# Patient Record
Sex: MALE | Race: BLACK OR AFRICAN AMERICAN | NOT HISPANIC OR LATINO | Employment: OTHER | URBAN - METROPOLITAN AREA
[De-identification: names, ages, dates, MRNs, and addresses within clinical notes are randomized per-mention and may not be internally consistent; named-entity substitution may affect disease eponyms.]

---

## 2023-03-14 LAB
ALBUMIN (G/DL) IN SER/PLAS: NORMAL
ANION GAP IN SER/PLAS: NORMAL
CALCIUM (MG/DL) IN SER/PLAS: NORMAL
CARBON DIOXIDE, TOTAL (MMOL/L) IN SER/PLAS: NORMAL
CHLORIDE (MMOL/L) IN SER/PLAS: NORMAL
CREATININE (MG/DL) IN SER/PLAS: NORMAL
ERYTHROCYTE DISTRIBUTION WIDTH (RATIO) BY AUTOMATED COUNT: NORMAL
ERYTHROCYTE MEAN CORPUSCULAR HEMOGLOBIN CONCENTRATION (G/DL) BY AUTOMATED: NORMAL
ERYTHROCYTE MEAN CORPUSCULAR VOLUME (FL) BY AUTOMATED COUNT: NORMAL
ERYTHROCYTES (10*6/UL) IN BLOOD BY AUTOMATED COUNT: NORMAL
GFR FEMALE: NORMAL
GFR MALE: NORMAL
GLUCOSE (MG/DL) IN SER/PLAS: NORMAL
HEMATOCRIT (%) IN BLOOD BY AUTOMATED COUNT: NORMAL
HEMOGLOBIN (G/DL) IN BLOOD: NORMAL
LEUKOCYTES (10*3/UL) IN BLOOD BY AUTOMATED COUNT: NORMAL
NRBC (PER 100 WBCS) BY AUTOMATED COUNT: NORMAL
PHOSPHATE (MG/DL) IN SER/PLAS: NORMAL
PLATELETS (10*3/UL) IN BLOOD AUTOMATED COUNT: NORMAL
POTASSIUM (MMOL/L) IN SER/PLAS: NORMAL
SODIUM (MMOL/L) IN SER/PLAS: NORMAL
TACROLIMUS (NG/ML) IN BLOOD: NORMAL
UREA NITROGEN (MG/DL) IN SER/PLAS: NORMAL

## 2023-03-15 LAB
ALBUMIN (G/DL) IN SER/PLAS: 4.2 G/DL (ref 3.4–5)
ANION GAP IN SER/PLAS: 16 MMOL/L (ref 10–20)
CALCIUM (MG/DL) IN SER/PLAS: 9.9 MG/DL (ref 8.6–10.3)
CARBON DIOXIDE, TOTAL (MMOL/L) IN SER/PLAS: 28 MMOL/L (ref 21–32)
CHLORIDE (MMOL/L) IN SER/PLAS: 98 MMOL/L (ref 98–107)
CREATININE (MG/DL) IN SER/PLAS: 1.81 MG/DL (ref 0.5–1.3)
ERYTHROCYTE DISTRIBUTION WIDTH (RATIO) BY AUTOMATED COUNT: 13.8 % (ref 11.5–14.5)
ERYTHROCYTE MEAN CORPUSCULAR HEMOGLOBIN CONCENTRATION (G/DL) BY AUTOMATED: 30.7 G/DL (ref 32–36)
ERYTHROCYTE MEAN CORPUSCULAR VOLUME (FL) BY AUTOMATED COUNT: 81 FL (ref 80–100)
ERYTHROCYTES (10*6/UL) IN BLOOD BY AUTOMATED COUNT: 5.46 X10E12/L (ref 4.5–5.9)
GFR MALE: 39 ML/MIN/1.73M2
GLUCOSE (MG/DL) IN SER/PLAS: 110 MG/DL (ref 74–99)
HEMATOCRIT (%) IN BLOOD BY AUTOMATED COUNT: 44.3 % (ref 41–52)
HEMOGLOBIN (G/DL) IN BLOOD: 13.6 G/DL (ref 13.5–17.5)
LEUKOCYTES (10*3/UL) IN BLOOD BY AUTOMATED COUNT: 9.7 X10E9/L (ref 4.4–11.3)
PHOSPHATE (MG/DL) IN SER/PLAS: 2.9 MG/DL (ref 2.5–4.9)
PLATELETS (10*3/UL) IN BLOOD AUTOMATED COUNT: 338 X10E9/L (ref 150–450)
POTASSIUM (MMOL/L) IN SER/PLAS: 3.5 MMOL/L (ref 3.5–5.3)
SODIUM (MMOL/L) IN SER/PLAS: 138 MMOL/L (ref 136–145)
TACROLIMUS (NG/ML) IN BLOOD: 4.4 NG/ML (ref 2–15)
UREA NITROGEN (MG/DL) IN SER/PLAS: 19 MG/DL (ref 6–23)

## 2023-03-20 LAB
RBC, URINE: 7 /HPF (ref 0–5)
WBC, URINE: 25 /HPF (ref 0–5)

## 2023-03-23 LAB — URINE CULTURE: ABNORMAL

## 2023-04-10 LAB
ALBUMIN (G/DL) IN SER/PLAS: 4.1 G/DL (ref 3.4–5)
ANION GAP IN SER/PLAS: 16 MMOL/L (ref 10–20)
CALCIUM (MG/DL) IN SER/PLAS: 9.5 MG/DL (ref 8.6–10.3)
CARBON DIOXIDE, TOTAL (MMOL/L) IN SER/PLAS: 28 MMOL/L (ref 21–32)
CHLORIDE (MMOL/L) IN SER/PLAS: 100 MMOL/L (ref 98–107)
CREATININE (MG/DL) IN SER/PLAS: 1.78 MG/DL (ref 0.5–1.3)
ERYTHROCYTE DISTRIBUTION WIDTH (RATIO) BY AUTOMATED COUNT: 14 % (ref 11.5–14.5)
ERYTHROCYTE MEAN CORPUSCULAR HEMOGLOBIN CONCENTRATION (G/DL) BY AUTOMATED: 30.2 G/DL (ref 32–36)
ERYTHROCYTE MEAN CORPUSCULAR VOLUME (FL) BY AUTOMATED COUNT: 81 FL (ref 80–100)
ERYTHROCYTES (10*6/UL) IN BLOOD BY AUTOMATED COUNT: 5.44 X10E12/L (ref 4.5–5.9)
GFR MALE: 39 ML/MIN/1.73M2
GLUCOSE (MG/DL) IN SER/PLAS: 90 MG/DL (ref 74–99)
HEMATOCRIT (%) IN BLOOD BY AUTOMATED COUNT: 44.3 % (ref 41–52)
HEMOGLOBIN (G/DL) IN BLOOD: 13.4 G/DL (ref 13.5–17.5)
LEUKOCYTES (10*3/UL) IN BLOOD BY AUTOMATED COUNT: 8.8 X10E9/L (ref 4.4–11.3)
PHOSPHATE (MG/DL) IN SER/PLAS: 3.7 MG/DL (ref 2.5–4.9)
PLATELETS (10*3/UL) IN BLOOD AUTOMATED COUNT: 364 X10E9/L (ref 150–450)
POTASSIUM (MMOL/L) IN SER/PLAS: 3.6 MMOL/L (ref 3.5–5.3)
SODIUM (MMOL/L) IN SER/PLAS: 140 MMOL/L (ref 136–145)
TACROLIMUS (NG/ML) IN BLOOD: 3.9 NG/ML (ref 2–15)
UREA NITROGEN (MG/DL) IN SER/PLAS: 17 MG/DL (ref 6–23)

## 2023-05-05 LAB
ALBUMIN (G/DL) IN SER/PLAS: 4.1 G/DL (ref 3.4–5)
ANION GAP IN SER/PLAS: 17 MMOL/L (ref 10–20)
CALCIUM (MG/DL) IN SER/PLAS: 9.6 MG/DL (ref 8.6–10.3)
CARBON DIOXIDE, TOTAL (MMOL/L) IN SER/PLAS: 25 MMOL/L (ref 21–32)
CHLORIDE (MMOL/L) IN SER/PLAS: 102 MMOL/L (ref 98–107)
CREATININE (MG/DL) IN SER/PLAS: 1.65 MG/DL (ref 0.5–1.3)
ERYTHROCYTE DISTRIBUTION WIDTH (RATIO) BY AUTOMATED COUNT: 13.9 % (ref 11.5–14.5)
ERYTHROCYTE MEAN CORPUSCULAR HEMOGLOBIN CONCENTRATION (G/DL) BY AUTOMATED: 30.1 G/DL (ref 32–36)
ERYTHROCYTE MEAN CORPUSCULAR VOLUME (FL) BY AUTOMATED COUNT: 80 FL (ref 80–100)
ERYTHROCYTES (10*6/UL) IN BLOOD BY AUTOMATED COUNT: 5.19 X10E12/L (ref 4.5–5.9)
GFR MALE: 43 ML/MIN/1.73M2
GLUCOSE (MG/DL) IN SER/PLAS: 105 MG/DL (ref 74–99)
HEMATOCRIT (%) IN BLOOD BY AUTOMATED COUNT: 41.5 % (ref 41–52)
HEMOGLOBIN (G/DL) IN BLOOD: 12.5 G/DL (ref 13.5–17.5)
LEUKOCYTES (10*3/UL) IN BLOOD BY AUTOMATED COUNT: 9.6 X10E9/L (ref 4.4–11.3)
PHOSPHATE (MG/DL) IN SER/PLAS: 3.4 MG/DL (ref 2.5–4.9)
PLATELETS (10*3/UL) IN BLOOD AUTOMATED COUNT: 340 X10E9/L (ref 150–450)
POTASSIUM (MMOL/L) IN SER/PLAS: 3.7 MMOL/L (ref 3.5–5.3)
SODIUM (MMOL/L) IN SER/PLAS: 140 MMOL/L (ref 136–145)
TACROLIMUS (NG/ML) IN BLOOD: <2 NG/ML (ref 2–15)
UREA NITROGEN (MG/DL) IN SER/PLAS: 18 MG/DL (ref 6–23)

## 2023-05-11 LAB — TACROLIMUS (NG/ML) IN BLOOD: 11.8 NG/ML (ref 2–15)

## 2023-05-18 LAB
ALBUMIN (G/DL) IN SER/PLAS: 4.2 G/DL (ref 3.4–5)
ANION GAP IN SER/PLAS: 15 MMOL/L (ref 10–20)
CALCIUM (MG/DL) IN SER/PLAS: 9.4 MG/DL (ref 8.6–10.3)
CARBON DIOXIDE, TOTAL (MMOL/L) IN SER/PLAS: 26 MMOL/L (ref 21–32)
CHLORIDE (MMOL/L) IN SER/PLAS: 102 MMOL/L (ref 98–107)
CREATININE (MG/DL) IN SER/PLAS: 1.67 MG/DL (ref 0.5–1.3)
ERYTHROCYTE DISTRIBUTION WIDTH (RATIO) BY AUTOMATED COUNT: 14.1 % (ref 11.5–14.5)
ERYTHROCYTE MEAN CORPUSCULAR HEMOGLOBIN CONCENTRATION (G/DL) BY AUTOMATED: 29.7 G/DL (ref 32–36)
ERYTHROCYTE MEAN CORPUSCULAR VOLUME (FL) BY AUTOMATED COUNT: 81 FL (ref 80–100)
ERYTHROCYTES (10*6/UL) IN BLOOD BY AUTOMATED COUNT: 5.19 X10E12/L (ref 4.5–5.9)
GFR MALE: 43 ML/MIN/1.73M2
GLUCOSE (MG/DL) IN SER/PLAS: 105 MG/DL (ref 74–99)
HEMATOCRIT (%) IN BLOOD BY AUTOMATED COUNT: 41.8 % (ref 41–52)
HEMOGLOBIN (G/DL) IN BLOOD: 12.4 G/DL (ref 13.5–17.5)
LEUKOCYTES (10*3/UL) IN BLOOD BY AUTOMATED COUNT: 8.6 X10E9/L (ref 4.4–11.3)
PHOSPHATE (MG/DL) IN SER/PLAS: 4.1 MG/DL (ref 2.5–4.9)
PLATELETS (10*3/UL) IN BLOOD AUTOMATED COUNT: 313 X10E9/L (ref 150–450)
POTASSIUM (MMOL/L) IN SER/PLAS: 3.6 MMOL/L (ref 3.5–5.3)
SODIUM (MMOL/L) IN SER/PLAS: 139 MMOL/L (ref 136–145)
TACROLIMUS (NG/ML) IN BLOOD: 6.8 NG/ML (ref 2–15)
UREA NITROGEN (MG/DL) IN SER/PLAS: 18 MG/DL (ref 6–23)

## 2023-06-05 LAB
ALBUMIN (G/DL) IN SER/PLAS: 4.2 G/DL (ref 3.4–5)
ANION GAP IN SER/PLAS: 14 MMOL/L (ref 10–20)
CALCIUM (MG/DL) IN SER/PLAS: 9.9 MG/DL (ref 8.6–10.3)
CARBON DIOXIDE, TOTAL (MMOL/L) IN SER/PLAS: 26 MMOL/L (ref 21–32)
CHLORIDE (MMOL/L) IN SER/PLAS: 102 MMOL/L (ref 98–107)
CHOLESTEROL (MG/DL) IN SER/PLAS: 165 MG/DL (ref 0–199)
CHOLESTEROL IN HDL (MG/DL) IN SER/PLAS: 45.6 MG/DL
CHOLESTEROL/HDL RATIO: 3.6
CREATININE (MG/DL) IN SER/PLAS: 1.6 MG/DL (ref 0.5–1.3)
ESTIMATED AVERAGE GLUCOSE FOR HBA1C: 131 MG/DL
GFR MALE: 45 ML/MIN/1.73M2
GLUCOSE (MG/DL) IN SER/PLAS: 107 MG/DL (ref 74–99)
HEMOGLOBIN A1C/HEMOGLOBIN TOTAL IN BLOOD: 6.2 %
LDL: 96 MG/DL (ref 0–99)
PHOSPHATE (MG/DL) IN SER/PLAS: 2.8 MG/DL (ref 2.5–4.9)
POTASSIUM (MMOL/L) IN SER/PLAS: 3.4 MMOL/L (ref 3.5–5.3)
SODIUM (MMOL/L) IN SER/PLAS: 139 MMOL/L (ref 136–145)
TACROLIMUS (NG/ML) IN BLOOD: 5.4 NG/ML (ref 2–15)
TRIGLYCERIDE (MG/DL) IN SER/PLAS: 118 MG/DL (ref 0–149)
UREA NITROGEN (MG/DL) IN SER/PLAS: 15 MG/DL (ref 6–23)
VLDL: 24 MG/DL (ref 0–40)

## 2023-06-28 LAB
ALBUMIN (G/DL) IN SER/PLAS: NORMAL
ANION GAP IN SER/PLAS: NORMAL
CALCIUM (MG/DL) IN SER/PLAS: NORMAL
CARBON DIOXIDE, TOTAL (MMOL/L) IN SER/PLAS: NORMAL
CHLORIDE (MMOL/L) IN SER/PLAS: NORMAL
CREATININE (MG/DL) IN SER/PLAS: NORMAL
CREATININE (MG/DL) IN URINE: 131 MG/DL (ref 20–370)
ERYTHROCYTE DISTRIBUTION WIDTH (RATIO) BY AUTOMATED COUNT: NORMAL
ERYTHROCYTE MEAN CORPUSCULAR HEMOGLOBIN CONCENTRATION (G/DL) BY AUTOMATED: NORMAL
ERYTHROCYTE MEAN CORPUSCULAR VOLUME (FL) BY AUTOMATED COUNT: NORMAL
ERYTHROCYTES (10*6/UL) IN BLOOD BY AUTOMATED COUNT: NORMAL
GFR FEMALE: NORMAL
GFR MALE: NORMAL
GLUCOSE (MG/DL) IN SER/PLAS: NORMAL
HEMATOCRIT (%) IN BLOOD BY AUTOMATED COUNT: NORMAL
HEMOGLOBIN (G/DL) IN BLOOD: NORMAL
LEUKOCYTES (10*3/UL) IN BLOOD BY AUTOMATED COUNT: NORMAL
NRBC (PER 100 WBCS) BY AUTOMATED COUNT: NORMAL
PHOSPHATE (MG/DL) IN SER/PLAS: NORMAL
PLATELETS (10*3/UL) IN BLOOD AUTOMATED COUNT: NORMAL
POTASSIUM (MMOL/L) IN SER/PLAS: NORMAL
PROTEIN (MG/DL) IN URINE: 267 MG/DL (ref 5–25)
PROTEIN/CREATININE (MG/MG) IN URINE: 2.04 MG/MG CREAT (ref 0–0.17)
SODIUM (MMOL/L) IN SER/PLAS: NORMAL
TACROLIMUS (NG/ML) IN BLOOD: NORMAL
UREA NITROGEN (MG/DL) IN SER/PLAS: NORMAL

## 2023-07-06 LAB
ALBUMIN (G/DL) IN SER/PLAS: 4 G/DL (ref 3.4–5)
ANION GAP IN SER/PLAS: 14 MMOL/L (ref 10–20)
CALCIUM (MG/DL) IN SER/PLAS: 9.5 MG/DL (ref 8.6–10.3)
CARBON DIOXIDE, TOTAL (MMOL/L) IN SER/PLAS: 25 MMOL/L (ref 21–32)
CHLORIDE (MMOL/L) IN SER/PLAS: 104 MMOL/L (ref 98–107)
CREATININE (MG/DL) IN SER/PLAS: 1.66 MG/DL (ref 0.5–1.3)
ERYTHROCYTE DISTRIBUTION WIDTH (RATIO) BY AUTOMATED COUNT: 14.6 % (ref 11.5–14.5)
ERYTHROCYTE MEAN CORPUSCULAR HEMOGLOBIN CONCENTRATION (G/DL) BY AUTOMATED: 30.7 G/DL (ref 32–36)
ERYTHROCYTE MEAN CORPUSCULAR VOLUME (FL) BY AUTOMATED COUNT: 79 FL (ref 80–100)
ERYTHROCYTES (10*6/UL) IN BLOOD BY AUTOMATED COUNT: 5.02 X10E12/L (ref 4.5–5.9)
GFR MALE: 43 ML/MIN/1.73M2
GLUCOSE (MG/DL) IN SER/PLAS: 111 MG/DL (ref 74–99)
HEMATOCRIT (%) IN BLOOD BY AUTOMATED COUNT: 39.7 % (ref 41–52)
HEMOGLOBIN (G/DL) IN BLOOD: 12.2 G/DL (ref 13.5–17.5)
LEUKOCYTES (10*3/UL) IN BLOOD BY AUTOMATED COUNT: 7.9 X10E9/L (ref 4.4–11.3)
PHOSPHATE (MG/DL) IN SER/PLAS: 3.3 MG/DL (ref 2.5–4.9)
PLATELETS (10*3/UL) IN BLOOD AUTOMATED COUNT: 313 X10E9/L (ref 150–450)
POTASSIUM (MMOL/L) IN SER/PLAS: 4.1 MMOL/L (ref 3.5–5.3)
SODIUM (MMOL/L) IN SER/PLAS: 139 MMOL/L (ref 136–145)
TACROLIMUS (NG/ML) IN BLOOD: 6.8 NG/ML (ref 2–15)
UREA NITROGEN (MG/DL) IN SER/PLAS: 14 MG/DL (ref 6–23)

## 2023-09-27 LAB
ALBUMIN (G/DL) IN SER/PLAS: 4.4 G/DL (ref 3.4–5)
ANION GAP IN SER/PLAS: 15 MMOL/L (ref 10–20)
APPEARANCE, URINE: CLEAR
BILIRUBIN, URINE: NEGATIVE
BLOOD, URINE: NEGATIVE
CALCIUM (MG/DL) IN SER/PLAS: 9.6 MG/DL (ref 8.6–10.3)
CARBON DIOXIDE, TOTAL (MMOL/L) IN SER/PLAS: 25 MMOL/L (ref 21–32)
CHLORIDE (MMOL/L) IN SER/PLAS: 103 MMOL/L (ref 98–107)
COLOR, URINE: YELLOW
CREATININE (MG/DL) IN SER/PLAS: 1.84 MG/DL (ref 0.5–1.3)
CREATININE (MG/DL) IN URINE: 112 MG/DL (ref 20–370)
ERYTHROCYTE DISTRIBUTION WIDTH (RATIO) BY AUTOMATED COUNT: 16.7 % (ref 11.5–14.5)
ERYTHROCYTE MEAN CORPUSCULAR HEMOGLOBIN CONCENTRATION (G/DL) BY AUTOMATED: 28.8 G/DL (ref 32–36)
ERYTHROCYTE MEAN CORPUSCULAR VOLUME (FL) BY AUTOMATED COUNT: 86 FL (ref 80–100)
ERYTHROCYTES (10*6/UL) IN BLOOD BY AUTOMATED COUNT: 4.05 X10E12/L (ref 4.5–5.9)
GFR MALE: 38 ML/MIN/1.73M2
GLUCOSE (MG/DL) IN SER/PLAS: 114 MG/DL (ref 74–99)
GLUCOSE, URINE: NEGATIVE MG/DL
HEMATOCRIT (%) IN BLOOD BY AUTOMATED COUNT: 34.7 % (ref 41–52)
HEMOGLOBIN (G/DL) IN BLOOD: 10 G/DL (ref 13.5–17.5)
HLA CLASS I SP ANTIBODY IDENTIFICATION, HIGH DEFINITION: NORMAL
HLA CLASS II SP ANTIBODY IDENTIFICATION, HIGH DEFINITION: NORMAL
KETONES, URINE: NEGATIVE MG/DL
LEUKOCYTE ESTERASE, URINE: NEGATIVE
LEUKOCYTES (10*3/UL) IN BLOOD BY AUTOMATED COUNT: 7.6 X10E9/L (ref 4.4–11.3)
MAGNESIUM (MG/DL) IN SER/PLAS: 1.71 MG/DL (ref 1.6–2.4)
NITRITE, URINE: NEGATIVE
PH, URINE: 6 (ref 5–8)
PHOSPHATE (MG/DL) IN SER/PLAS: 3.2 MG/DL (ref 2.5–4.9)
PLATELETS (10*3/UL) IN BLOOD AUTOMATED COUNT: 298 X10E9/L (ref 150–450)
POTASSIUM (MMOL/L) IN SER/PLAS: 3.6 MMOL/L (ref 3.5–5.3)
PROTEIN (MG/DL) IN URINE: 55 MG/DL (ref 5–25)
PROTEIN, URINE: ABNORMAL MG/DL
PROTEIN/CREATININE (MG/MG) IN URINE: 0.49 MG/MG CREAT (ref 0–0.17)
RBC, URINE: <1 /HPF (ref 0–5)
SODIUM (MMOL/L) IN SER/PLAS: 139 MMOL/L (ref 136–145)
SPECIFIC GRAVITY, URINE: 1.01 (ref 1–1.03)
TACROLIMUS (NG/ML) IN BLOOD: 4.7 NG/ML (ref 2–15)
UREA NITROGEN (MG/DL) IN SER/PLAS: 24 MG/DL (ref 6–23)
UROBILINOGEN, URINE: <2 MG/DL (ref 0–1.9)
WBC, URINE: 1 /HPF (ref 0–5)

## 2023-09-28 LAB — URINE CULTURE: NORMAL

## 2023-10-02 ENCOUNTER — HOSPITAL ENCOUNTER (OUTPATIENT)
Dept: CARDIOLOGY | Facility: CLINIC | Age: 73
Discharge: HOME | End: 2023-10-02
Payer: MEDICARE

## 2023-10-02 DIAGNOSIS — I48.91 A-FIB (MULTI): ICD-10-CM

## 2023-10-02 DIAGNOSIS — I48.92 ATRIAL FLUTTER (MULTI): ICD-10-CM

## 2023-10-03 ENCOUNTER — DOCUMENTATION (OUTPATIENT)
Dept: CARDIOLOGY | Facility: CLINIC | Age: 73
End: 2023-10-03
Payer: MEDICARE

## 2023-10-03 NOTE — PROGRESS NOTES
Cardiac Rehab orders received, placed for signature by Dr. Bhupinder MD [facility] and faxed to cardiac rehab with confirmation received.

## 2023-10-09 ENCOUNTER — HOSPITAL ENCOUNTER (OUTPATIENT)
Dept: CARDIOLOGY | Facility: CLINIC | Age: 73
Discharge: HOME | End: 2023-10-09
Payer: MEDICARE

## 2023-10-09 DIAGNOSIS — I48.91 A-FIB (MULTI): ICD-10-CM

## 2023-10-09 DIAGNOSIS — I48.92 ATRIAL FLUTTER (MULTI): ICD-10-CM

## 2023-10-13 ENCOUNTER — HOSPITAL ENCOUNTER (OUTPATIENT)
Dept: CARDIOLOGY | Facility: CLINIC | Age: 73
Discharge: HOME | End: 2023-10-13
Payer: MEDICARE

## 2023-10-13 DIAGNOSIS — I48.92 ATRIAL FLUTTER (MULTI): ICD-10-CM

## 2023-10-13 DIAGNOSIS — I48.91 A-FIB (MULTI): ICD-10-CM

## 2023-10-23 ENCOUNTER — HOSPITAL ENCOUNTER (OUTPATIENT)
Dept: CARDIOLOGY | Facility: CLINIC | Age: 73
Discharge: HOME | End: 2023-10-23
Payer: MEDICARE

## 2023-10-23 DIAGNOSIS — I48.91 A-FIB (MULTI): ICD-10-CM

## 2023-10-23 DIAGNOSIS — I48.92 ATRIAL FLUTTER (MULTI): ICD-10-CM

## 2023-10-30 ENCOUNTER — TELEPHONE (OUTPATIENT)
Dept: TRANSPLANT | Facility: HOSPITAL | Age: 73
End: 2023-10-30
Payer: MEDICARE

## 2023-10-30 ENCOUNTER — FOLLOW-UP (OUTPATIENT)
Dept: TRANSPLANT | Facility: HOSPITAL | Age: 73
End: 2023-10-30
Payer: MEDICARE

## 2023-10-30 VITALS
WEIGHT: 180 LBS | TEMPERATURE: 97.9 F | HEART RATE: 99 BPM | DIASTOLIC BLOOD PRESSURE: 75 MMHG | SYSTOLIC BLOOD PRESSURE: 155 MMHG | BODY MASS INDEX: 26.58 KG/M2 | OXYGEN SATURATION: 94 %

## 2023-10-30 DIAGNOSIS — N39.0 URINARY TRACT INFECTION WITHOUT HEMATURIA, SITE UNSPECIFIED: ICD-10-CM

## 2023-10-30 LAB
APPEARANCE UR: ABNORMAL
BILIRUB UR STRIP.AUTO-MCNC: NEGATIVE MG/DL
COLOR UR: YELLOW
GLUCOSE UR STRIP.AUTO-MCNC: NEGATIVE MG/DL
KETONES UR STRIP.AUTO-MCNC: NEGATIVE MG/DL
LEUKOCYTE ESTERASE UR QL STRIP.AUTO: ABNORMAL
MUCOUS THREADS #/AREA URNS AUTO: ABNORMAL /LPF
NITRITE UR QL STRIP.AUTO: NEGATIVE
PH UR STRIP.AUTO: 9 [PH]
PROT UR STRIP.AUTO-MCNC: ABNORMAL MG/DL
RBC # UR STRIP.AUTO: NEGATIVE /UL
RBC #/AREA URNS AUTO: ABNORMAL /HPF
SP GR UR STRIP.AUTO: 1.01
TRI-PHOS CRY #/AREA UR COMP ASSIST: ABNORMAL /HPF
UROBILINOGEN UR STRIP.AUTO-MCNC: <2 MG/DL
WBC #/AREA URNS AUTO: ABNORMAL /HPF

## 2023-10-30 PROCEDURE — 81001 URINALYSIS AUTO W/SCOPE: CPT

## 2023-10-30 PROCEDURE — 87086 URINE CULTURE/COLONY COUNT: CPT

## 2023-10-30 RX ORDER — CHOLECALCIFEROL (VITAMIN D3) 50 MCG
50 TABLET ORAL DAILY
COMMUNITY
End: 2024-01-16 | Stop reason: ALTCHOICE

## 2023-10-30 RX ORDER — MYCOPHENOLATE MOFETIL 250 MG/1
750 CAPSULE ORAL 2 TIMES DAILY
COMMUNITY
Start: 2020-05-06 | End: 2023-10-30 | Stop reason: SDUPTHER

## 2023-10-30 RX ORDER — PRAVASTATIN SODIUM 40 MG/1
40 TABLET ORAL NIGHTLY
COMMUNITY
End: 2024-04-10

## 2023-10-30 RX ORDER — AMLODIPINE BESYLATE 10 MG/1
10 TABLET ORAL DAILY
COMMUNITY

## 2023-10-30 RX ORDER — LOSARTAN POTASSIUM 25 MG/1
25 TABLET ORAL DAILY
COMMUNITY
Start: 2023-09-10

## 2023-10-30 RX ORDER — FLUTICASONE PROPIONATE 50 MCG
2 SPRAY, SUSPENSION (ML) NASAL DAILY PRN
COMMUNITY
Start: 2022-09-19

## 2023-10-30 RX ORDER — NITROGLYCERIN 0.4 MG/1
0.4 TABLET SUBLINGUAL EVERY 5 MIN PRN
COMMUNITY
Start: 2019-05-14

## 2023-10-30 RX ORDER — FAMOTIDINE 20 MG/1
20 TABLET, FILM COATED ORAL DAILY
COMMUNITY
End: 2024-03-06 | Stop reason: SDUPTHER

## 2023-10-30 RX ORDER — MULTIVITAMIN
TABLET ORAL
COMMUNITY
Start: 2009-08-31

## 2023-10-30 RX ORDER — TORSEMIDE 20 MG/1
1 TABLET ORAL DAILY PRN
COMMUNITY
Start: 2022-08-17 | End: 2023-11-02

## 2023-10-30 RX ORDER — LEVOTHYROXINE SODIUM 150 UG/1
150 TABLET ORAL DAILY
COMMUNITY
Start: 2023-09-03

## 2023-10-30 RX ORDER — PREDNISONE 5 MG/1
5 TABLET ORAL DAILY
COMMUNITY
Start: 2020-05-06 | End: 2023-10-30 | Stop reason: SDUPTHER

## 2023-10-30 RX ORDER — TAMSULOSIN HYDROCHLORIDE 0.4 MG/1
1 CAPSULE ORAL NIGHTLY
COMMUNITY
Start: 2020-05-08 | End: 2023-12-27 | Stop reason: SDUPTHER

## 2023-10-30 RX ORDER — METOPROLOL TARTRATE 50 MG/1
50 TABLET ORAL 2 TIMES DAILY
COMMUNITY
Start: 2023-06-22 | End: 2023-12-18

## 2023-10-30 RX ORDER — TACROLIMUS 0.5 MG/1
0.5 CAPSULE ORAL EVERY 12 HOURS
COMMUNITY
Start: 2023-09-24 | End: 2024-04-30 | Stop reason: ALTCHOICE

## 2023-10-30 RX ORDER — TACROLIMUS 1 MG/1
1 CAPSULE ORAL EVERY 12 HOURS
COMMUNITY
Start: 2020-05-06 | End: 2023-10-30 | Stop reason: SDUPTHER

## 2023-10-30 RX ORDER — ASPIRIN 81 MG/1
81 TABLET ORAL DAILY
COMMUNITY
Start: 2020-11-23

## 2023-10-30 ASSESSMENT — PAIN SCALES - GENERAL: PAINLEVEL: 0-NO PAIN

## 2023-10-30 NOTE — PROGRESS NOTES
Subjective   Dilip Haynes is a 73 y.o. male who underwent kidney transplant on 5/5/2020 (Kidney). Here today for follow-up.  Has dysuria. Has a loop recorder per his cardiologist to evaluate his shortness of breath.    Objective   The home BP readings have been in the 140's to 150's range.  Physical Exam  Constitutional:       Appearance: Normal appearance.   HENT:      Head: Normocephalic.      Mouth/Throat:      Mouth: Mucous membranes are moist.   Cardiovascular:      Rate and Rhythm: Normal rate. Rhythm irregular.      Heart sounds: Murmur heard.   Pulmonary:      Effort: Pulmonary effort is normal.      Breath sounds: Normal breath sounds.   Abdominal:      General: Abdomen is flat.      Palpations: Abdomen is soft.   Musculoskeletal:         General: Normal range of motion.      Cervical back: Normal range of motion.   Skin:     General: Skin is warm and dry.   Neurological:      General: No focal deficit present.      Mental Status: He is alert.   Psychiatric:         Mood and Affect: Mood normal.       Lab Results   Component Value Date    CREATININE 1.84 (H) 09/27/2023    K 3.6 09/27/2023    GLUCOSE 114 (H) 09/27/2023    HCT 34.7 (L) 09/27/2023    WBC 7.6 09/27/2023     09/27/2023    CALCIUM 9.6 09/27/2023     Assessment/Plan   Stable renal allograft function. Therapeutic Tacrolimus levels. No signification proteinuria. BP uncontrolled. Patient wants to monitor for now. Will order UA with culture. Labs every 3 months and return to clinic in 6 monhts.

## 2023-10-30 NOTE — TELEPHONE ENCOUNTER
Discussed ua with Dr. Ramirez and Dr. Dewitt started pt on Cipro 500mg BID for 7 days. Called pt he is aware

## 2023-10-30 NOTE — PATIENT INSTRUCTIONS
Labs every 3 months  Return to see us in 6 months  Testing urine today to rule out a urinary tract infection, sample provided

## 2023-10-31 ENCOUNTER — SPECIALTY PHARMACY (OUTPATIENT)
Dept: PHARMACY | Facility: CLINIC | Age: 73
End: 2023-10-31

## 2023-10-31 ENCOUNTER — PHARMACY VISIT (OUTPATIENT)
Dept: PHARMACY | Facility: CLINIC | Age: 73
End: 2023-10-31
Payer: COMMERCIAL

## 2023-10-31 DIAGNOSIS — Z94.0 KIDNEY REPLACED BY TRANSPLANT (HHS-HCC): Primary | ICD-10-CM

## 2023-10-31 PROCEDURE — RXMED WILLOW AMBULATORY MEDICATION CHARGE

## 2023-11-01 ENCOUNTER — HOSPITAL ENCOUNTER (OUTPATIENT)
Dept: RADIOLOGY | Facility: HOSPITAL | Age: 73
Discharge: HOME | End: 2023-11-01
Payer: MEDICARE

## 2023-11-01 VITALS — BODY MASS INDEX: 26.66 KG/M2 | HEIGHT: 69 IN | WEIGHT: 180 LBS

## 2023-11-01 DIAGNOSIS — A49.9 URINARY TRACT BACTERIAL INFECTIONS: Primary | ICD-10-CM

## 2023-11-01 DIAGNOSIS — N39.0 URINARY TRACT BACTERIAL INFECTIONS: Primary | ICD-10-CM

## 2023-11-01 DIAGNOSIS — I50.30 UNSPECIFIED DIASTOLIC (CONGESTIVE) HEART FAILURE (MULTI): ICD-10-CM

## 2023-11-01 DIAGNOSIS — Z94.0 KIDNEY REPLACED BY TRANSPLANT (HHS-HCC): ICD-10-CM

## 2023-11-01 PROCEDURE — 75561 CARDIAC MRI FOR MORPH W/DYE: CPT

## 2023-11-01 PROCEDURE — 75565 CARD MRI VELOC FLOW MAPPING: CPT | Performed by: INTERNAL MEDICINE

## 2023-11-01 PROCEDURE — A9575 INJ GADOTERATE MEGLUMI 0.1ML: HCPCS | Performed by: STUDENT IN AN ORGANIZED HEALTH CARE EDUCATION/TRAINING PROGRAM

## 2023-11-01 PROCEDURE — 2550000001 HC RX 255 CONTRASTS: Performed by: STUDENT IN AN ORGANIZED HEALTH CARE EDUCATION/TRAINING PROGRAM

## 2023-11-01 PROCEDURE — 75561 CARDIAC MRI FOR MORPH W/DYE: CPT | Performed by: INTERNAL MEDICINE

## 2023-11-01 RX ORDER — CIPROFLOXACIN 500 MG/1
500 TABLET ORAL 2 TIMES DAILY
Qty: 14 TABLET | Refills: 0 | Status: SHIPPED | OUTPATIENT
Start: 2023-11-01 | End: 2023-11-08

## 2023-11-01 RX ORDER — GADOTERATE MEGLUMINE 376.9 MG/ML
32 INJECTION INTRAVENOUS
Status: COMPLETED | OUTPATIENT
Start: 2023-11-01 | End: 2023-11-01

## 2023-11-01 RX ADMIN — GADOTERATE MEGLUMINE 32 ML: 376.9 INJECTION INTRAVENOUS at 10:24

## 2023-11-06 RX ORDER — CIPROFLOXACIN 500 MG/1
500 TABLET ORAL 2 TIMES DAILY
Qty: 14 TABLET | Refills: 0 | Status: SHIPPED | OUTPATIENT
Start: 2023-11-06 | End: 2023-11-13

## 2023-11-08 ENCOUNTER — HOSPITAL ENCOUNTER (OUTPATIENT)
Facility: HOSPITAL | Age: 73
Setting detail: OUTPATIENT SURGERY
Discharge: HOME | End: 2023-11-08
Attending: INTERNAL MEDICINE | Admitting: INTERNAL MEDICINE
Payer: MEDICARE

## 2023-11-08 ENCOUNTER — APPOINTMENT (OUTPATIENT)
Dept: CARDIOLOGY | Facility: HOSPITAL | Age: 73
End: 2023-11-08
Payer: MEDICARE

## 2023-11-08 DIAGNOSIS — I48.91 ATRIAL FIBRILLATION (MULTI): ICD-10-CM

## 2023-11-08 DIAGNOSIS — I48.0 PAROXYSMAL ATRIAL FIBRILLATION (MULTI): ICD-10-CM

## 2023-11-08 DIAGNOSIS — I48.19 ATRIAL FIBRILLATION, PERSISTENT (MULTI): ICD-10-CM

## 2023-11-08 DIAGNOSIS — Z95.9 PRESENCE OF CARDIAC AND VASCULAR IMPLANT AND GRAFT: Primary | ICD-10-CM

## 2023-11-08 PROCEDURE — 2780000003 HC OR 278 NO HCPCS

## 2023-11-08 PROCEDURE — 2500000004 HC RX 250 GENERAL PHARMACY W/ HCPCS (ALT 636 FOR OP/ED): Performed by: INTERNAL MEDICINE

## 2023-11-08 PROCEDURE — 33286 RMVL SUBQ CAR RHYTHM MNTR: CPT | Performed by: INTERNAL MEDICINE

## 2023-11-08 PROCEDURE — 33285 INSJ SUBQ CAR RHYTHM MNTR: CPT

## 2023-11-08 PROCEDURE — 33285 INSJ SUBQ CAR RHYTHM MNTR: CPT | Performed by: INTERNAL MEDICINE

## 2023-11-08 PROCEDURE — C1764 EVENT RECORDER, CARDIAC: HCPCS

## 2023-11-08 RX ORDER — BUPIVACAINE HYDROCHLORIDE 5 MG/ML
INJECTION, SOLUTION EPIDURAL; INTRACAUDAL AS NEEDED
Status: DISCONTINUED | OUTPATIENT
Start: 2023-11-08 | End: 2023-11-08 | Stop reason: HOSPADM

## 2023-11-08 RX ADMIN — BUPIVACAINE HYDROCHLORIDE 20 ML: 5 INJECTION, SOLUTION EPIDURAL; INTRACAUDAL at 08:41

## 2023-11-08 RX ADMIN — BUPIVACAINE HYDROCHLORIDE 20 ML: 5 INJECTION, SOLUTION EPIDURAL; INTRACAUDAL at 08:26

## 2023-11-08 NOTE — POST-PROCEDURE NOTE
Physician Transition of Care Summary  Invasive Cardiovascular Lab    Procedure Date: 11/8/2023  Attending: Panel 1:     * Charbel Lopez - Primary  Panel 2:     * Charbel Lopez - Primary  Resident/Fellow/Other Assistant: Surgeon(s) and Role:    Indications:   Pre-op Diagnosis     * Paroxysmal atrial fibrillation (CMS/HCC) [I48.0]     * Atrial fibrillation (CMS/HCC) [I48.91]    Post-procedure diagnosis:   * No Diagnosis Codes entered *    Procedure(s):   Successful loop recorder explant  Successful loop recorder re-implantation with LINQ II ([id number])      Procedure Findings:   The indications, risks, benefits, alternatives, and details of the procedure were explained to the patient who expressed understanding of the risks including but are not limited to: pain, bleeding, and infection for which the risk is less than 1%. More serious risks, including cardiac perforation and tamponade, vascular trauma, and/or death, for which the overall risk ranges 0.1-1%. After all questions were answered, the patient provided informed and written consent.    The patient was draped in sterile fashion. Local anesthesia was achieved with 1% lidocaine, and a small incision was made above the head of the device. Blunt dissection was performed until the device was visualized, and then the device was removed. The edges of the incision were approximated, and Dermabond was applied. Hemostasis was achieved. The new implantable loop recorder was introduced under the skin. The edges of the incision were approximated with suture x1 and Dermabond with an island dressing was applied. Hemostasis was achieved.    Description of the Procedure:   Successful loop recorder explant  Successful loop recorder re-implantation  The patient tolerated the procedure well with no immediate complications.    Complications:   None    Stents/Implants:   Cardiovascular Implants       Stent    Stent, Ureteral 6.5fr X 12cm Case [id number] - Implanted         Model/Cat number: [id number] : [id number]      As of 9/1/2023       Status: Unknown                              Anticoagulation/Antiplatelet Plan:   No changes    Estimated Blood Loss:   0 mL    Anesthesia: Moderate Sedation Anesthesia Staff: No anesthesia staff entered.    Any Specimen(s) Removed:   No specimens collected during this procedure.    Disposition:   Discharge home today      Electronically signed by: Hugo Flores MD, 11/8/2023 9:35 AM

## 2023-11-08 NOTE — PROGRESS NOTES
Pharmacy Medication History Review    Dilip Haynes is a 73 y.o. male admitted for No Principal Problem: There is no principal problem currently on the Problem List. Please update the Problem List and refresh.. Pharmacy reviewed the patient's [id number] medications and allergies for accuracy.    The list below reflects the updated PTA list. Please review each medication in order reconciliation for additional clarification and justification.  Medications Prior to Admission   Medication Sig Dispense Refill Last Dose    amLODIPine (Norvasc) 10 mg tablet Take 1 tablet (10 mg) by mouth once daily.   11/7/2023    apixaban (Eliquis) 2.5 mg tablet Take 1 tablet (2.5 mg) by mouth 2 times a day.   11/7/2023    aspirin 81 mg EC tablet Take 1 tablet (81 mg) by mouth once daily.       cholecalciferol (Vitamin D3) 50 MCG (2000 UT) tablet Take 1 tablet (50 mcg) by mouth once daily.   11/7/2023    ciprofloxacin (Cipro) 500 mg tablet Take 1 tablet (500 mg) by mouth 2 times a day for 7 days. 14 tablet 0     ciprofloxacin (Cipro) 500 mg tablet Take 1 tablet (500 mg) by mouth 2 times a day for 7 days. 14 tablet 0 11/7/2023    famotidine (Pepcid) 20 mg tablet Take 1 tablet (20 mg) by mouth once daily. as directed   11/7/2023    fluticasone (Flonase) 50 mcg/actuation nasal spray 2 sprays by Does not apply route once daily as needed for allergies.   11/6/2023    levothyroxine (Synthroid, Levoxyl) 150 mcg tablet Take 1 tablet (150 mcg) by mouth once daily.   11/8/2023    losartan (Cozaar) 25 mg tablet Take 1 tablet (25 mg) by mouth once daily.   11/7/2023    metoprolol tartrate (Lopressor) 50 mg tablet Take 1 tablet by mouth twice a day.   11/7/2023    multivitamin tablet Take by mouth.   11/7/2023    mycophenolate (Cellcept) 250 mg capsule TAKE THREE (3) CAPSULES BY MOUTH 2 TIMES DAILY. 540 capsule 3 11/7/2023    nitroglycerin (Nitrostat) 0.4 mg SL tablet Place 1 tablet (0.4 mg) under the tongue every 5 minutes if needed for  chest pain.       pravastatin (Pravachol) 40 mg tablet Take 1 tablet (40 mg) by mouth once daily at bedtime.   11/7/2023    predniSONE (Deltasone) 5 mg tablet TAKE ONE (1) TABLET BY MOUTH ONCE DAILY. 60 tablet 11 11/7/2023    tacrolimus (Prograf) 0.5 mg capsule Take 1 capsule (0.5 mg) by mouth every 12 hours. Take with 1 mg capsule for a total daily dose of 1.5 mg BID       tacrolimus (Prograf) 1 mg capsule TAKE ONE (1) CAPSULE BY MOUTH EVERY 12 HOURS. 120 capsule 11     tamsulosin (Flomax) 0.4 mg 24 hr capsule Take 1 capsule (0.4 mg) by mouth once daily at bedtime.   11/7/2023    torsemide (Demadex) 20 mg tablet TAKE 1 TABLET BY MOUTH DAILY AS NEEDED FOR SWELLING 90 tablet 2         The list below reflectives the updated allergy list. Please review each documented allergy for additional clarification and justification.  Allergies  Reviewed by Niyah Newman PharmD on 11/8/2023        Severity Reactions Comments    Lovastatin Not Specified Myalgia Leg cramps Muscle cramps    Simvastatin Not Specified Myalgia Muscle cramps    Adhesive Low Rash     Iodine Low Rash     Naproxen Low Rash           Sources used: Pharmacy dispense history, OARRs, patient interview (good historian- knew most drug names, dose and frequencies), and reviewed most recent note from transplant 10/30    Below are additional concerns with the patient's PTA list.  -note that pt states he takes torsemide daily and adds additional tablet if pt is experiencing edema (could not find documentation to take this way)  -per pt, finished up cipro doses yesterday after being diagnosed with a UTI    Niyah Newman PharmD  Transitions of Care Pharmacist  Medication reconciliation complete  Please reach out via [employer] for questions, or if no response call [employer] or [employer]   [facility]

## 2023-11-08 NOTE — H&P
History Of Present Illness  Dilip Haynes is a 73 y.o. male presenting for his planned ILR explant and re-implant    Dilip Haynes is a 73 y/o male presenting for follow-up for AF/AFL      PMH includes HTN, ESRD (2/2 HTN) s/p kidney transplant (5/5/20), CAD s/p PCI's, HFpEF (f/w [place]), carotid stenosis s/p R CEA, PAD s/p LFA stent, renal artery stenosis s/p renal stents (2014), ESRD s/p DDKT (5/5/20), Raynauds s/p finger amputations, intra-op cardiac arrest (5/2020) and AF.      Treatment of his AF includes metoprolol, DCCV & ILR implant (6/2020).   Symptoms of his AF includes irregular heartbeat and SOB     Now s/p Afib RFA with Dr. Lopez 11/1/2022  ILR readings 10/3-11/11/2022- 11% Afib burden, only 1 episode since his ablation on 11/10 for 16 minutes, no elevated rates.  ECG 12/2/2022 NSR HR 66 bpm ST and T wave abnormality  Patient unfortunately is having trouble transmitting his loop recorder from home and is awaiting a device to fix the problem  Last Transmission 12/9/2022 showed his last Afib episode was 12/2 and it lasted for 12 minutes  ECG 2/3/2023- NSR HR 80 bpm, ST/T wave abnormality  Remote transmission 8/4/2023 Since last transmission, 0 pt activated symptoms, 0 marisol/ 1 pauses (duration EGM shows undersensing and noise with zoom amplitude SR 83 then to SB 50 bpm ), 0 AT/2 AF ( duration 6-10 min; EGMs show probable SR with ectopy; AT/AF burden 4.45%- pt remains on elqiuis and metoprolol per AEMR ) , and 0 tachy detections within parameter zones.   ECG 8/4/2023 NSR HR 72 bpm     TODAY Patient is doing well and has no complaints. He denies any palpitations, dizziness, syncope, chest pain, and SOB. He is taking his Torsemide daily, even though it is labeled for PRN. He is currently in cardiac rehab and doing well. His Eliquis and Metoprolol were adjusted and he denies any further bleeding issues on the lower dose of ELiquis ..     Past Medical History  Past Medical History:   Diagnosis Date     Encounter for other preprocedural examination 05/10/2020    Pre-transplant evaluation for kidney transplant    Old myocardial infarction 01/19/2022    H/O non-ST elevation myocardial infarction (NSTEMI)    Personal history of other diseases of the circulatory system 12/22/2015    History of stenosis of renal artery    Personal history of other diseases of the circulatory system 04/13/2021    History of carotid artery stenosis    Personal history of other diseases of the circulatory system 01/19/2022    History of essential hypertension    Personal history of other diseases of the circulatory system 07/23/2016    History of claudication    Personal history of other endocrine, nutritional and metabolic disease 12/22/2015    History of thyroid disease    Raynaud's syndrome without gangrene 12/22/2015    Raynauds disease       Surgical History  Past Surgical History:   Procedure Laterality Date    CAROTID ENDARTERECTOMY  12/11/2021    Carotid Thromboendarterectomy    KNEE SURGERY  09/12/2019    Knee Surgery    OTHER SURGICAL HISTORY  01/19/2022    Kidney transplantation    OTHER SURGICAL HISTORY  09/22/2020    Transcath Intravascular Stent Placement Percutaneous Femoral    OTHER SURGICAL HISTORY  01/19/2022    Peritoneal Dialysis Catheter    TOTAL THYROIDECTOMY  12/11/2021    Thyroid Surgery Total Thyroidectomy        Social History  He has no history on file for tobacco use, alcohol use, and drug use.    Family History  No family history on file.     Allergies  Lovastatin, Simvastatin, Adhesive, Iodine, and Naproxen    Review of Systems   All other systems reviewed and are negative.       Physical Exam  Vitals reviewed.   Constitutional:       Appearance: Normal appearance.   HENT:      Head: Normocephalic and atraumatic.      Nose: Nose normal.      Mouth/Throat:      Mouth: Mucous membranes are moist.   Eyes:      Pupils: Pupils are equal, round, and reactive to light.   Cardiovascular:      Rate and Rhythm: Normal  rate and regular rhythm.      Pulses: Normal pulses.      Heart sounds: Normal heart sounds.   Pulmonary:      Effort: Pulmonary effort is normal.      Breath sounds: Normal breath sounds.   Abdominal:      General: Abdomen is flat.   Musculoskeletal:         General: Normal range of motion.      Cervical back: Normal range of motion and neck supple.   Skin:     General: Skin is warm.   Neurological:      Mental Status: He is alert.          Last Recorded Vitals  There were no vitals taken for this visit.    Relevant Results         Assessment/Plan   Active Problems:  There are no active Hospital Problems.    We will perform ILR explant and re-implant today.       I spent 20 minutes in the professional and overall care of this patient.      Hugo Flores MD

## 2023-11-20 ENCOUNTER — HOSPITAL ENCOUNTER (OUTPATIENT)
Dept: CARDIOLOGY | Facility: CLINIC | Age: 73
Discharge: HOME | End: 2023-11-20
Payer: MEDICARE

## 2023-11-20 ENCOUNTER — LAB (OUTPATIENT)
Dept: LAB | Facility: LAB | Age: 73
End: 2023-11-20
Payer: MEDICARE

## 2023-11-20 DIAGNOSIS — I48.92 ATRIAL FLUTTER (MULTI): ICD-10-CM

## 2023-11-20 DIAGNOSIS — I48.91 A-FIB (MULTI): ICD-10-CM

## 2023-11-20 DIAGNOSIS — Z94.0 KIDNEY REPLACED BY TRANSPLANT (HHS-HCC): ICD-10-CM

## 2023-11-20 LAB
ALBUMIN SERPL BCP-MCNC: 4.3 G/DL (ref 3.4–5)
ANION GAP SERPL CALC-SCNC: 14 MMOL/L (ref 10–20)
BUN SERPL-MCNC: 19 MG/DL (ref 6–23)
CALCIUM SERPL-MCNC: 9.7 MG/DL (ref 8.6–10.3)
CHLORIDE SERPL-SCNC: 100 MMOL/L (ref 98–107)
CO2 SERPL-SCNC: 26 MMOL/L (ref 21–32)
CREAT SERPL-MCNC: 1.64 MG/DL (ref 0.5–1.3)
ERYTHROCYTE [DISTWIDTH] IN BLOOD BY AUTOMATED COUNT: 14.2 % (ref 11.5–14.5)
GFR SERPL CREATININE-BSD FRML MDRD: 43 ML/MIN/1.73M*2
GLUCOSE SERPL-MCNC: 100 MG/DL (ref 74–99)
HCT VFR BLD AUTO: 41.2 % (ref 41–52)
HGB BLD-MCNC: 12.4 G/DL (ref 13.5–17.5)
MCH RBC QN AUTO: 23.9 PG (ref 26–34)
MCHC RBC AUTO-ENTMCNC: 30.1 G/DL (ref 32–36)
MCV RBC AUTO: 79 FL (ref 80–100)
NRBC BLD-RTO: 0 /100 WBCS (ref 0–0)
PHOSPHATE SERPL-MCNC: 3 MG/DL (ref 2.5–4.9)
PLATELET # BLD AUTO: 356 X10*3/UL (ref 150–450)
POTASSIUM SERPL-SCNC: 3.5 MMOL/L (ref 3.5–5.3)
RBC # BLD AUTO: 5.19 X10*6/UL (ref 4.5–5.9)
SODIUM SERPL-SCNC: 136 MMOL/L (ref 136–145)
WBC # BLD AUTO: 9.6 X10*3/UL (ref 4.4–11.3)

## 2023-11-20 PROCEDURE — 80197 ASSAY OF TACROLIMUS: CPT

## 2023-11-20 PROCEDURE — 87799 DETECT AGENT NOS DNA QUANT: CPT

## 2023-11-20 PROCEDURE — 85027 COMPLETE CBC AUTOMATED: CPT

## 2023-11-20 PROCEDURE — 36415 COLL VENOUS BLD VENIPUNCTURE: CPT

## 2023-11-20 PROCEDURE — 80069 RENAL FUNCTION PANEL: CPT

## 2023-11-20 PROCEDURE — 82652 VIT D 1 25-DIHYDROXY: CPT

## 2023-11-20 PROCEDURE — 83970 ASSAY OF PARATHORMONE: CPT

## 2023-11-21 ENCOUNTER — LAB (OUTPATIENT)
Dept: LAB | Facility: LAB | Age: 73
End: 2023-11-21
Payer: MEDICARE

## 2023-11-21 DIAGNOSIS — Z94.0 KIDNEY REPLACED BY TRANSPLANT (HHS-HCC): ICD-10-CM

## 2023-11-21 LAB
BKV DNA SERPL NAA+PROBE-LOG#: NORMAL {LOG_COPIES}/ML
CREAT UR-MCNC: 128 MG/DL (ref 20–370)
LABORATORY COMMENT REPORT: NOT DETECTED
PROT UR-ACNC: 119 MG/DL (ref 5–25)
PROT/CREAT UR: 0.93 MG/MG CREAT (ref 0–0.17)
PTH-INTACT SERPL-MCNC: 161.2 PG/ML (ref 18.5–88)
TACROLIMUS BLD-MCNC: 5.9 NG/ML

## 2023-11-21 PROCEDURE — 84156 ASSAY OF PROTEIN URINE: CPT

## 2023-11-21 PROCEDURE — 82570 ASSAY OF URINE CREATININE: CPT

## 2023-11-22 LAB
1,25(OH)2D3 SERPL-MCNC: 89.8 PG/ML (ref 19.9–79.3)
ALLOSURE SCORE - KIDNEY: 0.1 %
CAREDX_ORDER_ID: NORMAL
CENTER_ORDER_ID: NORMAL
CLIENT SPECIMEN ID - ALLOSURE: NORMAL
DONOR RELATION - ALLOSURE: NORMAL
NOTES - ALLOSURE: NORMAL
RELATIVE CHANGE VALUE - KIDNEY: NORMAL %
TEST COMMENTS - ALLOSURE: NORMAL
TIME POST TX - ALLOSURE: NORMAL
TRANSPLANTED ORGAN - ALLOSURE: NORMAL
TX DATE - ALLOSURE/ALLOMAP: NORMAL
WP_ORDER_ID: NORMAL

## 2023-12-01 ENCOUNTER — HOSPITAL ENCOUNTER (OUTPATIENT)
Dept: CARDIOLOGY | Facility: CLINIC | Age: 73
Discharge: HOME | End: 2023-12-01
Payer: MEDICARE

## 2023-12-01 DIAGNOSIS — I48.92 ATRIAL FLUTTER (MULTI): ICD-10-CM

## 2023-12-01 DIAGNOSIS — I48.91 A-FIB (MULTI): ICD-10-CM

## 2023-12-05 RX ORDER — METOPROLOL TARTRATE 100 MG/1
TABLET ORAL
Qty: 180 TABLET | Refills: 1 | Status: SHIPPED | OUTPATIENT
Start: 2023-12-05 | End: 2023-12-18 | Stop reason: WASHOUT

## 2023-12-17 DIAGNOSIS — I10 HYPERTENSION, UNSPECIFIED TYPE: Primary | ICD-10-CM

## 2023-12-18 ENCOUNTER — HOSPITAL ENCOUNTER (OUTPATIENT)
Dept: CARDIOLOGY | Facility: CLINIC | Age: 73
Discharge: HOME | End: 2023-12-18
Payer: MEDICARE

## 2023-12-18 ENCOUNTER — SPECIALTY PHARMACY (OUTPATIENT)
Dept: PHARMACY | Facility: CLINIC | Age: 73
End: 2023-12-18

## 2023-12-18 DIAGNOSIS — I48.91 A-FIB (MULTI): ICD-10-CM

## 2023-12-18 DIAGNOSIS — I48.92 ATRIAL FLUTTER (MULTI): ICD-10-CM

## 2023-12-18 RX ORDER — TACROLIMUS 1 MG/1
1 CAPSULE ORAL EVERY 12 HOURS
Qty: 120 CAPSULE | Refills: 11 | Status: CANCELLED | OUTPATIENT
Start: 2023-12-18 | End: 2024-12-16

## 2023-12-18 RX ORDER — METOPROLOL TARTRATE 50 MG/1
50 TABLET ORAL 2 TIMES DAILY
Qty: 180 TABLET | Refills: 3 | Status: SHIPPED | OUTPATIENT
Start: 2023-12-18

## 2023-12-27 DIAGNOSIS — N40.0 BENIGN PROSTATIC HYPERPLASIA, UNSPECIFIED WHETHER LOWER URINARY TRACT SYMPTOMS PRESENT: Primary | ICD-10-CM

## 2023-12-27 RX ORDER — TAMSULOSIN HYDROCHLORIDE 0.4 MG/1
0.4 CAPSULE ORAL NIGHTLY
Qty: 30 CAPSULE | Refills: 11 | Status: SHIPPED | OUTPATIENT
Start: 2023-12-27 | End: 2024-02-07 | Stop reason: SDUPTHER

## 2023-12-31 DIAGNOSIS — Z94.0 KIDNEY REPLACED BY TRANSPLANT (HHS-HCC): Primary | ICD-10-CM

## 2024-01-08 ENCOUNTER — TELEPHONE (OUTPATIENT)
Dept: TRANSPLANT | Facility: HOSPITAL | Age: 74
End: 2024-01-08
Payer: MEDICARE

## 2024-01-09 ENCOUNTER — LAB (OUTPATIENT)
Dept: LAB | Facility: LAB | Age: 74
End: 2024-01-09
Payer: MEDICARE

## 2024-01-09 DIAGNOSIS — Z94.0 KIDNEY REPLACED BY TRANSPLANT (HHS-HCC): ICD-10-CM

## 2024-01-09 LAB
ALBUMIN SERPL BCP-MCNC: 4.3 G/DL (ref 3.4–5)
ANION GAP SERPL CALC-SCNC: 14 MMOL/L (ref 10–20)
BUN SERPL-MCNC: 21 MG/DL (ref 6–23)
CALCIUM SERPL-MCNC: 9.3 MG/DL (ref 8.6–10.3)
CHLORIDE SERPL-SCNC: 103 MMOL/L (ref 98–107)
CO2 SERPL-SCNC: 25 MMOL/L (ref 21–32)
CREAT SERPL-MCNC: 1.65 MG/DL (ref 0.5–1.3)
EGFRCR SERPLBLD CKD-EPI 2021: 43 ML/MIN/1.73M*2
ERYTHROCYTE [DISTWIDTH] IN BLOOD BY AUTOMATED COUNT: 15.5 % (ref 11.5–14.5)
GLUCOSE SERPL-MCNC: 140 MG/DL (ref 74–99)
HCT VFR BLD AUTO: 35.4 % (ref 41–52)
HGB BLD-MCNC: 10.6 G/DL (ref 13.5–17.5)
MCH RBC QN AUTO: 23.6 PG (ref 26–34)
MCHC RBC AUTO-ENTMCNC: 29.9 G/DL (ref 32–36)
MCV RBC AUTO: 79 FL (ref 80–100)
NRBC BLD-RTO: 0 /100 WBCS (ref 0–0)
PHOSPHATE SERPL-MCNC: 3.1 MG/DL (ref 2.5–4.9)
PLATELET # BLD AUTO: 307 X10*3/UL (ref 150–450)
POTASSIUM SERPL-SCNC: 4 MMOL/L (ref 3.5–5.3)
PTH-INTACT SERPL-MCNC: 207.1 PG/ML (ref 18.5–88)
RBC # BLD AUTO: 4.49 X10*6/UL (ref 4.5–5.9)
SODIUM SERPL-SCNC: 138 MMOL/L (ref 136–145)
TACROLIMUS BLD-MCNC: 9.8 NG/ML
WBC # BLD AUTO: 8.8 X10*3/UL (ref 4.4–11.3)

## 2024-01-09 PROCEDURE — 80197 ASSAY OF TACROLIMUS: CPT

## 2024-01-09 PROCEDURE — 85027 COMPLETE CBC AUTOMATED: CPT

## 2024-01-09 PROCEDURE — 83970 ASSAY OF PARATHORMONE: CPT

## 2024-01-09 PROCEDURE — 87799 DETECT AGENT NOS DNA QUANT: CPT

## 2024-01-09 PROCEDURE — 80069 RENAL FUNCTION PANEL: CPT

## 2024-01-09 PROCEDURE — 36415 COLL VENOUS BLD VENIPUNCTURE: CPT

## 2024-01-09 PROCEDURE — 82652 VIT D 1 25-DIHYDROXY: CPT

## 2024-01-10 ENCOUNTER — TELEPHONE (OUTPATIENT)
Dept: TRANSPLANT | Facility: HOSPITAL | Age: 74
End: 2024-01-10

## 2024-01-10 ENCOUNTER — LAB (OUTPATIENT)
Dept: LAB | Facility: LAB | Age: 74
End: 2024-01-10
Payer: MEDICARE

## 2024-01-10 ENCOUNTER — APPOINTMENT (OUTPATIENT)
Dept: LAB | Facility: LAB | Age: 74
End: 2024-01-10
Payer: MEDICARE

## 2024-01-10 DIAGNOSIS — Z94.0 KIDNEY REPLACED BY TRANSPLANT (HHS-HCC): ICD-10-CM

## 2024-01-10 LAB
BKV DNA SERPL NAA+PROBE-LOG#: NORMAL {LOG_COPIES}/ML
CREAT UR-MCNC: 62.9 MG/DL (ref 20–370)
LABORATORY COMMENT REPORT: NOT DETECTED
PROT UR-ACNC: 18 MG/DL (ref 5–25)
PROT/CREAT UR: 0.29 MG/MG CREAT (ref 0–0.17)

## 2024-01-10 PROCEDURE — 84156 ASSAY OF PROTEIN URINE: CPT

## 2024-01-10 PROCEDURE — 82570 ASSAY OF URINE CREATININE: CPT

## 2024-01-10 RX ORDER — TACROLIMUS 1 MG/1
1 CAPSULE ORAL EVERY 12 HOURS
Qty: 120 CAPSULE | Refills: 11 | Status: CANCELLED | OUTPATIENT
Start: 2023-12-18 | End: 2024-12-16

## 2024-01-10 NOTE — TELEPHONE ENCOUNTER
Reviewed labs with dr. Ramirez no changes not true trough repeat tac next week. Pt is aware and will following 12 hours between.

## 2024-01-11 ENCOUNTER — SPECIALTY PHARMACY (OUTPATIENT)
Dept: PHARMACY | Facility: CLINIC | Age: 74
End: 2024-01-11

## 2024-01-11 ENCOUNTER — PHARMACY VISIT (OUTPATIENT)
Dept: PHARMACY | Facility: CLINIC | Age: 74
End: 2024-01-11
Payer: COMMERCIAL

## 2024-01-11 PROCEDURE — RXMED WILLOW AMBULATORY MEDICATION CHARGE

## 2024-01-11 RX ORDER — TACROLIMUS 1 MG/1
1 CAPSULE ORAL EVERY 12 HOURS
Qty: 120 CAPSULE | Refills: 11 | Status: CANCELLED | OUTPATIENT
Start: 2023-12-18 | End: 2024-12-16

## 2024-01-12 LAB — 1,25(OH)2D3 SERPL-MCNC: 110 PG/ML (ref 19.9–79.3)

## 2024-01-16 ENCOUNTER — TELEPHONE (OUTPATIENT)
Dept: TRANSPLANT | Facility: HOSPITAL | Age: 74
End: 2024-01-16
Payer: MEDICARE

## 2024-01-16 NOTE — TELEPHONE ENCOUNTER
Vit D 110 patient taking daily 2,000 international units .   Per Dr. Boland patient to stop taking at this time  Called and updated patient.

## 2024-01-17 ENCOUNTER — TELEPHONE (OUTPATIENT)
Dept: CARDIOLOGY | Facility: HOSPITAL | Age: 74
End: 2024-01-17
Payer: MEDICARE

## 2024-01-17 ENCOUNTER — LAB (OUTPATIENT)
Dept: LAB | Facility: LAB | Age: 74
End: 2024-01-17
Payer: MEDICARE

## 2024-01-17 DIAGNOSIS — Z94.0 KIDNEY REPLACED BY TRANSPLANT (HHS-HCC): ICD-10-CM

## 2024-01-17 LAB
ALBUMIN SERPL BCP-MCNC: 4.5 G/DL (ref 3.4–5)
ANION GAP SERPL CALC-SCNC: 14 MMOL/L (ref 10–20)
BUN SERPL-MCNC: 24 MG/DL (ref 6–23)
CALCIUM SERPL-MCNC: 9.6 MG/DL (ref 8.6–10.3)
CHLORIDE SERPL-SCNC: 100 MMOL/L (ref 98–107)
CO2 SERPL-SCNC: 27 MMOL/L (ref 21–32)
CREAT SERPL-MCNC: 1.77 MG/DL (ref 0.5–1.3)
EGFRCR SERPLBLD CKD-EPI 2021: 40 ML/MIN/1.73M*2
ERYTHROCYTE [DISTWIDTH] IN BLOOD BY AUTOMATED COUNT: 15.7 % (ref 11.5–14.5)
GLUCOSE SERPL-MCNC: 123 MG/DL (ref 74–99)
HCT VFR BLD AUTO: 36.7 % (ref 41–52)
HGB BLD-MCNC: 10.8 G/DL (ref 13.5–17.5)
MCH RBC QN AUTO: 23.8 PG (ref 26–34)
MCHC RBC AUTO-ENTMCNC: 29.4 G/DL (ref 32–36)
MCV RBC AUTO: 81 FL (ref 80–100)
NRBC BLD-RTO: 0 /100 WBCS (ref 0–0)
PHOSPHATE SERPL-MCNC: 3.1 MG/DL (ref 2.5–4.9)
PLATELET # BLD AUTO: 296 X10*3/UL (ref 150–450)
POTASSIUM SERPL-SCNC: 3.8 MMOL/L (ref 3.5–5.3)
RBC # BLD AUTO: 4.53 X10*6/UL (ref 4.5–5.9)
SODIUM SERPL-SCNC: 137 MMOL/L (ref 136–145)
TACROLIMUS BLD-MCNC: 14.2 NG/ML
WBC # BLD AUTO: 9.6 X10*3/UL (ref 4.4–11.3)

## 2024-01-17 PROCEDURE — 80069 RENAL FUNCTION PANEL: CPT

## 2024-01-17 PROCEDURE — 80197 ASSAY OF TACROLIMUS: CPT

## 2024-01-17 PROCEDURE — 85027 COMPLETE CBC AUTOMATED: CPT

## 2024-01-17 PROCEDURE — 36415 COLL VENOUS BLD VENIPUNCTURE: CPT

## 2024-01-18 ENCOUNTER — TELEPHONE (OUTPATIENT)
Dept: TRANSPLANT | Facility: HOSPITAL | Age: 74
End: 2024-01-18
Payer: MEDICARE

## 2024-01-18 DIAGNOSIS — Z94.0 KIDNEY REPLACED BY TRANSPLANT (HHS-HCC): ICD-10-CM

## 2024-01-18 NOTE — TELEPHONE ENCOUNTER
Called pt, no answer lvm.   After reviewed labs with Dr. Mckinney. FK is 14.2 from 9.2. will start envarsus 0.75mg daily, script was sent to [facility]  For now hold one dose, restart Tac 0.5mg bid repeat labs next week.   
no

## 2024-01-19 ENCOUNTER — SPECIALTY PHARMACY (OUTPATIENT)
Dept: PHARMACY | Facility: CLINIC | Age: 74
End: 2024-01-19

## 2024-01-19 RX ORDER — TACROLIMUS 1 MG/1
1 CAPSULE ORAL EVERY 12 HOURS
Qty: 120 CAPSULE | Refills: 11 | Status: CANCELLED | OUTPATIENT
Start: 2023-12-18 | End: 2024-12-16

## 2024-01-22 ENCOUNTER — HOSPITAL ENCOUNTER (OUTPATIENT)
Dept: CARDIOLOGY | Facility: CLINIC | Age: 74
Discharge: HOME | End: 2024-01-22
Payer: MEDICARE

## 2024-01-22 DIAGNOSIS — I48.91 A-FIB (MULTI): ICD-10-CM

## 2024-01-22 DIAGNOSIS — I48.92 ATRIAL FLUTTER (MULTI): ICD-10-CM

## 2024-01-23 ENCOUNTER — TELEPHONE (OUTPATIENT)
Dept: TRANSPLANT | Facility: HOSPITAL | Age: 74
End: 2024-01-23
Payer: MEDICARE

## 2024-01-24 ENCOUNTER — LAB (OUTPATIENT)
Dept: LAB | Facility: LAB | Age: 74
End: 2024-01-24
Payer: MEDICARE

## 2024-01-24 DIAGNOSIS — Z94.0 KIDNEY REPLACED BY TRANSPLANT (HHS-HCC): ICD-10-CM

## 2024-01-24 LAB
ALBUMIN SERPL BCP-MCNC: 4.3 G/DL (ref 3.4–5)
ANION GAP SERPL CALC-SCNC: 12 MMOL/L (ref 10–20)
BUN SERPL-MCNC: 23 MG/DL (ref 6–23)
CALCIUM SERPL-MCNC: 9.8 MG/DL (ref 8.6–10.3)
CHLORIDE SERPL-SCNC: 100 MMOL/L (ref 98–107)
CO2 SERPL-SCNC: 29 MMOL/L (ref 21–32)
CREAT SERPL-MCNC: 1.89 MG/DL (ref 0.5–1.3)
EGFRCR SERPLBLD CKD-EPI 2021: 37 ML/MIN/1.73M*2
ERYTHROCYTE [DISTWIDTH] IN BLOOD BY AUTOMATED COUNT: 15.5 % (ref 11.5–14.5)
GLUCOSE SERPL-MCNC: 106 MG/DL (ref 74–99)
HCT VFR BLD AUTO: 36.8 % (ref 41–52)
HGB BLD-MCNC: 11 G/DL (ref 13.5–17.5)
MCH RBC QN AUTO: 24.2 PG (ref 26–34)
MCHC RBC AUTO-ENTMCNC: 29.9 G/DL (ref 32–36)
MCV RBC AUTO: 81 FL (ref 80–100)
NRBC BLD-RTO: 0 /100 WBCS (ref 0–0)
PHOSPHATE SERPL-MCNC: 3.3 MG/DL (ref 2.5–4.9)
PLATELET # BLD AUTO: 289 X10*3/UL (ref 150–450)
POTASSIUM SERPL-SCNC: 4 MMOL/L (ref 3.5–5.3)
PTH-INTACT SERPL-MCNC: 240.8 PG/ML (ref 18.5–88)
RBC # BLD AUTO: 4.55 X10*6/UL (ref 4.5–5.9)
SODIUM SERPL-SCNC: 137 MMOL/L (ref 136–145)
WBC # BLD AUTO: 8.8 X10*3/UL (ref 4.4–11.3)

## 2024-01-24 PROCEDURE — 87799 DETECT AGENT NOS DNA QUANT: CPT

## 2024-01-24 PROCEDURE — 85027 COMPLETE CBC AUTOMATED: CPT

## 2024-01-24 PROCEDURE — 80197 ASSAY OF TACROLIMUS: CPT

## 2024-01-24 PROCEDURE — 83970 ASSAY OF PARATHORMONE: CPT

## 2024-01-24 PROCEDURE — 80069 RENAL FUNCTION PANEL: CPT

## 2024-01-24 PROCEDURE — 36415 COLL VENOUS BLD VENIPUNCTURE: CPT

## 2024-01-25 LAB
BKV DNA SERPL NAA+PROBE-LOG#: NORMAL {LOG_COPIES}/ML
LABORATORY COMMENT REPORT: NOT DETECTED
TACROLIMUS BLD-MCNC: 5.4 NG/ML

## 2024-01-26 ENCOUNTER — HOSPITAL ENCOUNTER (OUTPATIENT)
Dept: CARDIOLOGY | Facility: CLINIC | Age: 74
Discharge: HOME | End: 2024-01-26
Payer: MEDICARE

## 2024-01-26 ENCOUNTER — LAB (OUTPATIENT)
Dept: LAB | Facility: LAB | Age: 74
End: 2024-01-26
Payer: MEDICARE

## 2024-01-26 DIAGNOSIS — I48.91 A-FIB (MULTI): ICD-10-CM

## 2024-01-26 DIAGNOSIS — Z94.0 KIDNEY REPLACED BY TRANSPLANT (HHS-HCC): ICD-10-CM

## 2024-01-26 DIAGNOSIS — I48.92 ATRIAL FLUTTER (MULTI): ICD-10-CM

## 2024-01-26 LAB
CREAT UR-MCNC: 64.1 MG/DL (ref 20–370)
PROT UR-ACNC: 102 MG/DL (ref 5–25)
PROT/CREAT UR: 1.59 MG/MG CREAT (ref 0–0.17)

## 2024-01-26 PROCEDURE — 84156 ASSAY OF PROTEIN URINE: CPT

## 2024-01-26 PROCEDURE — 82570 ASSAY OF URINE CREATININE: CPT

## 2024-01-29 ENCOUNTER — HOSPITAL ENCOUNTER (OUTPATIENT)
Dept: CARDIOLOGY | Facility: CLINIC | Age: 74
Discharge: HOME | End: 2024-01-29
Payer: MEDICARE

## 2024-01-29 DIAGNOSIS — I48.92 ATRIAL FLUTTER (MULTI): ICD-10-CM

## 2024-01-29 DIAGNOSIS — I48.91 A-FIB (MULTI): ICD-10-CM

## 2024-01-30 ENCOUNTER — TELEPHONE (OUTPATIENT)
Dept: TRANSPLANT | Facility: HOSPITAL | Age: 74
End: 2024-01-30
Payer: MEDICARE

## 2024-01-31 ENCOUNTER — SPECIALTY PHARMACY (OUTPATIENT)
Dept: PHARMACY | Facility: CLINIC | Age: 74
End: 2024-01-31

## 2024-02-05 ENCOUNTER — HOSPITAL ENCOUNTER (OUTPATIENT)
Dept: CARDIOLOGY | Facility: CLINIC | Age: 74
Discharge: HOME | End: 2024-02-05
Payer: MEDICARE

## 2024-02-05 DIAGNOSIS — I48.91 A-FIB (MULTI): ICD-10-CM

## 2024-02-05 DIAGNOSIS — I48.92 ATRIAL FLUTTER (MULTI): ICD-10-CM

## 2024-02-07 ENCOUNTER — OFFICE VISIT (OUTPATIENT)
Dept: UROLOGY | Facility: CLINIC | Age: 74
End: 2024-02-07
Payer: MEDICARE

## 2024-02-07 ENCOUNTER — SPECIALTY PHARMACY (OUTPATIENT)
Dept: PHARMACY | Facility: CLINIC | Age: 74
End: 2024-02-07

## 2024-02-07 VITALS
BODY MASS INDEX: 26.58 KG/M2 | DIASTOLIC BLOOD PRESSURE: 73 MMHG | HEART RATE: 63 BPM | WEIGHT: 180 LBS | TEMPERATURE: 97.1 F | SYSTOLIC BLOOD PRESSURE: 182 MMHG

## 2024-02-07 DIAGNOSIS — N39.41 URGENCY INCONTINENCE: Primary | ICD-10-CM

## 2024-02-07 DIAGNOSIS — N40.1 BPH WITH OBSTRUCTION/LOWER URINARY TRACT SYMPTOMS: ICD-10-CM

## 2024-02-07 DIAGNOSIS — N30.00 ACUTE CYSTITIS WITHOUT HEMATURIA: ICD-10-CM

## 2024-02-07 DIAGNOSIS — N40.0 BENIGN PROSTATIC HYPERPLASIA, UNSPECIFIED WHETHER LOWER URINARY TRACT SYMPTOMS PRESENT: ICD-10-CM

## 2024-02-07 DIAGNOSIS — N13.8 BPH WITH OBSTRUCTION/LOWER URINARY TRACT SYMPTOMS: ICD-10-CM

## 2024-02-07 LAB
POC APPEARANCE, URINE: CLEAR
POC BILIRUBIN, URINE: NEGATIVE
POC BLOOD, URINE: ABNORMAL
POC COLOR, URINE: YELLOW
POC GLUCOSE, URINE: NEGATIVE MG/DL
POC KETONES, URINE: NEGATIVE MG/DL
POC LEUKOCYTES, URINE: ABNORMAL
POC NITRITE,URINE: NEGATIVE
POC PH, URINE: 6 PH
POC PROTEIN, URINE: ABNORMAL MG/DL
POC SPECIFIC GRAVITY, URINE: 1.02
POC UROBILINOGEN, URINE: 1 EU/DL

## 2024-02-07 PROCEDURE — 1159F MED LIST DOCD IN RCRD: CPT | Performed by: STUDENT IN AN ORGANIZED HEALTH CARE EDUCATION/TRAINING PROGRAM

## 2024-02-07 PROCEDURE — 1160F RVW MEDS BY RX/DR IN RCRD: CPT | Performed by: STUDENT IN AN ORGANIZED HEALTH CARE EDUCATION/TRAINING PROGRAM

## 2024-02-07 PROCEDURE — 87086 URINE CULTURE/COLONY COUNT: CPT

## 2024-02-07 PROCEDURE — 81003 URINALYSIS AUTO W/O SCOPE: CPT | Performed by: STUDENT IN AN ORGANIZED HEALTH CARE EDUCATION/TRAINING PROGRAM

## 2024-02-07 PROCEDURE — 99214 OFFICE O/P EST MOD 30 MIN: CPT | Performed by: STUDENT IN AN ORGANIZED HEALTH CARE EDUCATION/TRAINING PROGRAM

## 2024-02-07 PROCEDURE — 87186 SC STD MICRODIL/AGAR DIL: CPT

## 2024-02-07 PROCEDURE — RXMED WILLOW AMBULATORY MEDICATION CHARGE

## 2024-02-07 PROCEDURE — 1126F AMNT PAIN NOTED NONE PRSNT: CPT | Performed by: STUDENT IN AN ORGANIZED HEALTH CARE EDUCATION/TRAINING PROGRAM

## 2024-02-07 PROCEDURE — 1036F TOBACCO NON-USER: CPT | Performed by: STUDENT IN AN ORGANIZED HEALTH CARE EDUCATION/TRAINING PROGRAM

## 2024-02-07 RX ORDER — TAMSULOSIN HYDROCHLORIDE 0.4 MG/1
0.4 CAPSULE ORAL NIGHTLY
Qty: 90 CAPSULE | Refills: 3 | Status: SHIPPED | OUTPATIENT
Start: 2024-02-07

## 2024-02-07 NOTE — PROGRESS NOTES
Dilip presents for a annual follow up visit.  The patient’s EMR has been reviewed.  Lives in [place], [place].  Accompanied by his wife provides additional history today  Initially referred by Shayy Luciano for UDS     s/p DDKT May 2020  Was on peritoneal dialysis for 5 years prior.  Was primarily anuric during this time  Was unable to void following transplant.  Proceeded with ISC 6-8 times daily.  Now s/p TURP 2/24/22    SUBJECTIVE: HPI   TODAY (02/07/24)  Reports he has been doing well overall.   Continues tamsulosin with good response.   Denies any side-effects and wishes to continue.   Albeit, c/o mild dysuria, concerned he may have UTI.  Typically has 1-2x UTI's per year.   Otherwise denies any gross hematuria, flank pain, fevers or chills.  Notes he underwent colonoscopy about 2 weeks ago.  4x polyps removed.      TO REVIEW: 12/14/2022   Today, the patient reports he is doing well overall. States that his urinary symptoms have improved since being on tamsulosin. Stream remains strong. Reports an isolated incident of enuresis x1 since last visit, otherwise denies any incontinence during the day or night. Denies any recent UTIs or gross hematuria. PVR 0 mL today.     TO REVIEW:  No longer requiring ISC, voiding on his own with a good strong stream  Was having trouble with gross hematuria  Recurred every time he starts his Eliquis   This has finally resolved, no further bleeding  CT urogram confirmed no other cause for bleeding, no clots or other bladder or kidney defects     had UTIs including enterococcus requiring linezolid, saw ID and was recommended prophylactic antibiotic  is not currently taking macrobid recommended  just started doxycycline for current UTI by Shayy   he was not having symptoms, this was prophylactic for UDS today     I personally reviewed and interpreted the patient's urodynamic testing. This demonstrated normal bladder capacity. There was multiple small DOs noted during the filling phase.  Filling pressures/compliance remained normal. Patient had normal sensation of urgency.   Voiding phase was significant for no flow or ability to void at all but a normal bell shaped detrusor pressure curve with significant bladder pressures recorded. Postvoid residual was entire volume. Patient's detrusor pressure remained normal/high during urination. EMG activity was synergic.     Is on tamsulosin  still having problems with constipation  no gross hematuria   is not able to void on his own without self cath  denies current UTI symptoms  good appetite     PSA last checked 2019, was 0.84  CT from 2019 personally reviewed: prostate volume 45g    Past medical, surgical, family and social history in the chart was reviewed and is accurate including any additions to what is in this HPI.    Review of Systems   Constitutional: denies any unintentional weight loss or change in strength.  Integumentary: denies any rashes or pruritus.  Eyes: denies any double vision or eye pain.  Ear/Nose/Mouth/Throat: denies any nosebleeds or gum bleeds.  Cardiovascular: denies any chest pain or syncope.  Respiratory: denies hemoptysis.  Gastrointestinal: denies nausea or vomiting.  Musculoskeletal: denies muscle cramping or weakness.  Neurologic: denies convulsions or seizures.  Hematologic/Lymphatic: denies bleeding tendencies.  Endocrine: denies heat/cold intolerance.  All other systems have been reviewed and are negative unless otherwise noted in the HPI.    OBJECTIVE:  Visit Vitals  BP (!) 182/73   Pulse 63   Temp 36.2 °C (97.1 °F)     Physical Exam   Constitutional: No obvious distress.  Eyes: Non-injected conjunctiva, sclera clear, EOMI.  Ears/Nose/Mouth/Throat: No obvious drainage per ears or nose.  Cardiovascular: Extremities are warm and well perfused. No edema, cyanosis or pallor.  Respiratory: No audible wheezing/stridor; respirations do not appear labored.  Gastrointestinal: Abdomen soft, not distended.  Musculoskeletal: Normal  ROM of extremities.  Skin: No obvious rashes or open sores.  Neurologic: Alert and oriented, CN 2-12 grossly intact.  Psychiatric: Answers questions appropriately with normal affect.  Hematologic/Lymphatic/Immunologic: No obvious bruises or sites of spontaneous bleeding.  Genitourinary: No CVA tenderness, bladder not palpable.     Labs and imaging:  Lab Results   Component Value Date    WBC 8.8 01/24/2024    HGB 11.0 (L) 01/24/2024    HCT 36.8 (L) 01/24/2024     01/24/2024    CHOL 165 06/05/2023    TRIG 118 06/05/2023    HDL 45.6 06/05/2023    ALT 10 08/26/2023    AST 12 08/26/2023     01/24/2024    K 4.0 01/24/2024     01/24/2024    CREATININE 1.89 (H) 01/24/2024    BUN 23 01/24/2024    CO2 29 01/24/2024    TSH 0.01 (L) 09/28/2020    PSA 0.84 07/10/2019    INR 1.2 (H) 08/26/2023    HGBA1C 5.2 09/21/2023     ASSESSMENT:  Problem List Items Addressed This Visit    None  Visit Diagnoses       Urgency incontinence    -  Primary    Relevant Orders    POCT UA Automated manually resulted (Completed)          1. Urinary retention  2. BPH with urinary retention - s/p TURP  3. Gross hematuria - resolved, work-up normal    PLAN:  UA suspicious for UTI.   Offered empiric antibiotic but he declined.   Opted to wait until urine culture results.   Send urine for culture, if positive, start antibiotics.   If negative, RTC in 1 year for reassessment and UA.     In the meantime, continue tamsulosin.   Reports good response, denies side-effects.   Wishes to continue, Rx refill provided.     All questions were answered to the patient’s satisfaction.  Patient agrees with the plan and wishes to proceed.  Follow-up will be scheduled appropriately.     Scribed for Dr. Michael Montesinos by Taras Carvajal.  I, Dr. Michael Montesinos have personally reviewed and agreed with the information entered by the Virtual Scribe. 02/07/24.

## 2024-02-08 ENCOUNTER — PHARMACY VISIT (OUTPATIENT)
Dept: PHARMACY | Facility: CLINIC | Age: 74
End: 2024-02-08
Payer: COMMERCIAL

## 2024-02-08 ENCOUNTER — HOSPITAL ENCOUNTER (OUTPATIENT)
Dept: CARDIOLOGY | Facility: CLINIC | Age: 74
Discharge: HOME | End: 2024-02-08
Payer: MEDICARE

## 2024-02-08 DIAGNOSIS — I48.92 ATRIAL FLUTTER (MULTI): ICD-10-CM

## 2024-02-08 DIAGNOSIS — I48.91 A-FIB (MULTI): ICD-10-CM

## 2024-02-10 LAB — BACTERIA UR CULT: ABNORMAL

## 2024-02-12 ENCOUNTER — HOSPITAL ENCOUNTER (OUTPATIENT)
Dept: CARDIOLOGY | Facility: CLINIC | Age: 74
Discharge: HOME | End: 2024-02-12
Payer: MEDICARE

## 2024-02-12 DIAGNOSIS — I48.92 ATRIAL FLUTTER (MULTI): ICD-10-CM

## 2024-02-12 DIAGNOSIS — I48.91 A-FIB (MULTI): ICD-10-CM

## 2024-02-13 DIAGNOSIS — N39.0 ACUTE UTI: ICD-10-CM

## 2024-02-14 RX ORDER — SULFAMETHOXAZOLE AND TRIMETHOPRIM 800; 160 MG/1; MG/1
1 TABLET ORAL 2 TIMES DAILY
Qty: 14 TABLET | Refills: 0 | Status: SHIPPED | OUTPATIENT
Start: 2024-02-14 | End: 2024-02-21

## 2024-02-15 ENCOUNTER — HOSPITAL ENCOUNTER (OUTPATIENT)
Dept: CARDIOLOGY | Facility: CLINIC | Age: 74
Discharge: HOME | End: 2024-02-15
Payer: MEDICARE

## 2024-02-15 DIAGNOSIS — I48.92 ATRIAL FLUTTER (MULTI): ICD-10-CM

## 2024-02-15 DIAGNOSIS — I48.91 A-FIB (MULTI): ICD-10-CM

## 2024-02-15 PROCEDURE — 93298 REM INTERROG DEV EVAL SCRMS: CPT

## 2024-02-15 PROCEDURE — 93298 REM INTERROG DEV EVAL SCRMS: CPT | Performed by: INTERNAL MEDICINE

## 2024-02-20 ENCOUNTER — HOSPITAL ENCOUNTER (OUTPATIENT)
Dept: CARDIOLOGY | Facility: CLINIC | Age: 74
Discharge: HOME | End: 2024-02-20
Payer: MEDICARE

## 2024-02-20 DIAGNOSIS — I48.92 ATRIAL FLUTTER (MULTI): ICD-10-CM

## 2024-02-20 DIAGNOSIS — I48.91 A-FIB (MULTI): ICD-10-CM

## 2024-02-20 RX ORDER — PREDNISONE 5 MG/1
5 TABLET ORAL DAILY
Qty: 30 TABLET | Refills: 23 | Status: SHIPPED | OUTPATIENT
Start: 2024-02-20 | End: 2024-04-11 | Stop reason: SDUPTHER

## 2024-02-21 ENCOUNTER — HOSPITAL ENCOUNTER (OUTPATIENT)
Dept: CARDIOLOGY | Facility: CLINIC | Age: 74
Discharge: HOME | End: 2024-02-21
Payer: MEDICARE

## 2024-02-21 DIAGNOSIS — I48.91 ATRIAL FIBRILLATION, UNSPECIFIED TYPE (MULTI): ICD-10-CM

## 2024-02-23 ENCOUNTER — HOSPITAL ENCOUNTER (OUTPATIENT)
Dept: CARDIOLOGY | Facility: CLINIC | Age: 74
Discharge: HOME | End: 2024-02-23
Payer: MEDICARE

## 2024-02-23 DIAGNOSIS — I48.91 ATRIAL FIBRILLATION, UNSPECIFIED TYPE (MULTI): ICD-10-CM

## 2024-02-26 ENCOUNTER — HOSPITAL ENCOUNTER (OUTPATIENT)
Dept: CARDIOLOGY | Facility: CLINIC | Age: 74
Discharge: HOME | End: 2024-02-26
Payer: MEDICARE

## 2024-02-26 DIAGNOSIS — I48.91 ATRIAL FIBRILLATION, UNSPECIFIED TYPE (MULTI): ICD-10-CM

## 2024-02-28 DIAGNOSIS — K21.9 GASTRO-ESOPHAGEAL REFLUX DISEASE WITHOUT ESOPHAGITIS: ICD-10-CM

## 2024-03-03 DIAGNOSIS — Z94.0 KIDNEY REPLACED BY TRANSPLANT (HHS-HCC): ICD-10-CM

## 2024-03-04 ENCOUNTER — HOSPITAL ENCOUNTER (OUTPATIENT)
Dept: CARDIOLOGY | Facility: CLINIC | Age: 74
Discharge: HOME | End: 2024-03-04
Payer: MEDICARE

## 2024-03-04 DIAGNOSIS — I48.91 ATRIAL FIBRILLATION, UNSPECIFIED TYPE (MULTI): ICD-10-CM

## 2024-03-04 PROCEDURE — RXMED WILLOW AMBULATORY MEDICATION CHARGE

## 2024-03-05 ENCOUNTER — LAB (OUTPATIENT)
Dept: LAB | Facility: LAB | Age: 74
End: 2024-03-05
Payer: MEDICARE

## 2024-03-05 ENCOUNTER — PHARMACY VISIT (OUTPATIENT)
Dept: PHARMACY | Facility: CLINIC | Age: 74
End: 2024-03-05
Payer: COMMERCIAL

## 2024-03-05 DIAGNOSIS — Z94.0 KIDNEY REPLACED BY TRANSPLANT (HHS-HCC): ICD-10-CM

## 2024-03-05 LAB
ALBUMIN SERPL BCP-MCNC: 4 G/DL (ref 3.4–5)
ANION GAP SERPL CALC-SCNC: 13 MMOL/L (ref 10–20)
BUN SERPL-MCNC: 19 MG/DL (ref 6–23)
CALCIUM SERPL-MCNC: 9.6 MG/DL (ref 8.6–10.6)
CHLORIDE SERPL-SCNC: 101 MMOL/L (ref 98–107)
CO2 SERPL-SCNC: 27 MMOL/L (ref 21–32)
CREAT SERPL-MCNC: 1.56 MG/DL (ref 0.5–1.3)
EGFRCR SERPLBLD CKD-EPI 2021: 46 ML/MIN/1.73M*2
ERYTHROCYTE [DISTWIDTH] IN BLOOD BY AUTOMATED COUNT: 15.3 % (ref 11.5–14.5)
GLUCOSE SERPL-MCNC: 88 MG/DL (ref 74–99)
HCT VFR BLD AUTO: 38.6 % (ref 41–52)
HGB BLD-MCNC: 11.3 G/DL (ref 13.5–17.5)
MCH RBC QN AUTO: 23.1 PG (ref 26–34)
MCHC RBC AUTO-ENTMCNC: 29.3 G/DL (ref 32–36)
MCV RBC AUTO: 79 FL (ref 80–100)
NRBC BLD-RTO: 0 /100 WBCS (ref 0–0)
PHOSPHATE SERPL-MCNC: 3.4 MG/DL (ref 2.5–4.9)
PLATELET # BLD AUTO: 294 X10*3/UL (ref 150–450)
POTASSIUM SERPL-SCNC: 3.7 MMOL/L (ref 3.5–5.3)
RBC # BLD AUTO: 4.9 X10*6/UL (ref 4.5–5.9)
SODIUM SERPL-SCNC: 137 MMOL/L (ref 136–145)
TACROLIMUS BLD-MCNC: 3.9 NG/ML
WBC # BLD AUTO: 8 X10*3/UL (ref 4.4–11.3)

## 2024-03-05 PROCEDURE — 80197 ASSAY OF TACROLIMUS: CPT

## 2024-03-05 PROCEDURE — 80069 RENAL FUNCTION PANEL: CPT

## 2024-03-05 PROCEDURE — 36415 COLL VENOUS BLD VENIPUNCTURE: CPT

## 2024-03-05 PROCEDURE — 85027 COMPLETE CBC AUTOMATED: CPT

## 2024-03-06 RX ORDER — FAMOTIDINE 20 MG/1
20 TABLET, FILM COATED ORAL DAILY
Qty: 90 TABLET | Refills: 3 | Status: SHIPPED | OUTPATIENT
Start: 2024-03-06

## 2024-03-06 NOTE — TELEPHONE ENCOUNTER
Discussed with Dr. Demarco increased Envarsus 0.75mg 2 tablets daily called pt he is aware, new script was sent to [facility]

## 2024-03-11 ENCOUNTER — HOSPITAL ENCOUNTER (OUTPATIENT)
Dept: CARDIOLOGY | Facility: CLINIC | Age: 74
Discharge: HOME | End: 2024-03-11
Payer: MEDICARE

## 2024-03-11 DIAGNOSIS — I48.91 ATRIAL FIBRILLATION, UNSPECIFIED TYPE (MULTI): ICD-10-CM

## 2024-03-18 ENCOUNTER — HOSPITAL ENCOUNTER (OUTPATIENT)
Dept: CARDIOLOGY | Facility: CLINIC | Age: 74
Discharge: HOME | End: 2024-03-18
Payer: MEDICARE

## 2024-03-18 DIAGNOSIS — I48.91 ATRIAL FIBRILLATION, UNSPECIFIED TYPE (MULTI): ICD-10-CM

## 2024-03-20 ENCOUNTER — SPECIALTY PHARMACY (OUTPATIENT)
Dept: PHARMACY | Facility: CLINIC | Age: 74
End: 2024-03-20

## 2024-03-20 ENCOUNTER — TELEPHONE (OUTPATIENT)
Dept: TRANSPLANT | Facility: HOSPITAL | Age: 74
End: 2024-03-20
Payer: MEDICARE

## 2024-03-20 DIAGNOSIS — I48.91 UNSPECIFIED ATRIAL FIBRILLATION (MULTI): ICD-10-CM

## 2024-03-21 RX ORDER — APIXABAN 2.5 MG/1
2.5 TABLET, FILM COATED ORAL 2 TIMES DAILY
Qty: 60 TABLET | Refills: 10 | Status: SHIPPED | OUTPATIENT
Start: 2024-03-21

## 2024-03-21 NOTE — TELEPHONE ENCOUNTER
On call - Patient called in and stated his Envarsus script to his local [facility] needs a PA.  Found the PA in EPIC completed questions.  Patient has enough Envarsus until the beginning of next week.  Patient denies any other needs.  Will update primary coordinator.

## 2024-03-22 ENCOUNTER — HOSPITAL ENCOUNTER (OUTPATIENT)
Dept: CARDIOLOGY | Facility: CLINIC | Age: 74
Discharge: HOME | End: 2024-03-22
Payer: MEDICARE

## 2024-03-22 DIAGNOSIS — I48.91 ATRIAL FIBRILLATION, UNSPECIFIED TYPE (MULTI): ICD-10-CM

## 2024-03-25 ENCOUNTER — HOSPITAL ENCOUNTER (OUTPATIENT)
Dept: CARDIOLOGY | Facility: CLINIC | Age: 74
Discharge: HOME | End: 2024-03-25
Payer: MEDICARE

## 2024-03-25 DIAGNOSIS — I48.91 ATRIAL FIBRILLATION, UNSPECIFIED TYPE (MULTI): ICD-10-CM

## 2024-03-29 ENCOUNTER — HOSPITAL ENCOUNTER (OUTPATIENT)
Dept: CARDIOLOGY | Facility: CLINIC | Age: 74
Discharge: HOME | End: 2024-03-29
Payer: MEDICARE

## 2024-03-29 DIAGNOSIS — I48.91 ATRIAL FIBRILLATION, UNSPECIFIED TYPE (MULTI): ICD-10-CM

## 2024-04-01 ENCOUNTER — HOSPITAL ENCOUNTER (OUTPATIENT)
Dept: CARDIOLOGY | Facility: CLINIC | Age: 74
Discharge: HOME | End: 2024-04-01
Payer: MEDICARE

## 2024-04-01 DIAGNOSIS — I48.91 ATRIAL FIBRILLATION, UNSPECIFIED TYPE (MULTI): ICD-10-CM

## 2024-04-04 PROCEDURE — RXMED WILLOW AMBULATORY MEDICATION CHARGE

## 2024-04-05 ENCOUNTER — PHARMACY VISIT (OUTPATIENT)
Dept: PHARMACY | Facility: CLINIC | Age: 74
End: 2024-04-05
Payer: COMMERCIAL

## 2024-04-06 DIAGNOSIS — I25.10 ATHEROSCLEROTIC HEART DISEASE OF NATIVE CORONARY ARTERY WITHOUT ANGINA PECTORIS: ICD-10-CM

## 2024-04-07 DIAGNOSIS — Z48.298 AFTERCARE FOLLOWING ORGAN TRANSPLANT: Primary | ICD-10-CM

## 2024-04-08 ENCOUNTER — HOSPITAL ENCOUNTER (OUTPATIENT)
Dept: CARDIOLOGY | Facility: CLINIC | Age: 74
Discharge: HOME | End: 2024-04-08
Payer: MEDICARE

## 2024-04-08 DIAGNOSIS — I48.91 UNSPECIFIED ATRIAL FIBRILLATION (MULTI): ICD-10-CM

## 2024-04-08 DIAGNOSIS — I48.92 UNSPECIFIED ATRIAL FLUTTER (MULTI): ICD-10-CM

## 2024-04-10 RX ORDER — PRAVASTATIN SODIUM 40 MG/1
40 TABLET ORAL NIGHTLY
Qty: 90 TABLET | Refills: 3 | Status: SHIPPED | OUTPATIENT
Start: 2024-04-10

## 2024-04-10 RX ORDER — MYCOPHENOLATE MOFETIL 250 MG/1
750 CAPSULE ORAL DAILY
Qty: 180 CAPSULE | Refills: 11 | Status: SHIPPED | OUTPATIENT
Start: 2024-04-10

## 2024-04-11 ENCOUNTER — HOSPITAL ENCOUNTER (OUTPATIENT)
Dept: CARDIOLOGY | Facility: CLINIC | Age: 74
Discharge: HOME | End: 2024-04-11
Payer: MEDICARE

## 2024-04-11 DIAGNOSIS — I48.92 UNSPECIFIED ATRIAL FLUTTER (MULTI): ICD-10-CM

## 2024-04-11 DIAGNOSIS — I48.91 UNSPECIFIED ATRIAL FIBRILLATION (MULTI): ICD-10-CM

## 2024-04-11 RX ORDER — PREDNISONE 5 MG/1
5 TABLET ORAL DAILY
Qty: 30 TABLET | Refills: 23 | Status: SHIPPED | OUTPATIENT
Start: 2024-04-11

## 2024-04-15 ENCOUNTER — HOSPITAL ENCOUNTER (OUTPATIENT)
Dept: CARDIOLOGY | Facility: CLINIC | Age: 74
Discharge: HOME | End: 2024-04-15
Payer: MEDICARE

## 2024-04-15 DIAGNOSIS — I48.91 UNSPECIFIED ATRIAL FIBRILLATION (MULTI): ICD-10-CM

## 2024-04-15 DIAGNOSIS — I48.92 UNSPECIFIED ATRIAL FLUTTER (MULTI): ICD-10-CM

## 2024-04-19 ENCOUNTER — HOSPITAL ENCOUNTER (OUTPATIENT)
Dept: CARDIOLOGY | Facility: CLINIC | Age: 74
Discharge: HOME | End: 2024-04-19
Payer: MEDICARE

## 2024-04-19 DIAGNOSIS — I48.91 UNSPECIFIED ATRIAL FIBRILLATION (MULTI): ICD-10-CM

## 2024-04-19 DIAGNOSIS — I48.92 UNSPECIFIED ATRIAL FLUTTER (MULTI): ICD-10-CM

## 2024-04-22 ENCOUNTER — HOSPITAL ENCOUNTER (OUTPATIENT)
Dept: CARDIOLOGY | Facility: CLINIC | Age: 74
Discharge: HOME | End: 2024-04-22
Payer: MEDICARE

## 2024-04-22 DIAGNOSIS — I48.91 UNSPECIFIED ATRIAL FIBRILLATION (MULTI): ICD-10-CM

## 2024-04-22 DIAGNOSIS — I48.92 UNSPECIFIED ATRIAL FLUTTER (MULTI): ICD-10-CM

## 2024-04-24 ENCOUNTER — HOSPITAL ENCOUNTER (OUTPATIENT)
Dept: CARDIOLOGY | Facility: CLINIC | Age: 74
Discharge: HOME | End: 2024-04-24
Payer: MEDICARE

## 2024-04-24 DIAGNOSIS — I48.91 UNSPECIFIED ATRIAL FIBRILLATION (MULTI): ICD-10-CM

## 2024-04-24 DIAGNOSIS — I48.92 UNSPECIFIED ATRIAL FLUTTER (MULTI): ICD-10-CM

## 2024-04-26 ENCOUNTER — TELEPHONE (OUTPATIENT)
Dept: CARDIOLOGY | Facility: CLINIC | Age: 74
End: 2024-04-26
Payer: MEDICARE

## 2024-04-30 ENCOUNTER — SPECIALTY PHARMACY (OUTPATIENT)
Dept: PHARMACY | Facility: CLINIC | Age: 74
End: 2024-04-30

## 2024-04-30 ENCOUNTER — OFFICE VISIT (OUTPATIENT)
Dept: TRANSPLANT | Facility: HOSPITAL | Age: 74
End: 2024-04-30
Payer: MEDICARE

## 2024-04-30 VITALS
SYSTOLIC BLOOD PRESSURE: 156 MMHG | TEMPERATURE: 97.5 F | OXYGEN SATURATION: 92 % | HEIGHT: 71 IN | DIASTOLIC BLOOD PRESSURE: 64 MMHG | HEART RATE: 73 BPM | WEIGHT: 178 LBS | BODY MASS INDEX: 24.92 KG/M2

## 2024-04-30 DIAGNOSIS — Z92.25 PERSONAL HISTORY OF IMMUNOSUPRESSION THERAPY: ICD-10-CM

## 2024-04-30 DIAGNOSIS — Z76.82 KIDNEY TRANSPLANT CANDIDATE: ICD-10-CM

## 2024-04-30 DIAGNOSIS — K21.9 GASTRO-ESOPHAGEAL REFLUX DISEASE WITHOUT ESOPHAGITIS: ICD-10-CM

## 2024-04-30 DIAGNOSIS — Z94.0 KIDNEY REPLACED BY TRANSPLANT (HHS-HCC): Primary | ICD-10-CM

## 2024-04-30 PROCEDURE — 1159F MED LIST DOCD IN RCRD: CPT | Performed by: HOSPITALIST

## 2024-04-30 PROCEDURE — 99214 OFFICE O/P EST MOD 30 MIN: CPT

## 2024-04-30 PROCEDURE — 1160F RVW MEDS BY RX/DR IN RCRD: CPT | Performed by: HOSPITALIST

## 2024-04-30 PROCEDURE — RXMED WILLOW AMBULATORY MEDICATION CHARGE

## 2024-04-30 ASSESSMENT — ENCOUNTER SYMPTOMS
BACK PAIN: 0
DIARRHEA: 0
SHORTNESS OF BREATH: 0
NERVOUS/ANXIOUS: 0
NAUSEA: 0
HEMATURIA: 0
HEADACHES: 0
ABDOMINAL DISTENTION: 0
FREQUENCY: 0
CONFUSION: 0
PALPITATIONS: 0
CHEST TIGHTNESS: 0
COUGH: 0
VOMITING: 0

## 2024-04-30 NOTE — PROGRESS NOTES
"Dilip Haynes  73 y.o.   male with a history significant for end stage renal disease secondary to hypertension s/p increased risk and HCV+  donor kidney transplant on 2020. He was seroconverted and treated. Patient's post transplant course was complicated by urinary retention and enlarged prostate.  -History of PCI in the right coronary artery and A-fib s/p cardioversion on Eliquis. Follows cardiology closely patient also underwent ablation -2022.   -Hospital admission in 2023 with CHF exacerbation. Patient had 100% stenosis in the mid circumflex and PDA RCA lesion 100% stenosis recommend medical management. Got cardiac rehab and recent echo showing 60% EF. Patient also had a cardiac MRI done in 2023 which showed cardiac remodeling in the inferior wall but with preserved EF around 60 to 65%.    Interim history: Denied any more bleeding have been taking tacrolimus plan to 0.7 5 mg as a patient was given only 15-day supply by the pharmacy will discuss with the pharmacy about giving 2 tablets of 0.75 mg to total of 1.5 mg.        Review of Systems   Respiratory:  Negative for cough, chest tightness and shortness of breath.    Cardiovascular:  Negative for chest pain, palpitations and leg swelling.   Gastrointestinal:  Negative for abdominal distention, diarrhea, nausea and vomiting.   Genitourinary:  Negative for frequency, hematuria and urgency.   Musculoskeletal:  Negative for back pain.   Neurological:  Negative for headaches.   Psychiatric/Behavioral:  Negative for confusion. The patient is not nervous/anxious.         Objective:  Visit Vitals  /64   Pulse 73   Temp 36.4 °C (97.5 °F)   Ht 1.803 m (5' 11\")   Wt 80.7 kg (178 lb)   SpO2 92%   BMI 24.83 kg/m²   Smoking Status Never   BSA 2.01 m²      Physical Exam  HENT:      Head: Normocephalic and atraumatic.      Nose: Nose normal.      Mouth/Throat:      Mouth: Mucous membranes are moist.   Eyes:      Extraocular Movements: " Extraocular movements intact.      Pupils: Pupils are equal, round, and reactive to light.   Cardiovascular:      Rate and Rhythm: Normal rate.      Pulses: Normal pulses.      Heart sounds: Normal heart sounds.   Pulmonary:      Effort: Pulmonary effort is normal.   Abdominal:      Palpations: Abdomen is soft.      Tenderness: There is no abdominal tenderness. There is no guarding.   Musculoskeletal:         General: Normal range of motion.      Cervical back: Normal range of motion.   Skin:     General: Skin is warm.   Neurological:      General: No focal deficit present.      Mental Status: Mental status is at baseline.   Psychiatric:         Mood and Affect: Mood normal.            Current Outpatient Medications:     amLODIPine (Norvasc) 10 mg tablet, Take 1 tablet (10 mg) by mouth once daily., Disp: , Rfl:     aspirin 81 mg EC tablet, Take 1 tablet (81 mg) by mouth once daily., Disp: , Rfl:     Eliquis 2.5 mg tablet, TAKE 1 TABLET TWICE A DAY, Disp: 60 tablet, Rfl: 10    famotidine (Pepcid) 20 mg tablet, TAKE 1 TABLET BY MOUTH EVERY DAY AS DIRECTED, Disp: 90 tablet, Rfl: 3    fluticasone (Flonase) 50 mcg/actuation nasal spray, 2 sprays by Does not apply route once daily as needed for allergies., Disp: , Rfl:     levothyroxine (Synthroid, Levoxyl) 150 mcg tablet, Take 1 tablet (150 mcg) by mouth once daily., Disp: , Rfl:     losartan (Cozaar) 25 mg tablet, Take 1 tablet (25 mg) by mouth once daily., Disp: , Rfl:     metoprolol tartrate (Lopressor) 50 mg tablet, TAKE 1 TABLET TWICE A DAY, Disp: 180 tablet, Rfl: 3    multivitamin tablet, Take by mouth., Disp: , Rfl:     mycophenolate (Cellcept) 250 mg capsule, TAKE 3 CAPSULES BY MOUTH TWICE A DAY, Disp: 180 capsule, Rfl: 11    nitroglycerin (Nitrostat) 0.4 mg SL tablet, Place 1 tablet (0.4 mg) under the tongue every 5 minutes if needed for chest pain., Disp: , Rfl:     pravastatin (Pravachol) 40 mg tablet, TAKE 1 TABLET BY MOUTH EVERYDAY AT BEDTIME, Disp: 90  tablet, Rfl: 3    predniSONE (Deltasone) 5 mg tablet, TAKE 1 TABLET BY MOUTH EVERY DAY, Disp: 30 tablet, Rfl: 23    tacrolimus ER (Envarsus XR) 0.75 mg tablet ER, Take 2 tablets (1.5 mg) by mouth once daily., Disp: 60 tablet, Rfl: 11    tamsulosin (Flomax) 0.4 mg 24 hr capsule, Take 1 capsule (0.4 mg) by mouth once daily at bedtime., Disp: 90 capsule, Rfl: 3    torsemide (Demadex) 20 mg tablet, TAKE 1 TABLET BY MOUTH DAILY AS NEEDED FOR SWELLING, Disp: 90 tablet, Rfl: 2     [unfilled]     No images are attached to the encounter.     Assessment and Plan :Dilip Haynes 73 y.o.   male with a history significant for end stage renal disease secondary to hypertension s/p increased risk and HCV+  donor kidney transplant on 2020. He was seroconverted and treated. Patient's post transplant course was complicated by urinary retention and enlarged prostate.  -History of PCI in the right coronary artery and A-fib s/p cardioversion on [facility]. Follows cardiology closely patient also underwent ablation -2022.   -Hospital admission in 2023 with CHF exacerbation. Patient had 100% stenosis in the mid circumflex and PDA RCA lesion 100% stenosis recommend medical management. Got cardiac rehab and recent echo showing 60% EF. Patient also had a cardiac MRI done in 2023 which showed cardiac remodeling in the inferior wall but with preserved EF around 60 to 65%.    Interim history: Denied any more bleeding have been taking tacrolimus plan to 0.7 5 mg as a patient was given only 15-day supply by the pharmacy will discuss with the pharmacy about giving 2 tablets of 0.75 mg to total of 1.5 mg.  Getting MRI of the heart as per his cardiologist.    Allograft function: Last labs are from March seems to be stable at 1.6-1.8 creatinine currently around 1.56, stable electrolytes, mild uptrend in the UPC noticed increase losartan.-Infectious workup from  CMV, EBV, BK negative    Immunosuppression:  Increase tacrolimus to 1.5 mg daily extended release, continue with the mycophenolate 3 capsules twice daily and prednisone 1 tablet daily.  Aim tacrolimus levels of 5-8    Hemodynamics: Pressures mildly elevated increase losartan to 50 daily    No anemia or leukopenia noticed    Bone mineral disease: Calcium and phosphorus levels are optimal continue with the current management we will check PTH and vitamin D with the next labs    General health care: Recommended age-appropriate screening, COVID dermatology visits, DEXA scan 2 to 3 years.    Labs in 2 weeks follow-up in 6 months      Ashley Gerard MD    Notes created by Cici -Please excuse the Typos .

## 2024-05-02 ENCOUNTER — PHARMACY VISIT (OUTPATIENT)
Dept: PHARMACY | Facility: CLINIC | Age: 74
End: 2024-05-02
Payer: COMMERCIAL

## 2024-05-02 ENCOUNTER — HOSPITAL ENCOUNTER (OUTPATIENT)
Dept: CARDIOLOGY | Facility: CLINIC | Age: 74
Discharge: HOME | End: 2024-05-02
Payer: MEDICARE

## 2024-05-02 DIAGNOSIS — I48.91 UNSPECIFIED ATRIAL FIBRILLATION (MULTI): ICD-10-CM

## 2024-05-02 DIAGNOSIS — I48.92 UNSPECIFIED ATRIAL FLUTTER (MULTI): ICD-10-CM

## 2024-05-06 ENCOUNTER — HOSPITAL ENCOUNTER (OUTPATIENT)
Dept: CARDIOLOGY | Facility: CLINIC | Age: 74
Discharge: HOME | End: 2024-05-06
Payer: MEDICARE

## 2024-05-06 DIAGNOSIS — I48.91 UNSPECIFIED ATRIAL FIBRILLATION (MULTI): ICD-10-CM

## 2024-05-06 DIAGNOSIS — I48.92 UNSPECIFIED ATRIAL FLUTTER (MULTI): ICD-10-CM

## 2024-05-06 PROCEDURE — 93298 REM INTERROG DEV EVAL SCRMS: CPT

## 2024-05-13 ENCOUNTER — HOSPITAL ENCOUNTER (OUTPATIENT)
Dept: CARDIOLOGY | Facility: CLINIC | Age: 74
Discharge: HOME | End: 2024-05-13
Payer: MEDICARE

## 2024-05-13 ENCOUNTER — LAB (OUTPATIENT)
Dept: LAB | Facility: LAB | Age: 74
End: 2024-05-13
Payer: MEDICARE

## 2024-05-13 DIAGNOSIS — I48.91 UNSPECIFIED ATRIAL FIBRILLATION (MULTI): ICD-10-CM

## 2024-05-13 DIAGNOSIS — Z94.0 KIDNEY REPLACED BY TRANSPLANT (HHS-HCC): ICD-10-CM

## 2024-05-13 DIAGNOSIS — I48.92 UNSPECIFIED ATRIAL FLUTTER (MULTI): ICD-10-CM

## 2024-05-13 LAB
ALBUMIN SERPL BCP-MCNC: 4.3 G/DL (ref 3.4–5)
ANION GAP SERPL CALC-SCNC: 14 MMOL/L (ref 10–20)
BUN SERPL-MCNC: 15 MG/DL (ref 6–23)
CALCIUM SERPL-MCNC: 9.8 MG/DL (ref 8.6–10.6)
CHLORIDE SERPL-SCNC: 105 MMOL/L (ref 98–107)
CO2 SERPL-SCNC: 23 MMOL/L (ref 21–32)
CREAT SERPL-MCNC: 1.59 MG/DL (ref 0.5–1.3)
EGFRCR SERPLBLD CKD-EPI 2021: 45 ML/MIN/1.73M*2
ERYTHROCYTE [DISTWIDTH] IN BLOOD BY AUTOMATED COUNT: 15.4 % (ref 11.5–14.5)
GLUCOSE SERPL-MCNC: 103 MG/DL (ref 74–99)
HCT VFR BLD AUTO: 39 % (ref 41–52)
HGB BLD-MCNC: 11.9 G/DL (ref 13.5–17.5)
MCH RBC QN AUTO: 24.1 PG (ref 26–34)
MCHC RBC AUTO-ENTMCNC: 30.5 G/DL (ref 32–36)
MCV RBC AUTO: 79 FL (ref 80–100)
NRBC BLD-RTO: 0 /100 WBCS (ref 0–0)
PHOSPHATE SERPL-MCNC: 3.1 MG/DL (ref 2.5–4.9)
PLATELET # BLD AUTO: 309 X10*3/UL (ref 150–450)
POTASSIUM SERPL-SCNC: 4.3 MMOL/L (ref 3.5–5.3)
PTH-INTACT SERPL-MCNC: 143.1 PG/ML (ref 18.5–88)
RBC # BLD AUTO: 4.93 X10*6/UL (ref 4.5–5.9)
SODIUM SERPL-SCNC: 138 MMOL/L (ref 136–145)
TACROLIMUS BLD-MCNC: 7.8 NG/ML
WBC # BLD AUTO: 9.3 X10*3/UL (ref 4.4–11.3)

## 2024-05-13 PROCEDURE — 82570 ASSAY OF URINE CREATININE: CPT

## 2024-05-13 PROCEDURE — 85027 COMPLETE CBC AUTOMATED: CPT

## 2024-05-13 PROCEDURE — 84156 ASSAY OF PROTEIN URINE: CPT

## 2024-05-13 PROCEDURE — 82652 VIT D 1 25-DIHYDROXY: CPT

## 2024-05-13 PROCEDURE — 83970 ASSAY OF PARATHORMONE: CPT

## 2024-05-13 PROCEDURE — 80069 RENAL FUNCTION PANEL: CPT

## 2024-05-13 PROCEDURE — 80197 ASSAY OF TACROLIMUS: CPT

## 2024-05-13 PROCEDURE — 36415 COLL VENOUS BLD VENIPUNCTURE: CPT

## 2024-05-13 PROCEDURE — 87799 DETECT AGENT NOS DNA QUANT: CPT

## 2024-05-14 LAB
BKV DNA SERPL NAA+PROBE-LOG#: NORMAL {LOG_COPIES}/ML
CREAT UR-MCNC: 144.6 MG/DL (ref 20–370)
CREAT UR-MCNC: 145 MG/DL (ref 20–370)
LABORATORY COMMENT REPORT: NOT DETECTED
PROT UR-ACNC: 142 MG/DL (ref 5–25)
PROT/CREAT UR: 0.98 MG/MG CREAT (ref 0–0.17)

## 2024-05-15 DIAGNOSIS — Z94.0 KIDNEY REPLACED BY TRANSPLANT (HHS-HCC): ICD-10-CM

## 2024-05-15 LAB — 1,25(OH)2D3 SERPL-MCNC: 104 PG/ML (ref 19.9–79.3)

## 2024-05-20 ENCOUNTER — HOSPITAL ENCOUNTER (OUTPATIENT)
Dept: CARDIOLOGY | Facility: CLINIC | Age: 74
Discharge: HOME | End: 2024-05-20
Payer: MEDICARE

## 2024-05-20 DIAGNOSIS — I48.92 UNSPECIFIED ATRIAL FLUTTER (MULTI): ICD-10-CM

## 2024-05-20 DIAGNOSIS — I48.91 UNSPECIFIED ATRIAL FIBRILLATION (MULTI): ICD-10-CM

## 2024-05-28 ENCOUNTER — SPECIALTY PHARMACY (OUTPATIENT)
Dept: PHARMACY | Facility: CLINIC | Age: 74
End: 2024-05-28

## 2024-05-28 PROCEDURE — RXMED WILLOW AMBULATORY MEDICATION CHARGE

## 2024-05-29 ENCOUNTER — PHARMACY VISIT (OUTPATIENT)
Dept: PHARMACY | Facility: CLINIC | Age: 74
End: 2024-05-29
Payer: COMMERCIAL

## 2024-05-30 DIAGNOSIS — E55.9 VITAMIN D DEFICIENCY: ICD-10-CM

## 2024-06-03 ENCOUNTER — HOSPITAL ENCOUNTER (OUTPATIENT)
Dept: CARDIOLOGY | Facility: CLINIC | Age: 74
Discharge: HOME | End: 2024-06-03
Payer: MEDICARE

## 2024-06-03 DIAGNOSIS — I48.91 UNSPECIFIED ATRIAL FIBRILLATION (MULTI): ICD-10-CM

## 2024-06-03 DIAGNOSIS — I48.92 UNSPECIFIED ATRIAL FLUTTER (MULTI): ICD-10-CM

## 2024-06-10 ENCOUNTER — LAB (OUTPATIENT)
Dept: LAB | Facility: LAB | Age: 74
End: 2024-06-10
Payer: MEDICARE

## 2024-06-10 ENCOUNTER — HOSPITAL ENCOUNTER (OUTPATIENT)
Dept: CARDIOLOGY | Facility: CLINIC | Age: 74
Discharge: HOME | End: 2024-06-10
Payer: MEDICARE

## 2024-06-10 DIAGNOSIS — I48.91 UNSPECIFIED ATRIAL FIBRILLATION (MULTI): ICD-10-CM

## 2024-06-10 DIAGNOSIS — Z94.0 KIDNEY REPLACED BY TRANSPLANT (HHS-HCC): ICD-10-CM

## 2024-06-10 DIAGNOSIS — E55.9 VITAMIN D DEFICIENCY: ICD-10-CM

## 2024-06-10 DIAGNOSIS — I48.92 UNSPECIFIED ATRIAL FLUTTER (MULTI): ICD-10-CM

## 2024-06-10 LAB
25(OH)D3 SERPL-MCNC: 43 NG/ML (ref 30–100)
ALBUMIN SERPL BCP-MCNC: 4.3 G/DL (ref 3.4–5)
ANION GAP SERPL CALC-SCNC: 17 MMOL/L (ref 10–20)
BUN SERPL-MCNC: 18 MG/DL (ref 6–23)
CALCIUM SERPL-MCNC: 9.5 MG/DL (ref 8.6–10.6)
CHLORIDE SERPL-SCNC: 103 MMOL/L (ref 98–107)
CO2 SERPL-SCNC: 25 MMOL/L (ref 21–32)
CREAT SERPL-MCNC: 1.55 MG/DL (ref 0.5–1.3)
CREAT UR-MCNC: 63.6 MG/DL (ref 20–370)
EGFRCR SERPLBLD CKD-EPI 2021: 46 ML/MIN/1.73M*2
ERYTHROCYTE [DISTWIDTH] IN BLOOD BY AUTOMATED COUNT: 15.3 % (ref 11.5–14.5)
GLUCOSE SERPL-MCNC: 107 MG/DL (ref 74–99)
HCT VFR BLD AUTO: 40 % (ref 41–52)
HGB BLD-MCNC: 12 G/DL (ref 13.5–17.5)
MCH RBC QN AUTO: 24 PG (ref 26–34)
MCHC RBC AUTO-ENTMCNC: 30 G/DL (ref 32–36)
MCV RBC AUTO: 80 FL (ref 80–100)
NRBC BLD-RTO: 0 /100 WBCS (ref 0–0)
PHOSPHATE SERPL-MCNC: 3.6 MG/DL (ref 2.5–4.9)
PLATELET # BLD AUTO: 303 X10*3/UL (ref 150–450)
POTASSIUM SERPL-SCNC: 4.1 MMOL/L (ref 3.5–5.3)
PROT UR-ACNC: 39 MG/DL (ref 5–25)
PROT/CREAT UR: 0.61 MG/MG CREAT (ref 0–0.17)
PTH-INTACT SERPL-MCNC: 244.5 PG/ML (ref 18.5–88)
RBC # BLD AUTO: 5 X10*6/UL (ref 4.5–5.9)
SODIUM SERPL-SCNC: 141 MMOL/L (ref 136–145)
TACROLIMUS BLD-MCNC: 5.5 NG/ML
WBC # BLD AUTO: 8.4 X10*3/UL (ref 4.4–11.3)

## 2024-06-10 PROCEDURE — 84156 ASSAY OF PROTEIN URINE: CPT

## 2024-06-10 PROCEDURE — 82570 ASSAY OF URINE CREATININE: CPT

## 2024-06-10 PROCEDURE — 82306 VITAMIN D 25 HYDROXY: CPT

## 2024-06-10 PROCEDURE — 93298 REM INTERROG DEV EVAL SCRMS: CPT

## 2024-06-10 PROCEDURE — 83970 ASSAY OF PARATHORMONE: CPT

## 2024-06-10 PROCEDURE — 80069 RENAL FUNCTION PANEL: CPT

## 2024-06-10 PROCEDURE — 80197 ASSAY OF TACROLIMUS: CPT

## 2024-06-10 PROCEDURE — 85027 COMPLETE CBC AUTOMATED: CPT

## 2024-06-10 PROCEDURE — 36415 COLL VENOUS BLD VENIPUNCTURE: CPT

## 2024-06-10 PROCEDURE — 93298 REM INTERROG DEV EVAL SCRMS: CPT | Performed by: INTERNAL MEDICINE

## 2024-06-11 ENCOUNTER — LAB (OUTPATIENT)
Dept: LAB | Facility: LAB | Age: 74
End: 2024-06-11
Payer: MEDICARE

## 2024-06-11 ENCOUNTER — OFFICE VISIT (OUTPATIENT)
Dept: ENDOCRINOLOGY | Facility: CLINIC | Age: 74
End: 2024-06-11
Payer: MEDICARE

## 2024-06-11 VITALS
OXYGEN SATURATION: 86 % | HEART RATE: 76 BPM | WEIGHT: 177.2 LBS | BODY MASS INDEX: 24.71 KG/M2 | DIASTOLIC BLOOD PRESSURE: 67 MMHG | TEMPERATURE: 98.6 F | SYSTOLIC BLOOD PRESSURE: 176 MMHG

## 2024-06-11 DIAGNOSIS — T38.0X5A STEROID-INDUCED HYPERGLYCEMIA: ICD-10-CM

## 2024-06-11 DIAGNOSIS — R73.03 PREDIABETES: Primary | ICD-10-CM

## 2024-06-11 DIAGNOSIS — R73.9 STEROID-INDUCED HYPERGLYCEMIA: ICD-10-CM

## 2024-06-11 DIAGNOSIS — R73.03 PREDIABETES: ICD-10-CM

## 2024-06-11 LAB
EST. AVERAGE GLUCOSE BLD GHB EST-MCNC: 131 MG/DL
HBA1C MFR BLD: 6.2 %

## 2024-06-11 PROCEDURE — 83036 HEMOGLOBIN GLYCOSYLATED A1C: CPT

## 2024-06-11 PROCEDURE — 1159F MED LIST DOCD IN RCRD: CPT | Performed by: STUDENT IN AN ORGANIZED HEALTH CARE EDUCATION/TRAINING PROGRAM

## 2024-06-11 PROCEDURE — 99213 OFFICE O/P EST LOW 20 MIN: CPT | Performed by: STUDENT IN AN ORGANIZED HEALTH CARE EDUCATION/TRAINING PROGRAM

## 2024-06-11 PROCEDURE — 1036F TOBACCO NON-USER: CPT | Performed by: STUDENT IN AN ORGANIZED HEALTH CARE EDUCATION/TRAINING PROGRAM

## 2024-06-11 PROCEDURE — 36415 COLL VENOUS BLD VENIPUNCTURE: CPT

## 2024-06-11 NOTE — PATIENT INSTRUCTIONS
Blood sugar goal:   Fasting <140  2 hours after you eat <160     A1c to stay in prediabetes range <6.4%    If A1c is going up I would like to start you on jardiance or farxiga    If this repeat A1c is good, then I will see you next year     Cathy Marin MD  Divison of Endocrinology   [facility]   Phone: [phone]    option 4, then option 1  Fax: [phone]

## 2024-06-11 NOTE — PROGRESS NOTES
72 M: CAD, HFpEF, PAD, aflutter/Afib, CKD s/p renal transplant in 5/2020 on chornic prednisone 5mg and tacrolimus, HLD, HTN      Diabetes follow up     did not require insulin during immediate post op transplant. currently on prednisone 5mg and tacrolimus.      Diabetes History  DM diagnosed 2023  Complications Micro and Macro-peripheral neuropathy, CKD s/p transplant   Last A1c 6.9%in Jan 2023 -->6.2%--> 5.2% in 9/2023    Regimen -none      Not checking sugars at home      Diet:  Trying to cut back on sweets      Exercise: going outdoors more      Comorbidities and Screening  Eye Exam -need  Foot exam -done today, also sees podiatry    pravastatin 40mg   Cr and albuminuria -proteinuria, GFR 30s-40s   BP   ACE/ARB has proteinuria, none      follows urology-for BPH and urinary retention s/p TURP      thyroidectomy for goiter -managed by PCP   Final pathology benign      PMH: as above   Famxh: DM-mother, mother and daughter hypothyroidism   Social: former smoker quit 10 years ago,         12 Point ROS reviewed and is negative except for pertinent findings noted on HPI     Physical Exam  Constitutional:       Appearance: Normal appearance.   Cardiovascular:      Rate and Rhythm: Normal rate and regular rhythm.      Pulses:           Dorsalis pedis pulses are 2+ on the right side and 2+ on the left side.        Posterior tibial pulses are 2+ on the right side and 2+ on the left side.   Abdominal:      Palpations: Abdomen is soft.   Musculoskeletal:         General: Swelling present.      Comments: Swelling L>R   Feet:      Comments: Dry soles, with fungal infection on the nails   Skin:     General: Skin is warm.   Neurological:      Mental Status: He is alert.         labs and imaging reviewed, pertinent findings listed on HPI and Impression    Problem List Items Addressed This Visit       Prediabetes - Primary    Relevant Orders    Hemoglobin A1C    Steroid-induced hyperglycemia     Steroid induced hyperglycemia    On  longterm prednisone 5mg daily   Added on A1c to most frequent labs.  If starting to trend up we discussed starting an SGLT2 inhibitor since he also has a slightly lower GFR and some proteinuria  Advised to check Bgs at home 2-3x/week      If staying in the prediabetes can continue yearly ff up vs 6 months

## 2024-06-25 ENCOUNTER — SPECIALTY PHARMACY (OUTPATIENT)
Dept: PHARMACY | Facility: CLINIC | Age: 74
End: 2024-06-25

## 2024-06-25 PROCEDURE — RXMED WILLOW AMBULATORY MEDICATION CHARGE

## 2024-06-26 ENCOUNTER — PHARMACY VISIT (OUTPATIENT)
Dept: PHARMACY | Facility: CLINIC | Age: 74
End: 2024-06-26
Payer: COMMERCIAL

## 2024-07-05 ENCOUNTER — OFFICE VISIT (OUTPATIENT)
Dept: CARDIOLOGY | Facility: HOSPITAL | Age: 74
End: 2024-07-05
Payer: MEDICARE

## 2024-07-05 ENCOUNTER — LAB (OUTPATIENT)
Dept: LAB | Facility: LAB | Age: 74
End: 2024-07-05
Payer: MEDICARE

## 2024-07-05 VITALS
HEART RATE: 54 BPM | BODY MASS INDEX: 24.36 KG/M2 | SYSTOLIC BLOOD PRESSURE: 151 MMHG | DIASTOLIC BLOOD PRESSURE: 64 MMHG | WEIGHT: 174 LBS | OXYGEN SATURATION: 90 % | HEIGHT: 71 IN

## 2024-07-05 DIAGNOSIS — Z94.0 KIDNEY REPLACED BY TRANSPLANT (HHS-HCC): ICD-10-CM

## 2024-07-05 DIAGNOSIS — I50.32 CHRONIC HEART FAILURE WITH PRESERVED EJECTION FRACTION (MULTI): ICD-10-CM

## 2024-07-05 DIAGNOSIS — I73.9 PAD (PERIPHERAL ARTERY DISEASE) (CMS-HCC): ICD-10-CM

## 2024-07-05 DIAGNOSIS — R82.90 URINE ABNORMALITY: ICD-10-CM

## 2024-07-05 DIAGNOSIS — M79.89 LEFT LEG SWELLING: ICD-10-CM

## 2024-07-05 DIAGNOSIS — I25.10 ATHEROSCLEROSIS OF NATIVE CORONARY ARTERY OF NATIVE HEART, UNSPECIFIED WHETHER ANGINA PRESENT: Primary | ICD-10-CM

## 2024-07-05 DIAGNOSIS — I70.213 ATHEROSCLEROSIS OF NATIVE ARTERIES OF EXTREMITIES WITH INTERMITTENT CLAUDICATION, BILATERAL LEGS (CMS-HCC): ICD-10-CM

## 2024-07-05 PROCEDURE — 80197 ASSAY OF TACROLIMUS: CPT

## 2024-07-05 PROCEDURE — 99215 OFFICE O/P EST HI 40 MIN: CPT | Performed by: STUDENT IN AN ORGANIZED HEALTH CARE EDUCATION/TRAINING PROGRAM

## 2024-07-05 PROCEDURE — 83880 ASSAY OF NATRIURETIC PEPTIDE: CPT

## 2024-07-05 PROCEDURE — 1159F MED LIST DOCD IN RCRD: CPT | Performed by: STUDENT IN AN ORGANIZED HEALTH CARE EDUCATION/TRAINING PROGRAM

## 2024-07-05 PROCEDURE — 85027 COMPLETE CBC AUTOMATED: CPT

## 2024-07-05 PROCEDURE — 81001 URINALYSIS AUTO W/SCOPE: CPT

## 2024-07-05 PROCEDURE — 1036F TOBACCO NON-USER: CPT | Performed by: STUDENT IN AN ORGANIZED HEALTH CARE EDUCATION/TRAINING PROGRAM

## 2024-07-05 PROCEDURE — 80069 RENAL FUNCTION PANEL: CPT

## 2024-07-05 PROCEDURE — G2211 COMPLEX E/M VISIT ADD ON: HCPCS | Performed by: STUDENT IN AN ORGANIZED HEALTH CARE EDUCATION/TRAINING PROGRAM

## 2024-07-05 PROCEDURE — 36415 COLL VENOUS BLD VENIPUNCTURE: CPT

## 2024-07-05 PROCEDURE — 93005 ELECTROCARDIOGRAM TRACING: CPT | Performed by: STUDENT IN AN ORGANIZED HEALTH CARE EDUCATION/TRAINING PROGRAM

## 2024-07-05 RX ORDER — TORSEMIDE 20 MG/1
20 TABLET ORAL DAILY
Qty: 90 TABLET | Refills: 3 | Status: SHIPPED | OUTPATIENT
Start: 2024-07-05

## 2024-07-05 ASSESSMENT — ENCOUNTER SYMPTOMS
DIZZINESS: 0
HEADACHES: 0
DYSPNEA ON EXERTION: 1
ORTHOPNEA: 0
SHORTNESS OF BREATH: 0
FALLS: 0
FLANK PAIN: 0
PND: 0
NAUSEA: 0
VOMITING: 0
PALPITATIONS: 0
CHANGE IN BOWEL HABIT: 0

## 2024-07-05 NOTE — PATIENT INSTRUCTIONS
Thank you for coming in today. If you have any questions or concerns, you may call the Heart Failure Office at [phone] option [phone], or [phone].  You may also contact our heart failure nursing team via email on [email or web address].    For quicker results set-up your  [employer] account to receive results and other correspondence directly to your phone.    Please bring all your pills/medications to your Cardiology appointments.    **  -We will keep an eye out for the results of your cardiac MRI and update if any changes are needed    -  Please discuss your abdominal discomfort with your kidney transplant team    - Please make the following medication changes:  1. START taking Torsemide 20 mg once a day, every day    - Please have the following tests done:  1.Blood and urine tests today ( RFP, BNP, urinalysis and urine culture) we will let Dr Ramirez  know to look ut for your urine results    2. Blood tests just before your next visit (BNP)    3. DVT Ultrasound of the left lower limb. CALL [phone] to schedule    4. PEDRO PABLO of both lower limbs with and without exercise. CALL [phone] to schedule    You will be referred to the following teams, CALL  [phone] to schedule your appointments with:  1. Cardiac rehabilitation ( CAD)    - Please make an appointment to be seen in 11/2024

## 2024-07-05 NOTE — PROGRESS NOTES
"Accompanied by: wife    Chief Complaint  Cardiovascular review for exertional dyspnea      History of Present Illness  73 year old man with history of vascular disease with obstructive CAD s/p PCI mRCA in-stent restenosis in 5/25/2016, renal artery stenosis, femoral artery stenosis and carotid stenosis all s/p intervention, HFrimprovedEF, atrial fibrillation s/p DCCV 6/2020 and is maintained on DOAC. 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  TTE 12/10/2021 demonstrates normal left ventricular systolic function 55% with mild to moderate RV systolic impairment. 12/13/2021 stress evaluation c/f inducible ischaemia. He had a positive stress test 12/2021 and is now status post left heart catheterization 12/28/2021 which demonstrated known midLCx  with collateral flow, and otherwise mild CAD. He will be maintained on aspirin and pravastatin for CAD. He was hospitalized 2/2023 with sudden onset dyspnea. COVID and influenza were negative. On left heart catheterization 2/2023 hisLCx CTA was again visualized andrPDA 100% occlusion was also noted. Recommendation was made by the cardiology team for medical management. TTE 2/2023 LVEF 60 -65% with normal biventricular systolic function, mild mitral regurgitation. At his last visit he continues to struggle with exertional dyspnea and has now completed cardiac MRI 4/2023 which showed areas of infarction and in the region of the LCx there was \"partial viability\". He does have normal biventricular systolic function with LVEF 63%. There was no evidence of i LV or RV thrombus. Normal valvular function.  He completed cardiac rehabilitation early 2024 and notes that his exertional dyspnea was improved while he was doing the his sessions.  Today he again has had exertional dyspnea for the past 1 month or so.  He notes that he is not getting short of breath when he walks up his driveway.  He is also struggling with exertional leg pain, and occasional nonexertional leg cramps.  His exertional " dyspnea resolves with rest.  He denies orthopnea, PND, chest pain.  He does have chronic leg swelling (right only).  When he takes his diuretic (prescribed prn) his leg swelling resolves.    Review of Systems   Cardiovascular:  Positive for dyspnea on exertion and leg swelling. Negative for chest pain, orthopnea, palpitations and paroxysmal nocturnal dyspnea.   Respiratory:  Negative for shortness of breath.    Musculoskeletal:  Negative for falls.   Gastrointestinal:  Negative for change in bowel habit, nausea and vomiting.   Genitourinary:  Negative for bladder incontinence and flank pain.   Neurological:  Negative for dizziness and headaches.     He denies overt bleeding.    The electronic medical record has been reviewed by me for salient history. All cardiovascular imaging and testing available in the electronic medical record, and [employer] has been reviewed.      Medication Documentation Review Audit       Reviewed by Kacie Alonso RN (Registered Nurse) on 07/05/24 at 1015      Medication Order Taking? Sig Documenting Provider Last Dose Status   amLODIPine (Norvasc) 10 mg tablet [id number] Yes Take 1 tablet (10 mg) by mouth once daily. Historical MD Andrew Taking Active   aspirin 81 mg EC tablet [id number] No Take 1 tablet (81 mg) by mouth once daily. Historical MD Andrew Not Taking Active   Eliquis 2.5 mg tablet [id number] Yes TAKE 1 TABLET TWICE A DAY Krystle Linn MD PhD Taking Active   famotidine (Pepcid) 20 mg tablet [id number] Yes TAKE 1 TABLET BY MOUTH EVERY DAY AS DIRECTED Ash Demarco MD Taking Active   fluticasone (Flonase) 50 mcg/actuation nasal spray [id number] Yes 2 sprays by Does not apply route once daily as needed for allergies. Elieser Morales MD Taking Active   levothyroxine (Synthroid, Levoxyl) 137 mcg tablet [id number] Yes Take 150 mcg by mouth once daily. Elieser Morales MD Taking Differently Active   losartan (Cozaar) 25 mg tablet [id number] Yes Take 1 tablet (25 mg) by  "mouth once daily. Historical Provider, MD Taking Active   metoprolol tartrate (Lopressor) 50 mg tablet [id number] Yes TAKE 1 TABLET TWICE A DAY Krystle Linn MD PhD Taking Active   multivitamin tablet [id number] Yes Take by mouth. Historical Provider, MD Taking Active   mycophenolate (Cellcept) 250 mg capsule [id number] Yes TAKE 3 CAPSULES BY MOUTH TWICE A DAY Krystian Thompson MD Taking Active   nitroglycerin (Nitrostat) 0.4 mg SL tablet [id number] Yes Place 1 tablet (0.4 mg) under the tongue every 5 minutes if needed for chest pain. Historical Provider, MD Taking Active   pravastatin (Pravachol) 40 mg tablet [id number] Yes TAKE 1 TABLET BY MOUTH EVERYDAY AT BEDTIME Krystle Linn MD PhD Taking Active   predniSONE (Deltasone) 5 mg tablet [id number] Yes TAKE 1 TABLET BY MOUTH EVERY DAY Krystian Thompson MD Taking Active   tacrolimus ER (Envarsus XR) 0.75 mg tablet ER [id number] Yes Take 2 tablets (1.5 mg) by mouth once daily. Rajani Ramirez MD Taking Active   tamsulosin (Flomax) 0.4 mg 24 hr capsule [id number] Yes Take 1 capsule (0.4 mg) by mouth once daily at bedtime. Michael Montesinos MD Taking Active   torsemide (Demadex) 20 mg tablet [id number] Yes TAKE 1 TABLET BY MOUTH DAILY AS NEEDED FOR SWELLING Krystle Linn MD PhD Taking Active                   Allergies   Allergen Reactions    Lovastatin Myalgia     Leg cramps    Muscle cramps    Simvastatin Myalgia     Muscle cramps    Adhesive Rash    Iodine Rash    Naproxen Rash     Visit Vitals  /64   Pulse 54   Ht 1.803 m (5' 11\")   Wt 78.9 kg (174 lb)   SpO2 90%   BMI 24.27 kg/m²   Smoking Status Former   BSA 1.99 m²     On examination:    Very pleasant elderly -American man in no apparent CP or painful distress  Immaculately groomed   Neck: No JVD or HJR  CVS: HS 1,2.  No added sounds  Resp: CTA bilaterally. Percussion note resonant  Abdomen: Mildly obese, SNT, BS wnl  Extremities: Left-sided 2+ pedal oedema, none on " right  Skin: warm and dry.  Multiple bruises  CNS: AO x 4, no gross deficits    Lab Results   Component Value Date    WBC 8.4 06/10/2024    HGB 12.0 (L) 06/10/2024    HCT 40.0 (L) 06/10/2024    MCV 80 06/10/2024     06/10/2024       Chemistry    Lab Results   Component Value Date/Time     06/10/2024 1025    K 4.1 06/10/2024 1025     06/10/2024 1025    CO2 25 06/10/2024 1025    BUN 18 06/10/2024 1025    CREATININE 1.55 (H) 06/10/2024 1025    Lab Results   Component Value Date/Time    CALCIUM 9.5 06/10/2024 1025    ALKPHOS 73 2023 2333    AST 12 2023 2333    ALT 10 2023 2333    BILITOT 0.5 2023 2333           Results/Data  MRI Cardiac w/wo contrast for Morph/Funct and Valve Dz 2023 10:25AM Krystle Hayes     Test Name Result Flag Reference   MRI Cardiac w/wo contrast for Morph/Funct and Valve Dz (Report)     FINAL REPORT  Interpreted by: MINGO WARREN   23 16:20                        [facility]                              CMR Report      MRN:          [id number]                  Name:      SUKHDEEP TORREZ                  :         1950                  Scan Date:  2023 09:41:15                    Electronically signed by Mingo Warren  16:20:34     GENERAL INFORMATION   =====================================================================  =====================================     HEIGHT: 68.90 in  (175.00 cm)  WEIGHT: 189.60 lbs  (86.00 kgs)  BSA: 2.02 m\S\2  BASELINE HR: 70 BPM  SCAN LOCATION: [facility]  REFERRING PHYSICIAN: KRYSTLE HAYES  ATTENDING PHYSICIAN: KRYSTLE HAYES  TECHNOLOGIST: Kori Mack  ACCESSION NUMBER: [id number]  CPT CODES: 59477  ICD10 CODES: I50.30, [ , ]I25.9  PATIENT HISTORY: \E\n\E\n72 year old man with history of vascular   disease with obstructive CAD s/p PCI mRCA in-stent restenosis in   2016, renal artery stenosis, femoral artery stenosis and carotid   stenosis all s/p  intervention, HFrecEF, atrial fibrillation s/p DCCV   6/2020 and is maintained on DOAC. 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 12/13/2021 stress   evaluation c/f inducible ischaemia. He had a positive stress test   12/2021 and is now status post left heart catheterization 12/28/2021   which demonstrated known midLCx  with collateral flow, and   otherwise mild CAD. He will be maintained on aspirin and pravastatin   for CAD. He was hospitalized 2/2023 with sudden onset dyspnea. COVID   and influenza were negative. On left heart catheterization 2/2023   hisLCx CTA was again visualized andrPDA 100% occlusion was also   noted. Recommendation was made by the cardiology team for medical   management. TTE 2/2023 LVEF 60 -65% with normal biventricular   systolic function, mild mitral regurgitation. Today he remains with   SOBOE.\E\n#CAD:\E\n? recurrent angina\E\n- cMRI ( viability   assessment, MV assessment)\E\n- will review 2/2023 [facility] with   interventional Cards after cMRI results available \E\n     SUMMARY   =====================================================================  =====================================     Prior Inferior and Inferolateral infarction with partial viability   (LCX territory). Wall motion  abnormalities in this distribution with preserved LVEF.  Moderate non-ischemic scarring in the septum.  LVH with concentric remodeling. Maximum thickness of the septum is   1.9 cm.  Mild RVH.     LEFT VENTRICLE: Quantitative LVEF 63 %. There is moderate LV   hypertrophy. There is LV concentric remodeling. LV cavity size  is normal. LV systolic function is regionally impaired. There is no   LV mass/thrombus.     VIABILITY: LV scar size is 13 %.     RIGHT VENTRICLE: There is mild RVH. RV cavity size is normal. RV   systolic function is normal. There is no RV mass/thrombus.     LV/RV SEPTUM: The  ventricular septum is intact.     LA/RA SEPTUM: The atrial septum is intact.     LEFT ATRIUM: LA is mildly enlarged.     RIGHT ATRIUM: There is no RA mass/thrombus. RA is mildly enlarged.     PERICARDIUM: Pericardium is normal. There is no pericardial effusion.     PLEURAL EFFUSION: There is no pleural effusion.     AORTIC VALVE: Aortic valve is mildly thickened. Aortic valveleaflets   are normal. The aortic valve annulus is normal in  size. There is mild aortic regurgitation.     MITRAL VALVE: Mitral valve leaflets are normal. The mitral valve   annulus is normal in size. There is mild mitral  regurgitation.     TRICUSPID VALVE: Tricuspid valve leaflets are normal. The tricuspid   valve annulus is normal in size.     AORTIC ROOT: The aortic root is normal.        CORE EXAM   =====================================================================  =====================================     MEASUREMENTS   ---------------------------------------------------------------------  -------------------    VOLUMETRIC ANALYSIS     ----------------------------------------------  .------------------------------------------------------.  .   .     . LV  . Reference . RV . Reference .  +------+----------+------+------------+----+-----------+  . EDV . ml    . 120 . (105-187) .  .      .  .   . ml/m\S\2  .  60 . (58-93)  .  .      .  . ESV . ml    .  44 . (25-70)  .  .      .  .   . ml/m\S\2  .  22 . (13-35)  .  .      .  . CO  . L/min  . 5.84 .      .  .      .  .   . L/min/m\S\2 . 2.90 .      .  .      .  . MASS . g    . 235 . (106-183) .  .      .  .   . g/m\S\2   . 117 . (56-89)  .  .      .  . SV  . ml    .  76 . () .  .      .  .   . ml/m\S\2  .  38 . (39-63)  .  .      .  . EF  . %    .  63 . (59-77)  .  .      .  '------+----------+------+------------+----+-----------'         CARDIAC OUTPUT HR: 77 BPM    LV DIMENSIONS     ----------------------------------------------      WALL THICKNESS - ANTEROSEPTAL: 1.9 cm      WALL  THICKNESS - INFEROLATERAL: 1.4 cm      WALL THICKNESS - MAXIMUM: 1.9 cm      LV SUSANA: 4.61 cm      LV ESD: 3.39 cm       LA DIMENSIONS (LV SYSTOLE)     ----------------------------------------------      AREA - 2 CHAMBER: 23.74 cm\S\2      LENGTH - 2 CHAMBER: 6.26 cm      AREA - 4 CHAMBER: 29.24 cm\S\2      LENGTH - 4 CHAMBER: 7.19 cm      VOLUME: 94 ml      VOLUME NORMALIZED: 46.7 ml/m\S\2       RA DIMENSIONS (RV SYSTOLE)     ----------------------------------------------      AREA - 4 CHAMBER: 22 cm\S\2      LENGTH - 4 CHAMBER: 5.2 cm       AORTIC ROOT DIMENSIONS     ----------------------------------------------      ANNULUS: 1.18 cm      SINUS OF VALSALVA: 2.7 cm      SINOTUBULAR JUNCTION: 2.4 cm       EXTRACELLULAR VOLUME MEASUREMENT     ----------------------------------------------      PRE-CONTRAST T1 MYOCARDIUM: 169.66 msec      PRE-CONTRAST T1 LV CAVITY: 178.02 msec      POST-CONTRAST T1 MYOCARDIUM: 134.39 msec      POST-CONTRAST T1 LV CAVITY: 65.5 msec       IRON QUANTIFICATION     ----------------------------------------------      MYOCARDIAL T2*: 33.26 msec      LIVER T2*: 12.89 msec        17 SEGMENT   ---------------------------------------------------------------------  -------------------  .---------------------------------------------------------------------  ----------------------.  . Segments      . Wall Motion  . Hyperenhancement . Stress   Perfusion . Interpretation .  +--------------------+---------------+------------------+-------------  -----+----------------+  . Base Anterior   . Normal/Hyper . None       .           . Normal     .  . Base Anteroseptal . Severe Hypo  . 26-50%      .           . Non-CAD Scar  .  . Base Inferoseptal . Severe Hypo  . 1-25%      .           . Non-CAD Scar  .  . Base Inferior   . Severe Hypo  . 51-75%      .           . Sub-Endo MI  .  . Base Inferolateral . Normal/Hyper . 26-50%      .           . Sub-Endo MI  .  . Base Anterolateral . Normal/Hyper . None        .           . Normal     .  . Mid Anterior    . Normal/Hyper . None       .           . Normal     .  . Mid Anteroseptal  . Normal/Hyper . 1-25%      .           . Non-CAD Scar  .  . Mid Inferoseptal  . Normal/Hyper . 1-25%      .           . Non-CAD Scar  .  . Mid Inferior    . Mild/Mod Hypo . 1-25%      .           . Sub-Endo MI  .  . Mid Inferolateral . Normal/Hyper . 1-25%      .           . Sub-Endo MI  .  . Mid Anterolateral . Normal/Hyper . None       .           . Normal     .  . Apical Anterior  . Normal/Hyper . None       .           . Normal     .  . Apical Septal   . Normal/Hyper . None       .           . Normal     .  . Apical Inferior  . Normal/Hyper . 1-25%      .           . Sub-Endo MI  .  . Apical Lateral   . Normal/Hyper . None       .           . Normal     .  . [place]        . Normal/Hyper . None       .           . Normal     .  +--------------------+---------------+------------------+-------------  -----+----------------+  . RV Segments    . Wall Motion  . Hyperenhancement . Stress   Perfusion . Interpretation .  +--------------------+---------------+------------------+-------------  -----+----------------+  . RV Basal Anterior . Normal/Hyper . None       .           . Normal     .  . RV Basal Inferior . Normal/Hyper . None       .           . Normal     .  . RV Mid       . Normal/Hyper . None       .           . Normal     .  . RV Apical     . Normal/Hyper . None       .           . Normal     .  '--------------------+---------------+------------------+-------------  -----+----------------'       FINDINGS     ----------------------------------------------      LV SCAR SIZE (17 SEGMENT): 13 %        SCAN INFO   =====================================================================  =====================================     GENERAL   ---------------------------------------------------------------------  -------------------    SCANNER     ----------------------------------------------       : [employer]      MODEL: MAGNETOM Aera      PULSE SEQUENCES: SSFP cine, 2D LGE segmented, 2D LGE   single-shot, Black-blood LGE (or Grey-blood),      Pre-contrast T1 mapping, Post-contrast T1 mapping, T2   mapping, First-pass perfusion without stress,      Phase contrast imaging, HASTE morphology        CONTRAST AGENT     ----------------------------------------------      TYPE: [employer]      LOT NUMBER: [id number]       EXPIRATION DATE: 2027-09-01 00:00:00       GD CONCENTRATION: 0.5 M      VOLUME ADMINISTERED: 35 ml      DOSAGE: 0.20 mmol/kg       SEDATION     ----------------------------------------------      SEDATION USED?: No       SETUP     ----------------------------------------------      SCAN TYPE: Clinical      PATIENT TYPE: Outpatient      INCOMPLETE SCAN: No      REASON(S) FOR SCAN: Viability: chronic CAD         Report generated by [employer], a product of [employer]    Electronically signed by: BRIANA  04/06/23 16:20     Impressions    IMPRESSION:    73 year old man with history of vascular disease with obstructive CAD s/p PCI mRCA in-stent restenosis in 5/25/2016, renal artery stenosis, femoral artery stenosis and carotid stenosis all s/p intervention, HFrecEF, atrial fibrillation s/p DCCV 6/2020 and is maintained on DOAC. 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  TTE 12/10/2021 demonstrates normal left ventricular systolic function 55% with mild to moderate RV systolic impairment. 12/13/2021 stress evaluation c/f inducible ischaemia. He had a positive stress test 12/2021 and is now status post left heart catheterization 12/28/2021 which demonstrated known midLCx  with collateral flow, and otherwise mild CAD. He will be maintained on aspirin and pravastatin for CAD. He was hospitalized 2/2023 with sudden onset dyspnea. COVID and influenza were negative. On left heart catheterization 2/2023 hisLCx CTA was again visualized andrPDA 100% occlusion was also noted.  "Recommendation was made by the cardiology team for medical management. TTE 2/2023 LVEF 60 -65% with normal biventricular systolic function, mild mitral regurgitation. At his last visit he continues to struggle with exertional dyspnea and has now completed cardiac MRI 4/2023 which showed areas of infarction and in the region of the LCx there was \"partial viability\". He does have normal biventricular systolic function with LVEF 63%. There was no evidence of i LV or RV thrombus. Normal valvular function.  He was referred to cardiac rehabilitation and doing the sessions was associated with exertional improvement in his dyspnea.  Unfortunately his exertional dyspnea has recurred.  He also complains of unilateral leg swelling (he is fully adherent with DOAC), and exertional leg pain.    ACC/AHA stage B  NYHA FC: 2  Volume status: Euvolemic   Perfusion status: Warm to touch    PLAN    #HFrecoveredEF  -No rx changes   -Plan to start SGLT2 inhibitor, spironolactone at next visit  - cMRI with exercise challenge is pending    #Hypertension  -Continue metoprolol titrate 50 mg twice daily    #CAD:  No symptoms    -C rehab referral placed at last visit, he has upcoming visits  -Continue aspirin, pravast, metoprolol tartrate, CCB  -Given myalgia he was previously transitioned from rosuvastatin to pravastatin and this will be continued.     #Atrial fibrillation:  -Continue DOAC and lower dose apixaban 2.5 mg twice daily. He no longer troubled by overt bleeding    #Exertional leg pain  -PEDRO PABLO with and without exercise bilaterally    #Unilateral leg swelling  He is fully adherent with DOAC, however will also check for DVT    This note was transcribed using the Dragon Dictation system. There may be grammatical, punctuation, or verbiage errors that can occur with voice recognition programs.      Krystle Linn MD PhD    "

## 2024-07-06 LAB
ALBUMIN SERPL BCP-MCNC: 4.3 G/DL (ref 3.4–5)
ANION GAP SERPL CALC-SCNC: 16 MMOL/L (ref 10–20)
BACTERIA #/AREA URNS AUTO: ABNORMAL /HPF
BNP SERPL-MCNC: 1084 PG/ML (ref 0–99)
BUN SERPL-MCNC: 21 MG/DL (ref 6–23)
CALCIUM SERPL-MCNC: 9.7 MG/DL (ref 8.6–10.6)
CHLORIDE SERPL-SCNC: 100 MMOL/L (ref 98–107)
CO2 SERPL-SCNC: 23 MMOL/L (ref 21–32)
CREAT SERPL-MCNC: 1.76 MG/DL (ref 0.5–1.3)
EGFRCR SERPLBLD CKD-EPI 2021: 40 ML/MIN/1.73M*2
ERYTHROCYTE [DISTWIDTH] IN BLOOD BY AUTOMATED COUNT: 14.8 % (ref 11.5–14.5)
GLUCOSE SERPL-MCNC: 180 MG/DL (ref 74–99)
HCT VFR BLD AUTO: 37.7 % (ref 41–52)
HGB BLD-MCNC: 11.3 G/DL (ref 13.5–17.5)
HYALINE CASTS #/AREA URNS AUTO: ABNORMAL /LPF
MCH RBC QN AUTO: 23.3 PG (ref 26–34)
MCHC RBC AUTO-ENTMCNC: 30 G/DL (ref 32–36)
MCV RBC AUTO: 78 FL (ref 80–100)
MUCOUS THREADS #/AREA URNS AUTO: ABNORMAL /LPF
NRBC BLD-RTO: 0 /100 WBCS (ref 0–0)
PHOSPHATE SERPL-MCNC: 3.2 MG/DL (ref 2.5–4.9)
PLATELET # BLD AUTO: 344 X10*3/UL (ref 150–450)
POTASSIUM SERPL-SCNC: 4.3 MMOL/L (ref 3.5–5.3)
RBC # BLD AUTO: 4.84 X10*6/UL (ref 4.5–5.9)
RBC #/AREA URNS AUTO: ABNORMAL /HPF
SODIUM SERPL-SCNC: 135 MMOL/L (ref 136–145)
TACROLIMUS BLD-MCNC: 10.7 NG/ML
WBC # BLD AUTO: 7.9 X10*3/UL (ref 4.4–11.3)
WBC #/AREA URNS AUTO: >50 /HPF
WBC CLUMPS #/AREA URNS AUTO: ABNORMAL /HPF

## 2024-07-08 LAB
ATRIAL RATE: 57 BPM
P AXIS: 76 DEGREES
P OFFSET: 159 MS
P ONSET: 126 MS
PR INTERVAL: 182 MS
Q ONSET: 217 MS
QRS COUNT: 12 BEATS
QRS DURATION: 86 MS
QT INTERVAL: 412 MS
QTC CALCULATION(BAZETT): 441 MS
QTC FREDERICIA: 431 MS
R AXIS: -37 DEGREES
T AXIS: 212 DEGREES
T OFFSET: 423 MS
VENTRICULAR RATE: 69 BPM

## 2024-07-10 ENCOUNTER — HOSPITAL ENCOUNTER (OUTPATIENT)
Dept: RADIOLOGY | Facility: HOSPITAL | Age: 74
Discharge: HOME | End: 2024-07-10
Payer: MEDICARE

## 2024-07-10 ENCOUNTER — HOSPITAL ENCOUNTER (OUTPATIENT)
Dept: CARDIOLOGY | Facility: CLINIC | Age: 74
Discharge: HOME | End: 2024-07-10
Payer: MEDICARE

## 2024-07-10 DIAGNOSIS — I48.91 UNSPECIFIED ATRIAL FIBRILLATION (MULTI): ICD-10-CM

## 2024-07-10 DIAGNOSIS — I48.92 UNSPECIFIED ATRIAL FLUTTER (MULTI): ICD-10-CM

## 2024-07-10 DIAGNOSIS — I50.30 UNSPECIFIED DIASTOLIC (CONGESTIVE) HEART FAILURE (MULTI): ICD-10-CM

## 2024-07-10 PROCEDURE — A9575 INJ GADOTERATE MEGLUMI 0.1ML: HCPCS | Performed by: STUDENT IN AN ORGANIZED HEALTH CARE EDUCATION/TRAINING PROGRAM

## 2024-07-10 PROCEDURE — 75565 CARD MRI VELOC FLOW MAPPING: CPT | Performed by: INTERNAL MEDICINE

## 2024-07-10 PROCEDURE — 2550000001 HC RX 255 CONTRASTS: Performed by: STUDENT IN AN ORGANIZED HEALTH CARE EDUCATION/TRAINING PROGRAM

## 2024-07-10 PROCEDURE — 75563 CARD MRI W/STRESS IMG & DYE: CPT

## 2024-07-10 PROCEDURE — 75563 CARD MRI W/STRESS IMG & DYE: CPT | Performed by: INTERNAL MEDICINE

## 2024-07-10 PROCEDURE — 93298 REM INTERROG DEV EVAL SCRMS: CPT

## 2024-07-10 RX ORDER — GADOTERATE MEGLUMINE 376.9 MG/ML
31 INJECTION INTRAVENOUS
Status: COMPLETED | OUTPATIENT
Start: 2024-07-10 | End: 2024-07-10

## 2024-07-10 NOTE — NURSING NOTE
MRI STRESS        Stress protocol: Regadenoson  Regadenoson Dose: 0.4mg  Aminophylline Dose: 100mg     Resting Vitals:  BP: 182/60  HR: 61 BPM  SPO2: 95% RA  Resting ECG: Sinus, left axis deviation, non specific T wave abnormality, 2mm lateral and inferior lead downsloping ST depression (approved to proceed with regadenoson stress per MD Isac Medrano)     Stress:  0.4mg IV push Regadenoson:  1 min: /73  HR 62 bpm 97% RA Symptoms: patient visibly short of breath  2 min: /72  HR 67 bpm 97% RA Symptoms: Patient stated “that wasn't that bad”     Reversal/Recovery:  100mg IV push Aminophylline:  1 min: /65  HR 61 bpm 96% RA Symptoms: Denies  2 min: /71  HR 70 bpm 94% RA Symptoms: Denies  4 min: /61  HR 73 bpm 91% RA Symptoms: Denies  6 min: /60  HR 74 bpm 92% RA Symptoms: Denies     Patients resting HR of 61 bpm laura to a maximum of 74 bpm.  Resting BP of 182/60 laura to a maximum of 185/65, which occurred immediately after Aminophylline. Patients post stress EKG remained unchanged.  Patient discharged from the [facility] to home.

## 2024-07-11 ENCOUNTER — TELEPHONE (OUTPATIENT)
Dept: TRANSPLANT | Facility: HOSPITAL | Age: 74
End: 2024-07-11
Payer: MEDICARE

## 2024-07-11 DIAGNOSIS — N30.00 ACUTE CYSTITIS WITHOUT HEMATURIA: ICD-10-CM

## 2024-07-11 DIAGNOSIS — Z94.0 KIDNEY REPLACED BY TRANSPLANT (HHS-HCC): ICD-10-CM

## 2024-07-11 RX ORDER — CIPROFLOXACIN 500 MG/1
500 TABLET ORAL 2 TIMES DAILY
Qty: 20 TABLET | Refills: 0 | Status: SHIPPED | OUTPATIENT
Start: 2024-07-11 | End: 2024-07-21

## 2024-07-11 NOTE — TELEPHONE ENCOUNTER
Reviewed with Dr. Ramirez:    Creatinine: (1.76 on 7/5); (1.55); (1.59); (1.56)   Tac: (10.7 on 7/5); (5.5); (7.8); (3.9)   UA: WBC: > 50;     Bacteria: 1+       c/o urinary symptoms to PCP on Friday. PCP would like transplant to prescribe antibiotic.     Plan: Start Cipro 500 mg BID x 10 days.     Spoke with the patient. He states that he took Envarsus before his morning labs were drawn. He will repeat another level tomorrow. States that urine has been cloudy with an odor. Script for Cipro sent to [facility] pending Dr. Ramirez signature.   
37.6

## 2024-07-12 ENCOUNTER — LAB (OUTPATIENT)
Dept: LAB | Facility: LAB | Age: 74
End: 2024-07-12
Payer: MEDICARE

## 2024-07-12 ENCOUNTER — HOSPITAL ENCOUNTER (OUTPATIENT)
Dept: CARDIOLOGY | Facility: CLINIC | Age: 74
Discharge: HOME | End: 2024-07-12
Payer: MEDICARE

## 2024-07-12 DIAGNOSIS — I48.91 UNSPECIFIED ATRIAL FIBRILLATION (MULTI): ICD-10-CM

## 2024-07-12 DIAGNOSIS — I48.92 UNSPECIFIED ATRIAL FLUTTER (MULTI): ICD-10-CM

## 2024-07-12 DIAGNOSIS — Z94.0 KIDNEY REPLACED BY TRANSPLANT (HHS-HCC): ICD-10-CM

## 2024-07-12 LAB — TACROLIMUS BLD-MCNC: 5.9 NG/ML

## 2024-07-12 PROCEDURE — 36415 COLL VENOUS BLD VENIPUNCTURE: CPT

## 2024-07-12 PROCEDURE — 80197 ASSAY OF TACROLIMUS: CPT

## 2024-07-15 ENCOUNTER — HOSPITAL ENCOUNTER (OUTPATIENT)
Dept: CARDIOLOGY | Facility: CLINIC | Age: 74
Discharge: HOME | End: 2024-07-15
Payer: MEDICARE

## 2024-07-15 DIAGNOSIS — I48.92 UNSPECIFIED ATRIAL FLUTTER (MULTI): ICD-10-CM

## 2024-07-15 DIAGNOSIS — I48.91 UNSPECIFIED ATRIAL FIBRILLATION (MULTI): ICD-10-CM

## 2024-07-22 ENCOUNTER — HOSPITAL ENCOUNTER (OUTPATIENT)
Dept: CARDIOLOGY | Facility: CLINIC | Age: 74
Discharge: HOME | End: 2024-07-22
Payer: MEDICARE

## 2024-07-22 DIAGNOSIS — I48.91 UNSPECIFIED ATRIAL FIBRILLATION (MULTI): ICD-10-CM

## 2024-07-22 DIAGNOSIS — I48.92 UNSPECIFIED ATRIAL FLUTTER (MULTI): ICD-10-CM

## 2024-07-24 ENCOUNTER — SPECIALTY PHARMACY (OUTPATIENT)
Dept: PHARMACY | Facility: CLINIC | Age: 74
End: 2024-07-24

## 2024-07-24 PROCEDURE — RXMED WILLOW AMBULATORY MEDICATION CHARGE

## 2024-07-25 ENCOUNTER — HOSPITAL ENCOUNTER (OUTPATIENT)
Dept: CARDIOLOGY | Facility: CLINIC | Age: 74
Discharge: HOME | End: 2024-07-25
Payer: MEDICARE

## 2024-07-25 ENCOUNTER — PHARMACY VISIT (OUTPATIENT)
Dept: PHARMACY | Facility: CLINIC | Age: 74
End: 2024-07-25
Payer: COMMERCIAL

## 2024-07-25 DIAGNOSIS — I48.92 UNSPECIFIED ATRIAL FLUTTER (MULTI): ICD-10-CM

## 2024-07-25 DIAGNOSIS — I48.91 UNSPECIFIED ATRIAL FIBRILLATION (MULTI): ICD-10-CM

## 2024-07-29 ENCOUNTER — HOSPITAL ENCOUNTER (OUTPATIENT)
Dept: CARDIOLOGY | Facility: CLINIC | Age: 74
Discharge: HOME | End: 2024-07-29
Payer: MEDICARE

## 2024-07-29 DIAGNOSIS — I48.92 UNSPECIFIED ATRIAL FLUTTER (MULTI): ICD-10-CM

## 2024-07-29 DIAGNOSIS — I48.91 UNSPECIFIED ATRIAL FIBRILLATION (MULTI): ICD-10-CM

## 2024-07-30 ENCOUNTER — APPOINTMENT (OUTPATIENT)
Dept: VASCULAR MEDICINE | Facility: CLINIC | Age: 74
End: 2024-07-30
Payer: MEDICARE

## 2024-07-30 ENCOUNTER — HOSPITAL ENCOUNTER (OUTPATIENT)
Dept: CARDIOLOGY | Facility: CLINIC | Age: 74
Discharge: HOME | End: 2024-07-30
Payer: MEDICARE

## 2024-07-30 DIAGNOSIS — I48.92 UNSPECIFIED ATRIAL FLUTTER (MULTI): ICD-10-CM

## 2024-07-30 DIAGNOSIS — I48.91 UNSPECIFIED ATRIAL FIBRILLATION (MULTI): ICD-10-CM

## 2024-08-06 ENCOUNTER — HOSPITAL ENCOUNTER (OUTPATIENT)
Dept: VASCULAR MEDICINE | Facility: CLINIC | Age: 74
Discharge: HOME | End: 2024-08-06
Payer: MEDICARE

## 2024-08-06 DIAGNOSIS — I50.32 CHRONIC HEART FAILURE WITH PRESERVED EJECTION FRACTION (MULTI): ICD-10-CM

## 2024-08-06 DIAGNOSIS — I73.89 OTHER SPECIFIED PERIPHERAL VASCULAR DISEASES (CMS-HCC): ICD-10-CM

## 2024-08-06 DIAGNOSIS — I73.9 PAD (PERIPHERAL ARTERY DISEASE) (CMS-HCC): ICD-10-CM

## 2024-08-06 DIAGNOSIS — M79.89 LEFT LEG SWELLING: ICD-10-CM

## 2024-08-06 DIAGNOSIS — I70.213 ATHEROSCLEROSIS OF NATIVE ARTERIES OF EXTREMITIES WITH INTERMITTENT CLAUDICATION, BILATERAL LEGS (CMS-HCC): ICD-10-CM

## 2024-08-06 PROCEDURE — 93922 UPR/L XTREMITY ART 2 LEVELS: CPT

## 2024-08-06 PROCEDURE — 93970 EXTREMITY STUDY: CPT

## 2024-08-06 PROCEDURE — 93922 UPR/L XTREMITY ART 2 LEVELS: CPT | Performed by: SURGERY

## 2024-08-06 PROCEDURE — 93970 EXTREMITY STUDY: CPT | Performed by: SURGERY

## 2024-08-08 PROBLEM — Z95.818 PRESENCE OF CARDIAC DEVICE: Status: ACTIVE | Noted: 2024-08-08

## 2024-08-08 PROBLEM — R06.02 SHORTNESS OF BREATH: Status: ACTIVE | Noted: 2024-08-08

## 2024-08-08 PROBLEM — Z95.5 PRESENCE OF STENT IN CORONARY ARTERY: Status: ACTIVE | Noted: 2024-08-08

## 2024-08-09 ENCOUNTER — OFFICE VISIT (OUTPATIENT)
Dept: CARDIOLOGY | Facility: CLINIC | Age: 74
End: 2024-08-09
Payer: MEDICARE

## 2024-08-09 VITALS
BODY MASS INDEX: 24.01 KG/M2 | DIASTOLIC BLOOD PRESSURE: 76 MMHG | WEIGHT: 171.5 LBS | SYSTOLIC BLOOD PRESSURE: 172 MMHG | OXYGEN SATURATION: 91 % | HEIGHT: 71 IN | HEART RATE: 60 BPM

## 2024-08-09 DIAGNOSIS — I10 ESSENTIAL HYPERTENSION: ICD-10-CM

## 2024-08-09 DIAGNOSIS — I70.213 ATHEROSCLEROSIS OF NATIVE ARTERY OF BOTH LOWER EXTREMITIES WITH INTERMITTENT CLAUDICATION (CMS-HCC): Primary | ICD-10-CM

## 2024-08-09 DIAGNOSIS — I48.0 PAROXYSMAL ATRIAL FIBRILLATION (MULTI): Primary | ICD-10-CM

## 2024-08-09 LAB
ATRIAL RATE: 61 BPM
P AXIS: 71 DEGREES
P OFFSET: 155 MS
P ONSET: 118 MS
PR INTERVAL: 194 MS
Q ONSET: 215 MS
QRS COUNT: 10 BEATS
QRS DURATION: 88 MS
QT INTERVAL: 432 MS
QTC CALCULATION(BAZETT): 434 MS
QTC FREDERICIA: 434 MS
R AXIS: 24 DEGREES
T AXIS: 211 DEGREES
T OFFSET: 431 MS
VENTRICULAR RATE: 61 BPM

## 2024-08-09 PROCEDURE — 1160F RVW MEDS BY RX/DR IN RCRD: CPT | Performed by: NURSE PRACTITIONER

## 2024-08-09 PROCEDURE — 99214 OFFICE O/P EST MOD 30 MIN: CPT | Performed by: NURSE PRACTITIONER

## 2024-08-09 PROCEDURE — 1036F TOBACCO NON-USER: CPT | Performed by: NURSE PRACTITIONER

## 2024-08-09 PROCEDURE — 93005 ELECTROCARDIOGRAM TRACING: CPT | Performed by: NURSE PRACTITIONER

## 2024-08-09 PROCEDURE — 1126F AMNT PAIN NOTED NONE PRSNT: CPT | Performed by: NURSE PRACTITIONER

## 2024-08-09 PROCEDURE — 1159F MED LIST DOCD IN RCRD: CPT | Performed by: NURSE PRACTITIONER

## 2024-08-09 PROCEDURE — 3077F SYST BP >= 140 MM HG: CPT | Performed by: NURSE PRACTITIONER

## 2024-08-09 PROCEDURE — 3078F DIAST BP <80 MM HG: CPT | Performed by: NURSE PRACTITIONER

## 2024-08-09 RX ORDER — LOSARTAN POTASSIUM 25 MG/1
25 TABLET ORAL DAILY
Qty: 90 TABLET | Refills: 1 | Status: SHIPPED | OUTPATIENT
Start: 2024-08-09 | End: 2025-02-05

## 2024-08-09 ASSESSMENT — PAIN SCALES - GENERAL: PAINLEVEL: 0-NO PAIN

## 2024-08-09 ASSESSMENT — ENCOUNTER SYMPTOMS
WEAKNESS: 0
NEAR-SYNCOPE: 0
SHORTNESS OF BREATH: 0
SPUTUM PRODUCTION: 0
LIGHT-HEADEDNESS: 0
SORE THROAT: 0
ABDOMINAL PAIN: 0
COUGH: 0
ORTHOPNEA: 0
SYNCOPE: 0
DIZZINESS: 0
FEVER: 0
BLURRED VISION: 0
HEMOPTYSIS: 0
DIAPHORESIS: 0
NAUSEA: 0
FALLS: 0
DEPRESSION: 0
VOMITING: 0
LOSS OF SENSATION IN FEET: 1
MYALGIAS: 0
IRREGULAR HEARTBEAT: 1
OCCASIONAL FEELINGS OF UNSTEADINESS: 0
DOUBLE VISION: 0
DYSPNEA ON EXERTION: 1
PALPITATIONS: 0
DIARRHEA: 0
HEADACHES: 0
SNORING: 0
PND: 0

## 2024-08-09 ASSESSMENT — COLUMBIA-SUICIDE SEVERITY RATING SCALE - C-SSRS
1. IN THE PAST MONTH, HAVE YOU WISHED YOU WERE DEAD OR WISHED YOU COULD GO TO SLEEP AND NOT WAKE UP?: NO
6. HAVE YOU EVER DONE ANYTHING, STARTED TO DO ANYTHING, OR PREPARED TO DO ANYTHING TO END YOUR LIFE?: NO
2. HAVE YOU ACTUALLY HAD ANY THOUGHTS OF KILLING YOURSELF?: NO

## 2024-08-09 ASSESSMENT — PATIENT HEALTH QUESTIONNAIRE - PHQ9
2. FEELING DOWN, DEPRESSED OR HOPELESS: NOT AT ALL
SUM OF ALL RESPONSES TO PHQ9 QUESTIONS 1 AND 2: 0
1. LITTLE INTEREST OR PLEASURE IN DOING THINGS: NOT AT ALL

## 2024-08-09 NOTE — PROGRESS NOTES
Subjective   Dilip Haynes is a 73 y.o. male.    Chief Complaint:  Follow-up and Atrial Fibrillation    Dilip Haynes is a 72 y/o male presenting for annual follow-up of AF/AFL      PMH includes HTN, ESRD (2/2 HTN) s/p kidney transplant (5/5/20), CAD s/p PCI's, HFpEF (f/w Linn), carotid stenosis s/p R CEA, PAD s/p LFA stent, renal artery stenosis s/p renal stents (2014), ESRD s/p DDKT (5/5/20), Raynauds s/p finger amputations, intra-op cardiac arrest (5/2020) and AF.      Treatment of his AF includes metoprolol, DCCV & ILR implant (6/2020, battery change 11/2023)  Symptoms of his AF includes irregular heartbeat and SOB     Now s/p Afib RFA with Dr. Lopez 11/1/2022  ILR readings 10/3-11/11/2022- 11% Afib burden, only 1 episode since his ablation on 11/10 for 16 minutes, no elevated rates.  ECG 12/2/2022 NSR HR 66 bpm ST and T wave abnormality  Patient unfortunately is having trouble transmitting his loop recorder from home and is awaiting a device to fix the problem  Last Transmission 12/9/2022 showed his last Afib episode was 12/2 and it lasted for 12 minutes  ECG 2/3/2023- NSR HR 80 bpm, ST/T wave abnormality  Remote transmission 8/4/2023 Since last transmission, 0 pt activated symptoms, 0 marisol/ 1 pauses (duration EGM shows undersensing and noise with zoom amplitude SR 83 then to SB 50 bpm ), 0 AT/2 AF ( duration 6-10 min; EGMs show probable SR with ectopy; AT/AF burden 4.45%- pt remains on elqiuis and metoprolol per AEMR ) , and 0 tachy detections within parameter zones.   ECG 8/4/2023 NSR HR 72 bpm    CMRI 7/10/2024: LV hypertrophy with moderate asymmetric hypertrophy of the septum. Mixed ischemic and non-ischemic scar. Transmural infarct of the apical inferior infarct in a distal RCA territory. Overall scar size is 15%. Regadenoson perfusion imaging reveals global ischemia consistent with abnormal myocardial flow reserve that may relate to LVH. Dilated left atrium. Mild to moderate MR.    Remote  "transmissions over the last 6 months have been monitoring PVC and Afib burden.  PVC burden usually remains between 1-3%.  He is still having episodes of Afib, sometimes 0%, sometimes up to 30% depending on when the transmission is sent (sometimes it is only days apart).  He is on Eliquis 2.5 BID  ECG 8/9/2024 NSR with PACs/PVCs, HR 61 bpm    Patient denies any arrhythmia complaints.  He gets SOB after mowing the lawn, but not normal daily activities.  He sometimes notices an irregular heart beat, but nothing that lasts more than a few minutes and nothing that is very fast. He ran out of his Losartan about a month ago.    /76 (BP Location: Left arm, Patient Position: Sitting, BP Cuff Size: Adult)   Pulse 60   Ht 1.803 m (5' 11\")   Wt 77.8 kg (171 lb 8 oz)   SpO2 91%   BMI 23.92 kg/m²   Current Outpatient Medications on File Prior to Visit   Medication Sig Dispense Refill    amLODIPine (Norvasc) 10 mg tablet Take 1 tablet (10 mg) by mouth once daily.      Eliquis 2.5 mg tablet TAKE 1 TABLET TWICE A DAY 60 tablet 10    famotidine (Pepcid) 20 mg tablet TAKE 1 TABLET BY MOUTH EVERY DAY AS DIRECTED 90 tablet 3    fluticasone (Flonase) 50 mcg/actuation nasal spray 2 sprays by Does not apply route once daily as needed for allergies.      levothyroxine (Synthroid, Levoxyl) 137 mcg tablet Take 150 mcg by mouth once daily.      metoprolol tartrate (Lopressor) 50 mg tablet TAKE 1 TABLET TWICE A  tablet 3    multivitamin tablet Take by mouth.      mycophenolate (Cellcept) 250 mg capsule TAKE 3 CAPSULES BY MOUTH TWICE A  capsule 11    nitroglycerin (Nitrostat) 0.4 mg SL tablet Place 1 tablet (0.4 mg) under the tongue every 5 minutes if needed for chest pain.      pravastatin (Pravachol) 40 mg tablet TAKE 1 TABLET BY MOUTH EVERYDAY AT BEDTIME 90 tablet 3    predniSONE (Deltasone) 5 mg tablet TAKE 1 TABLET BY MOUTH EVERY DAY 30 tablet 23    tacrolimus ER (Envarsus XR) 0.75 mg tablet ER Take 2 tablets (1.5 " mg) by mouth once daily. 60 tablet 11    tamsulosin (Flomax) 0.4 mg 24 hr capsule Take 1 capsule (0.4 mg) by mouth once daily at bedtime. 90 capsule 3    torsemide (Demadex) 20 mg tablet Take 1 tablet (20 mg) by mouth once daily. 90 tablet 3    [DISCONTINUED] losartan (Cozaar) 25 mg tablet Take 1 tablet (25 mg) by mouth once daily.      [DISCONTINUED] aspirin 81 mg EC tablet Take 1 tablet (81 mg) by mouth once daily.       No current facility-administered medications on file prior to visit.         Review of Systems   Constitutional: Negative for diaphoresis, fever and malaise/fatigue.   HENT:  Negative for congestion and sore throat.    Eyes:  Negative for blurred vision and double vision.   Cardiovascular:  Positive for dyspnea on exertion and irregular heartbeat. Negative for chest pain, leg swelling, near-syncope, orthopnea, palpitations, paroxysmal nocturnal dyspnea and syncope.   Respiratory:  Negative for cough, hemoptysis, shortness of breath, snoring and sputum production.    Hematologic/Lymphatic: Negative for bleeding problem.   Skin:  Negative for rash.   Musculoskeletal:  Negative for falls, joint pain and myalgias.   Gastrointestinal:  Negative for abdominal pain, diarrhea, nausea and vomiting.   Neurological:  Negative for dizziness, headaches, light-headedness and weakness.   All other systems reviewed and are negative.      Objective   Constitutional:       Appearance: Healthy appearance. Not in distress.   Eyes:      Conjunctiva/sclera: Conjunctivae normal.   HENT:      Nose: Nose normal.    Mouth/Throat:      Pharynx: Oropharynx is clear.   Pulmonary:      Effort: Pulmonary effort is normal.      Breath sounds: Normal breath sounds. No wheezing. No rhonchi.   Chest:      Chest wall: Not tender to palpatation.   Cardiovascular:      Normal rate. Occasional ectopic beats. Regular rhythm.      Murmurs: There is no murmur.      No rub.   Pulses:     Intact distal pulses.   Edema:     Peripheral edema  absent.   Abdominal:      General: Bowel sounds are normal.      Palpations: Abdomen is soft.   Musculoskeletal: Normal range of motion.      Cervical back: Neck supple. Skin:     General: Skin is warm and dry.   Neurological:      Mental Status: Alert and oriented to person, place and time.      Motor: Motor function is intact.         Lab Review:   Lab Results   Component Value Date     (L) 07/05/2024    K 4.3 07/05/2024     07/05/2024    CO2 23 07/05/2024    BUN 21 07/05/2024    CREATININE 1.76 (H) 07/05/2024    GLUCOSE 180 (H) 07/05/2024    CALCIUM 9.7 07/05/2024     Lab Results   Component Value Date    WBC 7.9 07/05/2024    HGB 11.3 (L) 07/05/2024    HCT 37.7 (L) 07/05/2024    MCV 78 (L) 07/05/2024     07/05/2024       Assessment/Plan   The primary encounter diagnosis was Paroxysmal atrial fibrillation (Multi). A diagnosis of Essential hypertension was also pertinent to this visit.  Patient is now almost 2 years post Afib ablation and overall is feeling good.  He still follows with Dr. Linn for advanced heart failure.  I will renew his Losartan today  Continue Eliquis and Metoprolol    I will review his loop recorder transmissions with Dr. Lopez.  His burden remains low and he denies any symptoms.  If his burden increases and/or he develops heart failure symptoms, we will likely discuss repeat RFA vs AAD for rhythm control. We will continue to watch his loop recorder and I will call the patient with any change in plan  Follow up with me in 1 year or sooner if needed

## 2024-08-12 ENCOUNTER — HOSPITAL ENCOUNTER (OUTPATIENT)
Dept: CARDIOLOGY | Facility: CLINIC | Age: 74
Discharge: HOME | End: 2024-08-12
Payer: MEDICARE

## 2024-08-12 DIAGNOSIS — I48.91 UNSPECIFIED ATRIAL FIBRILLATION (MULTI): ICD-10-CM

## 2024-08-12 DIAGNOSIS — I48.92 UNSPECIFIED ATRIAL FLUTTER (MULTI): ICD-10-CM

## 2024-08-12 PROCEDURE — 93298 REM INTERROG DEV EVAL SCRMS: CPT

## 2024-08-15 ENCOUNTER — HOSPITAL ENCOUNTER (OUTPATIENT)
Dept: CARDIOLOGY | Facility: CLINIC | Age: 74
Discharge: HOME | End: 2024-08-15
Payer: MEDICARE

## 2024-08-15 DIAGNOSIS — I48.92 UNSPECIFIED ATRIAL FLUTTER (MULTI): ICD-10-CM

## 2024-08-15 DIAGNOSIS — I48.91 UNSPECIFIED ATRIAL FIBRILLATION (MULTI): ICD-10-CM

## 2024-08-19 ENCOUNTER — HOSPITAL ENCOUNTER (OUTPATIENT)
Dept: CARDIOLOGY | Facility: CLINIC | Age: 74
Discharge: HOME | End: 2024-08-19
Payer: MEDICARE

## 2024-08-19 DIAGNOSIS — I48.91 UNSPECIFIED ATRIAL FIBRILLATION (MULTI): ICD-10-CM

## 2024-08-19 DIAGNOSIS — I48.92 UNSPECIFIED ATRIAL FLUTTER (MULTI): ICD-10-CM

## 2024-08-20 ENCOUNTER — HOSPITAL ENCOUNTER (OUTPATIENT)
Dept: CARDIOLOGY | Facility: CLINIC | Age: 74
Discharge: HOME | End: 2024-08-20
Payer: MEDICARE

## 2024-08-20 DIAGNOSIS — I48.91 UNSPECIFIED ATRIAL FIBRILLATION (MULTI): ICD-10-CM

## 2024-08-20 DIAGNOSIS — I48.92 UNSPECIFIED ATRIAL FLUTTER (MULTI): ICD-10-CM

## 2024-08-22 ENCOUNTER — HOSPITAL ENCOUNTER (OUTPATIENT)
Dept: CARDIOLOGY | Facility: CLINIC | Age: 74
Discharge: HOME | End: 2024-08-22
Payer: MEDICARE

## 2024-08-22 ENCOUNTER — CLINICAL SUPPORT (OUTPATIENT)
Dept: CARDIAC REHAB | Facility: HOSPITAL | Age: 74
End: 2024-08-22
Payer: MEDICARE

## 2024-08-22 VITALS
SYSTOLIC BLOOD PRESSURE: 179 MMHG | HEIGHT: 71 IN | WEIGHT: 176.5 LBS | DIASTOLIC BLOOD PRESSURE: 77 MMHG | BODY MASS INDEX: 24.71 KG/M2 | OXYGEN SATURATION: 96 %

## 2024-08-22 DIAGNOSIS — I48.91 UNSPECIFIED ATRIAL FIBRILLATION (MULTI): ICD-10-CM

## 2024-08-22 DIAGNOSIS — I70.213 ATHEROSCLEROSIS OF NATIVE ARTERY OF BOTH LOWER EXTREMITIES WITH INTERMITTENT CLAUDICATION (CMS-HCC): ICD-10-CM

## 2024-08-22 DIAGNOSIS — I48.92 UNSPECIFIED ATRIAL FLUTTER (MULTI): ICD-10-CM

## 2024-08-22 ASSESSMENT — PATIENT HEALTH QUESTIONNAIRE - PHQ9
SUM OF ALL RESPONSES TO PHQ9 QUESTIONS 1 & 2: 0
4. FEELING TIRED OR HAVING LITTLE ENERGY: SEVERAL DAYS
1. LITTLE INTEREST OR PLEASURE IN DOING THINGS: NOT AT ALL
SUM OF ALL RESPONSES TO PHQ QUESTIONS 1-9: 1
5. POOR APPETITE OR OVEREATING: NOT AT ALL
9. THOUGHTS THAT YOU WOULD BE BETTER OFF DEAD, OR OF HURTING YOURSELF: NOT AT ALL
3. TROUBLE FALLING OR STAYING ASLEEP OR SLEEPING TOO MUCH: NOT AT ALL
8. MOVING OR SPEAKING SO SLOWLY THAT OTHER PEOPLE COULD HAVE NOTICED. OR THE OPPOSITE, BEING SO FIGETY OR RESTLESS THAT YOU HAVE BEEN MOVING AROUND A LOT MORE THAN USUAL: NOT AT ALL
SUM OF ALL RESPONSES TO PHQ QUESTIONS 1-9: 1
6. FEELING BAD ABOUT YOURSELF - OR THAT YOU ARE A FAILURE OR HAVE LET YOURSELF OR YOUR FAMILY DOWN: NOT AT ALL
7. TROUBLE CONCENTRATING ON THINGS, SUCH AS READING THE NEWSPAPER OR WATCHING TELEVISION: NOT AT ALL
2. FEELING DOWN, DEPRESSED OR HOPELESS: NOT AT ALL

## 2024-08-22 ASSESSMENT — DUKE ACTIVITY SCORE INDEX (DASI)
CAN YOU WALK A BLOCK OR TWO ON LEVEL GROUND: YES
CAN YOU PARTICIPATE IN MODERATE RECREATIONAL ACTIVITIES LIKE GOLF, BOWLING, DANCING, DOUBLES TENNIS OR THROWING A BASEBALL OR FOOTBALL: NO
CAN YOU DO MODERATE WORK AROUND THE HOUSE LIKE VACUUMING, SWEEPING FLOORS OR CARRYING GROCERIES: YES
CAN YOU DO LIGHT WORK AROUND THE HOUSE LIKE DUSTING OR WASHING DISHES: YES
CAN YOU DO YARD WORK LIKE RAKING LEAVES, WEEDING OR PUSHING A MOWER: YES
CAN YOU CLIMB A FLIGHT OF STAIRS OR WALK UP A HILL: YES
CAN YOU WALK INDOORS, SUCH AS AROUND YOUR HOUSE: YES
CAN YOU PARTICIPATE IN STRENOUS SPORTS LIKE SWIMMING, SINGLES TENNIS, FOOTBALL, BASKETBALL, OR SKIING: NO
TOTAL_SCORE: 31.45
CAN YOU HAVE SEXUAL RELATIONS: NO
CAN YOU DO HEAVY WORK AROUND THE HOUSE LIKE SCRUBBING FLOORS OR LIFTING AND MOVING HEAVY FURNITURE: YES
CAN YOU TAKE CARE OF YOURSELF (EAT, DRESS, BATHE, OR USE TOILET): YES
DASI METS SCORE: 6.6
CAN YOU RUN A SHORT DISTANCE: NO

## 2024-08-22 NOTE — PROGRESS NOTES
Vascular Rehabilitation Initial Treatment Plan    Name: Dilip Haynes  Medical Record Number: [id number]  YOB: 1950  Age: 73 y.o.    Today’s Date: 8/22/2024  Primary Care Physician: Steve Aguilera MD  Referring Physician: Krystle Linn MD*  Program Location: [facility]  Primary Diagnosis:   1. Atherosclerosis of native artery of both lower extremities with intermittent claudication (CMS-HCC)  Referral to Vascular Rehab         Onset/Date of Diagnosis: PAD 1991    Initial Assessment, not yet started program.    AACVPR Risk Stratification:       Falls Risk: Low  Psychosocial Assessment     No data recorded    Sent PH-Q 9 to MD if score > 20: No; score < 20    Pt reported/currently experiencing stress: No  Patient uses stress management skills: Yes   History of: no history of anxiety or depression  Currently seeing a mental health provider: No  Social Support: Yes, Whom:wife-Charity  Quality of Life Survey: SF-36   SF-36 Pre Post   Physical Component Score TBD TBD   Mental Component Score TBD TBD     Learning Assessment:  Learning assessment/barriers:  none  Preferred learning method: Visual  Barriers: None  Comments:    Stages of Change:Action    Psychosocial Plan    Goal Status: Initial Assessment; goals not yet started    Psychosocial Goals: Demonstrating proper techniques for stress management and Maintain or lower PH-Q 9 score by discharge    Psychosocial Interventions/Education: To be done in Cardiac Rehab.        Nutrition Assessment:    Hyperlipidemia: Yes     Lipids:   Lab Results   Component Value Date    CHOL 165 06/05/2023    HDL 45.6 06/05/2023    LDLF 96 06/05/2023    TRIG 118 06/05/2023       Current Dietary Guidelines: Low sodium  Barriers to dietary change: no    Diet Habit Survey: Picture Your Plate  Pre:  43%  Post: To be done at discharge.    Diabetes Assessment    Lab Results   Component Value Date    HGBA1C 6.2 (H) 06/11/2024       History of Diabetes:  No    Weight Management       No data recorded    Nutrition Plan    Goal Status: Initial Assessment; goals not yet started    Nutrition Goals: Improve Diet Habit Survey score by 5-10 points by discharge and Adapt a low-sodium, DASH diet prior to discharge    Nutrition Interventions/Education:   To be done in Cardiac Rehab.        Exercise Assessment    No  Mode: NA  Frequency: NA  Duration: NA    Exercise Prescription     Exercise Prescription based on: Duke Activity Status Index (DASI)  Pt history  DASI Score: 31.45   MET Score: 6.6   Frequency:  2 days/week   Mode: Treadmill   Duration: 30-40 total aerobic minutes   Intensity: RPE 11-14  Target HR:  To be calculated after 6 attended sessions.  MET Level: 3  Patient wears supplemental O2: No     Modality Workload METs Duration (minutes)   1 Pre-Exercise      2 Treadmill 1.2 mph  1.9 8 :00   3 Treadmill 1.2mph  1.9 8 :00   4 Treadmill 1.2mph  1'9 8 :00   5 Rest   5 :00   6 Post-Exercise        Resistance Training: No   Home Exercise Prescription given: To be given prior to discharge from program.    Exercise Plan    Goal Status: Initial Assessment; goals not yet started    Exercise Goals: Increase exercise MET level by 5-10% each week and Increase total exercise duration to 30-45 minutes    Exercise Interventions/Education:   To be done in Cardiac Rehab.        Other Core Components/Risk Factor Assessment:    Medication adherence  Current Medications:   Medication Documentation Review Audit       Reviewed by JUDY Carter (Nurse Practitioner) on 08/09/24 at 1224      Medication Order Taking? Sig Documenting Provider Last Dose Status   amLODIPine (Norvasc) 10 mg tablet [id number] Yes Take 1 tablet (10 mg) by mouth once daily. Historical Provider, MD Taking Active   Discontinued 08/09/24 1151   Eliquis 2.5 mg tablet [id number] Yes TAKE 1 TABLET TWICE A DAY Krystle Linn MD PhD Taking Active   famotidine (Pepcid) 20 mg tablet [id number] Yes TAKE 1 TABLET  BY MOUTH EVERY DAY AS DIRECTED Ash Demarco MD Taking Active   fluticasone (Flonase) 50 mcg/actuation nasal spray [id number] Yes 2 sprays by Does not apply route once daily as needed for allergies. Historical Provider, MD Taking Active   levothyroxine (Synthroid, Levoxyl) 137 mcg tablet [id number] Yes Take 150 mcg by mouth once daily. Historical Provider, MD Taking Active   Discontinued 08/09/24 1151   losartan (Cozaar) 25 mg tablet [id number] Yes Take 1 tablet (25 mg) by mouth once daily. Tina Hester, APRN-CNP  Active   metoprolol tartrate (Lopressor) 50 mg tablet [id number] Yes TAKE 1 TABLET TWICE A DAY Krystle Linn MD PhD Taking Active   multivitamin tablet [id number] Yes Take by mouth. Historical Provider, MD Taking Active   mycophenolate (Cellcept) 250 mg capsule [id number] Yes TAKE 3 CAPSULES BY MOUTH TWICE A DAY Krystian Thompson MD Taking Active   nitroglycerin (Nitrostat) 0.4 mg SL tablet [id number] Yes Place 1 tablet (0.4 mg) under the tongue every 5 minutes if needed for chest pain. Historical Provider, MD Taking Active   pravastatin (Pravachol) 40 mg tablet [id number] Yes TAKE 1 TABLET BY MOUTH EVERYDAY AT BEDTIME Krystle Linn MD PhD Taking Active   predniSONE (Deltasone) 5 mg tablet [id number] Yes TAKE 1 TABLET BY MOUTH EVERY DAY Krystian Thompson MD Taking Active   tacrolimus ER (Envarsus XR) 0.75 mg tablet ER [id number] Yes Take 2 tablets (1.5 mg) by mouth once daily. Rajani Ramirez MD Taking Active   tamsulosin (Flomax) 0.4 mg 24 hr capsule [id number] Yes Take 1 capsule (0.4 mg) by mouth once daily at bedtime. Michael Montesinos MD Taking Active   torsemide (Demadex) 20 mg tablet [id number] Yes Take 1 tablet (20 mg) by mouth once daily. Krystle Linn MD PhD Taking Active                                 Medication compliance: Yes   Uses pill box/organizer: Yes    Carries medication list: Yes     Blood Pressure Management  History of Hypertension: Yes    Medication Changes: Yes   Resting BP:  There were no vitals taken for this visit.     Heart Failure Management  Hx of Heart Failure: No    Smoking/Tobacco Assessment  Social History     Tobacco Use   Smoking Status Former    Types: Cigarettes   Smokeless Tobacco Never       Other Core Component Plan    Goal Status: Initial Assessment; goals not yet started    Other Core Component Goals: Verbalize SL NTG action and proper dosage by discharge and Achieve resting BP of < 130/80 by discharge    Other Core Component Interventions/Education:   Achieve resting BP of < 130/80 by discharge        Individual Patient Goals:    Decrease leg cramps  Strengthen legs and increase endurance      Goal Status: Initial Assessment; goals not yet started    Staff Comments:      Rehab Staff Signature: Val Todd RN

## 2024-08-23 ENCOUNTER — TRANSCRIBE ORDERS (OUTPATIENT)
Dept: CARDIAC REHAB | Facility: HOSPITAL | Age: 74
End: 2024-08-23
Payer: MEDICARE

## 2024-08-23 DIAGNOSIS — I70.213 ATHEROSCLEROSIS OF NATIVE ARTERY OF BOTH LOWER EXTREMITIES WITH INTERMITTENT CLAUDICATION (CMS-HCC): Primary | ICD-10-CM

## 2024-08-26 ENCOUNTER — HOSPITAL ENCOUNTER (OUTPATIENT)
Dept: CARDIOLOGY | Facility: CLINIC | Age: 74
Discharge: HOME | End: 2024-08-26
Payer: MEDICARE

## 2024-08-26 DIAGNOSIS — I48.92 UNSPECIFIED ATRIAL FLUTTER (MULTI): ICD-10-CM

## 2024-08-26 DIAGNOSIS — I48.91 UNSPECIFIED ATRIAL FIBRILLATION (MULTI): ICD-10-CM

## 2024-08-29 ENCOUNTER — CLINICAL SUPPORT (OUTPATIENT)
Dept: CARDIAC REHAB | Facility: HOSPITAL | Age: 74
End: 2024-08-29
Payer: MEDICARE

## 2024-08-29 DIAGNOSIS — I70.213 ATHEROSCLEROSIS OF NATIVE ARTERY OF BOTH LOWER EXTREMITIES WITH INTERMITTENT CLAUDICATION (CMS-HCC): ICD-10-CM

## 2024-08-29 PROCEDURE — 93668 PERIPHERAL VASCULAR REHAB: CPT | Performed by: INTERNAL MEDICINE

## 2024-08-31 ENCOUNTER — SPECIALTY PHARMACY (OUTPATIENT)
Dept: PHARMACY | Facility: CLINIC | Age: 74
End: 2024-08-31

## 2024-08-31 PROCEDURE — RXMED WILLOW AMBULATORY MEDICATION CHARGE

## 2024-09-03 ENCOUNTER — HOSPITAL ENCOUNTER (OUTPATIENT)
Dept: CARDIOLOGY | Facility: CLINIC | Age: 74
Discharge: HOME | End: 2024-09-03
Payer: MEDICARE

## 2024-09-03 DIAGNOSIS — I48.91 UNSPECIFIED ATRIAL FIBRILLATION (MULTI): ICD-10-CM

## 2024-09-03 DIAGNOSIS — I48.92 UNSPECIFIED ATRIAL FLUTTER (MULTI): ICD-10-CM

## 2024-09-04 ENCOUNTER — PHARMACY VISIT (OUTPATIENT)
Dept: PHARMACY | Facility: CLINIC | Age: 74
End: 2024-09-04
Payer: COMMERCIAL

## 2024-09-05 ENCOUNTER — CLINICAL SUPPORT (OUTPATIENT)
Dept: CARDIAC REHAB | Facility: HOSPITAL | Age: 74
End: 2024-09-05
Payer: MEDICARE

## 2024-09-05 ENCOUNTER — HOSPITAL ENCOUNTER (OUTPATIENT)
Dept: CARDIOLOGY | Facility: CLINIC | Age: 74
Discharge: HOME | End: 2024-09-05
Payer: MEDICARE

## 2024-09-05 DIAGNOSIS — I70.213 ATHEROSCLEROSIS OF NATIVE ARTERY OF BOTH LOWER EXTREMITIES WITH INTERMITTENT CLAUDICATION (CMS-HCC): ICD-10-CM

## 2024-09-05 DIAGNOSIS — I48.92 UNSPECIFIED ATRIAL FLUTTER (MULTI): ICD-10-CM

## 2024-09-05 DIAGNOSIS — I48.91 UNSPECIFIED ATRIAL FIBRILLATION (MULTI): ICD-10-CM

## 2024-09-05 PROCEDURE — 93668 PERIPHERAL VASCULAR REHAB: CPT | Performed by: INTERNAL MEDICINE

## 2024-09-09 ENCOUNTER — CLINICAL SUPPORT (OUTPATIENT)
Dept: CARDIAC REHAB | Facility: HOSPITAL | Age: 74
End: 2024-09-09
Payer: MEDICARE

## 2024-09-09 DIAGNOSIS — I70.213 ATHEROSCLEROSIS OF NATIVE ARTERY OF BOTH LOWER EXTREMITIES WITH INTERMITTENT CLAUDICATION (CMS-HCC): ICD-10-CM

## 2024-09-09 PROCEDURE — 93668 PERIPHERAL VASCULAR REHAB: CPT | Performed by: INTERNAL MEDICINE

## 2024-09-12 ENCOUNTER — HOSPITAL ENCOUNTER (OUTPATIENT)
Dept: CARDIOLOGY | Facility: CLINIC | Age: 74
Discharge: HOME | End: 2024-09-12
Payer: MEDICARE

## 2024-09-12 DIAGNOSIS — I48.91 UNSPECIFIED ATRIAL FIBRILLATION (MULTI): ICD-10-CM

## 2024-09-12 DIAGNOSIS — I48.92 UNSPECIFIED ATRIAL FLUTTER (MULTI): ICD-10-CM

## 2024-09-16 ENCOUNTER — HOSPITAL ENCOUNTER (OUTPATIENT)
Dept: CARDIOLOGY | Facility: CLINIC | Age: 74
Discharge: HOME | End: 2024-09-16
Payer: MEDICARE

## 2024-09-16 ENCOUNTER — CLINICAL SUPPORT (OUTPATIENT)
Dept: CARDIAC REHAB | Facility: HOSPITAL | Age: 74
End: 2024-09-16
Payer: MEDICARE

## 2024-09-16 DIAGNOSIS — I70.213 ATHEROSCLEROSIS OF NATIVE ARTERY OF BOTH LOWER EXTREMITIES WITH INTERMITTENT CLAUDICATION (CMS-HCC): ICD-10-CM

## 2024-09-16 DIAGNOSIS — I48.91 UNSPECIFIED ATRIAL FIBRILLATION (MULTI): ICD-10-CM

## 2024-09-16 DIAGNOSIS — I48.92 UNSPECIFIED ATRIAL FLUTTER (MULTI): ICD-10-CM

## 2024-09-16 PROCEDURE — 93298 REM INTERROG DEV EVAL SCRMS: CPT

## 2024-09-16 PROCEDURE — 93668 PERIPHERAL VASCULAR REHAB: CPT | Performed by: INTERNAL MEDICINE

## 2024-09-18 ENCOUNTER — HOSPITAL ENCOUNTER (OUTPATIENT)
Dept: CARDIOLOGY | Facility: CLINIC | Age: 74
Discharge: HOME | End: 2024-09-18
Payer: MEDICARE

## 2024-09-18 DIAGNOSIS — I48.91 UNSPECIFIED ATRIAL FIBRILLATION (MULTI): ICD-10-CM

## 2024-09-18 DIAGNOSIS — I48.92 UNSPECIFIED ATRIAL FLUTTER (MULTI): ICD-10-CM

## 2024-09-19 ENCOUNTER — CLINICAL SUPPORT (OUTPATIENT)
Dept: CARDIAC REHAB | Facility: HOSPITAL | Age: 74
End: 2024-09-19
Payer: MEDICARE

## 2024-09-19 DIAGNOSIS — I70.213 ATHEROSCLEROSIS OF NATIVE ARTERY OF BOTH LOWER EXTREMITIES WITH INTERMITTENT CLAUDICATION (CMS-HCC): ICD-10-CM

## 2024-09-19 PROCEDURE — 93668 PERIPHERAL VASCULAR REHAB: CPT | Performed by: INTERNAL MEDICINE

## 2024-09-23 ENCOUNTER — HOSPITAL ENCOUNTER (OUTPATIENT)
Dept: CARDIOLOGY | Facility: CLINIC | Age: 74
Discharge: HOME | End: 2024-09-23
Payer: MEDICARE

## 2024-09-23 ENCOUNTER — CLINICAL SUPPORT (OUTPATIENT)
Dept: CARDIAC REHAB | Facility: HOSPITAL | Age: 74
End: 2024-09-23
Payer: MEDICARE

## 2024-09-23 DIAGNOSIS — I48.91 UNSPECIFIED ATRIAL FIBRILLATION (MULTI): ICD-10-CM

## 2024-09-23 DIAGNOSIS — I48.92 UNSPECIFIED ATRIAL FLUTTER (MULTI): ICD-10-CM

## 2024-09-23 DIAGNOSIS — I70.213 ATHEROSCLEROSIS OF NATIVE ARTERY OF BOTH LOWER EXTREMITIES WITH INTERMITTENT CLAUDICATION (CMS-HCC): ICD-10-CM

## 2024-09-23 PROCEDURE — 93668 PERIPHERAL VASCULAR REHAB: CPT | Performed by: INTERNAL MEDICINE

## 2024-09-26 ENCOUNTER — CLINICAL SUPPORT (OUTPATIENT)
Dept: CARDIAC REHAB | Facility: HOSPITAL | Age: 74
End: 2024-09-26
Payer: MEDICARE

## 2024-09-26 DIAGNOSIS — I70.213 ATHEROSCLEROSIS OF NATIVE ARTERY OF BOTH LOWER EXTREMITIES WITH INTERMITTENT CLAUDICATION (CMS-HCC): ICD-10-CM

## 2024-09-26 PROCEDURE — 93668 PERIPHERAL VASCULAR REHAB: CPT | Performed by: INTERNAL MEDICINE

## 2024-09-30 ENCOUNTER — CLINICAL SUPPORT (OUTPATIENT)
Dept: CARDIAC REHAB | Facility: HOSPITAL | Age: 74
End: 2024-09-30
Payer: MEDICARE

## 2024-09-30 ENCOUNTER — HOSPITAL ENCOUNTER (OUTPATIENT)
Dept: CARDIOLOGY | Facility: CLINIC | Age: 74
Discharge: HOME | End: 2024-09-30
Payer: MEDICARE

## 2024-09-30 VITALS
DIASTOLIC BLOOD PRESSURE: 68 MMHG | WEIGHT: 178 LBS | BODY MASS INDEX: 24.92 KG/M2 | SYSTOLIC BLOOD PRESSURE: 154 MMHG | OXYGEN SATURATION: 90 % | HEIGHT: 71 IN

## 2024-09-30 DIAGNOSIS — I48.91 UNSPECIFIED ATRIAL FIBRILLATION (MULTI): ICD-10-CM

## 2024-09-30 DIAGNOSIS — I70.213 ATHEROSCLEROSIS OF NATIVE ARTERY OF BOTH LOWER EXTREMITIES WITH INTERMITTENT CLAUDICATION (CMS-HCC): ICD-10-CM

## 2024-09-30 DIAGNOSIS — I48.92 UNSPECIFIED ATRIAL FLUTTER (MULTI): ICD-10-CM

## 2024-09-30 PROCEDURE — 93668 PERIPHERAL VASCULAR REHAB: CPT | Performed by: INTERNAL MEDICINE

## 2024-09-30 ASSESSMENT — DUKE ACTIVITY SCORE INDEX (DASI)
CAN YOU DO YARD WORK LIKE RAKING LEAVES, WEEDING OR PUSHING A MOWER: YES
CAN YOU HAVE SEXUAL RELATIONS: NO
TOTAL_SCORE: 31.45
CAN YOU PARTICIPATE IN STRENOUS SPORTS LIKE SWIMMING, SINGLES TENNIS, FOOTBALL, BASKETBALL, OR SKIING: NO
DASI METS SCORE: 6.6
CAN YOU DO MODERATE WORK AROUND THE HOUSE LIKE VACUUMING, SWEEPING FLOORS OR CARRYING GROCERIES: YES
CAN YOU WALK A BLOCK OR TWO ON LEVEL GROUND: YES
CAN YOU DO LIGHT WORK AROUND THE HOUSE LIKE DUSTING OR WASHING DISHES: YES
CAN YOU DO HEAVY WORK AROUND THE HOUSE LIKE SCRUBBING FLOORS OR LIFTING AND MOVING HEAVY FURNITURE: YES
CAN YOU WALK INDOORS, SUCH AS AROUND YOUR HOUSE: YES
CAN YOU TAKE CARE OF YOURSELF (EAT, DRESS, BATHE, OR USE TOILET): YES
CAN YOU PARTICIPATE IN MODERATE RECREATIONAL ACTIVITIES LIKE GOLF, BOWLING, DANCING, DOUBLES TENNIS OR THROWING A BASEBALL OR FOOTBALL: NO
CAN YOU CLIMB A FLIGHT OF STAIRS OR WALK UP A HILL: YES
CAN YOU RUN A SHORT DISTANCE: NO

## 2024-09-30 ASSESSMENT — PATIENT HEALTH QUESTIONNAIRE - PHQ9: SUM OF ALL RESPONSES TO PHQ QUESTIONS 1-9: 1

## 2024-09-30 NOTE — PROGRESS NOTES
Vascular Rehabilitation  MID ASSESSMENT    Name: Dilip Haynes  Medical Record Number: [id number]  YOB: 1950  Age: 74 y.o.    Today’s Date: 9/30/2024  Primary Care Physician: Steve Aguilera MD  Referring Physician: Krystle Linn MD*  Program Location: [facility]  Primary Diagnosis:   1. Atherosclerosis of native artery of both lower extremities with intermittent claudication (CMS-HCC)  Follow Up In Vascular Rehab         Onset/Date of Diagnosis: PAD 1991    Date of first session: 08/29/24    AACVPR Risk Stratification: Moderate     Falls Risk: Low  Psychosocial Assessment     Pre PHQ-9: 1      Sent PH-Q 9 to MD if score > 20: No; score < 20    Pt reported/currently experiencing stress: No  Patient uses stress management skills: Yes   History of: no history of anxiety or depression  Currently seeing a mental health provider: No  Social Support: Yes, Whom:wife-Charity    Learning Assessment:  Learning assessment/barriers:  none  Preferred learning method: Visual  Barriers: None  Comments:    Stages of Change:Action    Psychosocial Plan    Goal Status: In progress    Psychosocial Goals: Demonstrating proper techniques for stress management and Maintain or lower PH-Q 9 score by discharge    Psychosocial Interventions/Education:   Patient reports no new stressors at this time.   Patient attended class on stress management, discussed signs and symptoms of depression, link to heart disease, coping techniques, relaxation techniques, and community resources.      Nutrition Assessment:    Hyperlipidemia: Yes     Lipids:   Lab Results   Component Value Date    CHOL 165 06/05/2023    HDL 45.6 06/05/2023    LDLF 96 06/05/2023    TRIG 118 06/05/2023       Current Dietary Guidelines: Low sodium  Barriers to dietary change: no    Diet Habit Survey: Picture Your Plate  Pre:  43%  Post: To be done at discharge.    Diabetes Assessment    Lab Results   Component Value Date    HGBA1C 6.2 (H)  "06/11/2024       History of Diabetes: No    Weight Management    Height: 180.3 cm (5' 10.98\")  Weight: 80.7 kg (178 lb)  BMI (Calculated): 24.84      Nutrition Plan    Goal Status: In progress    Nutrition Goals: Improve Diet Habit Survey score by 5-10 points by discharge and Adapt a low-sodium, DASH diet prior to discharge    Nutrition Interventions/Education:   Patient continues to obtain his weight at each session of cardiac rehab.   Patient discussed the importance of following a Low Sodium Diet and monitoring daily weight during the CHF education class.       Exercise Assessment    No  Mode: NA  Frequency: NA  Duration: NA    Exercise Prescription     Exercise Prescription based on: Duke Activity Status Index (DASI) and Patient History   Pt history  DASI Score: 31.45   MET Score: 6.6   Frequency:  2 days/week   Mode: Treadmill   Duration: 30-40 total aerobic minutes   Intensity: RPE 11-14  Target HR:  88-98  MET Level: 1.9-2.8  Patient wears supplemental O2: No     Modality Workload METs Duration (minutes)   1 Pre-Exercise      2 Treadmill 1.2 mph  1.9 8 :00   3 Rest   05:00   4 Treadmill 1.2mph  1.9 8 :00   5 Rest   5:00   6 Treadmill 1.2mph  1.9 8 :00   7 NuStep 62 Gomez @ lv 4 2.8 10:00   8 Rest   5 :00     Resistance Training: No   Home Exercise Prescription given: To be given prior to discharge from program.    Exercise Plan    Goal Status: In progress    Exercise Goals: Increase exercise MET level by 5-10% each week and Increase total exercise duration to 30-45 minutes    Exercise Interventions/Education:   Patient is able to exercise for 30-40 minutes without complaint.   Patient is increasing his MET level as tolerated.   Patient attended class on aerobic exercise, discussed different types of aerobic exercises, pulse taking, and weather precautions.   Patient attended class on resistance training, discussed proper form, set duration, and safety precautions.     Other Core Components/Risk Factor " Assessment:    Medication adherence  Current Medications:   Medication Documentation Review Audit       Reviewed by JUDY Carter (Nurse Practitioner) on 08/09/24 at 1224      Medication Order Taking? Sig Documenting Provider Last Dose Status   amLODIPine (Norvasc) 10 mg tablet [id number] Yes Take 1 tablet (10 mg) by mouth once daily. Historical MD Andrew Taking Active   Discontinued 08/09/24 1151   Eliquis 2.5 mg tablet [id number] Yes TAKE 1 TABLET TWICE A DAY Krystle Linn MD PhD Taking Active   famotidine (Pepcid) 20 mg tablet [id number] Yes TAKE 1 TABLET BY MOUTH EVERY DAY AS DIRECTED Ash Demarco MD Taking Active   fluticasone (Flonase) 50 mcg/actuation nasal spray [id number] Yes 2 sprays by Does not apply route once daily as needed for allergies. Historical Provider, MD Taking Active   levothyroxine (Synthroid, Levoxyl) 137 mcg tablet [id number] Yes Take 150 mcg by mouth once daily. Historical Provider, MD Taking Active   Discontinued 08/09/24 1151   losartan (Cozaar) 25 mg tablet [id number] Yes Take 1 tablet (25 mg) by mouth once daily. JUDY Carter  Active   metoprolol tartrate (Lopressor) 50 mg tablet [id number] Yes TAKE 1 TABLET TWICE A DAY Krystle Linn MD PhD Taking Active   multivitamin tablet [id number] Yes Take by mouth. Historical Provider, MD Taking Active   mycophenolate (Cellcept) 250 mg capsule [id number] Yes TAKE 3 CAPSULES BY MOUTH TWICE A DAY Krystian Thompson MD Taking Active   nitroglycerin (Nitrostat) 0.4 mg SL tablet [id number] Yes Place 1 tablet (0.4 mg) under the tongue every 5 minutes if needed for chest pain. Historical Provider, MD Taking Active   pravastatin (Pravachol) 40 mg tablet [id number] Yes TAKE 1 TABLET BY MOUTH EVERYDAY AT BEDTIME Krystle Linn MD PhD Taking Active   predniSONE (Deltasone) 5 mg tablet [id number] Yes TAKE 1 TABLET BY MOUTH EVERY DAY Krystian Thompson MD Taking Active   tacrolimus ER (Envarsus XR)  0.75 mg tablet ER [id number] Yes Take 2 tablets (1.5 mg) by mouth once daily. Rajani Ramirez MD Taking Active   tamsulosin (Flomax) 0.4 mg 24 hr capsule [id number] Yes Take 1 capsule (0.4 mg) by mouth once daily at bedtime. Michael Montesinos MD Taking Active   torsemide (Demadex) 20 mg tablet [id number] Yes Take 1 tablet (20 mg) by mouth once daily. Krystle Linn MD PhD Taking Active                                 Medication compliance: Yes   Uses pill box/organizer: Yes    Carries medication list: Yes     Blood Pressure Management  History of Hypertension: Yes   Medication Changes: Yes   Resting BP:  Visit Vitals  /68        Heart Failure Management  Hx of Heart Failure: No    Smoking/Tobacco Assessment  Social History     Tobacco Use   Smoking Status Former    Types: Cigarettes   Smokeless Tobacco Never       Other Core Component Plan    Goal Status: In progress    Other Core Component Goals: Verbalize SL NTG action and proper dosage by discharge and Achieve resting BP of < 130/80 by discharge    Other Core Component Interventions/Education:   Patient's blood pressure has been slightly elevated, no symptoms reported by patient.   Patient attended class on understanding heart disease, discussed, CAD/RF, angina signs and symptoms, and NTG/EMS  Patient attended class on CHF with the CHF navigator, discussed CHF, signs and symptoms, and when to call cardiologist. Discussed the importance of monitoring daily weight, lower leg edema, and shortness of breath.   Reviewed importance of taking prescribed medications after discharge. Discussed the importance of following up with cardiologist as scheduled        Individual Patient Goals:    Decrease leg cramps  Strengthen legs and increase endurance      Goal Status: In progress    Staff Comments:  Patient reports no angina with exercise. He has attended 8 sessions and is exercising for 2 rounds of PAD protocol on the treadmill and using the NuStep. The  patient's blood pressure has been slightly elevated without symptoms and SPO2 has been 90% or greater on room air with exercise. Patient is engaging with staff and classmates. Will continue with exercise and education as tolerated.    Rehab Staff Signature: MARITZA VALENCIA      EDUCATION  Aerobic exercise  Weight training  CHF  Stress  CAD/RF

## 2024-09-30 NOTE — PROGRESS NOTES
Vascular Rehabilitation  MID ASSESSMENT    Name: Dilip Haynes  Medical Record Number: [id number]  YOB: 1950  Age: 74 y.o.    Today’s Date: 9/30/2024  Primary Care Physician: Steve Aguilera MD  Referring Physician: Krystle Linn MD*  Program Location: [facility]  Primary Diagnosis:   1. Atherosclerosis of native artery of both lower extremities with intermittent claudication (CMS-HCC)  Follow Up In Vascular Rehab         Onset/Date of Diagnosis: PAD 1991    Date of first session: 08/29/24    AACVPR Risk Stratification: Moderate     Falls Risk: Low  Psychosocial Assessment     Pre PHQ-9: 1      Sent PH-Q 9 to MD if score > 20: No; score < 20    Pt reported/currently experiencing stress: No  Patient uses stress management skills: Yes   History of: no history of anxiety or depression  Currently seeing a mental health provider: No  Social Support: Yes, Whom:wife-Charity    Learning Assessment:  Learning assessment/barriers:  none  Preferred learning method: Visual  Barriers: None  Comments:    Stages of Change:Action    Psychosocial Plan    Goal Status: In progress    Psychosocial Goals: Demonstrating proper techniques for stress management and Maintain or lower PH-Q 9 score by discharge    Psychosocial Interventions/Education:   Patient reports no new stressors at this time.   Patient attended class on stress management, discussed signs and symptoms of depression, link to heart disease, coping techniques, relaxation techniques, and community resources.      Nutrition Assessment:    Hyperlipidemia: Yes     Lipids:   Lab Results   Component Value Date    CHOL 165 06/05/2023    HDL 45.6 06/05/2023    LDLF 96 06/05/2023    TRIG 118 06/05/2023       Current Dietary Guidelines: Low sodium  Barriers to dietary change: no    Diet Habit Survey: Picture Your Plate  Pre:  43%  Post: To be done at discharge.    Diabetes Assessment    Lab Results   Component Value Date    HGBA1C 6.2 (H)  "06/11/2024       History of Diabetes: No    Weight Management    Height: 180.3 cm (5' 10.98\")  Weight: 80.7 kg (178 lb)  BMI (Calculated): 24.84      Nutrition Plan    Goal Status: In progress    Nutrition Goals: Improve Diet Habit Survey score by 5-10 points by discharge and Adapt a low-sodium, DASH diet prior to discharge    Nutrition Interventions/Education:   Patient continues to obtain his weight at each session of cardiac rehab.   Patient discussed the importance of following a Low Sodium Diet and monitoring daily weight during the CHF education class.       Exercise Assessment    No  Mode: NA  Frequency: NA  Duration: NA    Exercise Prescription     Exercise Prescription based on: [facility] Activity Status Index (DASI) and Patient History   Pt history  DASI Score: 31.45   MET Score: 6.6   Frequency:  2 days/week   Mode: Treadmill   Duration: 30-40 total aerobic minutes   Intensity: RPE 11-14  Target HR:  88-98  MET Level: 1.9-2.8  Patient wears supplemental O2: No     Modality Workload METs Duration (minutes)   1 Pre-Exercise      2 Treadmill 1.2 mph  1.9 8 :00   3 Rest   05:00   4 Treadmill 1.2mph  1.9 8 :00   5 Rest   5:00   6 Treadmill 1.2mph  1.9 8 :00   7 NuStep 62 Gomez @ lv 4 2.8 10:00   8 Rest   5 :00     Resistance Training: No   Home Exercise Prescription given: To be given prior to discharge from program.    Exercise Plan    Goal Status: In progress    Exercise Goals: Increase exercise MET level by 5-10% each week and Increase total exercise duration to 30-45 minutes    Exercise Interventions/Education:   Patient is able to exercise for 30-40 minutes without complaint.   Patient is increasing his MET level as tolerated.   Patient attended class on aerobic exercise, discussed different types of aerobic exercises, pulse taking, and weather precautions.   Patient attended class on resistance training, discussed proper form, set duration, and safety precautions.     Other Core Components/Risk Factor " Assessment:    Medication adherence  Current Medications:   Medication Documentation Review Audit       Reviewed by JUDY Carter (Nurse Practitioner) on 08/09/24 at 1224      Medication Order Taking? Sig Documenting Provider Last Dose Status   amLODIPine (Norvasc) 10 mg tablet [id number] Yes Take 1 tablet (10 mg) by mouth once daily. Historical MD Andrew Taking Active   Discontinued 08/09/24 1151   Eliquis 2.5 mg tablet [id number] Yes TAKE 1 TABLET TWICE A DAY Krystle Linn MD PhD Taking Active   famotidine (Pepcid) 20 mg tablet [id number] Yes TAKE 1 TABLET BY MOUTH EVERY DAY AS DIRECTED Ash Demarco MD Taking Active   fluticasone (Flonase) 50 mcg/actuation nasal spray [id number] Yes 2 sprays by Does not apply route once daily as needed for allergies. Historical Provider, MD Taking Active   levothyroxine (Synthroid, Levoxyl) 137 mcg tablet [id number] Yes Take 150 mcg by mouth once daily. Historical Provider, MD Taking Active   Discontinued 08/09/24 1151   losartan (Cozaar) 25 mg tablet [id number] Yes Take 1 tablet (25 mg) by mouth once daily. JUDY Carter  Active   metoprolol tartrate (Lopressor) 50 mg tablet [id number] Yes TAKE 1 TABLET TWICE A DAY Krystle Linn MD PhD Taking Active   multivitamin tablet [id number] Yes Take by mouth. Historical Provider, MD Taking Active   mycophenolate (Cellcept) 250 mg capsule [id number] Yes TAKE 3 CAPSULES BY MOUTH TWICE A DAY Krystian Thompson MD Taking Active   nitroglycerin (Nitrostat) 0.4 mg SL tablet [id number] Yes Place 1 tablet (0.4 mg) under the tongue every 5 minutes if needed for chest pain. Historical Provider, MD Taking Active   pravastatin (Pravachol) 40 mg tablet [id number] Yes TAKE 1 TABLET BY MOUTH EVERYDAY AT BEDTIME Krystle Linn MD PhD Taking Active   predniSONE (Deltasone) 5 mg tablet [id number] Yes TAKE 1 TABLET BY MOUTH EVERY DAY Krystian Thompson MD Taking Active   tacrolimus ER (Envarsus XR)  0.75 mg tablet ER [id number] Yes Take 2 tablets (1.5 mg) by mouth once daily. Rajani Ramirez MD Taking Active   tamsulosin (Flomax) 0.4 mg 24 hr capsule [id number] Yes Take 1 capsule (0.4 mg) by mouth once daily at bedtime. Michael Montesinos MD Taking Active   torsemide (Demadex) 20 mg tablet [id number] Yes Take 1 tablet (20 mg) by mouth once daily. Krystle Linn MD PhD Taking Active                                 Medication compliance: Yes   Uses pill box/organizer: Yes    Carries medication list: Yes     Blood Pressure Management  History of Hypertension: Yes   Medication Changes: Yes   Resting BP:  Visit Vitals  /68        Heart Failure Management  Hx of Heart Failure: No    Smoking/Tobacco Assessment  Social History     Tobacco Use   Smoking Status Former    Types: Cigarettes   Smokeless Tobacco Never       Other Core Component Plan    Goal Status: In progress    Other Core Component Goals: Verbalize SL NTG action and proper dosage by discharge and Achieve resting BP of < 130/80 by discharge    Other Core Component Interventions/Education:   Patient's blood pressure has been slightly elevated, no symptoms reported by patient.   Patient attended class on understanding heart disease, discussed, CAD/RF, angina signs and symptoms, and NTG/EMS  Patient attended class on CHF with the CHF navigator, discussed CHF, signs and symptoms, and when to call cardiologist. Discussed the importance of monitoring daily weight, lower leg edema, and shortness of breath.   Reviewed importance of taking prescribed medications after discharge. Discussed the importance of following up with cardiologist as scheduled        Individual Patient Goals:    Decrease leg cramps  Strengthen legs and increase endurance      Goal Status: In progress    Staff Comments:  Patient reports no angina with exercise. He has attended 8 sessions and is exercising for 2 rounds of PAD protocol on the treadmill and using the NuStep. The  patient's blood pressure has been slightly elevated without symptoms and SPO2 has been 90% or greater on room air with exercise. Patient is engaging with staff and classmates. Will continue with exercise and education as tolerated.    Rehab Staff Signature: MARITZA VALENCIA      EDUCATION  Aerobic exercise  Weight training  CHF  Stress  CAD/RF

## 2024-10-03 ENCOUNTER — SPECIALTY PHARMACY (OUTPATIENT)
Dept: PHARMACY | Facility: CLINIC | Age: 74
End: 2024-10-03

## 2024-10-03 ENCOUNTER — CLINICAL SUPPORT (OUTPATIENT)
Dept: CARDIAC REHAB | Facility: HOSPITAL | Age: 74
End: 2024-10-03
Payer: MEDICARE

## 2024-10-03 ENCOUNTER — HOSPITAL ENCOUNTER (OUTPATIENT)
Dept: CARDIOLOGY | Facility: CLINIC | Age: 74
Discharge: HOME | End: 2024-10-03
Payer: MEDICARE

## 2024-10-03 DIAGNOSIS — I48.92 UNSPECIFIED ATRIAL FLUTTER (MULTI): ICD-10-CM

## 2024-10-03 DIAGNOSIS — I70.213 ATHEROSCLEROSIS OF NATIVE ARTERY OF BOTH LOWER EXTREMITIES WITH INTERMITTENT CLAUDICATION (CMS-HCC): ICD-10-CM

## 2024-10-03 DIAGNOSIS — I48.91 UNSPECIFIED ATRIAL FIBRILLATION (MULTI): ICD-10-CM

## 2024-10-03 PROCEDURE — RXMED WILLOW AMBULATORY MEDICATION CHARGE

## 2024-10-03 PROCEDURE — 93668 PERIPHERAL VASCULAR REHAB: CPT | Performed by: INTERNAL MEDICINE

## 2024-10-04 ENCOUNTER — PHARMACY VISIT (OUTPATIENT)
Dept: PHARMACY | Facility: CLINIC | Age: 74
End: 2024-10-04
Payer: COMMERCIAL

## 2024-10-07 ENCOUNTER — HOSPITAL ENCOUNTER (OUTPATIENT)
Dept: CARDIOLOGY | Facility: CLINIC | Age: 74
Discharge: HOME | End: 2024-10-07
Payer: MEDICARE

## 2024-10-07 ENCOUNTER — APPOINTMENT (OUTPATIENT)
Dept: CARDIAC REHAB | Facility: HOSPITAL | Age: 74
End: 2024-10-07
Payer: MEDICARE

## 2024-10-07 DIAGNOSIS — I48.91 UNSPECIFIED ATRIAL FIBRILLATION (MULTI): ICD-10-CM

## 2024-10-07 DIAGNOSIS — I48.92 UNSPECIFIED ATRIAL FLUTTER (MULTI): ICD-10-CM

## 2024-10-09 ENCOUNTER — HOSPITAL ENCOUNTER (OUTPATIENT)
Dept: CARDIOLOGY | Facility: CLINIC | Age: 74
Discharge: HOME | End: 2024-10-09
Payer: MEDICARE

## 2024-10-09 DIAGNOSIS — I48.92 UNSPECIFIED ATRIAL FLUTTER (MULTI): ICD-10-CM

## 2024-10-09 DIAGNOSIS — I48.91 UNSPECIFIED ATRIAL FIBRILLATION (MULTI): ICD-10-CM

## 2024-10-10 ENCOUNTER — CLINICAL SUPPORT (OUTPATIENT)
Dept: CARDIAC REHAB | Facility: HOSPITAL | Age: 74
End: 2024-10-10
Payer: MEDICARE

## 2024-10-10 DIAGNOSIS — I70.213 ATHEROSCLEROSIS OF NATIVE ARTERY OF BOTH LOWER EXTREMITIES WITH INTERMITTENT CLAUDICATION (CMS-HCC): ICD-10-CM

## 2024-10-10 PROCEDURE — 93668 PERIPHERAL VASCULAR REHAB: CPT | Performed by: INTERNAL MEDICINE

## 2024-10-11 ENCOUNTER — HOSPITAL ENCOUNTER (OUTPATIENT)
Dept: CARDIOLOGY | Facility: CLINIC | Age: 74
Discharge: HOME | End: 2024-10-11
Payer: MEDICARE

## 2024-10-11 DIAGNOSIS — I48.91 UNSPECIFIED ATRIAL FIBRILLATION (MULTI): ICD-10-CM

## 2024-10-11 DIAGNOSIS — I48.92 UNSPECIFIED ATRIAL FLUTTER (MULTI): ICD-10-CM

## 2024-10-14 ENCOUNTER — CLINICAL SUPPORT (OUTPATIENT)
Dept: CARDIAC REHAB | Facility: HOSPITAL | Age: 74
End: 2024-10-14
Payer: MEDICARE

## 2024-10-14 ENCOUNTER — HOSPITAL ENCOUNTER (OUTPATIENT)
Dept: CARDIOLOGY | Facility: CLINIC | Age: 74
Discharge: HOME | End: 2024-10-14
Payer: MEDICARE

## 2024-10-14 DIAGNOSIS — I48.91 UNSPECIFIED ATRIAL FIBRILLATION (MULTI): ICD-10-CM

## 2024-10-14 DIAGNOSIS — I70.213 ATHEROSCLEROSIS OF NATIVE ARTERY OF BOTH LOWER EXTREMITIES WITH INTERMITTENT CLAUDICATION (CMS-HCC): ICD-10-CM

## 2024-10-14 DIAGNOSIS — I48.92 UNSPECIFIED ATRIAL FLUTTER (MULTI): ICD-10-CM

## 2024-10-14 PROCEDURE — 93668 PERIPHERAL VASCULAR REHAB: CPT | Performed by: INTERNAL MEDICINE

## 2024-10-16 ENCOUNTER — SPECIALTY PHARMACY (OUTPATIENT)
Dept: PHARMACY | Facility: CLINIC | Age: 74
End: 2024-10-16

## 2024-10-16 PROCEDURE — RXMED WILLOW AMBULATORY MEDICATION CHARGE

## 2024-10-17 ENCOUNTER — PHARMACY VISIT (OUTPATIENT)
Dept: PHARMACY | Facility: CLINIC | Age: 74
End: 2024-10-17
Payer: COMMERCIAL

## 2024-10-17 ENCOUNTER — CLINICAL SUPPORT (OUTPATIENT)
Dept: CARDIAC REHAB | Facility: HOSPITAL | Age: 74
End: 2024-10-17
Payer: MEDICARE

## 2024-10-17 DIAGNOSIS — I70.213 ATHEROSCLEROSIS OF NATIVE ARTERY OF BOTH LOWER EXTREMITIES WITH INTERMITTENT CLAUDICATION (CMS-HCC): ICD-10-CM

## 2024-10-17 PROCEDURE — 93668 PERIPHERAL VASCULAR REHAB: CPT | Performed by: INTERNAL MEDICINE

## 2024-10-21 ENCOUNTER — CLINICAL SUPPORT (OUTPATIENT)
Dept: CARDIAC REHAB | Facility: HOSPITAL | Age: 74
End: 2024-10-21
Payer: MEDICARE

## 2024-10-21 DIAGNOSIS — I70.213 ATHEROSCLEROSIS OF NATIVE ARTERY OF BOTH LOWER EXTREMITIES WITH INTERMITTENT CLAUDICATION (CMS-HCC): ICD-10-CM

## 2024-10-21 PROCEDURE — 93668 PERIPHERAL VASCULAR REHAB: CPT | Performed by: INTERNAL MEDICINE

## 2024-10-23 ENCOUNTER — HOSPITAL ENCOUNTER (OUTPATIENT)
Dept: CARDIOLOGY | Facility: CLINIC | Age: 74
Discharge: HOME | End: 2024-10-23
Payer: MEDICARE

## 2024-10-23 DIAGNOSIS — I48.92 UNSPECIFIED ATRIAL FLUTTER (MULTI): ICD-10-CM

## 2024-10-23 DIAGNOSIS — I48.91 UNSPECIFIED ATRIAL FIBRILLATION (MULTI): ICD-10-CM

## 2024-10-24 ENCOUNTER — HOSPITAL ENCOUNTER (OUTPATIENT)
Dept: CARDIOLOGY | Facility: CLINIC | Age: 74
Discharge: HOME | End: 2024-10-24
Payer: MEDICARE

## 2024-10-24 DIAGNOSIS — I48.92 UNSPECIFIED ATRIAL FLUTTER (MULTI): ICD-10-CM

## 2024-10-24 DIAGNOSIS — Z94.0 KIDNEY REPLACED BY TRANSPLANT (HHS-HCC): ICD-10-CM

## 2024-10-24 DIAGNOSIS — I48.91 UNSPECIFIED ATRIAL FIBRILLATION (MULTI): ICD-10-CM

## 2024-10-24 DIAGNOSIS — E55.9 VITAMIN D DEFICIENCY: ICD-10-CM

## 2024-10-28 ENCOUNTER — HOSPITAL ENCOUNTER (OUTPATIENT)
Dept: CARDIOLOGY | Facility: CLINIC | Age: 74
Discharge: HOME | End: 2024-10-28
Payer: MEDICARE

## 2024-10-28 ENCOUNTER — CLINICAL SUPPORT (OUTPATIENT)
Dept: CARDIAC REHAB | Facility: HOSPITAL | Age: 74
End: 2024-10-28
Payer: MEDICARE

## 2024-10-28 DIAGNOSIS — I70.213 ATHEROSCLEROSIS OF NATIVE ARTERY OF BOTH LOWER EXTREMITIES WITH INTERMITTENT CLAUDICATION (CMS-HCC): ICD-10-CM

## 2024-10-28 DIAGNOSIS — I48.92 UNSPECIFIED ATRIAL FLUTTER (MULTI): ICD-10-CM

## 2024-10-28 DIAGNOSIS — I48.91 UNSPECIFIED ATRIAL FIBRILLATION (MULTI): ICD-10-CM

## 2024-10-28 PROCEDURE — 93298 REM INTERROG DEV EVAL SCRMS: CPT | Performed by: INTERNAL MEDICINE

## 2024-10-28 PROCEDURE — 93298 REM INTERROG DEV EVAL SCRMS: CPT

## 2024-10-28 PROCEDURE — 93668 PERIPHERAL VASCULAR REHAB: CPT | Performed by: INTERNAL MEDICINE

## 2024-10-30 ENCOUNTER — OFFICE VISIT (OUTPATIENT)
Dept: TRANSPLANT | Facility: HOSPITAL | Age: 74
End: 2024-10-30
Payer: MEDICARE

## 2024-10-30 VITALS
OXYGEN SATURATION: 92 % | BODY MASS INDEX: 25 KG/M2 | WEIGHT: 179.2 LBS | SYSTOLIC BLOOD PRESSURE: 147 MMHG | HEART RATE: 69 BPM | TEMPERATURE: 97.7 F | DIASTOLIC BLOOD PRESSURE: 73 MMHG

## 2024-10-30 DIAGNOSIS — Z92.25 PERSONAL HISTORY OF IMMUNOSUPRESSION THERAPY: ICD-10-CM

## 2024-10-30 DIAGNOSIS — I10 ESSENTIAL HYPERTENSION: ICD-10-CM

## 2024-10-30 DIAGNOSIS — Z94.0 KIDNEY REPLACED BY TRANSPLANT (HHS-HCC): Primary | ICD-10-CM

## 2024-10-30 PROCEDURE — 1159F MED LIST DOCD IN RCRD: CPT | Performed by: HOSPITALIST

## 2024-10-30 PROCEDURE — 3078F DIAST BP <80 MM HG: CPT | Performed by: HOSPITALIST

## 2024-10-30 PROCEDURE — 3077F SYST BP >= 140 MM HG: CPT | Performed by: HOSPITALIST

## 2024-10-30 PROCEDURE — G2211 COMPLEX E/M VISIT ADD ON: HCPCS

## 2024-10-30 PROCEDURE — 1126F AMNT PAIN NOTED NONE PRSNT: CPT | Performed by: HOSPITALIST

## 2024-10-30 PROCEDURE — 99214 OFFICE O/P EST MOD 30 MIN: CPT

## 2024-10-30 RX ORDER — LOSARTAN POTASSIUM 25 MG/1
50 TABLET ORAL DAILY
Qty: 180 TABLET | Refills: 3 | Status: SHIPPED | OUTPATIENT
Start: 2024-10-30 | End: 2024-11-08 | Stop reason: SDUPTHER

## 2024-10-30 ASSESSMENT — ENCOUNTER SYMPTOMS
CONFUSION: 0
HEADACHES: 0
NERVOUS/ANXIOUS: 0
SHORTNESS OF BREATH: 0
DIARRHEA: 0
CHEST TIGHTNESS: 0
FREQUENCY: 0
BACK PAIN: 0
VOMITING: 0
PALPITATIONS: 0
HEMATURIA: 0
NAUSEA: 0
COUGH: 0
ABDOMINAL DISTENTION: 0

## 2024-10-30 ASSESSMENT — PAIN SCALES - GENERAL: PAINLEVEL_OUTOF10: 0-NO PAIN

## 2024-10-31 ENCOUNTER — LAB (OUTPATIENT)
Dept: LAB | Facility: LAB | Age: 74
End: 2024-10-31
Payer: MEDICARE

## 2024-10-31 ENCOUNTER — CLINICAL SUPPORT (OUTPATIENT)
Dept: CARDIAC REHAB | Facility: HOSPITAL | Age: 74
End: 2024-10-31
Payer: MEDICARE

## 2024-10-31 DIAGNOSIS — E55.9 VITAMIN D DEFICIENCY: ICD-10-CM

## 2024-10-31 DIAGNOSIS — Z94.0 KIDNEY REPLACED BY TRANSPLANT (HHS-HCC): ICD-10-CM

## 2024-10-31 DIAGNOSIS — I70.213 ATHEROSCLEROSIS OF NATIVE ARTERY OF BOTH LOWER EXTREMITIES WITH INTERMITTENT CLAUDICATION (CMS-HCC): ICD-10-CM

## 2024-10-31 DIAGNOSIS — I50.32 CHRONIC HEART FAILURE WITH PRESERVED EJECTION FRACTION: ICD-10-CM

## 2024-10-31 LAB
25(OH)D3 SERPL-MCNC: 37 NG/ML (ref 30–100)
ALBUMIN SERPL BCP-MCNC: 4.3 G/DL (ref 3.4–5)
ANION GAP SERPL CALC-SCNC: 16 MMOL/L (ref 10–20)
BNP SERPL-MCNC: 1072 PG/ML (ref 0–99)
BUN SERPL-MCNC: 24 MG/DL (ref 6–23)
CALCIUM SERPL-MCNC: 8.9 MG/DL (ref 8.6–10.6)
CHLORIDE SERPL-SCNC: 105 MMOL/L (ref 98–107)
CO2 SERPL-SCNC: 24 MMOL/L (ref 21–32)
CREAT SERPL-MCNC: 1.9 MG/DL (ref 0.5–1.3)
CREAT UR-MCNC: 66.7 MG/DL (ref 20–370)
EGFRCR SERPLBLD CKD-EPI 2021: 36 ML/MIN/1.73M*2
ERYTHROCYTE [DISTWIDTH] IN BLOOD BY AUTOMATED COUNT: 15.9 % (ref 11.5–14.5)
GLUCOSE SERPL-MCNC: 96 MG/DL (ref 74–99)
HCT VFR BLD AUTO: 39.1 % (ref 41–52)
HGB BLD-MCNC: 11.9 G/DL (ref 13.5–17.5)
MCH RBC QN AUTO: 23.9 PG (ref 26–34)
MCHC RBC AUTO-ENTMCNC: 30.4 G/DL (ref 32–36)
MCV RBC AUTO: 79 FL (ref 80–100)
NRBC BLD-RTO: 0 /100 WBCS (ref 0–0)
PHOSPHATE SERPL-MCNC: 3.5 MG/DL (ref 2.5–4.9)
PLATELET # BLD AUTO: 219 X10*3/UL (ref 150–450)
POTASSIUM SERPL-SCNC: 4 MMOL/L (ref 3.5–5.3)
PROT UR-ACNC: 15 MG/DL (ref 5–25)
PROT/CREAT UR: 0.22 MG/MG CREAT (ref 0–0.17)
PTH-INTACT SERPL-MCNC: 288.8 PG/ML (ref 18.5–88)
RBC # BLD AUTO: 4.97 X10*6/UL (ref 4.5–5.9)
SODIUM SERPL-SCNC: 141 MMOL/L (ref 136–145)
TACROLIMUS BLD-MCNC: 6.5 NG/ML
WBC # BLD AUTO: 8.8 X10*3/UL (ref 4.4–11.3)

## 2024-10-31 PROCEDURE — 82306 VITAMIN D 25 HYDROXY: CPT

## 2024-10-31 PROCEDURE — 36415 COLL VENOUS BLD VENIPUNCTURE: CPT

## 2024-10-31 PROCEDURE — 85027 COMPLETE CBC AUTOMATED: CPT

## 2024-10-31 PROCEDURE — 80069 RENAL FUNCTION PANEL: CPT

## 2024-10-31 PROCEDURE — 82570 ASSAY OF URINE CREATININE: CPT

## 2024-10-31 PROCEDURE — 83970 ASSAY OF PARATHORMONE: CPT

## 2024-10-31 PROCEDURE — 93668 PERIPHERAL VASCULAR REHAB: CPT | Performed by: INTERNAL MEDICINE

## 2024-10-31 PROCEDURE — 84156 ASSAY OF PROTEIN URINE: CPT

## 2024-10-31 PROCEDURE — 83880 ASSAY OF NATRIURETIC PEPTIDE: CPT

## 2024-10-31 PROCEDURE — 80197 ASSAY OF TACROLIMUS: CPT

## 2024-11-01 ENCOUNTER — TELEPHONE (OUTPATIENT)
Dept: TRANSPLANT | Facility: HOSPITAL | Age: 74
End: 2024-11-01
Payer: MEDICARE

## 2024-11-04 ENCOUNTER — HOSPITAL ENCOUNTER (OUTPATIENT)
Dept: CARDIOLOGY | Facility: CLINIC | Age: 74
Discharge: HOME | End: 2024-11-04
Payer: MEDICARE

## 2024-11-04 ENCOUNTER — PHARMACY VISIT (OUTPATIENT)
Dept: PHARMACY | Facility: CLINIC | Age: 74
End: 2024-11-04
Payer: COMMERCIAL

## 2024-11-04 ENCOUNTER — CLINICAL SUPPORT (OUTPATIENT)
Dept: CARDIAC REHAB | Facility: HOSPITAL | Age: 74
End: 2024-11-04
Payer: MEDICARE

## 2024-11-04 ENCOUNTER — SPECIALTY PHARMACY (OUTPATIENT)
Dept: PHARMACY | Facility: CLINIC | Age: 74
End: 2024-11-04

## 2024-11-04 DIAGNOSIS — I48.91 UNSPECIFIED ATRIAL FIBRILLATION (MULTI): ICD-10-CM

## 2024-11-04 DIAGNOSIS — I48.92 UNSPECIFIED ATRIAL FLUTTER (MULTI): ICD-10-CM

## 2024-11-04 DIAGNOSIS — I70.213 ATHEROSCLEROSIS OF NATIVE ARTERY OF BOTH LOWER EXTREMITIES WITH INTERMITTENT CLAUDICATION (CMS-HCC): ICD-10-CM

## 2024-11-04 DIAGNOSIS — Z94.0 KIDNEY REPLACED BY TRANSPLANT (HHS-HCC): ICD-10-CM

## 2024-11-04 PROCEDURE — RXMED WILLOW AMBULATORY MEDICATION CHARGE

## 2024-11-04 PROCEDURE — 93668 PERIPHERAL VASCULAR REHAB: CPT | Performed by: INTERNAL MEDICINE

## 2024-11-05 ENCOUNTER — HOSPITAL ENCOUNTER (OUTPATIENT)
Dept: CARDIOLOGY | Facility: CLINIC | Age: 74
Discharge: HOME | End: 2024-11-05
Payer: MEDICARE

## 2024-11-05 DIAGNOSIS — I48.92 UNSPECIFIED ATRIAL FLUTTER (MULTI): ICD-10-CM

## 2024-11-05 DIAGNOSIS — I48.91 UNSPECIFIED ATRIAL FIBRILLATION (MULTI): ICD-10-CM

## 2024-11-07 ENCOUNTER — CLINICAL SUPPORT (OUTPATIENT)
Dept: CARDIAC REHAB | Facility: HOSPITAL | Age: 74
End: 2024-11-07
Payer: MEDICARE

## 2024-11-07 DIAGNOSIS — I70.213 ATHEROSCLEROSIS OF NATIVE ARTERY OF BOTH LOWER EXTREMITIES WITH INTERMITTENT CLAUDICATION (CMS-HCC): ICD-10-CM

## 2024-11-07 PROCEDURE — 93668 PERIPHERAL VASCULAR REHAB: CPT | Performed by: INTERNAL MEDICINE

## 2024-11-08 ENCOUNTER — APPOINTMENT (OUTPATIENT)
Dept: CARDIOLOGY | Facility: HOSPITAL | Age: 74
End: 2024-11-08
Payer: MEDICARE

## 2024-11-08 VITALS
WEIGHT: 176.4 LBS | SYSTOLIC BLOOD PRESSURE: 169 MMHG | DIASTOLIC BLOOD PRESSURE: 69 MMHG | HEART RATE: 63 BPM | BODY MASS INDEX: 24.69 KG/M2 | HEIGHT: 71 IN | OXYGEN SATURATION: 86 %

## 2024-11-08 DIAGNOSIS — Z95.818 PRESENCE OF CARDIAC DEVICE: Primary | ICD-10-CM

## 2024-11-08 DIAGNOSIS — I10 ESSENTIAL HYPERTENSION: ICD-10-CM

## 2024-11-08 DIAGNOSIS — R06.02 SHORTNESS OF BREATH: ICD-10-CM

## 2024-11-08 DIAGNOSIS — J31.0 CHRONIC RHINITIS: ICD-10-CM

## 2024-11-08 PROCEDURE — 3078F DIAST BP <80 MM HG: CPT | Performed by: STUDENT IN AN ORGANIZED HEALTH CARE EDUCATION/TRAINING PROGRAM

## 2024-11-08 PROCEDURE — 99215 OFFICE O/P EST HI 40 MIN: CPT | Performed by: STUDENT IN AN ORGANIZED HEALTH CARE EDUCATION/TRAINING PROGRAM

## 2024-11-08 PROCEDURE — 1036F TOBACCO NON-USER: CPT | Performed by: STUDENT IN AN ORGANIZED HEALTH CARE EDUCATION/TRAINING PROGRAM

## 2024-11-08 PROCEDURE — 1126F AMNT PAIN NOTED NONE PRSNT: CPT | Performed by: STUDENT IN AN ORGANIZED HEALTH CARE EDUCATION/TRAINING PROGRAM

## 2024-11-08 PROCEDURE — 1159F MED LIST DOCD IN RCRD: CPT | Performed by: STUDENT IN AN ORGANIZED HEALTH CARE EDUCATION/TRAINING PROGRAM

## 2024-11-08 PROCEDURE — 3077F SYST BP >= 140 MM HG: CPT | Performed by: STUDENT IN AN ORGANIZED HEALTH CARE EDUCATION/TRAINING PROGRAM

## 2024-11-08 RX ORDER — LOSARTAN POTASSIUM 50 MG/1
75 TABLET ORAL DAILY
Qty: 135 TABLET | Refills: 3 | Status: SHIPPED | OUTPATIENT
Start: 2024-11-08 | End: 2025-11-08

## 2024-11-08 ASSESSMENT — PAIN SCALES - GENERAL: PAINLEVEL_OUTOF10: 0-NO PAIN

## 2024-11-08 ASSESSMENT — ENCOUNTER SYMPTOMS
DEPRESSION: 0
OCCASIONAL FEELINGS OF UNSTEADINESS: 0
LOSS OF SENSATION IN FEET: 1

## 2024-11-08 ASSESSMENT — PATIENT HEALTH QUESTIONNAIRE - PHQ9
1. LITTLE INTEREST OR PLEASURE IN DOING THINGS: NOT AT ALL
2. FEELING DOWN, DEPRESSED OR HOPELESS: NOT AT ALL
SUM OF ALL RESPONSES TO PHQ9 QUESTIONS 1 AND 2: 0

## 2024-11-08 NOTE — PROGRESS NOTES
"Accompanied by: wife    Chief Complaint  Cardiovascular review for exertional dyspnea      History of Present Illness    74 y.o. man with history of vascular disease with obstructive CAD s/p PCI mRCA in-stent restenosis in 5/25/2016, renal artery stenosis, femoral artery stenosis and carotid stenosis all s/p intervention, HFrimprovedEF, atrial fibrillation s/p DCCV 6/2020 and is maintained on DOAC. 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  TTE 12/10/2021 demonstrates normal left ventricular systolic function 55% with mild to moderate RV systolic impairment. 12/13/2021 stress evaluation c/f inducible ischaemia. He had a positive stress test 12/2021 and is now status post left heart catheterization 12/28/2021 which demonstrated known midLCx  with collateral flow, and otherwise mild CAD. He will be maintained on aspirin and pravastatin for CAD. He was hospitalized 2/2023 with sudden onset dyspnea. COVID and influenza were negative. On left heart catheterization 2/2023 hisLCx CTA was again visualized andrPDA 100% occlusion was also noted. Recommendation was made by the cardiology team for medical management. TTE 2/2023 LVEF 60 -65% with normal biventricular systolic function, mild mitral regurgitation. At his last visit he continues to struggle with exertional dyspnea and has now completed cardiac MRI 4/2023 which showed areas of infarction and in the region of the LCx there was \"partial viability\". He does have normal biventricular systolic function with LVEF 63%. There was no evidence of i LV or RV thrombus. Normal valvular function.    He re-presented with SOBOE  and exertional leg cramps.  Bilateral PEDRO PABLO assessment  8/2024 was consistent with moderate vascular disease and he was rereferred to cardiac rehabilitation.  He has completed approximately a month and a half.  He does volunteer that his leg cramps are much less severe now, since he started his exercise regimen.    He has mild chronic unilateral leg swelling and denies " other cardiac symptomatology today.    Losartan dose increased by his Nephrology transplant team 10/2024 and has been well  tolerated but his BP remains elevated in clinic and on home monitoring.    Review of Systems   Cardiovascular:  Positive for dyspnea on exertion and leg swelling. Negative for chest pain, orthopnea, palpitations and paroxysmal nocturnal dyspnea.   Respiratory:  Negative for shortness of breath.    Musculoskeletal:  Negative for falls.   Gastrointestinal:  Negative for change in bowel habit, nausea and vomiting.   Genitourinary:  Negative for bladder incontinence and flank pain.   Neurological:  Negative for dizziness and headaches.     The electronic medical record has been reviewed by me for salient history. All cardiovascular imaging and testing available in the electronic medical record, and [employer] has been reviewed.      Medication Documentation Review Audit       Reviewed by Hattie Gonzalez RN (Registered Nurse) on 11/08/24 at 1015      Medication Order Taking? Sig Documenting Provider Last Dose Status   amLODIPine (Norvasc) 10 mg tablet [id number] Yes Take 1 tablet (10 mg) by mouth once daily. Historical Provider, MD Taking Active   apixaban (Eliquis) 2.5 mg tablet [id number] Yes TAKE 1 TABLET TWICE A DAY Krystle Linn MD PhD Taking Active   famotidine (Pepcid) 20 mg tablet [id number] Yes TAKE 1 TABLET BY MOUTH EVERY DAY AS DIRECTED Ash Demarco MD Taking Active   fluticasone (Flonase) 50 mcg/actuation nasal spray [id number] Yes 2 sprays by Does not apply route once daily as needed for allergies. Historical Provider, MD Taking Active   levothyroxine (Synthroid, Levoxyl) 137 mcg tablet [id number] Yes Take 150 mcg by mouth once daily. Historical Provider, MD Taking Active   losartan (Cozaar) 25 mg tablet [id number] Yes Take 2 tablets (50 mg) by mouth once daily. Rajani Ramirez MD  Active   metoprolol tartrate (Lopressor) 50 mg tablet [id number] Yes TAKE 1 TABLET TWICE A DAY  "Krystle Linn MD PhD Taking Active   multivitamin tablet [id number] Yes Take by mouth. Historical Provider, MD Taking Active   mycophenolate (Cellcept) 250 mg capsule [id number] Yes TAKE 3 CAPSULES BY MOUTH TWICE A DAY Krystian Thompson MD Taking Active   nitroglycerin (Nitrostat) 0.4 mg SL tablet [id number] Yes Place 1 tablet (0.4 mg) under the tongue every 5 minutes if needed for chest pain. Historical Provider, MD Taking Active   pravastatin (Pravachol) 40 mg tablet [id number] Yes TAKE 1 TABLET BY MOUTH EVERYDAY AT BEDTIME Krystle Linn MD PhD Taking Active   predniSONE (Deltasone) 5 mg tablet [id number] Yes TAKE 1 TABLET BY MOUTH EVERY DAY Krystian Thompson MD Taking Active   tacrolimus ER (Envarsus XR) 0.75 mg tablet ER [id number] Yes Take 2 tablets (1.5 mg) by mouth once daily. Rajani Ramirez MD Taking Active   tamsulosin (Flomax) 0.4 mg 24 hr capsule [id number] Yes Take 1 capsule (0.4 mg) by mouth once daily at bedtime. Michael Montesinos MD Taking Active   torsemide (Demadex) 20 mg tablet [id number] Yes Take 1 tablet (20 mg) by mouth once daily. Krystle Linn MD PhD Taking Active                      Allergies   Allergen Reactions    Lovastatin Myalgia     Leg cramps    Muscle cramps    Simvastatin Myalgia     Muscle cramps    Adhesive Rash    Iodine Rash    Naproxen Rash     Visit Vitals  /69 (BP Location: Left arm, Patient Position: Sitting, BP Cuff Size: Adult)   Pulse 63   Ht 1.803 m (5' 11\")   Wt 80 kg (176 lb 6.4 oz)   SpO2 (!) 86%   BMI 24.60 kg/m²   Smoking Status Former   BSA 2 m²      On examination:    Very pleasant elderly -American man in no apparent CP or painful distress  Immaculately groomed   Neck: No JVD or HJR  CVS: HS 1,2.  No added sounds  Resp: CTA bilaterally. Percussion note resonant  Abdomen: Mildly obese, SNT, BS wnl  Extremities: Left-sided trace pedal oedema, none on right  Skin: warm and dry.  Multiple bruises  CNS: AO x 4, no gross " deficits    Lab Results   Component Value Date    WBC 8.8 10/31/2024    HGB 11.9 (L) 10/31/2024    HCT 39.1 (L) 10/31/2024    MCV 79 (L) 10/31/2024     10/31/2024       Chemistry    Lab Results   Component Value Date/Time     10/31/2024 0726    K 4.0 10/31/2024 07     10/31/2024 0726    CO2 24 10/31/2024 07    BUN 24 (H) 10/31/2024 07    CREATININE 1.90 (H) 10/31/2024 07    Lab Results   Component Value Date/Time    CALCIUM 8.9 10/31/2024 0726    ALKPHOS 73 2023 2333    AST 12 2023 2333    ALT 10 2023 2333    BILITOT 0.5 2023             Results/Data  MRI Cardiac w/wo contrast for Morph/Funct and Valve Dz 2023 10:25AM Krystle Hayes     Test Name Result Flag Reference   MRI Cardiac w/wo contrast for Morph/Funct and Valve Dz (Report)     FINAL REPORT  Interpreted by: MINGO WARREN   23 16:20                        [facility]                              CMR Report      MRN:          [id number]                  Name:      SUKHDEEP TORREZ                  :         1950                  Scan Date:  2023 09:41:15                    Electronically signed by Mingo Warren  16:20:34     GENERAL INFORMATION   =====================================================================  =====================================     HEIGHT: 68.90 in  (175.00 cm)  WEIGHT: 189.60 lbs  (86.00 kgs)  BSA: 2.02 m\S\2  BASELINE HR: 70 BPM  SCAN LOCATION: [facility]  REFERRING PHYSICIAN: KRYSTLE HAYES  ATTENDING PHYSICIAN: KRYSTLE HAYES  TECHNOLOGIST: Kori Mack  ACCESSION NUMBER: [id number]  CPT CODES: 15802  ICD10 CODES: I50.30, [ , ]I25.9  PATIENT HISTORY: \E\n\E\n72 year old man with history of vascular   disease with obstructive CAD s/p PCI mRCA in-stent restenosis in   2016, renal artery stenosis, femoral artery stenosis and carotid   stenosis all s/p intervention, HFrecEF, atrial fibrillation s/p DCCV   2020  and is maintained on DOAC. 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 12/13/2021 stress   evaluation c/f inducible ischaemia. He had a positive stress test   12/2021 and is now status post left heart catheterization 12/28/2021   which demonstrated known midLCx  with collateral flow, and   otherwise mild CAD. He will be maintained on aspirin and pravastatin   for CAD. He was hospitalized 2/2023 with sudden onset dyspnea. COVID   and influenza were negative. On left heart catheterization 2/2023   hisLCx CTA was again visualized andrPDA 100% occlusion was also   noted. Recommendation was made by the cardiology team for medical   management. TTE 2/2023 LVEF 60 -65% with normal biventricular   systolic function, mild mitral regurgitation. Today he remains with   SOBOE.\E\n#CAD:\E\n? recurrent angina\E\n- cMRI ( viability   assessment, MV assessment)\E\n- will review 2/2023 [facility] with   interventional Cards after cMRI results available \E\n     SUMMARY   =====================================================================  =====================================     Prior Inferior and Inferolateral infarction with partial viability   (LCX territory). Wall motion  abnormalities in this distribution with preserved LVEF.  Moderate non-ischemic scarring in the septum.  LVH with concentric remodeling. Maximum thickness of the septum is   1.9 cm.  Mild RVH.     LEFT VENTRICLE: Quantitative LVEF 63 %. There is moderate LV   hypertrophy. There is LV concentric remodeling. LV cavity size  is normal. LV systolic function is regionally impaired. There is no   LV mass/thrombus.     VIABILITY: LV scar size is 13 %.     RIGHT VENTRICLE: There is mild RVH. RV cavity size is normal. RV   systolic function is normal. There is no RV mass/thrombus.     LV/RV SEPTUM: The ventricular septum is intact.     LA/RA SEPTUM: The atrial  septum is intact.     LEFT ATRIUM: LA is mildly enlarged.     RIGHT ATRIUM: There is no RA mass/thrombus. RA is mildly enlarged.     PERICARDIUM: Pericardium is normal. There is no pericardial effusion.     PLEURAL EFFUSION: There is no pleural effusion.     AORTIC VALVE: Aortic valve is mildly thickened. Aortic valveleaflets   are normal. The aortic valve annulus is normal in  size. There is mild aortic regurgitation.     MITRAL VALVE: Mitral valve leaflets are normal. The mitral valve   annulus is normal in size. There is mild mitral  regurgitation.     TRICUSPID VALVE: Tricuspid valve leaflets are normal. The tricuspid   valve annulus is normal in size.     AORTIC ROOT: The aortic root is normal.        CORE EXAM   =====================================================================  =====================================     MEASUREMENTS   ---------------------------------------------------------------------  -------------------    VOLUMETRIC ANALYSIS     ----------------------------------------------  .------------------------------------------------------.  .   .     . LV  . Reference . RV . Reference .  +------+----------+------+------------+----+-----------+  . EDV . ml    . 120 . (105-187) .  .      .  .   . ml/m\S\2  .  60 . (58-93)  .  .      .  . ESV . ml    .  44 . (25-70)  .  .      .  .   . ml/m\S\2  .  22 . (13-35)  .  .      .  . CO  . L/min  . 5.84 .      .  .      .  .   . L/min/m\S\2 . 2.90 .      .  .      .  . MASS . g    . 235 . (106-183) .  .      .  .   . g/m\S\2   . 117 . (56-89)  .  .      .  . SV  . ml    .  76 . () .  .      .  .   . ml/m\S\2  .  38 . (39-63)  .  .      .  . EF  . %    .  63 . (59-77)  .  .      .  '------+----------+------+------------+----+-----------'         CARDIAC OUTPUT HR: 77 BPM    LV DIMENSIONS     ----------------------------------------------      WALL THICKNESS - ANTEROSEPTAL: 1.9 cm      WALL THICKNESS - INFEROLATERAL: 1.4 cm      WALL THICKNESS -  MAXIMUM: 1.9 cm      LV SUSANA: 4.61 cm      LV ESD: 3.39 cm       LA DIMENSIONS (LV SYSTOLE)     ----------------------------------------------      AREA - 2 CHAMBER: 23.74 cm\S\2      LENGTH - 2 CHAMBER: 6.26 cm      AREA - 4 CHAMBER: 29.24 cm\S\2      LENGTH - 4 CHAMBER: 7.19 cm      VOLUME: 94 ml      VOLUME NORMALIZED: 46.7 ml/m\S\2       RA DIMENSIONS (RV SYSTOLE)     ----------------------------------------------      AREA - 4 CHAMBER: 22 cm\S\2      LENGTH - 4 CHAMBER: 5.2 cm       AORTIC ROOT DIMENSIONS     ----------------------------------------------      ANNULUS: 1.18 cm      SINUS OF VALSALVA: 2.7 cm      SINOTUBULAR JUNCTION: 2.4 cm       EXTRACELLULAR VOLUME MEASUREMENT     ----------------------------------------------      PRE-CONTRAST T1 MYOCARDIUM: 169.66 msec      PRE-CONTRAST T1 LV CAVITY: 178.02 msec      POST-CONTRAST T1 MYOCARDIUM: 134.39 msec      POST-CONTRAST T1 LV CAVITY: 65.5 msec       IRON QUANTIFICATION     ----------------------------------------------      MYOCARDIAL T2*: 33.26 msec      LIVER T2*: 12.89 msec        17 SEGMENT   ---------------------------------------------------------------------  -------------------  .---------------------------------------------------------------------  ----------------------.  . Segments      . Wall Motion  . Hyperenhancement . Stress   Perfusion . Interpretation .  +--------------------+---------------+------------------+-------------  -----+----------------+  . Base Anterior   . Normal/Hyper . None       .           . Normal     .  . Base Anteroseptal . Severe Hypo  . 26-50%      .           . Non-CAD Scar  .  . Base Inferoseptal . Severe Hypo  . 1-25%      .           . Non-CAD Scar  .  . Base Inferior   . Severe Hypo  . 51-75%      .           . Sub-Endo MI  .  . Base Inferolateral . Normal/Hyper . 26-50%      .           . Sub-Endo MI  .  . Base Anterolateral . Normal/Hyper . None       .           . Normal     .  . Mid Anterior    .  Normal/Hyper . None       .           . Normal     .  . Mid Anteroseptal  . Normal/Hyper . 1-25%      .           . Non-CAD Scar  .  . Mid Inferoseptal  . Normal/Hyper . 1-25%      .           . Non-CAD Scar  .  . Mid Inferior    . Mild/Mod Hypo . 1-25%      .           . Sub-Endo MI  .  . Mid Inferolateral . Normal/Hyper . 1-25%      .           . Sub-Endo MI  .  . Mid Anterolateral . Normal/Hyper . None       .           . Normal     .  . Apical Anterior  . Normal/Hyper . None       .           . Normal     .  . Apical Septal   . Normal/Hyper . None       .           . Normal     .  . Apical Inferior  . Normal/Hyper . 1-25%      .           . Sub-Endo MI  .  . Apical Lateral   . Normal/Hyper . None       .           . Normal     .  . [place]        . Normal/Hyper . None       .           . Normal     .  +--------------------+---------------+------------------+-------------  -----+----------------+  . RV Segments    . Wall Motion  . Hyperenhancement . Stress   Perfusion . Interpretation .  +--------------------+---------------+------------------+-------------  -----+----------------+  . RV Basal Anterior . Normal/Hyper . None       .           . Normal     .  . RV Basal Inferior . Normal/Hyper . None       .           . Normal     .  . RV Mid       . Normal/Hyper . None       .           . Normal     .  . RV Apical     . Normal/Hyper . None       .           . Normal     .  '--------------------+---------------+------------------+-------------  -----+----------------'       FINDINGS     ----------------------------------------------      LV SCAR SIZE (17 SEGMENT): 13 %        SCAN INFO   =====================================================================  =====================================     GENERAL   ---------------------------------------------------------------------  -------------------    SCANNER     ----------------------------------------------      : [employer]      MODEL: MAGNETOM  Aera      PULSE SEQUENCES: SSFP cine, 2D LGE segmented, 2D LGE   single-shot, Black-blood LGE (or Grey-blood),      Pre-contrast T1 mapping, Post-contrast T1 mapping, T2   mapping, First-pass perfusion without stress,      Phase contrast imaging, HASTE morphology        CONTRAST AGENT     ----------------------------------------------      TYPE: Dotarem      LOT NUMBER: [id number]       EXPIRATION DATE: 2027-09-01 00:00:00       GD CONCENTRATION: 0.5 M      VOLUME ADMINISTERED: 35 ml      DOSAGE: 0.20 mmol/kg       SEDATION     ----------------------------------------------      SEDATION USED?: No       SETUP     ----------------------------------------------      SCAN TYPE: Clinical      PATIENT TYPE: Outpatient      INCOMPLETE SCAN: No      REASON(S) FOR SCAN: Viability: chronic CAD         Report generated by [employer], a product of [employer]    Electronically signed by: BRIANA  04/06/23 16:20     Impressions    74 y.o. man with history of vascular disease with obstructive CAD s/p PCI mRCA in-stent restenosis in 5/25/2016, renal artery stenosis, femoral artery stenosis and carotid stenosis all s/p intervention, HFrecEF, atrial fibrillation s/p DCCV 6/2020 and is maintained on DOAC. 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  TTE 12/10/2021 demonstrates normal left ventricular systolic function 55% with mild to moderate RV systolic impairment. 12/13/2021 stress evaluation c/f inducible ischaemia. He had a positive stress test 12/2021 and is now status post left heart catheterization 12/28/2021 which demonstrated known midLCx  with collateral flow, and otherwise mild CAD. He will be maintained on aspirin and pravastatin for CAD. He was hospitalized 2/2023 with sudden onset dyspnea. COVID and influenza were negative. On left heart catheterization 2/2023 hisLCx CTA was again visualized andrPDA 100% occlusion was also noted. Recommendation was made by the cardiology team for medical management. TTE 2/2023 LVEF  "60 -65% with normal biventricular systolic function, mild mitral regurgitation. At his last visit he continues to struggle with exertional dyspnea and has now completed cardiac MRI 4/2023 which showed areas of infarction and in the region of the LCx there was \"partial viability\". He does have normal biventricular systolic function with LVEF 63%. There was no evidence of i LV or RV thrombus. Normal valvular function.  He was referred to cardiac rehabilitation and doing the sessions was associated with exertional improvement in his dyspnea.  Unfortunately his exertional dyspnea has recurred.  He also complains of unilateral leg swelling (he is fully adherent with DOAC), and exertional leg pain.  He re-presented with SOBOE  and exertional leg cramps.  Bilateral PEDRO PABLO assessment  8/2024 was consistent with moderate vascular disease and he was rereferred to cardiac rehabilitation.  He has completed approximately a month and a half.  He does volunteer that his leg cramps are much less severe now, since he started his exercise regimen.    ACC/AHA stage B  NYHA FC: 2  Volume status: Euvolemic   Perfusion status: Warm to touch    PLAN    #HFrecoveredEF  -Start SGLT2 inhibitor, empagliflozin 10 mg once daily.  We discussed potential side effects.  He was provided with a coupon today, and has been referred to the pharmacy assistance team with cost of this medication.  We also discussed the initiation of this drug with the kidney transplant team prior to starting, they were in agreement.  -Consider starting spironolactone at next visit    #Hypertension  -Continue metoprolol titrate 50 mg twice daily  - Increase Losartan to 75 mg every day    #CAD:  No symptoms    -Continue C rehab  -Continue aspirin, pravast, metoprolol tartrate, CCB  -Given myalgia he was previously transitioned from rosuvastatin to pravastatin and this will be continued.     #Atrial fibrillation:  -Continue DOAC and lower dose apixaban 2.5 mg twice daily. He no " "longer troubled by overt bleeding    #Exertional leg pain \"cramps\"  - Cont C rehab    This note was transcribed using the Dragon Dictation system. There may be grammatical, punctuation, or verbiage errors that can occur with voice recognition programs.      Krystle Linn MD PhD  "

## 2024-11-08 NOTE — PATIENT INSTRUCTIONS
Thank you for coming in today. If you have any questions or concerns, you may call the Heart Failure Office at [phone] option 6, or [phone].  You may also contact our heart failure nursing team via email on [email or web address].    For quicker results set-up your  [employer] account to receive results and other correspondence directly to your phone.    Please bring all your pills/medications to your Cardiology appointments.    **  - Please make the following medication changes:  1. START Jardiance 10 mg once daily, we will send this to  Specialty pharmacy    2. INCREASE Losartan to 75 mg once daily    - Please have the following tests done:  1.Blood tests just before your next visit (RFP, BNP)    You will be referred to the following teams, CALL  [phone] to schedule your appointments with:  1. STAT Pharmacy assistance team ( help with Jardiance cost)    2. Allergy medicine ( chronic rhinitis)    - Please make an appointment to be seen in 4 months

## 2024-11-11 ENCOUNTER — HOSPITAL ENCOUNTER (OUTPATIENT)
Dept: CARDIOLOGY | Facility: CLINIC | Age: 74
Discharge: HOME | End: 2024-11-11
Payer: MEDICARE

## 2024-11-11 ENCOUNTER — CLINICAL SUPPORT (OUTPATIENT)
Dept: CARDIAC REHAB | Facility: HOSPITAL | Age: 74
End: 2024-11-11
Payer: MEDICARE

## 2024-11-11 ENCOUNTER — LAB (OUTPATIENT)
Dept: LAB | Facility: LAB | Age: 74
End: 2024-11-11
Payer: MEDICARE

## 2024-11-11 DIAGNOSIS — Z94.0 KIDNEY REPLACED BY TRANSPLANT (HHS-HCC): ICD-10-CM

## 2024-11-11 DIAGNOSIS — I48.92 UNSPECIFIED ATRIAL FLUTTER (MULTI): ICD-10-CM

## 2024-11-11 DIAGNOSIS — I70.213 ATHEROSCLEROSIS OF NATIVE ARTERY OF BOTH LOWER EXTREMITIES WITH INTERMITTENT CLAUDICATION (CMS-HCC): ICD-10-CM

## 2024-11-11 DIAGNOSIS — I48.91 UNSPECIFIED ATRIAL FIBRILLATION (MULTI): ICD-10-CM

## 2024-11-11 LAB
ALBUMIN SERPL BCP-MCNC: 4.1 G/DL (ref 3.4–5)
ANION GAP SERPL CALC-SCNC: 15 MMOL/L (ref 10–20)
BUN SERPL-MCNC: 25 MG/DL (ref 6–23)
CALCIUM SERPL-MCNC: 9.5 MG/DL (ref 8.6–10.6)
CHLORIDE SERPL-SCNC: 103 MMOL/L (ref 98–107)
CO2 SERPL-SCNC: 26 MMOL/L (ref 21–32)
CREAT SERPL-MCNC: 1.96 MG/DL (ref 0.5–1.3)
CREAT UR-MCNC: 66.6 MG/DL (ref 20–370)
EGFRCR SERPLBLD CKD-EPI 2021: 35 ML/MIN/1.73M*2
ERYTHROCYTE [DISTWIDTH] IN BLOOD BY AUTOMATED COUNT: 15.8 % (ref 11.5–14.5)
GLUCOSE SERPL-MCNC: 96 MG/DL (ref 74–99)
HCT VFR BLD AUTO: 42.8 % (ref 41–52)
HGB BLD-MCNC: 12.7 G/DL (ref 13.5–17.5)
MCH RBC QN AUTO: 23.7 PG (ref 26–34)
MCHC RBC AUTO-ENTMCNC: 29.7 G/DL (ref 32–36)
MCV RBC AUTO: 80 FL (ref 80–100)
NRBC BLD-RTO: 0 /100 WBCS (ref 0–0)
PHOSPHATE SERPL-MCNC: 3.4 MG/DL (ref 2.5–4.9)
PLATELET # BLD AUTO: 245 X10*3/UL (ref 150–450)
POTASSIUM SERPL-SCNC: 3.7 MMOL/L (ref 3.5–5.3)
PROT UR-ACNC: 67 MG/DL (ref 5–25)
PROT/CREAT UR: 1.01 MG/MG CREAT (ref 0–0.17)
RBC # BLD AUTO: 5.35 X10*6/UL (ref 4.5–5.9)
SODIUM SERPL-SCNC: 140 MMOL/L (ref 136–145)
TACROLIMUS BLD-MCNC: 3.9 NG/ML
WBC # BLD AUTO: 8.5 X10*3/UL (ref 4.4–11.3)

## 2024-11-11 PROCEDURE — 80069 RENAL FUNCTION PANEL: CPT

## 2024-11-11 PROCEDURE — 36415 COLL VENOUS BLD VENIPUNCTURE: CPT

## 2024-11-11 PROCEDURE — 84156 ASSAY OF PROTEIN URINE: CPT

## 2024-11-11 PROCEDURE — 82570 ASSAY OF URINE CREATININE: CPT

## 2024-11-11 PROCEDURE — 80197 ASSAY OF TACROLIMUS: CPT

## 2024-11-11 PROCEDURE — 85027 COMPLETE CBC AUTOMATED: CPT

## 2024-11-12 ENCOUNTER — HOSPITAL ENCOUNTER (OUTPATIENT)
Dept: CARDIOLOGY | Facility: CLINIC | Age: 74
Discharge: HOME | End: 2024-11-12
Payer: MEDICARE

## 2024-11-12 ENCOUNTER — SPECIALTY PHARMACY (OUTPATIENT)
Dept: PHARMACY | Facility: CLINIC | Age: 74
End: 2024-11-12

## 2024-11-12 DIAGNOSIS — I48.91 UNSPECIFIED ATRIAL FIBRILLATION (MULTI): ICD-10-CM

## 2024-11-12 DIAGNOSIS — I48.92 UNSPECIFIED ATRIAL FLUTTER (MULTI): ICD-10-CM

## 2024-11-14 DIAGNOSIS — R30.0 BURNING WITH URINATION: ICD-10-CM

## 2024-11-14 RX ORDER — SULFAMETHOXAZOLE AND TRIMETHOPRIM 800; 160 MG/1; MG/1
1 TABLET ORAL 2 TIMES DAILY
Qty: 14 TABLET | Refills: 0 | Status: SHIPPED | OUTPATIENT
Start: 2024-11-14 | End: 2024-11-21

## 2024-11-14 NOTE — PROGRESS NOTES
Pt messaged on mychart , experiencing a burning sensation while urinating also frequency urinations for the past 3 days. Please advise. I had the same problem in February or March.   Nurse put order in for urine culture .   Dr Montesinos will  start pt on bactrim DS bid x 7 days  now.

## 2024-11-17 PROCEDURE — RXMED WILLOW AMBULATORY MEDICATION CHARGE

## 2024-11-18 ENCOUNTER — CLINICAL SUPPORT (OUTPATIENT)
Dept: CARDIAC REHAB | Facility: HOSPITAL | Age: 74
End: 2024-11-18
Payer: MEDICARE

## 2024-11-18 DIAGNOSIS — I70.213 ATHEROSCLEROSIS OF NATIVE ARTERY OF BOTH LOWER EXTREMITIES WITH INTERMITTENT CLAUDICATION (CMS-HCC): ICD-10-CM

## 2024-11-18 PROCEDURE — 93668 PERIPHERAL VASCULAR REHAB: CPT | Performed by: INTERNAL MEDICINE

## 2024-11-19 ENCOUNTER — APPOINTMENT (OUTPATIENT)
Dept: CARDIOLOGY | Facility: CLINIC | Age: 74
End: 2024-11-19
Payer: MEDICARE

## 2024-11-20 ENCOUNTER — PHARMACY VISIT (OUTPATIENT)
Dept: PHARMACY | Facility: CLINIC | Age: 74
End: 2024-11-20
Payer: COMMERCIAL

## 2024-11-21 ENCOUNTER — CLINICAL SUPPORT (OUTPATIENT)
Dept: CARDIAC REHAB | Facility: HOSPITAL | Age: 74
End: 2024-11-21
Payer: MEDICARE

## 2024-11-21 DIAGNOSIS — I70.213 ATHEROSCLEROSIS OF NATIVE ARTERY OF BOTH LOWER EXTREMITIES WITH INTERMITTENT CLAUDICATION (CMS-HCC): ICD-10-CM

## 2024-11-21 PROCEDURE — 93668 PERIPHERAL VASCULAR REHAB: CPT | Performed by: INTERNAL MEDICINE

## 2024-11-22 ENCOUNTER — SPECIALTY PHARMACY (OUTPATIENT)
Dept: PHARMACY | Facility: CLINIC | Age: 74
End: 2024-11-22

## 2024-11-22 ENCOUNTER — HOSPITAL ENCOUNTER (OUTPATIENT)
Dept: CARDIOLOGY | Facility: CLINIC | Age: 74
Discharge: HOME | End: 2024-11-22
Payer: MEDICARE

## 2024-11-22 DIAGNOSIS — I48.92 UNSPECIFIED ATRIAL FLUTTER (MULTI): ICD-10-CM

## 2024-11-22 DIAGNOSIS — I48.91 UNSPECIFIED ATRIAL FIBRILLATION (MULTI): ICD-10-CM

## 2024-11-25 ENCOUNTER — APPOINTMENT (OUTPATIENT)
Dept: CARDIAC REHAB | Facility: HOSPITAL | Age: 74
End: 2024-11-25
Payer: MEDICARE

## 2024-11-26 ENCOUNTER — APPOINTMENT (OUTPATIENT)
Dept: PHARMACY | Facility: HOSPITAL | Age: 74
End: 2024-11-26
Payer: MEDICARE

## 2024-11-26 NOTE — PROGRESS NOTES
Pharmacist Clinic: Cardiology Management    Dilip Haynes is a 74 y.o. male was referred to Clinical Pharmacy Team for heart failure/cardiomyopathy management.     Referring Provider: Krystle Linn MD*    THIS IS A NEW PATIENT APPOINTMENT. PATIENT WILL BE ESTABLISHING CARE WITH CLINICAL PHARMACY.    Appointment was completed by the patient who was reached at .    REVIEW OF PAST APPNT (IF APPLICABLE):   N/A    Allergies Reviewed? Yes    Allergies   Allergen Reactions    Lovastatin Myalgia     Leg cramps    Muscle cramps    Simvastatin Myalgia     Muscle cramps    Adhesive Rash    Iodine Rash    Naproxen Rash       Past Medical History:   Diagnosis Date    Encounter for other preprocedural examination 05/10/2020    Pre-transplant evaluation for kidney transplant    Old myocardial infarction 01/19/2022    H/O non-ST elevation myocardial infarction (NSTEMI)    Personal history of other diseases of the circulatory system 12/22/2015    History of stenosis of renal artery    Personal history of other diseases of the circulatory system 04/13/2021    History of carotid artery stenosis    Personal history of other diseases of the circulatory system 01/19/2022    History of essential hypertension    Personal history of other diseases of the circulatory system 07/23/2016    History of claudication    Personal history of other endocrine, nutritional and metabolic disease 12/22/2015    History of thyroid disease    Raynaud's syndrome without gangrene 12/22/2015    Raynauds disease       Current Outpatient Medications on File Prior to Visit   Medication Sig Dispense Refill    amLODIPine (Norvasc) 10 mg tablet Take 1 tablet (10 mg) by mouth once daily.      apixaban (Eliquis) 2.5 mg tablet TAKE 1 TABLET TWICE A DAY 60 tablet 10    empagliflozin (Jardiance) 10 mg Take 1 tablet (10 mg) by mouth once daily. 30 tablet 11    famotidine (Pepcid) 20 mg tablet TAKE 1 TABLET BY MOUTH EVERY DAY AS DIRECTED 90 tablet 3  "   fluticasone (Flonase) 50 mcg/actuation nasal spray 2 sprays by Does not apply route once daily as needed for allergies.      levothyroxine (Synthroid, Levoxyl) 137 mcg tablet Take 1 tablet (137 mcg) by mouth once daily.      losartan (Cozaar) 50 mg tablet Take 1.5 tablets (75 mg) by mouth once daily. 135 tablet 3    metoprolol tartrate (Lopressor) 50 mg tablet TAKE 1 TABLET TWICE A  tablet 3    multivitamin tablet Take 1 tablet by mouth once daily.      mycophenolate (Cellcept) 250 mg capsule TAKE 3 CAPSULES BY MOUTH TWICE A  capsule 11    nitroglycerin (Nitrostat) 0.4 mg SL tablet Place 1 tablet (0.4 mg) under the tongue every 5 minutes if needed for chest pain.      pravastatin (Pravachol) 40 mg tablet TAKE 1 TABLET BY MOUTH EVERYDAY AT BEDTIME 90 tablet 3    predniSONE (Deltasone) 5 mg tablet TAKE 1 TABLET BY MOUTH EVERY DAY 30 tablet 23    tacrolimus ER (Envarsus XR) 0.75 mg tablet ER Take 2 tablets (1.5 mg) by mouth once daily. 60 tablet 11    tamsulosin (Flomax) 0.4 mg 24 hr capsule Take 1 capsule (0.4 mg) by mouth once daily at bedtime. 90 capsule 3    torsemide (Demadex) 20 mg tablet Take 1 tablet (20 mg) by mouth once daily. 90 tablet 3     No current facility-administered medications on file prior to visit.         RELEVANT LAB RESULTS:  Lab Results   Component Value Date    BILITOT 0.5 08/26/2023    CALCIUM 9.5 11/11/2024    CO2 26 11/11/2024     11/11/2024    CREATININE 1.96 (H) 11/11/2024    GLUCOSE 96 11/11/2024    ALKPHOS 73 08/26/2023    K 3.7 11/11/2024    PROT 6.6 08/26/2023     11/11/2024    AST 12 08/26/2023    ALT 10 08/26/2023    BUN 25 (H) 11/11/2024    ANIONGAP 15 11/11/2024    MG 1.71 09/27/2023    PHOS 3.4 11/11/2024    ALBUMIN 4.1 11/11/2024    GFRF CANCELED 06/28/2023    GFRMALE 38 (A) 09/27/2023     Lab Results   Component Value Date    TRIG 118 06/05/2023    CHOL 165 06/05/2023    HDL 45.6 06/05/2023     No results found for: \"BMCBC\", \"CBCDIF\" "     PHARMACEUTICAL ASSESSMENT:    MEDICATION RECONCILIATION    Home Pharmacy Reviewed? Yes, describe: [id number]; [place]    Added:  - None  Changed:  - None  Removed:  - None    Drug Interactions? No clinically significant drug interactions requiring change in therapy found at the time of this visit.     Medication Documentation Review Audit       Reviewed by Gardenia Powers PharmD (Pharmacist) on 12/03/24 at 1448      Medication Order Taking? Sig Documenting Provider Last Dose Status   amLODIPine (Norvasc) 10 mg tablet [id number] Yes Take 1 tablet (10 mg) by mouth once daily. Historical Provider, MD Taking Active   apixaban (Eliquis) 2.5 mg tablet [id number] Yes TAKE 1 TABLET TWICE A DAY Krystle Linn MD PhD Taking Active   empagliflozin (Jardiance) 10 mg [id number] Yes Take 1 tablet (10 mg) by mouth once daily. Krystle Linn MD PhD  Active   famotidine (Pepcid) 20 mg tablet [id number] Yes TAKE 1 TABLET BY MOUTH EVERY DAY AS DIRECTED Ash Demarco MD Taking Active   fluticasone (Flonase) 50 mcg/actuation nasal spray [id number] Yes 2 sprays by Does not apply route once daily as needed for allergies. Historical Provider, MD Taking Active   levothyroxine (Synthroid, Levoxyl) 137 mcg tablet [id number] Yes Take 1 tablet (137 mcg) by mouth once daily. Historical Provider, MD Taking Active   losartan (Cozaar) 50 mg tablet [id number] Yes Take 1.5 tablets (75 mg) by mouth once daily. Krystle Linn MD PhD  Active   metoprolol tartrate (Lopressor) 50 mg tablet [id number] Yes TAKE 1 TABLET TWICE A DAY Krystle Linn MD PhD Taking Active   multivitamin tablet [id number] Yes Take 1 tablet by mouth once daily. Historical Provider, MD Taking Active   mycophenolate (Cellcept) 250 mg capsule [id number] Yes TAKE 3 CAPSULES BY MOUTH TWICE A DAY Krystian Thompson MD Taking Active   nitroglycerin (Nitrostat) 0.4 mg SL tablet [id number] Yes Place 1 tablet (0.4 mg) under the tongue every 5 minutes if  needed for chest pain. Historical Andrew, MD Taking Active   pravastatin (Pravachol) 40 mg tablet [id number] Yes TAKE 1 TABLET BY MOUTH EVERYDAY AT BEDTIME Krystle Hayes MD PhD Taking Active   predniSONE (Deltasone) 5 mg tablet [id number] Yes TAKE 1 TABLET BY MOUTH EVERY DAY Krystian Thompson MD Taking Active   tacrolimus ER (Envarsus XR) 0.75 mg tablet ER [id number] Yes Take 2 tablets (1.5 mg) by mouth once daily. Rajani Ramirez MD Taking Active   tamsulosin (Flomax) 0.4 mg 24 hr capsule [id number] Yes Take 1 capsule (0.4 mg) by mouth once daily at bedtime. Michael Montesinos MD Taking Active   torsemide (Demadex) 20 mg tablet [id number] Yes Take 1 tablet (20 mg) by mouth once daily. Krystle Hayes MD PhD Taking Active                    DISEASE MANAGEMENT ASSESSMENT:     CHF ASSESSMENT     Symptom/Staging:  -Most recent ejection fraction: 55%  -NYHA Stage: II    Results for orders placed in visit on 12/10/21    Echocardiogram    Narrative  [facility], [address] [place] [address]  Tel [phone] and Fax [phone]    TRANSTHORACIC ECHOCARDIOGRAM REPORT      Patient Name:     SUKHDEEP Lane Physician:  [id number] Sergio Zendejas MD  Study Date:       12/10/2021     Referring           KRSYTLE HAYES  Physician:  MRN/PID:          [id number]       PCP:  Accession/Order#: [id number]   [facility]  Location:           Invasive  YOB: 1950      Fellow:  Gender:           M              Nurse:  Admit Date:                      Sonographer:        Jacqui Rodriguez [facility]  Admission Status: Outpatient     Additional Staff:  Height:           185.42 cm      CC Report to:  Weight:           66.99 kg       Study Type:         Echocardiogram  BSA:              1.89 m2  Blood Pressure: 162 /102 mmHg    Diagnosis/ICD: I50.30-Unspecified diastolic (congestive) heart failure (CHF)  Indication:    Heart failure with preserved  ejection fraction; Shortness of  breath  Procedure/CPT: Echo Complete w Full [id number]    Patient History:  Pertinent History: Raynaud's; CAD with PCI's 2000 and 2007; Gout; ESRD s/p  kidney trnasplant; Kidney stent removal; HTN; A-Fib/flutter;  PVD; NSTEMI.    Study Detail: The following Echo studies were performed: 2D, M-Mode, Doppler and  color flow.      PHYSICIAN INTERPRETATION:  Left Ventricle: The left ventricular systolic function is normal, with an estimated ejection fraction of 55%. The patient is in atrial fibrillation which may influence the estimate of left ventricular function and transvalvular flows. There are no regional wall motion abnormalities. The left ventricular cavity size is decreased. The left ventricular septal wall thickness is severely increased. There is moderately increased left ventricular posterior wall thickness. There is moderate concentric left ventricular hypertrophy. Left ventricular diastolic filling was indeterminate. There is an elevated mean left atrial pressure.  Left Atrium: The left atrium is mildly dilated.  Right Ventricle: The right ventricle is normal in size. There is moderately reduced right ventricular systolic function.  Right Atrium: The right atrium is normal in size.  Aortic Valve: The aortic valve is trileaflet. There is mild aortic valve cusp calcification. There is no evidence of aortic valve regurgitation. The peak instantaneous gradient of the aortic valve is 4.8 mmHg.  Mitral Valve: The mitral valve is mildly thickened. There is mild mitral valve regurgitation.  Tricuspid Valve: The tricuspid valve is structurally normal. There is trace tricuspid regurgitation. The right ventricular systolic pressure is unable to be estimated.  Pulmonic Valve: The pulmonic valve is structurally normal. There is trace pulmonic valve regurgitation.  Pericardium: There is a small pericardial effusion.  Aorta: The aortic root is normal.  Systemic Veins: The inferior vena  cava appears to be of normal size. There is IVC inspiratory collapse greater than 50%.  In comparison to the previous echocardiogram(s): Compared with study from 2020, no significant change.      CONCLUSIONS:  1. The left ventricular systolic function is normal with a 55% estimated ejection fraction.  2. Severely increased left ventricular septal thickness.  3. The left ventricular posterior wall thickness is moderately increased.  4. There is an elevated mean left atrial pressure.  5. There is moderate concentric left ventricular hypertrophy.  6. There is moderately reduced right ventricular systolic function.  7. The left ventricular cavity size is decreased.  8. The patient is in atrial fibrillation which may influence the estimate of left ventricular function and transvalvular flows.    QUANTITATIVE DATA SUMMARY:  2D MEASUREMENTS:  Normal Ranges:  Ao Root d:     3.00 cm    (2.0-3.7cm)  LAs:           4.10 cm    (2.7-4.0cm)  IVSd:          2.00 cm    (0.6-1.1cm)  LVPWd:         1.70 cm    (0.6-1.1cm)  LVIDd:         3.20 cm    (3.9-5.9cm)  LVIDs:         2.70 cm  LV Mass Index: 130.4 g/m2  LV % FS        15.6 %    LA VOLUME:  Normal Ranges:  LA Volume Index: 34.8 ml/m2    RA VOLUME BY A/L METHOD:  Normal Ranges:  RA Area A4C: 16.4 cm2    AORTA MEASUREMENTS:  Normal Ranges:  Asc Ao, d: 2.50 cm (2.1-3.4cm)    LV SYSTOLIC FUNCTION BY 2D PLANIMETRY (MOD):  Normal Ranges:  EF-A4C View: 52.9 % (>55%)  EF-A2C View: 54.4 %  EF-Biplane:  54.5 %    LV DIASTOLIC FUNCTION:  Normal Ranges:  MV Peak E:    0.94 m/s (0.7-1.2 m/s)  MV e'         0.06 m/s (>8.0)  MV lateral e' 0.07 m/s  MV medial e'  0.05 m/s  E/e' Ratio:   15.63    (<8.0)  MV DT:        111 msec (150-240 msec)    MITRAL VALVE:  Normal Ranges:  MV DT: 111 msec (150-240msec)    AORTIC VALVE:  Normal Ranges:  AoV Vmax:      1.10 m/s (<1.7m/s)  AoV Peak P.8 mmHg (<20mmHg)  LVOT Max Ramone:  0.70 m/s (<1.1m/s)  LVOT VTI:      13.00 cm  LVOT Diameter: 2.00 cm   (1.8-2.4cm)  AoV Area,Vmax: 2.00 cm2 (2.5-4.5cm2)    RIGHT VENTRICLE:  RV 1   3.29 cm  RV 2   1.87 cm  RV 3   6.55 cm  TAPSE: 12.7 mm  RV s'  0.04 m/s    PULMONIC VALVE:  Normal Ranges:  PV Accel Time: 92 msec  (>120ms)  PV Max Ramone:    0.8 m/s  (0.6-0.9m/s)  PV Max P.8 mmHg      [id number] Sergio Zendejas MD  Electronically signed on 12/10/2021 at 2:06:33 PM         Final       Guideline-Directed Medical Therapy:  -ARNI: No   -If no, then ACEi/ARB?: Yes, describe: Losartan 50 mg 1.5 tablets (75 mg) every day   -Beta Blocker: Yes, describe: Metoprolol tartrate 50 mg BID  -MRA: No  -SGLT2i: Yes, describe: Jardiance 10 mg every day     Secondary Prevention:  -The ASCVD Risk score (Eb WEINSTEIN, et al., 2019) failed to calculate for the following reasons:    Risk score cannot be calculated because patient has a medical history suggesting prior/existing ASCVD   -Aspirin 81mg? no  -Statin?: Yes, describe: Pravastatin 40 mg every day   -HTN?: Yes, describe: 169/69 as of 2024    CURRENT PHARMACOTHERAPY:   Amlodipine 10 mg every day   Jardiance 10 mg every day   Losartan 50 mg 1.5 tablets (75 mg) every day   Torsemide 20 mg every day     Affordability: Jardiance and Eliquis create cost burden for patient towards the end of the year  Adherence/Compliance: no concerns; patient uses medication organizer. Reports missing a dose about once every 6 months.  Adverse Effects: None    Monitoring Weights at Home: Yes; about 1 time per week  Home Weight Recordings: 181 lbs (denies any acute fluctuations)    Past In Office Weight Readings:   Wt Readings from Last 6 Encounters:   24 80 kg (176 lb 6.4 oz)   10/30/24 81.3 kg (179 lb 3.2 oz)   24 80.7 kg (178 lb)   24 80.1 kg (176 lb 8 oz)   24 77.8 kg (171 lb 8 oz)   24 78.9 kg (174 lb)       Monitoring Blood Pressure at Home: Yes; will take on occasion  Home BP Recordings: 147-154 SBP     Past In Office BP Readings:   BP Readings from Last 6 Encounters:    11/08/24 169/69   10/30/24 147/73   09/30/24 154/68   08/22/24 179/77   08/09/24 172/76   07/05/24 151/64       HEALTH MANAGEMENT    Maintaining fluid restriction (<2 L/day): N/A  Edema/swelling: Yes, some swelling in his lower left leg, increases if he is on his feet for a long time. Patient reports improvement since starting Jardiance  Shortness of breath: Yes; about once per week, feels like his food is stuck in the middle of his chest after eating. Resolves with movement.   Trouble sleeping/lying down: No  Dry/hacking cough: No  Recent Hospitalizations: No    EDUCATION/COUNSELING:   - Counseled patient on MOA, expectations, duration of therapy, contraindications, administration, and monitoring parameters  - Counseled patient on lifestyle modifications that can decrease your risk of having complications (smoking cessation, losing weight, daily weights, vaccines)  - Counseled patient on fluid intake and weight management. Recommended to not consume more than 2 liters of fliuids per day. If they have gained more than 2-3 pounds within a 24 hours period (or 5 pounds in a week), contact their cardiologist  - Answered all patient questions and concerns       DISCUSSION/NOTES:   Today's appointment was initial visit to establish care with Clinical Pharmacy. Overall, Dilip is doing well. Reports he has some swelling in his lower left leg, but is seeing improvement since starting Jardiance. Reports cost burden with Jardiance and Eliquis once he reaches the coverage gap towards the end of the year. He is unsure if his household income will meet criteria for  PAP, but will send documentation for evaluation.  Patient does not monitor home BP readings on a regular basis, may consider titration of pharmacotherapy or addition of spironolactone at future visits due to previously elevated BP readings.  Will follow up in 1 month.     Patient Assistance Program (PAP)    Application for program to be submitted for the  following medications: [facility] Permanent Address: [facility]   Prescription Insurance:   Yes   Members of Household: 2   Files Taxes: Yes - files separately from spouse       Patient will be email financial information to pharmacist directly at [email or web address].    Patient verbally reports monthly or yearly income which is less than 400% federal poverty level    Patient aware this process may take up to 6 weeks.     If approved medication must be filled through [facility] and MEDICATION WILL BE MAILED TO PATIENT.         ASSESSMENT:    Assessment/Plan   Problem List Items Addressed This Visit       Presence of cardiac device    Relevant Orders    Referral to Clinical Pharmacy     Other Visit Diagnoses       Essential hypertension                RECOMMENDATIONS/PLAN:    Continue:  Amlodipine 10 mg every day   Jardiance 10 mg every day   Losartan 50 mg 1.5 tablets (75 mg) every day   Torsemide 20 mg every day   Follow up: 1 month    Last Appnt with Referring Provider: 11/8/2024  Next Appnt with Referring Provider: 3/14/2025  Clinical Pharmacist follow up: 1/8/2025  VAF/Application Expiration: Pending  Type of Encounter: Anne Marie Powers, PharmD    Verbal consent to manage patient's drug therapy was obtained from the patient . They were informed they may decline to participate or withdraw from participation in pharmacy services at any time.    Continue all meds under the continuation of care with the referring provider and clinical pharmacy team.

## 2024-11-29 PROCEDURE — RXMED WILLOW AMBULATORY MEDICATION CHARGE

## 2024-12-03 ENCOUNTER — HOSPITAL ENCOUNTER (OUTPATIENT)
Dept: CARDIOLOGY | Facility: CLINIC | Age: 74
Discharge: HOME | End: 2024-12-03
Payer: MEDICARE

## 2024-12-03 ENCOUNTER — APPOINTMENT (OUTPATIENT)
Dept: PHARMACY | Facility: HOSPITAL | Age: 74
End: 2024-12-03
Payer: MEDICARE

## 2024-12-03 ENCOUNTER — PHARMACY VISIT (OUTPATIENT)
Dept: PHARMACY | Facility: CLINIC | Age: 74
End: 2024-12-03
Payer: COMMERCIAL

## 2024-12-03 DIAGNOSIS — I10 ESSENTIAL HYPERTENSION: ICD-10-CM

## 2024-12-03 DIAGNOSIS — I48.92 UNSPECIFIED ATRIAL FLUTTER (MULTI): ICD-10-CM

## 2024-12-03 DIAGNOSIS — I48.91 UNSPECIFIED ATRIAL FIBRILLATION (MULTI): ICD-10-CM

## 2024-12-03 DIAGNOSIS — Z95.818 PRESENCE OF CARDIAC DEVICE: ICD-10-CM

## 2024-12-03 NOTE — Clinical Note
Sari Linn, Overall, Dilip is doing well. Reports he has some swelling in his lower left leg, but is seeing improvement since starting Jardiance. Reports cost burden with Jardiance once he reaches the coverage gap towards the end of the year. He is unsure if his household income will meet criteria for  PAP, but will send documentation for evaluation. Patient does not monitor home BP readings on a regular basis, may consider titration of pharmacotherapy or addition of spironolactone at future visits due to previously elevated BP readings. Will follow up in 1 month.

## 2024-12-04 ENCOUNTER — APPOINTMENT (OUTPATIENT)
Dept: PHARMACY | Facility: HOSPITAL | Age: 74
End: 2024-12-04
Payer: MEDICARE

## 2024-12-04 ENCOUNTER — HOSPITAL ENCOUNTER (OUTPATIENT)
Dept: CARDIOLOGY | Facility: CLINIC | Age: 74
Discharge: HOME | End: 2024-12-04
Payer: MEDICARE

## 2024-12-04 DIAGNOSIS — I48.92 UNSPECIFIED ATRIAL FLUTTER (MULTI): ICD-10-CM

## 2024-12-04 DIAGNOSIS — I48.91 UNSPECIFIED ATRIAL FIBRILLATION (MULTI): ICD-10-CM

## 2024-12-05 ENCOUNTER — TELEPHONE (OUTPATIENT)
Dept: PHARMACY | Facility: HOSPITAL | Age: 74
End: 2024-12-05
Payer: MEDICARE

## 2024-12-05 NOTE — TELEPHONE ENCOUNTER
Unfortunately, at this time, Dilip Haynes is ineligible to receive financial assistance through  Patient Assistance Program because income exceeds program requirements.    Patient was notified of ineligibility and given the following options: left voicemail for patient to return pharmacist's phone call regarding ineligibility. May recommend evaluating alternative Medicare part D plans. Also noted we may re-apply for  PAP in the future if income changes. Will discuss with patient upon returned phone call or at upcoming appointment 1/8/2025.    Referring provider will be notified of denial.     Gardenia Powers, PharmD

## 2024-12-09 ENCOUNTER — HOSPITAL ENCOUNTER (OUTPATIENT)
Dept: CARDIOLOGY | Facility: CLINIC | Age: 74
Discharge: HOME | End: 2024-12-09
Payer: MEDICARE

## 2024-12-09 DIAGNOSIS — I48.91 UNSPECIFIED ATRIAL FIBRILLATION (MULTI): ICD-10-CM

## 2024-12-09 DIAGNOSIS — I48.92 UNSPECIFIED ATRIAL FLUTTER (MULTI): ICD-10-CM

## 2024-12-10 ENCOUNTER — HOSPITAL ENCOUNTER (OUTPATIENT)
Dept: CARDIOLOGY | Facility: CLINIC | Age: 74
Discharge: HOME | End: 2024-12-10
Payer: MEDICARE

## 2024-12-10 DIAGNOSIS — I48.92 UNSPECIFIED ATRIAL FLUTTER (MULTI): ICD-10-CM

## 2024-12-10 DIAGNOSIS — I48.91 UNSPECIFIED ATRIAL FIBRILLATION (MULTI): ICD-10-CM

## 2024-12-16 ENCOUNTER — SPECIALTY PHARMACY (OUTPATIENT)
Dept: PHARMACY | Facility: CLINIC | Age: 74
End: 2024-12-16

## 2024-12-16 ENCOUNTER — HOSPITAL ENCOUNTER (OUTPATIENT)
Dept: CARDIOLOGY | Facility: CLINIC | Age: 74
Discharge: HOME | End: 2024-12-16
Payer: MEDICARE

## 2024-12-16 DIAGNOSIS — I48.91 UNSPECIFIED ATRIAL FIBRILLATION (MULTI): ICD-10-CM

## 2024-12-16 DIAGNOSIS — I48.92 UNSPECIFIED ATRIAL FLUTTER (MULTI): ICD-10-CM

## 2024-12-16 PROCEDURE — RXMED WILLOW AMBULATORY MEDICATION CHARGE

## 2024-12-18 ENCOUNTER — PHARMACY VISIT (OUTPATIENT)
Dept: PHARMACY | Facility: CLINIC | Age: 74
End: 2024-12-18
Payer: COMMERCIAL

## 2024-12-26 ENCOUNTER — HOSPITAL ENCOUNTER (OUTPATIENT)
Dept: CARDIOLOGY | Facility: CLINIC | Age: 74
Discharge: HOME | End: 2024-12-26
Payer: MEDICARE

## 2024-12-26 DIAGNOSIS — I48.92 UNSPECIFIED ATRIAL FLUTTER (MULTI): ICD-10-CM

## 2024-12-26 DIAGNOSIS — I48.91 UNSPECIFIED ATRIAL FIBRILLATION (MULTI): ICD-10-CM

## 2024-12-30 ENCOUNTER — HOSPITAL ENCOUNTER (OUTPATIENT)
Dept: CARDIOLOGY | Facility: CLINIC | Age: 74
Discharge: HOME | End: 2024-12-30
Payer: MEDICARE

## 2024-12-30 DIAGNOSIS — I48.91 UNSPECIFIED ATRIAL FIBRILLATION (MULTI): ICD-10-CM

## 2024-12-30 DIAGNOSIS — I48.92 UNSPECIFIED ATRIAL FLUTTER (MULTI): ICD-10-CM

## 2024-12-31 ENCOUNTER — SPECIALTY PHARMACY (OUTPATIENT)
Dept: PHARMACY | Facility: CLINIC | Age: 74
End: 2024-12-31

## 2024-12-31 PROCEDURE — RXMED WILLOW AMBULATORY MEDICATION CHARGE

## 2025-01-02 ENCOUNTER — PHARMACY VISIT (OUTPATIENT)
Dept: PHARMACY | Facility: CLINIC | Age: 75
End: 2025-01-02
Payer: COMMERCIAL

## 2025-01-08 ENCOUNTER — APPOINTMENT (OUTPATIENT)
Dept: PHARMACY | Facility: HOSPITAL | Age: 75
End: 2025-01-08
Payer: MEDICARE

## 2025-01-08 ENCOUNTER — HOSPITAL ENCOUNTER (OUTPATIENT)
Dept: CARDIOLOGY | Facility: CLINIC | Age: 75
Discharge: HOME | End: 2025-01-08
Payer: MEDICARE

## 2025-01-08 DIAGNOSIS — Z95.818 PRESENCE OF CARDIAC DEVICE: ICD-10-CM

## 2025-01-08 DIAGNOSIS — I48.92 UNSPECIFIED ATRIAL FLUTTER (MULTI): ICD-10-CM

## 2025-01-08 DIAGNOSIS — I48.91 UNSPECIFIED ATRIAL FIBRILLATION (MULTI): ICD-10-CM

## 2025-01-08 NOTE — PROGRESS NOTES
Pharmacist Clinic: Cardiology Management    Dilip Haynes is a 74 y.o. male was referred to Clinical Pharmacy Team for heart failure/cardiomyopathy management.     Referring Provider: Krystle Linn MD*    THIS IS A FOLLOW UP PATIENT APPOINTMENT. AT LAST VISIT ON 12/3/2024 WITH PHARMACIST (Gardenia Powers).    Appointment was completed by the patient who was reached at .    REVIEW OF PAST APPNT (IF APPLICABLE):   Today's appointment was initial visit to establish care with Clinical Pharmacy. Overall, Dilip is doing well. Reports he has some swelling in his lower left leg, but is seeing improvement since starting Jardiance. Reports cost burden with Jardiance and Eliquis once he reaches the coverage gap towards the end of the year. He is unsure if his household income will meet criteria for [facility] PAP, but will send documentation for evaluation.  Patient does not monitor home BP readings on a regular basis, may consider titration of pharmacotherapy or addition of spironolactone at future visits due to previously elevated BP readings.   PAP denied as income exceeds program requirements. May consider re-evaluation after updated guidelines for 2025 are released.      Allergies   Allergen Reactions    Lovastatin Myalgia     Leg cramps    Muscle cramps    Simvastatin Myalgia     Muscle cramps    Adhesive Rash    Iodine Rash    Naproxen Rash       Past Medical History:   Diagnosis Date    Encounter for other preprocedural examination 05/10/2020    Pre-transplant evaluation for kidney transplant    Old myocardial infarction 01/19/2022    H/O non-ST elevation myocardial infarction (NSTEMI)    Personal history of other diseases of the circulatory system 12/22/2015    History of stenosis of renal artery    Personal history of other diseases of the circulatory system 04/13/2021    History of carotid artery stenosis    Personal history of other diseases of the circulatory system 01/19/2022    History of essential  hypertension    Personal history of other diseases of the circulatory system 07/23/2016    History of claudication    Personal history of other endocrine, nutritional and metabolic disease 12/22/2015    History of thyroid disease    Raynaud's syndrome without gangrene 12/22/2015    Raynauds disease       Current Outpatient Medications on File Prior to Visit   Medication Sig Dispense Refill    amLODIPine (Norvasc) 10 mg tablet Take 1 tablet (10 mg) by mouth once daily.      apixaban (Eliquis) 2.5 mg tablet TAKE 1 TABLET TWICE A DAY 60 tablet 10    empagliflozin (Jardiance) 10 mg Take 1 tablet (10 mg) by mouth once daily. 30 tablet 11    famotidine (Pepcid) 20 mg tablet TAKE 1 TABLET BY MOUTH EVERY DAY AS DIRECTED 90 tablet 3    fluticasone (Flonase) 50 mcg/actuation nasal spray 2 sprays by Does not apply route once daily as needed for allergies.      levothyroxine (Synthroid, Levoxyl) 137 mcg tablet Take 1 tablet (137 mcg) by mouth once daily.      losartan (Cozaar) 50 mg tablet Take 1.5 tablets (75 mg) by mouth once daily. 135 tablet 3    metoprolol tartrate (Lopressor) 50 mg tablet TAKE 1 TABLET TWICE A  tablet 3    multivitamin tablet Take 1 tablet by mouth once daily.      mycophenolate (Cellcept) 250 mg capsule TAKE 3 CAPSULES BY MOUTH TWICE A  capsule 11    nitroglycerin (Nitrostat) 0.4 mg SL tablet Place 1 tablet (0.4 mg) under the tongue every 5 minutes if needed for chest pain.      pravastatin (Pravachol) 40 mg tablet TAKE 1 TABLET BY MOUTH EVERYDAY AT BEDTIME 90 tablet 3    predniSONE (Deltasone) 5 mg tablet TAKE 1 TABLET BY MOUTH EVERY DAY 30 tablet 23    tacrolimus ER (Envarsus XR) 0.75 mg tablet ER Take 2 tablets (1.5 mg) by mouth once daily. 60 tablet 11    tamsulosin (Flomax) 0.4 mg 24 hr capsule Take 1 capsule (0.4 mg) by mouth once daily at bedtime. 90 capsule 3    torsemide (Demadex) 20 mg tablet Take 1 tablet (20 mg) by mouth once daily. 90 tablet 3     No current  "facility-administered medications on file prior to visit.         RELEVANT LAB RESULTS:  Lab Results   Component Value Date    BILITOT 0.5 08/26/2023    CALCIUM 9.5 11/11/2024    CO2 26 11/11/2024     11/11/2024    CREATININE 1.96 (H) 11/11/2024    GLUCOSE 96 11/11/2024    ALKPHOS 73 08/26/2023    K 3.7 11/11/2024    PROT 6.6 08/26/2023     11/11/2024    AST 12 08/26/2023    ALT 10 08/26/2023    BUN 25 (H) 11/11/2024    ANIONGAP 15 11/11/2024    MG 1.71 09/27/2023    PHOS 3.4 11/11/2024    ALBUMIN 4.1 11/11/2024    GFRF CANCELED 06/28/2023    GFRMALE 38 (A) 09/27/2023     Lab Results   Component Value Date    TRIG 118 06/05/2023    CHOL 165 06/05/2023    HDL 45.6 06/05/2023     No results found for: \"BMCBC\", \"CBCDIF\"     PHARMACEUTICAL ASSESSMENT:    MEDICATION RECONCILIATION    Drug Interactions? No clinically significant drug interactions requiring change in therapy found at the time of this visit.     Medication Documentation Review Audit       Reviewed by Junior StringerD (Pharmacist) on 12/03/24 at 1448      Medication Order Taking? Sig Documenting Provider Last Dose Status   amLODIPine (Norvasc) 10 mg tablet [id number] Yes Take 1 tablet (10 mg) by mouth once daily. Historical Provider, MD Taking Active   apixaban (Eliquis) 2.5 mg tablet [id number] Yes TAKE 1 TABLET TWICE A DAY Krystle Linn MD PhD Taking Active   empagliflozin (Jardiance) 10 mg [id number] Yes Take 1 tablet (10 mg) by mouth once daily. Krystle Linn MD PhD  Active   famotidine (Pepcid) 20 mg tablet [id number] Yes TAKE 1 TABLET BY MOUTH EVERY DAY AS DIRECTED Ash Demarco MD Taking Active   fluticasone (Flonase) 50 mcg/actuation nasal spray [id number] Yes 2 sprays by Does not apply route once daily as needed for allergies. Historical Provider, MD Taking Active   levothyroxine (Synthroid, Levoxyl) 137 mcg tablet [id number] Yes Take 1 tablet (137 mcg) by mouth once daily. Historical Provider, MD Taking Active "   losartan (Cozaar) 50 mg tablet [id number] Yes Take 1.5 tablets (75 mg) by mouth once daily. Krystle Hayes MD PhD  Active   metoprolol tartrate (Lopressor) 50 mg tablet [id number] Yes TAKE 1 TABLET TWICE A DAY Krystle Hayes MD PhD Taking Active   multivitamin tablet [id number] Yes Take 1 tablet by mouth once daily. Historical Provider, MD Taking Active   mycophenolate (Cellcept) 250 mg capsule [id number] Yes TAKE 3 CAPSULES BY MOUTH TWICE A DAY Krystian Thompson MD Taking Active   nitroglycerin (Nitrostat) 0.4 mg SL tablet [id number] Yes Place 1 tablet (0.4 mg) under the tongue every 5 minutes if needed for chest pain. Historical Provider, MD Taking Active   pravastatin (Pravachol) 40 mg tablet [id number] Yes TAKE 1 TABLET BY MOUTH EVERYDAY AT BEDTIME Krystle Hayes MD PhD Taking Active   predniSONE (Deltasone) 5 mg tablet [id number] Yes TAKE 1 TABLET BY MOUTH EVERY DAY Krystian Thompson MD Taking Active   tacrolimus ER (Envarsus XR) 0.75 mg tablet ER [id number] Yes Take 2 tablets (1.5 mg) by mouth once daily. Rajani Ramirez MD Taking Active   tamsulosin (Flomax) 0.4 mg 24 hr capsule [id number] Yes Take 1 capsule (0.4 mg) by mouth once daily at bedtime. Michael Montesinos MD Taking Active   torsemide (Demadex) 20 mg tablet [id number] Yes Take 1 tablet (20 mg) by mouth once daily. Krystle Hayes MD PhD Taking Active                    DISEASE MANAGEMENT ASSESSMENT:     CHF ASSESSMENT     Symptom/Staging:  -Most recent ejection fraction: 55%  -NYHA Stage: II    Results for orders placed in visit on 12/10/21    Echocardiogram    Narrative  [facility], [address] [place] [address]  Tel [phone] and Fax [phone]    TRANSTHORACIC ECHOCARDIOGRAM REPORT      Patient Name:     SUKHDEEP PARRA [address] Physician:  [id number] Sergio Zendejas MD  Study Date:       12/10/2021     Referring           KRYSTLE HAYES  Physician:  MRN/PID:          [id number]        PCP:  Accession/Order#: [id number]   [facility]  Location:           Invasive  YOB: 1950      Fellow:  Gender:           M              Nurse:  Admit Date:                      Sonographer:        Jacqui Rodriguez RUST  Admission Status: Outpatient     Additional Staff:  Height:           185.42 cm      CC Report to:  Weight:           66.99 kg       Study Type:         Echocardiogram  BSA:              1.89 m2  Blood Pressure: 162 /102 mmHg    Diagnosis/ICD: I50.30-Unspecified diastolic (congestive) heart failure (CHF)  Indication:    Heart failure with preserved ejection fraction; Shortness of  breath  Procedure/CPT: Echo Complete w Full [id number]    Patient History:  Pertinent History: Raynaud's; CAD with PCI's 2000 and 2007; Gout; ESRD s/p  kidney trnasplant; Kidney stent removal; HTN; A-Fib/flutter;  PVD; NSTEMI.    Study Detail: The following Echo studies were performed: 2D, M-Mode, Doppler and  color flow.      PHYSICIAN INTERPRETATION:  Left Ventricle: The left ventricular systolic function is normal, with an estimated ejection fraction of 55%. The patient is in atrial fibrillation which may influence the estimate of left ventricular function and transvalvular flows. There are no regional wall motion abnormalities. The left ventricular cavity size is decreased. The left ventricular septal wall thickness is severely increased. There is moderately increased left ventricular posterior wall thickness. There is moderate concentric left ventricular hypertrophy. Left ventricular diastolic filling was indeterminate. There is an elevated mean left atrial pressure.  Left Atrium: The left atrium is mildly dilated.  Right Ventricle: The right ventricle is normal in size. There is moderately reduced right ventricular systolic function.  Right Atrium: The right atrium is normal in size.  Aortic Valve: The aortic valve is trileaflet. There is mild aortic valve cusp  calcification. There is no evidence of aortic valve regurgitation. The peak instantaneous gradient of the aortic valve is 4.8 mmHg.  Mitral Valve: The mitral valve is mildly thickened. There is mild mitral valve regurgitation.  Tricuspid Valve: The tricuspid valve is structurally normal. There is trace tricuspid regurgitation. The right ventricular systolic pressure is unable to be estimated.  Pulmonic Valve: The pulmonic valve is structurally normal. There is trace pulmonic valve regurgitation.  Pericardium: There is a small pericardial effusion.  Aorta: The aortic root is normal.  Systemic Veins: The inferior vena cava appears to be of normal size. There is IVC inspiratory collapse greater than 50%.  In comparison to the previous echocardiogram(s): Compared with study from 5/21/2020, no significant change.      CONCLUSIONS:  1. The left ventricular systolic function is normal with a 55% estimated ejection fraction.  2. Severely increased left ventricular septal thickness.  3. The left ventricular posterior wall thickness is moderately increased.  4. There is an elevated mean left atrial pressure.  5. There is moderate concentric left ventricular hypertrophy.  6. There is moderately reduced right ventricular systolic function.  7. The left ventricular cavity size is decreased.  8. The patient is in atrial fibrillation which may influence the estimate of left ventricular function and transvalvular flows.    QUANTITATIVE DATA SUMMARY:  2D MEASUREMENTS:  Normal Ranges:  Ao Root d:     3.00 cm    (2.0-3.7cm)  LAs:           4.10 cm    (2.7-4.0cm)  IVSd:          2.00 cm    (0.6-1.1cm)  LVPWd:         1.70 cm    (0.6-1.1cm)  LVIDd:         3.20 cm    (3.9-5.9cm)  LVIDs:         2.70 cm  LV Mass Index: 130.4 g/m2  LV % FS        15.6 %    LA VOLUME:  Normal Ranges:  LA Volume Index: 34.8 ml/m2    RA VOLUME BY A/L METHOD:  Normal Ranges:  RA Area A4C: 16.4 cm2    AORTA MEASUREMENTS:  Normal Ranges:  Asc Ao, d: 2.50 cm  (2.1-3.4cm)    LV SYSTOLIC FUNCTION BY 2D PLANIMETRY (MOD):  Normal Ranges:  EF-A4C View: 52.9 % (>55%)  EF-A2C View: 54.4 %  EF-Biplane:  54.5 %    LV DIASTOLIC FUNCTION:  Normal Ranges:  MV Peak E:    0.94 m/s (0.7-1.2 m/s)  MV e'         0.06 m/s (>8.0)  MV lateral e' 0.07 m/s  MV medial e'  0.05 m/s  E/e' Ratio:   15.63    (<8.0)  MV DT:        111 msec (150-240 msec)    MITRAL VALVE:  Normal Ranges:  MV DT: 111 msec (150-240msec)    AORTIC VALVE:  Normal Ranges:  AoV Vmax:      1.10 m/s (<1.7m/s)  AoV Peak P.8 mmHg (<20mmHg)  LVOT Max Ramone:  0.70 m/s (<1.1m/s)  LVOT VTI:      13.00 cm  LVOT Diameter: 2.00 cm  (1.8-2.4cm)  AoV Area,Vmax: 2.00 cm2 (2.5-4.5cm2)    RIGHT VENTRICLE:  RV 1   3.29 cm  RV 2   1.87 cm  RV 3   6.55 cm  TAPSE: 12.7 mm  RV s'  0.04 m/s    PULMONIC VALVE:  Normal Ranges:  PV Accel Time: 92 msec  (>120ms)  PV Max Ramone:    0.8 m/s  (0.6-0.9m/s)  PV Max P.8 mmHg      [id number] Sergio Zendejas MD  Electronically signed on 12/10/2021 at 2:06:33 PM         Final       Guideline-Directed Medical Therapy:  -ARNI: No   -If no, then ACEi/ARB?: Yes, describe: Losartan 50 mg 1.5 tablets (75 mg) every day   -Beta Blocker: Yes, describe: Metoprolol tartrate 50 mg BID  -MRA: No  -SGLT2i: Yes, describe: Jardiance 10 mg every day     Secondary Prevention:  -The ASCVD Risk score (Eb WEINSTEIN, et al., 2019) failed to calculate for the following reasons:    Risk score cannot be calculated because patient has a medical history suggesting prior/existing ASCVD   -Aspirin 81mg? no  -Statin?: Yes, describe: Pravastatin 40 mg every day   -HTN?: Yes, describe: 169/69 as of 2024    CURRENT PHARMACOTHERAPY:   Amlodipine 10 mg every day   Jardiance 10 mg every day   Losartan 50 mg 1.5 tablets (75 mg) every day   Torsemide 20 mg every day     Affordability: Jardiance and Eliquis create cost burden for patient towards the end of the year; previously ineligible for  PAP due to income  Adherence/Compliance: no  concerns; patient uses medication organizer.   Adverse Effects: None    Monitoring Weights at Home: Yes; about 1 time per week  Home Weight Recordings: 175.6 lbs (denies any acute fluctuations)    Past In Office Weight Readings:   Wt Readings from Last 6 Encounters:   11/08/24 80 kg (176 lb 6.4 oz)   10/30/24 81.3 kg (179 lb 3.2 oz)   09/30/24 80.7 kg (178 lb)   08/22/24 80.1 kg (176 lb 8 oz)   08/09/24 77.8 kg (171 lb 8 oz)   07/05/24 78.9 kg (174 lb)       Monitoring Blood Pressure at Home: Yes; will take on occasion  Home BP Recordings: 148/68 pulse 73 this morning    Past In Office BP Readings:   BP Readings from Last 6 Encounters:   11/08/24 169/69   10/30/24 147/73   09/30/24 154/68   08/22/24 179/77   08/09/24 172/76   07/05/24 151/64       HEALTH MANAGEMENT    Maintaining fluid restriction (<2 L/day): N/A  Edema/swelling: Yes, some swelling in his lower left leg about once per week at the most. Patient reports improvement since starting Jardiance. Has also been wearing compression stockings.  Shortness of breath: No  Trouble sleeping/lying down: No  Dry/hacking cough: No  Recent Hospitalizations: No    EDUCATION/COUNSELING:   - Counseled patient on MOA, expectations, duration of therapy, contraindications, administration, and monitoring parameters  - Counseled patient on lifestyle modifications that can decrease your risk of having complications (smoking cessation, losing weight, daily weights, vaccines)  - Counseled patient on fluid intake and weight management. Recommended to not consume more than 2 liters of fliuids per day. If they have gained more than 2-3 pounds within a 24 hours period (or 5 pounds in a week), contact their cardiologist  - Answered all patient questions and concerns       DISCUSSION/NOTES:   Today's appointment was 1 month follow up regarding heart failure pharmacotherapy. Dilip reports improvement in lower extremity swelling, states he has been wearing compression stockings. Will  only notice slight swelling about once per week at the most. Reminded patient to have labs drawn prior to his next cardiology appointment, due to previously initiating SGLT2.  We discussed his previous ineligibility for [facility] PAP, he does not anticipate his income to change upon filing his taxes for 2024. Advised patient to call pharmacist if income changes in the future or if he would like to be re-screened for [facility] PAP. Will not schedule follow up at this time.    ASSESSMENT:    Assessment/Plan   Problem List Items Addressed This Visit       Presence of cardiac device     RECOMMENDATIONS/PLAN:    Continue:  Amlodipine 10 mg every day   Jardiance 10 mg every day   Losartan 50 mg 1.5 tablets (75 mg) every day   Torsemide 20 mg every day     Last Appnt with Referring Provider: 11/8/2024  Next Appnt with Referring Provider: 3/14/2025  Clinical Pharmacist follow up: None at this time  VAF/Application Expiration: No  Type of Encounter: Anne Marie Powers PharmD    Verbal consent to manage patient's drug therapy was obtained from the patient . They were informed they may decline to participate or withdraw from participation in pharmacy services at any time.    Continue all meds under the continuation of care with the referring provider and clinical pharmacy team.

## 2025-01-08 NOTE — Clinical Note
Dilip Kennedy Dr. reports improvement in lower extremity swelling, states he has been wearing compression stockings. Will only notice slight swelling about once per week at the most. Reminded patient to have labs drawn prior to his next cardiology appointment. We discussed his previous ineligibility for [facility] P[facility], he does not anticipate his income to change upon filing his taxes for 2024. Advised patient to call pharmacist if income changes in the future or if he would like to be re-screened for [facility] PAP. Will not schedule follow up at this time.

## 2025-01-13 ENCOUNTER — HOSPITAL ENCOUNTER (OUTPATIENT)
Dept: CARDIOLOGY | Facility: CLINIC | Age: 75
Discharge: HOME | End: 2025-01-13
Payer: MEDICARE

## 2025-01-13 DIAGNOSIS — I48.92 UNSPECIFIED ATRIAL FLUTTER (MULTI): ICD-10-CM

## 2025-01-13 DIAGNOSIS — I48.91 UNSPECIFIED ATRIAL FIBRILLATION (MULTI): ICD-10-CM

## 2025-01-13 PROCEDURE — 93298 REM INTERROG DEV EVAL SCRMS: CPT

## 2025-01-16 ENCOUNTER — SPECIALTY PHARMACY (OUTPATIENT)
Dept: PHARMACY | Facility: CLINIC | Age: 75
End: 2025-01-16

## 2025-01-16 PROCEDURE — RXMED WILLOW AMBULATORY MEDICATION CHARGE

## 2025-01-20 ENCOUNTER — HOSPITAL ENCOUNTER (OUTPATIENT)
Dept: CARDIOLOGY | Facility: CLINIC | Age: 75
Discharge: HOME | End: 2025-01-20
Payer: MEDICARE

## 2025-01-20 DIAGNOSIS — I48.92 UNSPECIFIED ATRIAL FLUTTER (MULTI): ICD-10-CM

## 2025-01-20 DIAGNOSIS — I48.91 UNSPECIFIED ATRIAL FIBRILLATION (MULTI): ICD-10-CM

## 2025-01-24 ENCOUNTER — HOSPITAL ENCOUNTER (OUTPATIENT)
Dept: CARDIOLOGY | Facility: CLINIC | Age: 75
Discharge: HOME | End: 2025-01-24
Payer: MEDICARE

## 2025-01-24 DIAGNOSIS — I48.92 UNSPECIFIED ATRIAL FLUTTER (MULTI): ICD-10-CM

## 2025-01-24 DIAGNOSIS — I48.91 UNSPECIFIED ATRIAL FIBRILLATION (MULTI): ICD-10-CM

## 2025-01-27 ENCOUNTER — HOSPITAL ENCOUNTER (OUTPATIENT)
Dept: CARDIOLOGY | Facility: CLINIC | Age: 75
Discharge: HOME | End: 2025-01-27
Payer: MEDICARE

## 2025-01-27 DIAGNOSIS — I48.92 UNSPECIFIED ATRIAL FLUTTER (MULTI): ICD-10-CM

## 2025-01-27 DIAGNOSIS — I48.91 UNSPECIFIED ATRIAL FIBRILLATION (MULTI): ICD-10-CM

## 2025-01-29 ENCOUNTER — PHARMACY VISIT (OUTPATIENT)
Dept: PHARMACY | Facility: CLINIC | Age: 75
End: 2025-01-29
Payer: COMMERCIAL

## 2025-01-30 ENCOUNTER — SPECIALTY PHARMACY (OUTPATIENT)
Dept: PHARMACY | Facility: CLINIC | Age: 75
End: 2025-01-30

## 2025-01-30 PROCEDURE — RXMED WILLOW AMBULATORY MEDICATION CHARGE

## 2025-02-04 ENCOUNTER — LAB (OUTPATIENT)
Dept: LAB | Facility: HOSPITAL | Age: 75
End: 2025-02-04
Payer: MEDICARE

## 2025-02-04 DIAGNOSIS — Z94.0 KIDNEY REPLACED BY TRANSPLANT (HHS-HCC): ICD-10-CM

## 2025-02-04 DIAGNOSIS — R06.02 SHORTNESS OF BREATH: ICD-10-CM

## 2025-02-04 LAB
ALBUMIN SERPL BCP-MCNC: 4.4 G/DL (ref 3.4–5)
ANION GAP SERPL CALC-SCNC: 15 MMOL/L (ref 10–20)
BNP SERPL-MCNC: 627 PG/ML (ref 0–99)
BUN SERPL-MCNC: 24 MG/DL (ref 6–23)
CALCIUM SERPL-MCNC: 9.9 MG/DL (ref 8.6–10.6)
CHLORIDE SERPL-SCNC: 103 MMOL/L (ref 98–107)
CO2 SERPL-SCNC: 26 MMOL/L (ref 21–32)
CREAT SERPL-MCNC: 1.82 MG/DL (ref 0.5–1.3)
CREAT UR-MCNC: 72.3 MG/DL (ref 20–370)
EGFRCR SERPLBLD CKD-EPI 2021: 38 ML/MIN/1.73M*2
ERYTHROCYTE [DISTWIDTH] IN BLOOD BY AUTOMATED COUNT: 14 % (ref 11.5–14.5)
GLUCOSE SERPL-MCNC: 97 MG/DL (ref 74–99)
HCT VFR BLD AUTO: 45.4 % (ref 41–52)
HGB BLD-MCNC: 13.8 G/DL (ref 13.5–17.5)
MCH RBC QN AUTO: 25 PG (ref 26–34)
MCHC RBC AUTO-ENTMCNC: 30.4 G/DL (ref 32–36)
MCV RBC AUTO: 82 FL (ref 80–100)
NRBC BLD-RTO: 0 /100 WBCS (ref 0–0)
PHOSPHATE SERPL-MCNC: 4.2 MG/DL (ref 2.5–4.9)
PLATELET # BLD AUTO: 271 X10*3/UL (ref 150–450)
POTASSIUM SERPL-SCNC: 4 MMOL/L (ref 3.5–5.3)
PROT UR-ACNC: 26 MG/DL (ref 5–25)
PROT/CREAT UR: 0.36 MG/MG CREAT (ref 0–0.17)
RBC # BLD AUTO: 5.52 X10*6/UL (ref 4.5–5.9)
SODIUM SERPL-SCNC: 140 MMOL/L (ref 136–145)
WBC # BLD AUTO: 8.2 X10*3/UL (ref 4.4–11.3)

## 2025-02-04 PROCEDURE — 83880 ASSAY OF NATRIURETIC PEPTIDE: CPT

## 2025-02-04 PROCEDURE — 80069 RENAL FUNCTION PANEL: CPT

## 2025-02-04 PROCEDURE — 82306 VITAMIN D 25 HYDROXY: CPT

## 2025-02-04 PROCEDURE — 83970 ASSAY OF PARATHORMONE: CPT

## 2025-02-04 PROCEDURE — 80197 ASSAY OF TACROLIMUS: CPT

## 2025-02-04 PROCEDURE — 85027 COMPLETE CBC AUTOMATED: CPT

## 2025-02-04 PROCEDURE — 82570 ASSAY OF URINE CREATININE: CPT

## 2025-02-04 PROCEDURE — 84156 ASSAY OF PROTEIN URINE: CPT

## 2025-02-05 ENCOUNTER — PHARMACY VISIT (OUTPATIENT)
Dept: PHARMACY | Facility: CLINIC | Age: 75
End: 2025-02-05
Payer: COMMERCIAL

## 2025-02-05 ENCOUNTER — LAB (OUTPATIENT)
Dept: LAB | Facility: HOSPITAL | Age: 75
End: 2025-02-05
Payer: MEDICARE

## 2025-02-05 DIAGNOSIS — Z94.0 KIDNEY REPLACED BY TRANSPLANT (HHS-HCC): ICD-10-CM

## 2025-02-05 DIAGNOSIS — E55.9 VITAMIN D DEFICIENCY: ICD-10-CM

## 2025-02-05 LAB
25(OH)D3 SERPL-MCNC: 36 NG/ML (ref 30–100)
ALBUMIN SERPL BCP-MCNC: 4.5 G/DL (ref 3.4–5)
ANION GAP SERPL CALC-SCNC: 14 MMOL/L (ref 10–20)
BUN SERPL-MCNC: 25 MG/DL (ref 6–23)
CALCIUM SERPL-MCNC: 10 MG/DL (ref 8.6–10.6)
CHLORIDE SERPL-SCNC: 104 MMOL/L (ref 98–107)
CO2 SERPL-SCNC: 27 MMOL/L (ref 21–32)
CREAT SERPL-MCNC: 1.83 MG/DL (ref 0.5–1.3)
EGFRCR SERPLBLD CKD-EPI 2021: 38 ML/MIN/1.73M*2
GLUCOSE SERPL-MCNC: 97 MG/DL (ref 74–99)
PHOSPHATE SERPL-MCNC: 4.4 MG/DL (ref 2.5–4.9)
POTASSIUM SERPL-SCNC: 4 MMOL/L (ref 3.5–5.3)
PTH-INTACT SERPL-MCNC: 230.3 PG/ML (ref 18.5–88)
SODIUM SERPL-SCNC: 141 MMOL/L (ref 136–145)
TACROLIMUS BLD-MCNC: 8 NG/ML

## 2025-02-10 ENCOUNTER — HOSPITAL ENCOUNTER (OUTPATIENT)
Dept: CARDIOLOGY | Facility: CLINIC | Age: 75
Discharge: HOME | End: 2025-02-10
Payer: MEDICARE

## 2025-02-10 DIAGNOSIS — I48.91 UNSPECIFIED ATRIAL FIBRILLATION (MULTI): ICD-10-CM

## 2025-02-10 DIAGNOSIS — I48.92 UNSPECIFIED ATRIAL FLUTTER (MULTI): ICD-10-CM

## 2025-02-12 ENCOUNTER — APPOINTMENT (OUTPATIENT)
Dept: UROLOGY | Facility: CLINIC | Age: 75
End: 2025-02-12
Payer: MEDICARE

## 2025-02-13 DIAGNOSIS — I10 ESSENTIAL HYPERTENSION: ICD-10-CM

## 2025-02-13 DIAGNOSIS — I10 HYPERTENSION, UNSPECIFIED TYPE: ICD-10-CM

## 2025-02-14 RX ORDER — LOSARTAN POTASSIUM 25 MG/1
25 TABLET ORAL DAILY
Qty: 90 TABLET | Refills: 1 | Status: SHIPPED | OUTPATIENT
Start: 2025-02-14

## 2025-02-16 ENCOUNTER — SPECIALTY PHARMACY (OUTPATIENT)
Dept: PHARMACY | Facility: CLINIC | Age: 75
End: 2025-02-16

## 2025-02-16 DIAGNOSIS — I48.91 UNSPECIFIED ATRIAL FIBRILLATION (MULTI): ICD-10-CM

## 2025-02-17 ENCOUNTER — HOSPITAL ENCOUNTER (OUTPATIENT)
Dept: CARDIOLOGY | Facility: CLINIC | Age: 75
Discharge: HOME | End: 2025-02-17
Payer: MEDICARE

## 2025-02-17 DIAGNOSIS — I48.92 UNSPECIFIED ATRIAL FLUTTER (MULTI): ICD-10-CM

## 2025-02-17 DIAGNOSIS — I48.91 UNSPECIFIED ATRIAL FIBRILLATION (MULTI): ICD-10-CM

## 2025-02-17 PROCEDURE — RXMED WILLOW AMBULATORY MEDICATION CHARGE

## 2025-02-17 PROCEDURE — 93298 REM INTERROG DEV EVAL SCRMS: CPT

## 2025-02-18 RX ORDER — METOPROLOL TARTRATE 50 MG/1
50 TABLET ORAL 2 TIMES DAILY
Qty: 180 TABLET | Refills: 3 | Status: SHIPPED | OUTPATIENT
Start: 2025-02-18

## 2025-02-21 ENCOUNTER — SPECIALTY PHARMACY (OUTPATIENT)
Dept: PHARMACY | Facility: CLINIC | Age: 75
End: 2025-02-21

## 2025-02-25 ENCOUNTER — PHARMACY VISIT (OUTPATIENT)
Dept: PHARMACY | Facility: CLINIC | Age: 75
End: 2025-02-25
Payer: COMMERCIAL

## 2025-02-26 ENCOUNTER — HOSPITAL ENCOUNTER (OUTPATIENT)
Dept: CARDIOLOGY | Facility: CLINIC | Age: 75
Discharge: HOME | End: 2025-02-26
Payer: MEDICARE

## 2025-02-26 DIAGNOSIS — I48.91 UNSPECIFIED ATRIAL FIBRILLATION (MULTI): ICD-10-CM

## 2025-02-26 DIAGNOSIS — I48.92 UNSPECIFIED ATRIAL FLUTTER (MULTI): ICD-10-CM

## 2025-02-27 ENCOUNTER — HOSPITAL ENCOUNTER (OUTPATIENT)
Dept: CARDIOLOGY | Facility: CLINIC | Age: 75
Discharge: HOME | End: 2025-02-27
Payer: MEDICARE

## 2025-02-27 DIAGNOSIS — I48.91 UNSPECIFIED ATRIAL FIBRILLATION (MULTI): ICD-10-CM

## 2025-02-27 DIAGNOSIS — I48.92 UNSPECIFIED ATRIAL FLUTTER (MULTI): ICD-10-CM

## 2025-02-27 DIAGNOSIS — K21.9 GASTRO-ESOPHAGEAL REFLUX DISEASE WITHOUT ESOPHAGITIS: ICD-10-CM

## 2025-02-27 RX ORDER — FAMOTIDINE 20 MG/1
20 TABLET, FILM COATED ORAL DAILY
Qty: 90 TABLET | Refills: 3 | Status: SHIPPED | OUTPATIENT
Start: 2025-02-27

## 2025-02-28 ENCOUNTER — HOSPITAL ENCOUNTER (OUTPATIENT)
Dept: CARDIOLOGY | Facility: CLINIC | Age: 75
Discharge: HOME | End: 2025-02-28
Payer: MEDICARE

## 2025-02-28 DIAGNOSIS — I48.92 UNSPECIFIED ATRIAL FLUTTER (MULTI): ICD-10-CM

## 2025-02-28 DIAGNOSIS — I48.91 UNSPECIFIED ATRIAL FIBRILLATION (MULTI): ICD-10-CM

## 2025-03-03 ENCOUNTER — HOSPITAL ENCOUNTER (OUTPATIENT)
Dept: CARDIOLOGY | Facility: CLINIC | Age: 75
Discharge: HOME | End: 2025-03-03
Payer: MEDICARE

## 2025-03-03 DIAGNOSIS — I48.91 UNSPECIFIED ATRIAL FIBRILLATION (MULTI): ICD-10-CM

## 2025-03-03 DIAGNOSIS — I48.92 UNSPECIFIED ATRIAL FLUTTER (MULTI): ICD-10-CM

## 2025-03-04 ENCOUNTER — SPECIALTY PHARMACY (OUTPATIENT)
Dept: PHARMACY | Facility: CLINIC | Age: 75
End: 2025-03-04

## 2025-03-04 PROCEDURE — RXMED WILLOW AMBULATORY MEDICATION CHARGE

## 2025-03-05 ENCOUNTER — HOSPITAL ENCOUNTER (OUTPATIENT)
Dept: CARDIOLOGY | Facility: CLINIC | Age: 75
Discharge: HOME | End: 2025-03-05
Payer: MEDICARE

## 2025-03-05 DIAGNOSIS — I48.91 UNSPECIFIED ATRIAL FIBRILLATION (MULTI): ICD-10-CM

## 2025-03-05 DIAGNOSIS — I48.92 UNSPECIFIED ATRIAL FLUTTER (MULTI): ICD-10-CM

## 2025-03-06 ENCOUNTER — PHARMACY VISIT (OUTPATIENT)
Dept: PHARMACY | Facility: CLINIC | Age: 75
End: 2025-03-06
Payer: COMMERCIAL

## 2025-03-10 ENCOUNTER — HOSPITAL ENCOUNTER (OUTPATIENT)
Dept: CARDIOLOGY | Facility: CLINIC | Age: 75
Discharge: HOME | End: 2025-03-10
Payer: MEDICARE

## 2025-03-10 DIAGNOSIS — I48.91 UNSPECIFIED ATRIAL FIBRILLATION (MULTI): ICD-10-CM

## 2025-03-10 DIAGNOSIS — I48.92 UNSPECIFIED ATRIAL FLUTTER (MULTI): ICD-10-CM

## 2025-03-12 ENCOUNTER — APPOINTMENT (OUTPATIENT)
Dept: UROLOGY | Facility: CLINIC | Age: 75
End: 2025-03-12
Payer: MEDICARE

## 2025-03-12 ENCOUNTER — HOSPITAL ENCOUNTER (EMERGENCY)
Facility: HOSPITAL | Age: 75
Discharge: HOME | End: 2025-03-12
Attending: STUDENT IN AN ORGANIZED HEALTH CARE EDUCATION/TRAINING PROGRAM
Payer: MEDICARE

## 2025-03-12 VITALS
BODY MASS INDEX: 25.2 KG/M2 | SYSTOLIC BLOOD PRESSURE: 139 MMHG | HEART RATE: 83 BPM | DIASTOLIC BLOOD PRESSURE: 61 MMHG | TEMPERATURE: 98.6 F | RESPIRATION RATE: 17 BRPM | HEIGHT: 71 IN | OXYGEN SATURATION: 100 % | WEIGHT: 180 LBS

## 2025-03-12 DIAGNOSIS — S91.101D OPEN WOUND OF RIGHT GREAT TOE, SUBSEQUENT ENCOUNTER: ICD-10-CM

## 2025-03-12 DIAGNOSIS — Z94.0 RENAL TRANSPLANT RECIPIENT (HHS-HCC): ICD-10-CM

## 2025-03-12 DIAGNOSIS — T56.0X1A CHRONIC LEAD-INDUCED GOUT INVOLVING TOE OF RIGHT FOOT WITHOUT TOPHUS, INITIAL ENCOUNTER: ICD-10-CM

## 2025-03-12 DIAGNOSIS — M1A.1710 CHRONIC LEAD-INDUCED GOUT INVOLVING TOE OF RIGHT FOOT WITHOUT TOPHUS, INITIAL ENCOUNTER: ICD-10-CM

## 2025-03-12 DIAGNOSIS — M79.674 GREAT TOE PAIN, RIGHT: Primary | ICD-10-CM

## 2025-03-12 DIAGNOSIS — I73.9 PAD (PERIPHERAL ARTERY DISEASE) (CMS-HCC): ICD-10-CM

## 2025-03-12 LAB
ALBUMIN SERPL BCP-MCNC: 4.7 G/DL (ref 3.4–5)
ALP SERPL-CCNC: 69 U/L (ref 33–136)
ALT SERPL W P-5'-P-CCNC: 14 U/L (ref 10–52)
ANION GAP SERPL CALC-SCNC: 17 MMOL/L (ref 10–20)
AST SERPL W P-5'-P-CCNC: 18 U/L (ref 9–39)
BASOPHILS # BLD AUTO: 0.04 X10*3/UL (ref 0–0.1)
BASOPHILS NFR BLD AUTO: 0.5 %
BILIRUB SERPL-MCNC: 1.1 MG/DL (ref 0–1.2)
BUN SERPL-MCNC: 25 MG/DL (ref 6–23)
CALCIUM SERPL-MCNC: 9.8 MG/DL (ref 8.6–10.3)
CHLORIDE SERPL-SCNC: 105 MMOL/L (ref 98–107)
CO2 SERPL-SCNC: 23 MMOL/L (ref 21–32)
CREAT SERPL-MCNC: 1.94 MG/DL (ref 0.5–1.3)
EGFRCR SERPLBLD CKD-EPI 2021: 35 ML/MIN/1.73M*2
EOSINOPHIL # BLD AUTO: 0.01 X10*3/UL (ref 0–0.4)
EOSINOPHIL NFR BLD AUTO: 0.1 %
ERYTHROCYTE [DISTWIDTH] IN BLOOD BY AUTOMATED COUNT: 13.6 % (ref 11.5–14.5)
GLUCOSE SERPL-MCNC: 131 MG/DL (ref 74–99)
HCT VFR BLD AUTO: 47.3 % (ref 41–52)
HGB BLD-MCNC: 14.3 G/DL (ref 13.5–17.5)
HOLD SPECIMEN: NORMAL
IMM GRANULOCYTES # BLD AUTO: 0.03 X10*3/UL (ref 0–0.5)
IMM GRANULOCYTES NFR BLD AUTO: 0.3 % (ref 0–0.9)
LYMPHOCYTES # BLD AUTO: 0.91 X10*3/UL (ref 0.8–3)
LYMPHOCYTES NFR BLD AUTO: 10.3 %
MCH RBC QN AUTO: 24.7 PG (ref 26–34)
MCHC RBC AUTO-ENTMCNC: 30.2 G/DL (ref 32–36)
MCV RBC AUTO: 82 FL (ref 80–100)
MONOCYTES # BLD AUTO: 0.62 X10*3/UL (ref 0.05–0.8)
MONOCYTES NFR BLD AUTO: 7 %
NEUTROPHILS # BLD AUTO: 7.21 X10*3/UL (ref 1.6–5.5)
NEUTROPHILS NFR BLD AUTO: 81.8 %
NRBC BLD-RTO: 0 /100 WBCS (ref 0–0)
PLATELET # BLD AUTO: 257 X10*3/UL (ref 150–450)
POTASSIUM SERPL-SCNC: 4.6 MMOL/L (ref 3.5–5.3)
PROT SERPL-MCNC: 8 G/DL (ref 6.4–8.2)
RBC # BLD AUTO: 5.78 X10*6/UL (ref 4.5–5.9)
SODIUM SERPL-SCNC: 140 MMOL/L (ref 136–145)
URATE SERPL-MCNC: 8.3 MG/DL (ref 4–7.5)
WBC # BLD AUTO: 8.8 X10*3/UL (ref 4.4–11.3)

## 2025-03-12 PROCEDURE — 36415 COLL VENOUS BLD VENIPUNCTURE: CPT | Performed by: STUDENT IN AN ORGANIZED HEALTH CARE EDUCATION/TRAINING PROGRAM

## 2025-03-12 PROCEDURE — 85025 COMPLETE CBC W/AUTO DIFF WBC: CPT | Performed by: STUDENT IN AN ORGANIZED HEALTH CARE EDUCATION/TRAINING PROGRAM

## 2025-03-12 PROCEDURE — 99283 EMERGENCY DEPT VISIT LOW MDM: CPT | Performed by: STUDENT IN AN ORGANIZED HEALTH CARE EDUCATION/TRAINING PROGRAM

## 2025-03-12 PROCEDURE — 84550 ASSAY OF BLOOD/URIC ACID: CPT | Performed by: STUDENT IN AN ORGANIZED HEALTH CARE EDUCATION/TRAINING PROGRAM

## 2025-03-12 PROCEDURE — 80053 COMPREHEN METABOLIC PANEL: CPT | Performed by: STUDENT IN AN ORGANIZED HEALTH CARE EDUCATION/TRAINING PROGRAM

## 2025-03-12 RX ORDER — ACETAMINOPHEN 325 MG/1
650 TABLET ORAL EVERY 8 HOURS PRN
Qty: 15 TABLET | Refills: 0 | Status: SHIPPED | OUTPATIENT
Start: 2025-03-12 | End: 2025-03-17

## 2025-03-12 RX ORDER — OXYCODONE HYDROCHLORIDE 5 MG/1
5 TABLET ORAL EVERY 6 HOURS PRN
Qty: 12 TABLET | Refills: 0 | Status: SHIPPED | OUTPATIENT
Start: 2025-03-12 | End: 2025-03-15

## 2025-03-12 ASSESSMENT — LIFESTYLE VARIABLES
EVER FELT BAD OR GUILTY ABOUT YOUR DRINKING: NO
HAVE PEOPLE ANNOYED YOU BY CRITICIZING YOUR DRINKING: NO
TOTAL SCORE: 0
EVER HAD A DRINK FIRST THING IN THE MORNING TO STEADY YOUR NERVES TO GET RID OF A HANGOVER: NO
HAVE YOU EVER FELT YOU SHOULD CUT DOWN ON YOUR DRINKING: NO

## 2025-03-12 ASSESSMENT — PAIN SCALES - GENERAL
PAINLEVEL_OUTOF10: 4
PAINLEVEL_OUTOF10: 4

## 2025-03-12 ASSESSMENT — COLUMBIA-SUICIDE SEVERITY RATING SCALE - C-SSRS
6. HAVE YOU EVER DONE ANYTHING, STARTED TO DO ANYTHING, OR PREPARED TO DO ANYTHING TO END YOUR LIFE?: NO
1. IN THE PAST MONTH, HAVE YOU WISHED YOU WERE DEAD OR WISHED YOU COULD GO TO SLEEP AND NOT WAKE UP?: NO
2. HAVE YOU ACTUALLY HAD ANY THOUGHTS OF KILLING YOURSELF?: NO

## 2025-03-12 ASSESSMENT — PAIN - FUNCTIONAL ASSESSMENT: PAIN_FUNCTIONAL_ASSESSMENT: 0-10

## 2025-03-12 ASSESSMENT — PAIN DESCRIPTION - ORIENTATION: ORIENTATION: RIGHT

## 2025-03-12 ASSESSMENT — PAIN DESCRIPTION - PAIN TYPE: TYPE: CHRONIC PAIN

## 2025-03-12 NOTE — ED PROVIDER NOTES
History of Present Illness     History provided by: Patient and Significant Other  Limitations to History: None  External Records Reviewed with Brief Summary:  see below    HPI:  Dilip Haynes is a 74 y.o. male with right toe pain.  Patient was sent in by his podiatrist who was concerned for possible critical limb ischemia of the right lower extremity per patient report.  He has a significant vascular history, including carotid endarterectomy, coronary artery stents placed in the past (status post cardiac arrest?), femoral stent per patient report, etc. patient does report that he has a vascular surgeon who he has seen within the past 6 months, but he does not recall the name.  Patient's history is complicated by a prior renal transplant in 2020, for which she does follow-up with nephrology through [facility] every 3 months.  He is on immunosuppressive therapy.    The patient is primarily complaining of right toe pain.  He reports that this pain started after he was clipping his toenails about 4 weeks ago and he nicked his toe.  He has had ongoing pain in the tip of his toe since.  He has seen podiatry he was diagnosed with an arterial ulcer.  He saw podiatry today, who recommended that he come in to the emergency room or schedule an appointment with a vascular surgeon immediately.  He has been taking oral antibiotics for the past few weeks due to the wound on the tip of his toe.  He feels like his toe has improved while on antibiotics and is currently doing much better. The patient elected to come to the  ED as all of his kidney transplant care is here.  The patient was concerned that his pain may represent gout, as he has had flares in the past.  He did take some 5-year-old colchicine a few days ago which did help with his pain, but he has been advised not to take this anymore.  He denies any fevers, chills, chest pain, shortness of breath, abdominal pain, nausea, vomiting, diarrhea, constipation, urinary symptoms,  lower extremity edema.     Physical Exam   Triage vitals:  T 37.1 °C (98.8 °F)  HR 87  /74  RR 17  O2 (!) 93 % None (Room air)    Physical Exam  Constitutional:       General: He is not in acute distress.     Appearance: Normal appearance. He is not ill-appearing.   HENT:      Head: Normocephalic and atraumatic.      Mouth/Throat:      Mouth: Mucous membranes are moist.   Eyes:      Extraocular Movements: Extraocular movements intact.      Pupils: Pupils are equal, round, and reactive to light.   Cardiovascular:      Rate and Rhythm: Normal rate and regular rhythm.      Pulses: Normal pulses.      Heart sounds: No murmur heard.     No gallop.   Pulmonary:      Effort: Pulmonary effort is normal. No respiratory distress.      Breath sounds: Normal breath sounds. No wheezing, rhonchi or rales.   Abdominal:      General: Abdomen is flat. There is no distension.      Palpations: Abdomen is soft.      Tenderness: There is no abdominal tenderness. There is no guarding.   Musculoskeletal:         General: Normal range of motion.      Cervical back: Normal range of motion and neck supple.      Comments: He does have a nonhealing open arterial ulcer on the tip of his toe underneath his toenail.  Pulses faint to palpate in both feet.  Bilateral pulses confirmed with doppler. Mild erythema at MTP.  Healing approx 0.5 cm abrasion on distal 1st toe that's now dry, closed, no surrounding erythema, fluctuance, or swelling. No drainage noted around healing wound.   Skin:     General: Skin is warm.   Neurological:      General: No focal deficit present.      Mental Status: He is alert and oriented to person, place, and time.            Medical Decision Making & ED Course   Medical Decision Makin y.o. male who presents for right toe pain.  The patient was sent in by his podiatrist today with a note that he has critical limp ischemia and to call his vascular surgeon or go to the ER if unable to talk to them.  The  patient does have significant vascular history.  Per report, he did injure his toe about 4 weeks ago while he was clipping his toenails.  He has been experiencing pain in the tip of his toe ever since and a nonhealing arterial ulcer.  He has been on oral antibiotic therapy for a period of time with significant improvement.    After chart review, exam, and discussion with pt I believe he has pain in his leg from three sources. At rest he has no calf pain but reports when walking gets pain down his calf, PEDRO PABLO 0.39, he has distal pulses and a warm well perfused foot and no pain in calf currently, only at 1st MTP. I have discussed this with Dr. Pham and plan is for prompt outpatient followup.    He also has pain at 1st MTP that is most consistent with gout. See ED course for additional info for gout score. Will treat with tylenol and opiates due to renal transplant and current kidney function (at baseline, but elevated).     Is also being treated for an infection at distal 1st digit with oral antibiotics and topical antibiotics. He feels this has improved significantly since on doxycyline and course ends tomorrow. Offered admission for IV antibiotics if felt was doing worse or concern for failing outpt treatment, but pt feels his distal toe is doing very well and it is the MTP joint that has worsened, which I think is more consistent with gout. He has a history of gout in that digit.    Due to the fact that he has poor bloodflow and likely prolonged healing of toe infection, feel best course of interest to be on continued antibiotics, I offered admission to be safest, but pt deferrred and would prefer outpt care and I think that is very reasonable as long as he continues to have good followup. Since the doxy is working, will extend rx so he has continued antimicrobial coverage while seeing vascular and podiatry to continued to treat improving distal toe infection and chronic PAD. Discussed potential for worsening  infection with his blood flow and return precautions for any worsening pain, signs of infection, or if unable to get appointment within 2 days he understands he needs to be re-evaluated and is very comfortable with this plan.     ----  Social Determinants of Health which Significantly Impact Care: None identified     EKG Independent Interpretation: EKG interpreted by myself. Please see ED Course for full interpretation.    Independent Result Review and Interpretation: Relevant laboratory and radiographic results were reviewed and independently interpreted by myself.  As necessary, they are commented on in the ED Course.    Chronic conditions affecting the patient's care: As documented above in [facility]    The patient was discussed with the following consultants/services: Vascular surgery, ultrasound    Care Considerations: As documented above in [facility]    ED Course:  ED Course as of 03/13/25 1612   Wed Mar 12, 2025   1541 Pt had xray within last few weeks w no signs osteo.  He has bilateral Doppler PT pulses, no DP pulses found.  He is warm well-perfused foot.  He has toe pain but no leg pain at rest.  He does report exertional claudication down the lateral aspect of his right leg.  But once again he only has toe pain at this time.  Feels very similar to his prior gout as it is localized pain to his right first MTP joint.  No fevers chills systemic symptoms.  He is on an immunosuppressant.  Current plan is to see if we can do a vascular ultrasound to assess for arterial flow.  If we are not unable to do this within the next few hours will discuss with transplant team on if patient's renal function is reassuring whether we can do a CTA with runoff. [CR]   1613 Vascular tech reports cannot do study unless ordered by vascular. Will talk to vascular.  Last vascular surgery note I see is from 7/6/2022 showing history of claudication, PAD.  Chart review also shows prior cardiac arrest.  He is at a prior kidney transplant.    Pt  on Eliquis. He reports a stent placement in his leg but chart review seems to show that this was a cardiac stent in 2021.  Looks like in roughly 2017 he also had a vascular surgery done at [facility] by Dr. Box with left femoral endarterectomy and iliac stent performed.  Also had a prior left CEA. [CR]   1616 Creatinine(!): 1.94 [CR]   1627 Discussed w Dr. Pham, felt this was likely chronic in nature since there is no new pain overnight and this was 4 weeks of unchanged pain.  I agree this seems reasonable.  Do not feel patient should get contrast with kidney function.  Acute gout diagnosis rule shows gout score at 12.5 with 82.5% prevalence of gout and listed is very likely.  I have strong suspicion that patient has acute gout flare of his right MTP with some exertional claudication symptoms of his right calf that can likely be worked outpatient.  Will call podiatry to make sure no concern for bacterial infection of toe prior to any dispo decisions. [CR]   1727 Patient signed out to Dr. Grewal pending final disposition. [CH]   1731 I have had 3-4 lengthy discussions with patient about options.  Have discussed with vascular surgery, since patient has pulse and no pain at rest they are comfortable following urgently outpatient.  Discussed the PEDRO PABLO level and they are comfortable following that he outpatient.  Attempted to call [facility] podiatry, but was unable to get through after waiting for approximately 5 minutes for their on-call line there is no answer.  Unable to leave message.  Unable to epic message them because they are outside of her hospital system.  Discussed with her ED pharmacist, since patient has poor blood flow I think he may benefit from prolonged antibiotics.  He is quite confident that his toe has improved while on his doxycycline treatment.  It does not look infected at this time.  Offered admission for IV antibiotics versus prompt outpatient follow-up.  We discussed the risk and benefits of  admission versus going home including worsening toe infection and the benefits of IV antibiotics.  After these discussions he is wanting to follow-up outpatient which I think is very reasonable.  I think prophylactic antibiotics for his open toe wound is reasonable while he gets further management with vascular surgery for poor blood flow to this foot.  Will extend doxycycline and discussed extremely strict return precautions for any worsening pain, swelling, discharge, redness, fevers or any signs that he feels like his toe is worsening.  He is very comfortable returning for any worsening symptoms.  All questions answered.  Avoiding NSAIDs due to renal transplant for gout pain control. 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    Balwinder Guerra MD  Emergency Medicine       Ольга Carpenter DO  Resident  03/12/25 8993       I saw and evaluated the patient. I personally obtained the key and critical portions of the history and physical exam or was physically present for key and critical portions performed by the resident/fellow/KASEY.I reviewed the resident/fellow/KASEY's documentation and discussed the patient with the resident/fellow/KASEY. I agree with the resident/fellow/KASEY's medical decision making as documented in the note.     Balwinder Guerra MD  [facility] Emergency Medicine     Balwinder Guerra MD  03/13/25 1612       Balwinder Guerra MD  03/13/25 1613

## 2025-03-12 NOTE — DISCHARGE INSTRUCTIONS
As we discussed it is vital you follow-up with vascular surgery over the next 1 to 2 days.  If you are unable to get an appointment please return to the ER.  Is also important to follow-up with your podiatrist over the next week so they know that you have been seen by vascular and that I have extended your antibiotics as some prophylactic treatment.  You need to make sure that this wound continues to improve with your podiatrist to make sure there is no additional treatment needed.    Return to the ER for new, worse, or concerning symptoms, such as fevers, chills, worsening pain, swelling of your toe or foot, discharge from your toe, or any concerns for worsening infection.    As we discussed, although I do not expect your condition to worsen, there is diagnostic and certainty at this time in the potential for your condition to change it worsen. If you have ANY concerns or feel like your condition is changing/worsening, please RETURN TO THE ER to be reevaluated.

## 2025-03-14 ENCOUNTER — OFFICE VISIT (OUTPATIENT)
Dept: CARDIOLOGY | Facility: HOSPITAL | Age: 75
End: 2025-03-14
Payer: MEDICARE

## 2025-03-14 ENCOUNTER — OFFICE VISIT (OUTPATIENT)
Dept: VASCULAR SURGERY | Facility: HOSPITAL | Age: 75
End: 2025-03-14
Payer: MEDICARE

## 2025-03-14 VITALS
BODY MASS INDEX: 25.4 KG/M2 | HEART RATE: 67 BPM | HEIGHT: 71 IN | SYSTOLIC BLOOD PRESSURE: 194 MMHG | DIASTOLIC BLOOD PRESSURE: 75 MMHG | OXYGEN SATURATION: 97 % | WEIGHT: 181.4 LBS

## 2025-03-14 VITALS
SYSTOLIC BLOOD PRESSURE: 196 MMHG | WEIGHT: 180.8 LBS | DIASTOLIC BLOOD PRESSURE: 87 MMHG | BODY MASS INDEX: 25.31 KG/M2 | OXYGEN SATURATION: 96 % | HEIGHT: 71 IN

## 2025-03-14 DIAGNOSIS — I73.9 PAD (PERIPHERAL ARTERY DISEASE) (CMS-HCC): ICD-10-CM

## 2025-03-14 DIAGNOSIS — Z95.818 PRESENCE OF CARDIAC DEVICE: ICD-10-CM

## 2025-03-14 DIAGNOSIS — R06.02 SHORTNESS OF BREATH: Primary | ICD-10-CM

## 2025-03-14 PROBLEM — I50.32 CHRONIC DIASTOLIC CONGESTIVE HEART FAILURE: Status: ACTIVE | Noted: 2018-08-25

## 2025-03-14 PROBLEM — I71.9 AORTIC ANEURYSM: Status: ACTIVE | Noted: 2025-03-14

## 2025-03-14 PROBLEM — Z86.79 HISTORY OF CAROTID ARTERY STENOSIS: Status: ACTIVE | Noted: 2025-03-14

## 2025-03-14 PROBLEM — E11.9 TYPE 2 DIABETES MELLITUS: Status: ACTIVE | Noted: 2025-03-14

## 2025-03-14 PROBLEM — J96.20 ACUTE ON CHRONIC RESPIRATORY FAILURE (MULTI): Status: ACTIVE | Noted: 2025-03-14

## 2025-03-14 PROBLEM — E83.52 HYPERCALCEMIA: Status: ACTIVE | Noted: 2025-03-14

## 2025-03-14 PROBLEM — I26.99 PULMONARY EMBOLISM: Status: ACTIVE | Noted: 2025-03-14

## 2025-03-14 PROBLEM — I25.10 ARTERIOSCLEROSIS OF CORONARY ARTERY: Status: ACTIVE | Noted: 2025-03-14

## 2025-03-14 PROBLEM — M67.439 GANGLION CYST OF WRIST: Status: ACTIVE | Noted: 2017-07-10

## 2025-03-14 PROBLEM — I50.30 (HFPEF) HEART FAILURE WITH PRESERVED EJECTION FRACTION: Status: ACTIVE | Noted: 2025-03-14

## 2025-03-14 PROBLEM — Z86.79 HISTORY OF CLAUDICATION: Status: ACTIVE | Noted: 2025-03-14

## 2025-03-14 PROBLEM — D84.9 IMMUNOSUPPRESSION: Status: ACTIVE | Noted: 2025-03-14

## 2025-03-14 PROBLEM — I71.40 AAA (ABDOMINAL AORTIC ANEURYSM) WITHOUT RUPTURE (CMS-HCC): Status: ACTIVE | Noted: 2020-04-02

## 2025-03-14 PROBLEM — I48.91 ATRIAL FIBRILLATION (MULTI): Status: ACTIVE | Noted: 2021-09-17

## 2025-03-14 PROBLEM — I73.00 RAYNAUD'S DISEASE: Status: ACTIVE | Noted: 2025-03-14

## 2025-03-14 PROBLEM — K59.00 CONSTIPATION: Status: ACTIVE | Noted: 2025-03-14

## 2025-03-14 PROBLEM — I48.92 ATRIAL FLUTTER (MULTI): Status: ACTIVE | Noted: 2025-03-14

## 2025-03-14 PROBLEM — I46.9 CARDIAC ARREST WITH SUCCESSFUL RESUSCITATION: Status: ACTIVE | Noted: 2025-03-14

## 2025-03-14 PROBLEM — N18.6 END-STAGE RENAL DISEASE (MULTI): Status: ACTIVE | Noted: 2025-03-14

## 2025-03-14 PROBLEM — E78.5 HYPERLIPIDEMIA: Status: ACTIVE | Noted: 2025-03-14

## 2025-03-14 PROBLEM — I21.9 MYOCARDIAL INFARCTION (MULTI): Status: ACTIVE | Noted: 2025-03-14

## 2025-03-14 PROBLEM — N25.81 SECONDARY HYPERPARATHYROIDISM (MULTI): Status: ACTIVE | Noted: 2020-04-02

## 2025-03-14 PROBLEM — Z86.79 HISTORY OF HYPERTENSION: Status: ACTIVE | Noted: 2025-03-14

## 2025-03-14 PROBLEM — R31.0 GROSS HEMATURIA: Status: ACTIVE | Noted: 2025-03-14

## 2025-03-14 PROBLEM — Z86.79 HISTORY OF CARDIOVASCULAR DISORDER: Status: ACTIVE | Noted: 2025-03-14

## 2025-03-14 PROBLEM — Z94.0 KIDNEY REPLACED BY TRANSPLANT (HHS-HCC): Status: ACTIVE | Noted: 2020-05-05

## 2025-03-14 PROBLEM — I49.1 ATRIAL ECTOPY: Status: ACTIVE | Noted: 2025-03-14

## 2025-03-14 PROBLEM — R73.9 ACUTE HYPERGLYCEMIA: Status: ACTIVE | Noted: 2025-03-14

## 2025-03-14 PROBLEM — K92.1 HEMATOCHEZIA: Status: ACTIVE | Noted: 2025-03-14

## 2025-03-14 PROBLEM — R09.89 CAROTID BRUIT: Status: ACTIVE | Noted: 2025-03-14

## 2025-03-14 PROBLEM — R00.0 TACHYCARDIA: Status: ACTIVE | Noted: 2025-03-14

## 2025-03-14 PROBLEM — K21.9 GERD (GASTROESOPHAGEAL REFLUX DISEASE): Status: ACTIVE | Noted: 2023-12-28

## 2025-03-14 PROBLEM — Z86.39 HISTORY OF THYROID DISORDER: Status: ACTIVE | Noted: 2025-03-14

## 2025-03-14 PROBLEM — B19.20 HEPATITIS C VIRUS INFECTION: Status: ACTIVE | Noted: 2025-03-14

## 2025-03-14 PROBLEM — I49.5 BRADY-TACHY SYNDROME (MULTI): Status: ACTIVE | Noted: 2025-03-14

## 2025-03-14 PROBLEM — I10 HYPERTENSION: Status: ACTIVE | Noted: 2025-03-14

## 2025-03-14 PROCEDURE — 1159F MED LIST DOCD IN RCRD: CPT | Performed by: STUDENT IN AN ORGANIZED HEALTH CARE EDUCATION/TRAINING PROGRAM

## 2025-03-14 PROCEDURE — 3077F SYST BP >= 140 MM HG: CPT | Performed by: STUDENT IN AN ORGANIZED HEALTH CARE EDUCATION/TRAINING PROGRAM

## 2025-03-14 PROCEDURE — 99215 OFFICE O/P EST HI 40 MIN: CPT | Performed by: STUDENT IN AN ORGANIZED HEALTH CARE EDUCATION/TRAINING PROGRAM

## 2025-03-14 PROCEDURE — 99204 OFFICE O/P NEW MOD 45 MIN: CPT | Performed by: STUDENT IN AN ORGANIZED HEALTH CARE EDUCATION/TRAINING PROGRAM

## 2025-03-14 PROCEDURE — 3079F DIAST BP 80-89 MM HG: CPT | Performed by: STUDENT IN AN ORGANIZED HEALTH CARE EDUCATION/TRAINING PROGRAM

## 2025-03-14 PROCEDURE — 1125F AMNT PAIN NOTED PAIN PRSNT: CPT | Performed by: STUDENT IN AN ORGANIZED HEALTH CARE EDUCATION/TRAINING PROGRAM

## 2025-03-14 PROCEDURE — 99214 OFFICE O/P EST MOD 30 MIN: CPT | Performed by: STUDENT IN AN ORGANIZED HEALTH CARE EDUCATION/TRAINING PROGRAM

## 2025-03-14 PROCEDURE — 3078F DIAST BP <80 MM HG: CPT | Performed by: STUDENT IN AN ORGANIZED HEALTH CARE EDUCATION/TRAINING PROGRAM

## 2025-03-14 PROCEDURE — 1036F TOBACCO NON-USER: CPT | Performed by: STUDENT IN AN ORGANIZED HEALTH CARE EDUCATION/TRAINING PROGRAM

## 2025-03-14 RX ORDER — CHOLECALCIFEROL (VITAMIN D3) 50 MCG
2000 TABLET ORAL DAILY
COMMUNITY
End: 2025-03-26 | Stop reason: ENTERED-IN-ERROR

## 2025-03-14 RX ORDER — DOXYCYCLINE 100 MG/1
1 CAPSULE ORAL
COMMUNITY
Start: 2025-02-26 | End: 2025-03-26 | Stop reason: ENTERED-IN-ERROR

## 2025-03-14 RX ORDER — METHOCARBAMOL 500 MG/1
500 TABLET, FILM COATED ORAL EVERY 8 HOURS PRN
COMMUNITY
Start: 2025-02-20 | End: 2025-03-26 | Stop reason: ENTERED-IN-ERROR

## 2025-03-14 RX ORDER — CLONIDINE HYDROCHLORIDE 0.3 MG/1
0.3 TABLET ORAL DAILY
COMMUNITY
End: 2025-03-26 | Stop reason: ENTERED-IN-ERROR

## 2025-03-14 RX ORDER — CLOPIDOGREL BISULFATE 75 MG/1
75 TABLET ORAL DAILY
COMMUNITY
End: 2025-03-21 | Stop reason: ALTCHOICE

## 2025-03-14 ASSESSMENT — ENCOUNTER SYMPTOMS
CONSTIPATION: 0
DEPRESSION: 0
DYSPNEA ON EXERTION: 0
DEPRESSION: 0
DIARRHEA: 0
ORTHOPNEA: 0
HEADACHES: 0
DIZZINESS: 0
PALPITATIONS: 0
SHORTNESS OF BREATH: 0
VOMITING: 0
OCCASIONAL FEELINGS OF UNSTEADINESS: 0
LOSS OF SENSATION IN FEET: 0
LOSS OF SENSATION IN FEET: 0
NAUSEA: 0
OCCASIONAL FEELINGS OF UNSTEADINESS: 0
PND: 0

## 2025-03-14 ASSESSMENT — PAIN SCALES - GENERAL
PAINLEVEL_OUTOF10: 5
PAINLEVEL_OUTOF10: 4

## 2025-03-14 ASSESSMENT — COLUMBIA-SUICIDE SEVERITY RATING SCALE - C-SSRS
2. HAVE YOU ACTUALLY HAD ANY THOUGHTS OF KILLING YOURSELF?: NO
1. IN THE PAST MONTH, HAVE YOU WISHED YOU WERE DEAD OR WISHED YOU COULD GO TO SLEEP AND NOT WAKE UP?: NO
6. HAVE YOU EVER DONE ANYTHING, STARTED TO DO ANYTHING, OR PREPARED TO DO ANYTHING TO END YOUR LIFE?: NO

## 2025-03-14 NOTE — PATIENT INSTRUCTIONS
Thank you for coming in today. If you have any questions or concerns, you may call the Heart Failure Office at [phone] option [phone], or [phone].  You may also contact our heart failure nursing team via email on [email or web address].    For quicker results set-up your  [employer] account to receive results and other correspondence directly to your phone.    Please bring all your pills/medications to your Cardiology appointments.    **  - Please keep your Vascular Surgery appointment today    - No new medication changes today    - Please have the following tests done:  1.Blood tests just before your next visit (RFP, BNP, CBC)    - Please make an appointment to be seen in 4 months

## 2025-03-14 NOTE — PROGRESS NOTES
Reason for Visit: Right Big Toe Injury (Started because he tried to remove ingrown toe-nail a month ago) and New Patient Visit.  Referring Clinician: Charlie     History of Present Illness  Dilip Haynes is a 74 y.o. male with history of ***    CAD s/p PCI, L femoral stent placement, right carotid endarterectomy for asymptomatic stenosis, R DDRT (2020),     On Eliquis    Right calf pain with walking. Walking 200 ft. Improves with rest. Pain in foot all the time since wound formed a month.   Attempted ingrown toenail removal a month ago. Now with wound. No issues on the left other than swelling.     NO prior hx of nonhealing wounds on feet.     Smoking history: 50 year smoking history 1.5 packs/day; quit  2015.    Not on ASA due to spontaneous bleeding when on ASA and Eliquis.     Past Medical History  He has a past medical history of Encounter for other preprocedural examination (05/10/2020), Old myocardial infarction (01/19/2022), Personal history of other diseases of the circulatory system (12/22/2015), Personal history of other diseases of the circulatory system (04/13/2021), Personal history of other diseases of the circulatory system (01/19/2022), Personal history of other diseases of the circulatory system (07/23/2016), Personal history of other endocrine, nutritional and metabolic disease (12/22/2015), and Raynaud's syndrome without gangrene (12/22/2015).    Past Surgical History  He has a past surgical history that includes Total thyroidectomy (12/11/2021); Carotid endarterectomy (12/11/2021); Other surgical history (01/19/2022); Other surgical history (09/22/2020); Knee surgery (09/12/2019); and Other surgical history (01/19/2022).    Social History  He reports that he has quit smoking. His smoking use included cigarettes. He has never used smokeless tobacco. He reports current alcohol use. He reports that he does not use drugs.    Family  History  No family history on file.    Allergies  Lovastatin, Simvastatin, Adhesive, Iodine, and Naproxen    Outpatient Medications  Current Outpatient Medications   Medication Instructions    acetaminophen (TYLENOL) 650 mg, oral, Every 8 hours PRN    amLODIPine (NORVASC) 10 mg, Daily    apixaban (Eliquis) 2.5 mg tablet TAKE 1 TABLET BY MOUTH TWICE A DAY    cholecalciferol (VITAMIN D-3) 2,000 Units, oral, Daily    cloNIDine (CATAPRES) 0.3 mg, oral, Daily    clopidogrel (PLAVIX) 75 mg, oral, Daily    doxycycline (Monodox) 100 mg capsule 1 capsule, Every 12 hours scheduled (0630,1830)    empagliflozin (JARDIANCE) 10 mg, oral, Daily    Envarsus XR 1.5 mg, oral, Daily    famotidine (PEPCID) 20 mg, oral, Daily, as directed    fluticasone (Flonase) 50 mcg/actuation nasal spray 2 sprays, Daily PRN    levothyroxine (SYNTHROID, LEVOXYL) 137 mcg, Daily    losartan (COZAAR) 75 mg, oral, Daily    losartan (COZAAR) 25 mg, oral, Daily    methocarbamol (ROBAXIN) 500 mg, Every 8 hours PRN    metoprolol tartrate (LOPRESSOR) 50 mg, oral, 2 times daily    multivitamin tablet 1 tablet, Daily    mycophenolate (CELLCEPT) 750 mg, oral, Daily    nitroglycerin (NITROSTAT) 0.4 mg, Every 5 min PRN    oxyCODONE (ROXICODONE) 5 mg, oral, Every 6 hours PRN    pravastatin (PRAVACHOL) 40 mg, oral, Nightly    predniSONE (DELTASONE) 5 mg, oral, Daily    tamsulosin (FLOMAX) 0.4 mg, oral, Nightly    torsemide (DEMADEX) 20 mg, oral, Daily       Review of Systems  ROS    Last Recorded Vitals  There were no vitals filed for this visit.    Physical Examination  General: Well appearing, well-nourished, in no acute distress.  HEENT: Normocephalic atraumatic, pupils equal and reactive to light, extraocular muscles intact, no conjunctival injection, oropharynx clear without exudates.  Neck: Normal carotid arterial pulses, no arterial bruits, no thyromegaly.  Cardiac: Regular rhythm and normal heart rate.  Pulmonary: No increased work of breathing, no wheezes  "or crackles.  GI: Soft, ND, NT  Lower extremities: No wounds, ulcers, or discoloration. Palpable femoral pulses bilaterally. Non palpable pedal pulses. Monophasic R PT, monophasic L PT and AT.  Skin: Skin intact. No significant rashes or lesions present.  Neuro: Alert and oriented x 3, normal attention and cognition, no focal motor or sensory neurologic deficits.  Psych: Normal affect and mood.  Musculoskeletal: Normal gait normal muscle tone.    Laboratory Studies  Lab Results   Component Value Date    GLUCOSE 131 (H) 03/12/2025    CALCIUM 9.8 03/12/2025     03/12/2025    K 4.6 03/12/2025    CO2 23 03/12/2025     03/12/2025    BUN 25 (H) 03/12/2025    CREATININE 1.94 (H) 03/12/2025     Lab Results   Component Value Date    ALT 14 03/12/2025    AST 18 03/12/2025    ALKPHOS 69 03/12/2025    BILITOT 1.1 03/12/2025         Lab Results   Component Value Date    CHOL 165 06/05/2023    CHOL 139 10/19/2020     Lab Results   Component Value Date    HDL 45.6 06/05/2023    HDL 54.0 10/19/2020     No results found for: \"LDLCALC\"  Lab Results   Component Value Date    TRIG 118 06/05/2023    TRIG 93 10/19/2020     No components found for: \"CHOLHDL\"  Lab Results   Component Value Date    HGBA1C 6.4 (H) 02/20/2025     No components found for: \"UACR\"    Vascular Studies    ***    I personally reviewed the imaging studies. My impression is as follows: ***      Assessment and Plan  Problem List Items Addressed This Visit       PAD (peripheral artery disease) (CMS-[facility])             Alejandra Gant MD      " "for: \"CHOLHDL\"  Lab Results   Component Value Date    HGBA1C 6.4 (H) 02/20/2025     No components found for: \"UACR\"      Assessment and Plan  Problem List Items Addressed This Visit       PAD (peripheral artery disease) (CMS-[facility])       Dilip Haynes is a 74-year-old male with prior history of DDRT (2020) now with right lower extremity rest pain and tissue loss.  Will obtain ABIs and an arterial duplex.  Patient will likely need a lower extremity angiogram in the near future.  Will discuss with nephrology/transplant if okay to proceed with contrast administration for this.  Will also refer to podiatry to establish care.  However, would not recommend any operative intervention on the foot at this time given concern for compromised perfusion.    Alejandra Gant MD      "

## 2025-03-14 NOTE — PROGRESS NOTES
"Accompanied by: wife    Chief Complaint: HFpEF care     History of Present Illness    74 y.o. man with history of vascular disease with obstructive CAD s/p PCI mRCA in-stent restenosis in 5/25/2016, renal artery stenosis, femoral artery stenosis and carotid stenosis all s/p intervention, HFrimprovedEF, atrial fibrillation s/p DCCV 6/2020 and is maintained on DOAC. 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  TTE 12/10/2021 demonstrates normal left ventricular systolic function 55% with mild to moderate RV systolic impairment. 12/13/2021 stress evaluation c/f inducible ischaemia. He had a positive stress test 12/2021 and is now status post left heart catheterization 12/28/2021 which demonstrated known midLCx  with collateral flow, and otherwise mild CAD. He will be maintained on aspirin and pravastatin for CAD. He was hospitalized 2/2023 with sudden onset dyspnea. COVID and influenza were negative. On left heart catheterization 2/2023 hisLCx CTA was again visualized andrPDA 100% occlusion was also noted. Recommendation was made by the cardiology team for medical management. TTE 2/2023 LVEF 60 -65% with normal biventricular systolic function, mild mitral regurgitation. At his last visit he continues to struggle with exertional dyspnea and has now completed cardiac MRI 4/2023 which showed areas of infarction and in the region of the LCx there was \"partial viability\". He does have normal biventricular systolic function with LVEF 63%. There was no evidence of i LV or RV thrombus. Normal valvular function.  Dilip Haynes was initiated on SGLT2i last visit and this has been  well tolerated.  Scr averages ~ 2  Due to be seen by Vascular Surgery later today, for right toe pain.  He also complains of exercise-induced pain in the right lower limb, description sounds concerning for claudication.    Symptomatically he denies any other cardiovascular complaints today.    Review of Systems   Cardiovascular:  Positive for leg swelling. " Negative for chest pain, dyspnea on exertion, orthopnea, palpitations and paroxysmal nocturnal dyspnea.   Respiratory:  Negative for shortness of breath.    Musculoskeletal:  Negative for gout.   Gastrointestinal:  Negative for constipation, diarrhea, nausea and vomiting.   Genitourinary:  Negative for bladder incontinence.   Neurological:  Negative for dizziness and headaches.     The electronic medical record has been reviewed by me for salient history. All cardiovascular imaging and testing available in the electronic medical record, and [employer] has been reviewed.      Medication Documentation Review Audit       Reviewed by Isac Ambrosio (Technologist) on 03/14/25 at 1414      Medication Order Taking? Sig Documenting Provider Last Dose Status   acetaminophen (TylenoL) 325 mg tablet [id number]  Take 2 tablets (650 mg) by mouth every 8 hours if needed for mild pain (1 - 3) or moderate pain (4 - 6) for up to 5 days. Balwinder Guerra MD  Active   amLODIPine (Norvasc) 10 mg tablet [id number]  Take 1 tablet (10 mg) by mouth once daily. Historical Provider, MD  Active   apixaban (Eliquis) 2.5 mg tablet [id number]  TAKE 1 TABLET BY MOUTH TWICE A DAY Krystle Linn MD PhD  Active   cholecalciferol (Vitamin D-3) 50 MCG (2000 UT) tablet [id number]  Take 1 tablet (2,000 Units) by mouth once daily. Historical Provider, MD  Active   cloNIDine (Catapres) 0.3 mg tablet [id number]  Take 1 tablet (0.3 mg) by mouth once daily. Historical Provider, MD  Active   clopidogrel (Plavix) 75 mg tablet [id number]  Take 1 tablet (75 mg) by mouth once daily. Historical Provider, MD  Active   doxycycline (Monodox) 100 mg capsule [id number] Yes Take 1 capsule (100 mg) by mouth every 12 hours. Historical Provider, MD  Active   empagliflozin (Jardiance) 10 mg [id number]  Take 1 tablet (10 mg) by mouth once daily. Krystle Linn MD PhD  Active   famotidine (Pepcid) 20 mg tablet [id number]  TAKE 1 TABLET BY MOUTH EVERY DAY AS DIRECTED Ash  MD Dav  Active   fluticasone (Flonase) 50 mcg/actuation nasal spray [id number]  2 sprays by Does not apply route once daily as needed for allergies. Historical Provider, MD  Active   levothyroxine (Synthroid, Levoxyl) 137 mcg tablet [id number]  Take 1 tablet (137 mcg) by mouth once daily. Historical MD Andrew  Active   losartan (Cozaar) 25 mg tablet [id number]  TAKE 1 TABLET BY MOUTH EVERY DAY Krystle Linn MD PhD  Active   losartan (Cozaar) 50 mg tablet [id number]  Take 1.5 tablets (75 mg) by mouth once daily. Krystle Linn MD PhD  Active   methocarbamol (Robaxin) 500 mg tablet [id number] Yes Take 1 tablet (500 mg) by mouth every 8 hours if needed. Historical MD Andrew  Active   metoprolol tartrate (Lopressor) 50 mg tablet [id number]  TAKE 1 TABLET BY MOUTH TWICE A DAY Krystle Linn MD PhD  Active   multivitamin tablet [id number]  Take 1 tablet by mouth once daily. Elieser Provider, MD  Active   mycophenolate (Cellcept) 250 mg capsule [id number]  TAKE 3 CAPSULES BY MOUTH TWICE A DAY Krystian Thompson MD  Active   nitroglycerin (Nitrostat) 0.4 mg SL tablet [id number]  Place 1 tablet (0.4 mg) under the tongue every 5 minutes if needed for chest pain. Elieser Morales MD  Active   oxyCODONE (Roxicodone) 5 mg immediate release tablet [id number]  Take 1 tablet (5 mg) by mouth every 6 hours if needed for severe pain (7 - 10) for up to 3 days. Balwinder Guerra MD  Active   pravastatin (Pravachol) 40 mg tablet [id number]  TAKE 1 TABLET BY MOUTH EVERYDAY AT BEDTIME Krystle Linn MD PhD  Active   predniSONE (Deltasone) 5 mg tablet [id number]  TAKE 1 TABLET BY MOUTH EVERY DAY Krystian Thompson MD  Active   tacrolimus ER (Envarsus XR) 0.75 mg tablet ER [id number]  Take 2 tablets (1.5 mg) by mouth once daily. Rajani Ramirez MD  Active   tamsulosin (Flomax) 0.4 mg 24 hr capsule [id number]  Take 1 capsule (0.4 mg) by mouth once daily at bedtime. Michael Montesinos MD  Active  "  torsemide (Demadex) 20 mg tablet [id number]  Take 1 tablet (20 mg) by mouth once daily. Krystle Linn MD PhD  Active                   Allergies   Allergen Reactions    Lovastatin Myalgia     Leg cramps    Muscle cramps    Simvastatin Myalgia     Muscle cramps    Adhesive Rash    Iodine Rash    Naproxen Rash      Visit Vitals  BP (!) 194/75 (BP Location: Right arm, Patient Position: Sitting, BP Cuff Size: Adult) Comment: Notified nurse   Pulse 67   Ht 1.803 m (5' 11\")   Wt 82.3 kg (181 lb 6.4 oz)   SpO2 97%   BMI 25.30 kg/m²   Smoking Status Former   BSA 2.03 m²         On examination:    Very pleasant elderly -American man in no apparent CP or painful distress  Well groomed   Neck: No JVD or HJR  CVS: HS 1,2.  No added sounds  Resp: CTA bilaterally. Percussion note resonant  Abdomen: Mildly obese, SNT, BS wnl  Extremities: No pedal edema appreciated today.  Right great toe wrapped.  Skin: warm and dry.    CNS: AO x 4, no gross deficits    Lab Results   Component Value Date    WBC 8.8 03/12/2025    HGB 14.3 03/12/2025    HCT 47.3 03/12/2025    MCV 82 03/12/2025     03/12/2025       Chemistry    Lab Results   Component Value Date/Time     03/12/2025 1524    K 4.6 03/12/2025 1524     03/12/2025 1524    CO2 23 03/12/2025 1524    BUN 25 (H) 03/12/2025 1524    CREATININE 1.94 (H) 03/12/2025 1524    Lab Results   Component Value Date/Time    CALCIUM 9.8 03/12/2025 1524    ALKPHOS 69 03/12/2025 1524    AST 18 03/12/2025 1524    ALT 14 03/12/2025 1524    BILITOT 1.1 03/12/2025 1524             Results/Data  MRI Cardiac w/wo contrast for Morph/Funct and Valve Dz 06Apr2023 10:25AM Krystle Linn     Test Name Result Flag Reference   MRI Cardiac w/wo contrast for Morph/Funct and Valve Dz (Report)     FINAL REPORT  Interpreted by: ATIF WARREN   04/06/23 16:20                        [facility]                              CMR Report      MRN:          [id number]           "        Name:      SUKHDEEP TORREZ                  :         1950                  Scan Date:  2023 09:41:15                    Electronically signed by Mingo Quiñones  16:20:34     GENERAL INFORMATION   =====================================================================  =====================================     HEIGHT: 68.90 in  (175.00 cm)  WEIGHT: 189.60 lbs  (86.00 kgs)  BSA: 2.02 m\S\2  BASELINE HR: 70 BPM  SCAN LOCATION: [facility]  REFERRING PHYSICIAN: SHOAIB HAYES  ATTENDING PHYSICIAN: SHOAIB HAYES  TECHNOLOGIST: Kori Mack  ACCESSION NUMBER: [id number]  CPT CODES: 87525  ICD10 CODES: I50.30, [ , ]I25.9  PATIENT HISTORY: \E\n\E\n72 year old man with history of vascular   disease with obstructive CAD s/p PCI mRCA in-stent restenosis in   2016, renal artery stenosis, femoral artery stenosis and carotid   stenosis all s/p intervention, HFrecEF, atrial fibrillation s/p DCCV   2020 and is maintained on DOAC. he is s/p DDKT on .\E\nTTE   12/10/2021 demonstrates normal left ventricular systolic function 55%   with mild to moderate RV systolic impairment. 2021 stress   evaluation c/f inducible ischaemia. He had a positive stress test   2021 and is now status post left heart catheterization 2021   which demonstrated known midLCx  with collateral flow, and   otherwise mild CAD. He will be maintained on aspirin and pravastatin   for CAD. He was hospitalized 2023 with sudden onset dyspnea. COVID   and influenza were negative. On left heart catheterization 2023   hisLCx CTA was again visualized andrPDA 100% occlusion was also   noted. Recommendation was made by the cardiology team for medical   management. TTE 2023 LVEF 60 -65% with normal biventricular   systolic function, mild mitral regurgitation. Today he remains with   SOBOE.\E\n#CAD:\E\n? recurrent angina\E\n- cMRI ( viability   assessment, MV assessment)\E\n- will review 2023 [facility] with    interventional Cards after cMRI results available \E\n     SUMMARY   =====================================================================  =====================================     Prior Inferior and Inferolateral infarction with partial viability   (LCX territory). Wall motion  abnormalities in this distribution with preserved LVEF.  Moderate non-ischemic scarring in the septum.  LVH with concentric remodeling. Maximum thickness of the septum is   1.9 cm.  Mild RVH.     LEFT VENTRICLE: Quantitative LVEF 63 %. There is moderate LV   hypertrophy. There is LV concentric remodeling. LV cavity size  is normal. LV systolic function is regionally impaired. There is no   LV mass/thrombus.     VIABILITY: LV scar size is 13 %.     RIGHT VENTRICLE: There is mild RVH. RV cavity size is normal. RV   systolic function is normal. There is no RV mass/thrombus.     LV/RV SEPTUM: The ventricular septum is intact.     LA/RA SEPTUM: The atrial septum is intact.     LEFT ATRIUM: LA is mildly enlarged.     RIGHT ATRIUM: There is no RA mass/thrombus. RA is mildly enlarged.     PERICARDIUM: Pericardium is normal. There is no pericardial effusion.     PLEURAL EFFUSION: There is no pleural effusion.     AORTIC VALVE: Aortic valve is mildly thickened. Aortic valveleaflets   are normal. The aortic valve annulus is normal in  size. There is mild aortic regurgitation.     MITRAL VALVE: Mitral valve leaflets are normal. The mitral valve   annulus is normal in size. There is mild mitral  regurgitation.     TRICUSPID VALVE: Tricuspid valve leaflets are normal. The tricuspid   valve annulus is normal in size.     AORTIC ROOT: The aortic root is normal.        CORE EXAM   =====================================================================  =====================================     MEASUREMENTS   ---------------------------------------------------------------------  -------------------    VOLUMETRIC ANALYSIS      ----------------------------------------------  .------------------------------------------------------.  .   .     . LV  . Reference . RV . Reference .  +------+----------+------+------------+----+-----------+  . EDV . ml    . 120 . (105-187) .  .      .  .   . ml/m\S\2  .  60 . (58-93)  .  .      .  . ESV . ml    .  44 . (25-70)  .  .      .  .   . ml/m\S\2  .  22 . (13-35)  .  .      .  . CO  . L/min  . 5.84 .      .  .      .  .   . L/min/m\S\2 . 2.90 .      .  .      .  . MASS . g    . 235 . (106-183) .  .      .  .   . g/m\S\2   . 117 . (56-89)  .  .      .  . SV  . ml    .  76 . () .  .      .  .   . ml/m\S\2  .  38 . (39-63)  .  .      .  . EF  . %    .  63 . (59-77)  .  .      .  '------+----------+------+------------+----+-----------'         CARDIAC OUTPUT HR: 77 BPM    LV DIMENSIONS     ----------------------------------------------      WALL THICKNESS - ANTEROSEPTAL: 1.9 cm      WALL THICKNESS - INFEROLATERAL: 1.4 cm      WALL THICKNESS - MAXIMUM: 1.9 cm      LV SUSANA: 4.61 cm      LV ESD: 3.39 cm       LA DIMENSIONS (LV SYSTOLE)     ----------------------------------------------      AREA - 2 CHAMBER: 23.74 cm\S\2      LENGTH - 2 CHAMBER: 6.26 cm      AREA - 4 CHAMBER: 29.24 cm\S\2      LENGTH - 4 CHAMBER: 7.19 cm      VOLUME: 94 ml      VOLUME NORMALIZED: 46.7 ml/m\S\2       RA DIMENSIONS (RV SYSTOLE)     ----------------------------------------------      AREA - 4 CHAMBER: 22 cm\S\2      LENGTH - 4 CHAMBER: 5.2 cm       AORTIC ROOT DIMENSIONS     ----------------------------------------------      ANNULUS: 1.18 cm      SINUS OF VALSALVA: 2.7 cm      SINOTUBULAR JUNCTION: 2.4 cm       EXTRACELLULAR VOLUME MEASUREMENT     ----------------------------------------------      PRE-CONTRAST T1 MYOCARDIUM: 169.66 msec      PRE-CONTRAST T1 LV CAVITY: 178.02 msec      POST-CONTRAST T1 MYOCARDIUM: 134.39 msec      POST-CONTRAST T1 LV CAVITY: 65.5 msec       IRON QUANTIFICATION      ----------------------------------------------      MYOCARDIAL T2*: 33.26 msec      LIVER T2*: 12.89 msec        17 SEGMENT   ---------------------------------------------------------------------  -------------------  .---------------------------------------------------------------------  ----------------------.  . Segments      . Wall Motion  . Hyperenhancement . Stress   Perfusion . Interpretation .  +--------------------+---------------+------------------+-------------  -----+----------------+  . Base Anterior   . Normal/Hyper . None       .           . Normal     .  . Base Anteroseptal . Severe Hypo  . 26-50%      .           . Non-CAD Scar  .  . Base Inferoseptal . Severe Hypo  . 1-25%      .           . Non-CAD Scar  .  . Base Inferior   . Severe Hypo  . 51-75%      .           . Sub-Endo MI  .  . Base Inferolateral . Normal/Hyper . 26-50%      .           . Sub-Endo MI  .  . Base Anterolateral . Normal/Hyper . None       .           . Normal     .  . Mid Anterior    . Normal/Hyper . None       .           . Normal     .  . Mid Anteroseptal  . Normal/Hyper . 1-25%      .           . Non-CAD Scar  .  . Mid Inferoseptal  . Normal/Hyper . 1-25%      .           . Non-CAD Scar  .  . Mid Inferior    . Mild/Mod Hypo . 1-25%      .           . Sub-Endo MI  .  . Mid Inferolateral . Normal/Hyper . 1-25%      .           . Sub-Endo MI  .  . Mid Anterolateral . Normal/Hyper . None       .           . Normal     .  . Apical Anterior  . Normal/Hyper . None       .           . Normal     .  . Apical Septal   . Normal/Hyper . None       .           . Normal     .  . Apical Inferior  . Normal/Hyper . 1-25%      .           . Sub-Endo MI  .  . Apical Lateral   . Normal/Hyper . None       .           . Normal     .  . [place]        . Normal/Hyper . None       .           . Normal     .  +--------------------+---------------+------------------+-------------  -----+----------------+  . RV Segments    . Wall Motion  .  Hyperenhancement . Stress   Perfusion . Interpretation .  +--------------------+---------------+------------------+-------------  -----+----------------+  . RV Basal Anterior . Normal/Hyper . None       .           . Normal     .  . RV Basal Inferior . Normal/Hyper . None       .           . Normal     .  . RV Mid       . Normal/Hyper . None       .           . Normal     .  . RV Apical     . Normal/Hyper . None       .           . Normal     .  '--------------------+---------------+------------------+-------------  -----+----------------'       FINDINGS     ----------------------------------------------      LV SCAR SIZE (17 SEGMENT): 13 %        SCAN INFO   =====================================================================  =====================================     GENERAL   ---------------------------------------------------------------------  -------------------    SCANNER     ----------------------------------------------      : [employer]      MODEL: LocoMobi      PULSE SEQUENCES: SSFP cine, 2D LGE segmented, 2D LGE   single-shot, Black-blood LGE (or Grey-blood),      Pre-contrast T1 mapping, Post-contrast T1 mapping, T2   mapping, First-pass perfusion without stress,      Phase contrast imaging, HASTE morphology        CONTRAST AGENT     ----------------------------------------------      TYPE: Dotarem      LOT NUMBER: [id number]       EXPIRATION DATE: 2027-09-01 00:00:00       GD CONCENTRATION: 0.5 M      VOLUME ADMINISTERED: 35 ml      DOSAGE: 0.20 mmol/kg       SEDATION     ----------------------------------------------      SEDATION USED?: No       SETUP     ----------------------------------------------      SCAN TYPE: Clinical      PATIENT TYPE: Outpatient      INCOMPLETE SCAN: No      REASON(S) FOR SCAN: Viability: chronic CAD         Report generated by [employer], a product of [employer]    Electronically signed by: BRIANA  04/06/23 16:20  "    Impressions    74 y.o. man with history of vascular disease with obstructive CAD s/p PCI mRCA in-stent restenosis in 5/25/2016, renal artery stenosis, femoral artery stenosis and carotid stenosis all s/p intervention, HFrecEF, atrial fibrillation s/p DCCV 6/2020 and is maintained on DOAC. 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  TTE 12/10/2021 demonstrates normal left ventricular systolic function 55% with mild to moderate RV systolic impairment. 12/13/2021 stress evaluation c/f inducible ischaemia. He had a positive stress test 12/2021 and is now status post left heart catheterization 12/28/2021 which demonstrated known midLCx  with collateral flow, and otherwise mild CAD. He will be maintained on aspirin and pravastatin for CAD. He was hospitalized 2/2023 with sudden onset dyspnea. COVID and influenza were negative. On left heart catheterization 2/2023 hisLCx CTA was again visualized andrPDA 100% occlusion was also noted. Recommendation was made by the cardiology team for medical management. TTE 2/2023 LVEF 60 -65% with normal biventricular systolic function, mild mitral regurgitation. At his last visit he continues to struggle with exertional dyspnea and has now completed cardiac MRI 4/2023 which showed areas of infarction and in the region of the LCx there was \"partial viability\". He does have normal biventricular systolic function with LVEF 63%. There was no evidence of i LV or RV thrombus. Normal valvular function.  He was referred to cardiac rehabilitation and doing the sessions was associated with exertional improvement in his dyspnea.  Unfortunately his exertional dyspnea has recurred.  He also complains of unilateral leg swelling (he is fully adherent with DOAC), and exertional leg pain.  He re-presented with SOBOE  and exertional leg cramps.  Bilateral PEDRO PABLO assessment  8/2024 was consistent with moderate vascular disease and he was rereferred to cardiac rehabilitation.    He is most affected by right great toe " pain, and is due to see the vascular surgery team later today (3/14/2025).    ACC/AHA stage B  NYHA FC: 2  Volume status: Euvolemic   Perfusion status: Warm to touch    PLAN    #HFrecoveredEF  -Continue SGLT2 inhibitor, empagliflozin 10 mg once daily.    -Consider starting spironolactone at next visit    #Hypertension  He is hypertensive today but in significant pain.,  No adjustments made to his antihypertensive regimen this context  -Continue metoprolol titrate 50 mg twice daily  -Continue losartan 75 mg every day    #CAD:  No symptoms    -Continue C rehab, pending improvement in right toe pain  -Continue aspirin, pravast, metoprolol tartrate, CCB  -Given myalgia he was previously transitioned from rosuvastatin to pravastatin and this will be continued.     #Atrial fibrillation:  -Continue DOAC and lower dose apixaban 2.5 mg twice daily. He no longer troubled by overt bleeding    This note was transcribed using the Dragon Dictation system. There may be grammatical, punctuation, or verbiage errors that can occur with voice recognition programs.      Krystle Linn MD PhD

## 2025-03-17 ENCOUNTER — HOSPITAL ENCOUNTER (OUTPATIENT)
Dept: CARDIOLOGY | Facility: CLINIC | Age: 75
Discharge: HOME | End: 2025-03-17
Payer: MEDICARE

## 2025-03-17 DIAGNOSIS — I48.91 UNSPECIFIED ATRIAL FIBRILLATION (MULTI): ICD-10-CM

## 2025-03-17 DIAGNOSIS — I48.92 UNSPECIFIED ATRIAL FLUTTER (MULTI): ICD-10-CM

## 2025-03-20 ENCOUNTER — SPECIALTY PHARMACY (OUTPATIENT)
Dept: PHARMACY | Facility: CLINIC | Age: 75
End: 2025-03-20

## 2025-03-20 PROCEDURE — RXMED WILLOW AMBULATORY MEDICATION CHARGE

## 2025-03-24 ENCOUNTER — HOSPITAL ENCOUNTER (OUTPATIENT)
Dept: CARDIOLOGY | Facility: CLINIC | Age: 75
Discharge: HOME | End: 2025-03-24
Payer: MEDICARE

## 2025-03-24 DIAGNOSIS — I48.92 UNSPECIFIED ATRIAL FLUTTER (MULTI): ICD-10-CM

## 2025-03-24 DIAGNOSIS — I48.91 UNSPECIFIED ATRIAL FIBRILLATION (MULTI): ICD-10-CM

## 2025-03-25 ENCOUNTER — PHARMACY VISIT (OUTPATIENT)
Dept: PHARMACY | Facility: CLINIC | Age: 75
End: 2025-03-25
Payer: COMMERCIAL

## 2025-03-25 ASSESSMENT — ENCOUNTER SYMPTOMS
CARDIOVASCULAR NEGATIVE: 1
GASTROINTESTINAL NEGATIVE: 1
ENDOCRINE NEGATIVE: 1
CONSTITUTIONAL NEGATIVE: 1
MUSCULOSKELETAL NEGATIVE: 1
NEUROLOGICAL NEGATIVE: 1
RESPIRATORY NEGATIVE: 1
HEMATOLOGIC/LYMPHATIC NEGATIVE: 1
PSYCHIATRIC NEGATIVE: 1
EYES NEGATIVE: 1

## 2025-03-26 ENCOUNTER — HOSPITAL ENCOUNTER (EMERGENCY)
Facility: HOSPITAL | Age: 75
Discharge: OTHER INSTITUTION | End: 2025-03-27
Attending: EMERGENCY MEDICINE
Payer: MEDICARE

## 2025-03-26 ENCOUNTER — APPOINTMENT (OUTPATIENT)
Dept: RADIOLOGY | Facility: HOSPITAL | Age: 75
End: 2025-03-26
Payer: MEDICARE

## 2025-03-26 DIAGNOSIS — I99.8 ISCHEMIC FOOT: Primary | ICD-10-CM

## 2025-03-26 PROBLEM — L97.519: Status: ACTIVE | Noted: 2025-03-21

## 2025-03-26 LAB
ABO GROUP (TYPE) IN BLOOD: NORMAL
ALBUMIN SERPL BCP-MCNC: 5.1 G/DL (ref 3.4–5)
ALP SERPL-CCNC: 78 U/L (ref 33–136)
ALT SERPL W P-5'-P-CCNC: 19 U/L (ref 10–52)
ANION GAP SERPL CALC-SCNC: 18 MMOL/L (ref 10–20)
ANTIBODY SCREEN: NORMAL
AST SERPL W P-5'-P-CCNC: 22 U/L (ref 9–39)
BASOPHILS # BLD AUTO: 0.04 X10*3/UL (ref 0–0.1)
BASOPHILS NFR BLD AUTO: 0.4 %
BILIRUB SERPL-MCNC: 0.9 MG/DL (ref 0–1.2)
BUN SERPL-MCNC: 26 MG/DL (ref 6–23)
CALCIUM SERPL-MCNC: 10.1 MG/DL (ref 8.6–10.3)
CHLORIDE SERPL-SCNC: 99 MMOL/L (ref 98–107)
CO2 SERPL-SCNC: 24 MMOL/L (ref 21–32)
CREAT SERPL-MCNC: 2.12 MG/DL (ref 0.5–1.3)
EGFRCR SERPLBLD CKD-EPI 2021: 32 ML/MIN/1.73M*2
EOSINOPHIL # BLD AUTO: 0.03 X10*3/UL (ref 0–0.4)
EOSINOPHIL NFR BLD AUTO: 0.3 %
ERYTHROCYTE [DISTWIDTH] IN BLOOD BY AUTOMATED COUNT: 13.2 % (ref 11.5–14.5)
GLUCOSE SERPL-MCNC: 128 MG/DL (ref 74–99)
HCT VFR BLD AUTO: 48.7 % (ref 41–52)
HGB BLD-MCNC: 14.9 G/DL (ref 13.5–17.5)
IMM GRANULOCYTES # BLD AUTO: 0.03 X10*3/UL (ref 0–0.5)
IMM GRANULOCYTES NFR BLD AUTO: 0.3 % (ref 0–0.9)
INR PPP: 1.1 (ref 0.9–1.1)
LACTATE SERPL-SCNC: 1.2 MMOL/L (ref 0.4–2)
LACTATE SERPL-SCNC: 2.8 MMOL/L (ref 0.4–2)
LYMPHOCYTES # BLD AUTO: 0.99 X10*3/UL (ref 0.8–3)
LYMPHOCYTES NFR BLD AUTO: 11.1 %
MCH RBC QN AUTO: 25.2 PG (ref 26–34)
MCHC RBC AUTO-ENTMCNC: 30.6 G/DL (ref 32–36)
MCV RBC AUTO: 82 FL (ref 80–100)
MONOCYTES # BLD AUTO: 0.68 X10*3/UL (ref 0.05–0.8)
MONOCYTES NFR BLD AUTO: 7.6 %
NEUTROPHILS # BLD AUTO: 7.16 X10*3/UL (ref 1.6–5.5)
NEUTROPHILS NFR BLD AUTO: 80.3 %
NRBC BLD-RTO: 0 /100 WBCS (ref 0–0)
PLATELET # BLD AUTO: 242 X10*3/UL (ref 150–450)
POTASSIUM SERPL-SCNC: 4.7 MMOL/L (ref 3.5–5.3)
PROT SERPL-MCNC: 8.7 G/DL (ref 6.4–8.2)
PROTHROMBIN TIME: 12.6 SECONDS (ref 9.8–12.4)
RBC # BLD AUTO: 5.92 X10*6/UL (ref 4.5–5.9)
RH FACTOR (ANTIGEN D): NORMAL
SODIUM SERPL-SCNC: 136 MMOL/L (ref 136–145)
WBC # BLD AUTO: 8.9 X10*3/UL (ref 4.4–11.3)

## 2025-03-26 PROCEDURE — 96374 THER/PROPH/DIAG INJ IV PUSH: CPT | Mod: 59,MUE

## 2025-03-26 PROCEDURE — 99291 CRITICAL CARE FIRST HOUR: CPT

## 2025-03-26 PROCEDURE — 85610 PROTHROMBIN TIME: CPT

## 2025-03-26 PROCEDURE — 99285 EMERGENCY DEPT VISIT HI MDM: CPT | Performed by: EMERGENCY MEDICINE

## 2025-03-26 PROCEDURE — 75635 CT ANGIO ABDOMINAL ARTERIES: CPT | Performed by: STUDENT IN AN ORGANIZED HEALTH CARE EDUCATION/TRAINING PROGRAM

## 2025-03-26 PROCEDURE — 83605 ASSAY OF LACTIC ACID: CPT

## 2025-03-26 PROCEDURE — 85025 COMPLETE CBC W/AUTO DIFF WBC: CPT

## 2025-03-26 PROCEDURE — 2550000001 HC RX 255 CONTRASTS: Performed by: EMERGENCY MEDICINE

## 2025-03-26 PROCEDURE — 36415 COLL VENOUS BLD VENIPUNCTURE: CPT

## 2025-03-26 PROCEDURE — 2500000004 HC RX 250 GENERAL PHARMACY W/ HCPCS (ALT 636 FOR OP/ED)

## 2025-03-26 PROCEDURE — 75635 CT ANGIO ABDOMINAL ARTERIES: CPT

## 2025-03-26 PROCEDURE — 96375 TX/PRO/DX INJ NEW DRUG ADDON: CPT | Mod: 59

## 2025-03-26 PROCEDURE — 86850 RBC ANTIBODY SCREEN: CPT

## 2025-03-26 PROCEDURE — 80053 COMPREHEN METABOLIC PANEL: CPT

## 2025-03-26 PROCEDURE — 96361 HYDRATE IV INFUSION ADD-ON: CPT

## 2025-03-26 RX ORDER — ONDANSETRON HYDROCHLORIDE 2 MG/ML
4 INJECTION, SOLUTION INTRAVENOUS ONCE
Status: COMPLETED | OUTPATIENT
Start: 2025-03-26 | End: 2025-03-26

## 2025-03-26 RX ORDER — MORPHINE SULFATE 4 MG/ML
4 INJECTION, SOLUTION INTRAMUSCULAR; INTRAVENOUS ONCE
Status: COMPLETED | OUTPATIENT
Start: 2025-03-26 | End: 2025-03-26

## 2025-03-26 RX ORDER — HEPARIN SODIUM 10000 [USP'U]/100ML
0-4500 INJECTION, SOLUTION INTRAVENOUS CONTINUOUS
Status: DISCONTINUED | OUTPATIENT
Start: 2025-03-26 | End: 2025-03-27 | Stop reason: HOSPADM

## 2025-03-26 RX ADMIN — SODIUM CHLORIDE, POTASSIUM CHLORIDE, SODIUM LACTATE AND CALCIUM CHLORIDE 1000 ML: 600; 310; 30; 20 INJECTION, SOLUTION INTRAVENOUS at 18:47

## 2025-03-26 RX ADMIN — HEPARIN SODIUM 1500 UNITS/HR: 10000 INJECTION, SOLUTION INTRAVENOUS at 23:32

## 2025-03-26 RX ADMIN — ONDANSETRON 4 MG: 2 INJECTION INTRAMUSCULAR; INTRAVENOUS at 18:03

## 2025-03-26 RX ADMIN — MORPHINE SULFATE 4 MG: 4 INJECTION, SOLUTION INTRAMUSCULAR; INTRAVENOUS at 18:03

## 2025-03-26 RX ADMIN — IOHEXOL 90 ML: 350 INJECTION, SOLUTION INTRAVENOUS at 20:01

## 2025-03-26 ASSESSMENT — PAIN DESCRIPTION - ORIENTATION: ORIENTATION: RIGHT

## 2025-03-26 ASSESSMENT — PAIN SCALES - GENERAL
PAINLEVEL_OUTOF10: 6
PAINLEVEL_OUTOF10: 5 - MODERATE PAIN
PAINLEVEL_OUTOF10: 5 - MODERATE PAIN
PAINLEVEL_OUTOF10: 6

## 2025-03-26 ASSESSMENT — LIFESTYLE VARIABLES
EVER HAD A DRINK FIRST THING IN THE MORNING TO STEADY YOUR NERVES TO GET RID OF A HANGOVER: NO
EVER FELT BAD OR GUILTY ABOUT YOUR DRINKING: NO
HAVE PEOPLE ANNOYED YOU BY CRITICIZING YOUR DRINKING: NO
HAVE YOU EVER FELT YOU SHOULD CUT DOWN ON YOUR DRINKING: NO
TOTAL SCORE: 0

## 2025-03-26 ASSESSMENT — PAIN DESCRIPTION - LOCATION: LOCATION: FOOT

## 2025-03-26 ASSESSMENT — PAIN - FUNCTIONAL ASSESSMENT: PAIN_FUNCTIONAL_ASSESSMENT: 0-10

## 2025-03-26 ASSESSMENT — PAIN DESCRIPTION - PAIN TYPE: TYPE: ACUTE PAIN

## 2025-03-26 NOTE — ED PROVIDER NOTES
"EMERGENCY DEPARTMENT ENCOUNTER      Pt Name: Dilip Haynes  MRN: [id number]  Birthdate 1950  Date of evaluation: 3/26/2025  Provider: Torres Grewal DO    CHIEF COMPLAINT       Chief Complaint   Patient presents with    Wound Check     Pt reports wound over the right foot getting worse with swelling over the toes.          HISTORY OF PRESENT ILLNESS    74-year-old male, renal transplant patient on immunosuppressants, diabetes, hypertension, chronic kidney disease, BPH, aortic aneurysm, peripheral arterial disease, pulmonary embolism on Eliquis, presents emergency room today with right foot pain.  He was seen here 2 weeks ago, sent by podiatrist for possible \"critical limb ischemia\".  However they were able to Doppler pulses here, discharged the patient with vascular follow-up.  Patient did see the vascular doctor, as she was to get arterial ultrasound to see the vascular doctor again this Friday.  He said the pain has been getting worse in his foot, he is having a hard time sleeping at night due to the pain and he gets throbbing shooting pains in his foot.  No fevers or chills, no other symptoms.  Vascular doctor is Dr. Gant.       History provided by:  Patient      Nursing Notes were reviewed.    PAST MEDICAL HISTORY     Past Medical History:   Diagnosis Date    Encounter for other preprocedural examination 05/10/2020    Pre-transplant evaluation for kidney transplant    Old myocardial infarction 01/19/2022    H/O non-ST elevation myocardial infarction (NSTEMI)    Personal history of other diseases of the circulatory system 12/22/2015    History of stenosis of renal artery    Personal history of other diseases of the circulatory system 04/13/2021    History of carotid artery stenosis    Personal history of other diseases of the circulatory system 01/19/2022    History of essential hypertension    Personal history of other diseases of the circulatory system 07/23/2016    History of claudication    Personal " history of other endocrine, nutritional and metabolic disease 12/22/2015    History of thyroid disease    Raynaud's syndrome without gangrene 12/22/2015    Raynauds disease         SURGICAL HISTORY       Past Surgical History:   Procedure Laterality Date    CAROTID ENDARTERECTOMY  12/11/2021    Carotid Thromboendarterectomy    KNEE SURGERY  09/12/2019    Knee Surgery    OTHER SURGICAL HISTORY  01/19/2022    Kidney transplantation    OTHER SURGICAL HISTORY  09/22/2020    Transcath Intravascular Stent Placement Percutaneous Femoral    OTHER SURGICAL HISTORY  01/19/2022    Peritoneal Dialysis Catheter    TOTAL THYROIDECTOMY  12/11/2021    Thyroid Surgery Total Thyroidectomy         CURRENT MEDICATIONS       Previous Medications    AMLODIPINE (NORVASC) 10 MG TABLET    Take 1 tablet (10 mg) by mouth once daily.    APIXABAN (ELIQUIS) 2.5 MG TABLET    TAKE 1 TABLET BY MOUTH TWICE A DAY    EMPAGLIFLOZIN (JARDIANCE) 10 MG    Take 1 tablet (10 mg) by mouth once daily.    FAMOTIDINE (PEPCID) 20 MG TABLET    TAKE 1 TABLET BY MOUTH EVERY DAY AS DIRECTED    FLUTICASONE (FLONASE) 50 MCG/ACTUATION NASAL SPRAY    2 sprays by Does not apply route once daily as needed for allergies.    LEVOTHYROXINE (SYNTHROID, LEVOXYL) 137 MCG TABLET    Take 1 tablet (137 mcg) by mouth once daily.    LOSARTAN (COZAAR) 25 MG TABLET    TAKE 1 TABLET BY MOUTH EVERY DAY    LOSARTAN (COZAAR) 50 MG TABLET    Take 1.5 tablets (75 mg) by mouth once daily.    METOPROLOL TARTRATE (LOPRESSOR) 50 MG TABLET    TAKE 1 TABLET BY MOUTH TWICE A DAY    MULTIVITAMIN TABLET    Take 1 tablet by mouth once daily.    MYCOPHENOLATE (CELLCEPT) 250 MG CAPSULE    TAKE 3 CAPSULES BY MOUTH TWICE A DAY    NITROGLYCERIN (NITROSTAT) 0.4 MG SL TABLET    Place 1 tablet (0.4 mg) under the tongue every 5 minutes if needed for chest pain.    PRAVASTATIN (PRAVACHOL) 40 MG TABLET    TAKE 1 TABLET BY MOUTH EVERYDAY AT BEDTIME    PREDNISONE (DELTASONE) 5 MG TABLET    TAKE 1 TABLET BY MOUTH  EVERY DAY    TACROLIMUS ER (ENVARSUS XR) 0.75 MG TABLET ER    Take 2 tablets (1.5 mg) by mouth once daily.    TAMSULOSIN (FLOMAX) 0.4 MG 24 HR CAPSULE    Take 1 capsule (0.4 mg) by mouth once daily at bedtime.    TORSEMIDE (DEMADEX) 20 MG TABLET    Take 1 tablet (20 mg) by mouth once daily.       ALLERGIES     Lovastatin, Simvastatin, Adhesive, and Naproxen    FAMILY HISTORY     No family history on file.       SOCIAL HISTORY       Social History     Socioeconomic History    Marital status:    Tobacco Use    Smoking status: Former     Types: Cigarettes    Smokeless tobacco: Never   Substance and Sexual Activity    Alcohol use: Yes     Comment: 1 per year    Drug use: Never    Sexual activity: Defer     Social Drivers of Health     Financial Resource Strain: Low Risk  (9/15/2021)    Received from [facility]    Overall Financial Resource Strain (CARDIA)     Difficulty of Paying Living Expenses: Not hard at all   Food Insecurity: No Food Insecurity (9/15/2021)    Received from [facility]    Hunger Vital Sign     Worried About Running Out of Food in the Last Year: Never true     Ran Out of Food in the Last Year: Never true   Transportation Needs: No Transportation Needs (9/15/2021)    Received from [facility]    PRAPARE - Transportation     Lack of Transportation (Medical): No     Lack of Transportation (Non-Medical): No   Physical Activity: Inactive (9/15/2021)    Received from [facility]    Exercise Vital Sign     Days of Exercise per Week: 0 days     Minutes of Exercise per Session: 0 min   Stress: No Stress Concern Present (9/15/2021)    Received from [facility]    [place] of Occupational [employer] - Occupational Stress Questionnaire     Feeling of Stress : Not at all   Social Connections: Socially Integrated (9/15/2021)    Received from [facility]    Social  Connection and Isolation Panel [NHANES]     Frequency of Communication with Friends and Family: Three times a week     Frequency of Social Gatherings with Friends and Family: Once a week     Attends [employer] Services: 1 to 4 times per year     Active Member of Clubs or Organizations: Yes     Attends Club or Organization Meetings: 1 to 4 times per year     Marital Status:    Housing Stability: Low Risk  (9/15/2021)    [facility]    Housing Stability Vital Sign     Unable to Pay for Housing in the Last Year: No     Number of Places Lived in the Last Year: 1     Unstable Housing in the Last Year: No       SCREENINGS                        PHYSICAL EXAM    (up to 7 for level 4, 8 or more for level 5)     ED Triage Vitals [03/26/25 1613]   Temperature Heart Rate Respirations BP   37.5 °C (99.5 °F) 58 16 (!) 194/84      Pulse Ox Temp Source Heart Rate Source Patient Position   95 % Temporal -- --      BP Location FiO2 (%)     Right arm --       Physical Exam  Vitals and nursing note reviewed.   Constitutional:       Appearance: Normal appearance.   HENT:      Head: Normocephalic and atraumatic.   Eyes:      General: No scleral icterus.        Right eye: No discharge.         Left eye: No discharge.      Conjunctiva/sclera: Conjunctivae normal.   Cardiovascular:      Rate and Rhythm: Normal rate and regular rhythm.      Comments: Doppler dorsalis pedis pulse present in left foot  Right popliteal pulse present with Doppler.  No Doppler pulses on dorsalis pedis or right posterior tibial.  Pulmonary:      Effort: Pulmonary effort is normal. No respiratory distress.   Abdominal:      General: There is no distension.   Musculoskeletal:         General: Swelling present.      Cervical back: Normal range of motion.      Comments: Right foot swelling   Skin:     General: Skin is warm and dry.      Comments: Right foot is cool, mottled compared to the left.   Neurological:      Mental  Status: He is alert. Mental status is at baseline.   Psychiatric:         Mood and Affect: Mood normal.         Behavior: Behavior normal.          DIAGNOSTIC RESULTS     LABS:  Labs Reviewed   CBC WITH AUTO DIFFERENTIAL - Abnormal       Result Value    WBC 8.9      nRBC 0.0      RBC 5.92 (*)     Hemoglobin 14.9      Hematocrit 48.7      MCV 82      MCH 25.2 (*)     MCHC 30.6 (*)     RDW 13.2      Platelets 242      Neutrophils % 80.3      Immature Granulocytes %, Automated 0.3      Lymphocytes % 11.1      Monocytes % 7.6      Eosinophils % 0.3      Basophils % 0.4      Neutrophils Absolute 7.16 (*)     Immature Granulocytes Absolute, Automated 0.03      Lymphocytes Absolute 0.99      Monocytes Absolute 0.68      Eosinophils Absolute 0.03      Basophils Absolute 0.04     COMPREHENSIVE METABOLIC PANEL - Abnormal    Glucose 128 (*)     Sodium 136      Potassium 4.7      Chloride 99      Bicarbonate 24      Anion Gap 18      Urea Nitrogen 26 (*)     Creatinine 2.12 (*)     eGFR 32 (*)     Calcium 10.1      Albumin 5.1 (*)     Alkaline Phosphatase 78      Total Protein 8.7 (*)     AST 22      Bilirubin, Total 0.9      ALT 19     PROTIME-INR - Abnormal    Protime 12.6 (*)     INR 1.1     LACTATE - Abnormal    Lactate 2.8 (*)     Narrative:     Venipuncture immediately after or during the administration of Metamizole may lead to falsely low results. Testing should be performed immediately prior to Metamizole dosing.   LACTATE - Normal    Lactate 1.2      Narrative:     Venipuncture immediately after or during the administration of Metamizole may lead to falsely low results. Testing should be performed immediately prior to Metamizole dosing.   TYPE AND SCREEN    ABO TYPE O      Rh TYPE POS      ANTIBODY SCREEN NEG         All other labs were within normal range or not returned as of this dictation.    Imaging  CT angio aorta and bilateral iliofemoral runoff including without contrast if performed   Final Result   RIGHT  LOWER EXTREMITY:        - Right Common Iliac Artery: Mild fusiform dilatation measuring 1.9   cm. Atherosclerosis without significant stenosis. - Right External   Iliac Artery: Moderate-severe stenosis at the origin and   mild-to-moderate stenosis distally. - Right Internal Iliac Artery:   Severe stenosis at the origin and then chronic occlusion thereafter   with reconstitution at the gluteal arteries.        - Right Common Femoral Artery: Varying degrees of mild to moderate   stenosis. - Right Profunda Artery: Severe stenosis at the origin.   - Right Superficial Femoral Artery: Diffuse severe stenosis in the   proximal aspect which then progresses to complete occlusion distally.   There is an reconstitution as enters the popliteal fossa but remains   severely narrowed. - Right Popliteal Artery: Moderate severe stenosis   just above the tibioperoneal trunk. - Right Anterior Tibial,   Posterior Tibial, Peroneal Arteries: Limited evaluation due heavy   calcification. However the anterior tibial artery is occluded and the   posterior tibial artery is patent into the foot. The peroneal artery   is intermittently occluded.        LEFT LOWER EXTREMITY:        - Left Common Iliac Artery: Patent stent.   - Left External Iliac Artery: Patent stent.   - Left Internal Iliac Artery: Chronically occluded, distally   reconstitute at the gluteal arteries.        - Left Common Femoral Artery: Postsurgical change at the common   femoral artery. No hemodynamically significant stenosis. - Left   Profunda Artery:  No hemodynamically significant stenosis. - Left   Superficial Femoral Artery: Severe diffuse calcific atherosclerosis   with the ventral chronic occlusion in the mid thigh, remaining   completely occluded to the level of the popliteal artery. - Left   Popliteal Artery: Moderate to severe atherosclerosis with areas of   greater than 75% stenosis proximally. - Left Anterior Tibial,   Posterior Tibial, Peroneal Arteries:  Intermittent occlusion of the   anterior tibial artery and remains non-opacified into the foot on   arterial images but then fills in on delayed images. The left   posterior tibial and peroneal arteries are patent into the foot.        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   This is superimposed upon a fusiform infrarenal abdominal aortic   aneurysm with diameter of ~ 3.3 cm x 2.9 cm.        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        Cirrhotic appearing liver with 2 hypervascular foci that could   represent shunts (favored) however, recommend nonemergent MRI liver   for further evaluation given elevated risk factors.        Renal transplant right lower quadrant without evidence of acute   abnormality.        MACRO:   Critical Finding:  See findings. Notification was initiated on   3/26/2025 at 9:01 pm by  Sid Gonzalez.  (**-YCF-**) Instructions:        Signed by: Sid Gonzalez 3/26/2025 9:01 PM   Dictation workstation:   [id number]           Procedures  Critical Care    Performed by: Torres Grewal DO  Authorized by: Sony Cortez DO    Critical care provider statement:     Critical care time (minutes):  35    Critical care time was exclusive of:  Separately billable procedures and treating other patients    Critical care was time spent personally by me on the following activities:  Blood draw for specimens, discussions with consultants, ordering and performing treatments and interventions, ordering and review of laboratory studies, ordering and review of radiographic studies, re-evaluation of patient's condition, evaluation of patient's response to treatment, examination of patient, obtaining history from patient or surrogate and development of treatment plan with patient or surrogate    Care discussed with: accepting provider at another facility     Comments:      Patient presents with a cold, mottled, right foot with no pulses able to be palpated on Doppler, spoke with vascular team downtown, patient ultimately required IV heparin drip.  Required multiple phone calls with multiple consultants as well.       EMERGENCY DEPARTMENT COURSE/MDM:     ED Course as of 03/26/25 2323   Wed Mar 26, 2025   2133 Spoke with Dr. Gant of vascular surgery who reviewed imaging, did recommend admission, did recommend he reach out to the on-call Dr. Pham [RD]   2155 Dr. Pham of vascular surgery is going to reach the patient's vascular surgeon and get back to us [RD]   2218 Dr. Burgos recommended transfer to [facility] to ED, recommended heparin drip no bolus. [RD]   2222 Dr. Kennedy accepted patient for transfer [RD]      ED Course User Index  [RD] Torres Grewal, DO         Diagnoses as of 03/26/25 2323   Ischemic foot        Medical Decision Making  74-year-old male comes emergency room for right foot pain getting worse over the last couple weeks, seen here 2 weeks ago, following up with vascular for right leg ischemia.  On exam his right foot is mottled, cool, is unable to Doppler pulses here today, it was cool and mottled compared to the left.  We were able to Doppler popliteal pulse on the right.  Did order CT angio abdominal aorta with runoff.  Patient has a history of renal transplant, he did consent to a contrasted study given possible limb ischemia.  Did not order this acute life or limb threatening given this is been festering over the last few weeks and seems to be partially chronic in nature.    On my independent review of labs, CBC shows normal white count, hemoglobin and platelets, chemistry shows stable, chronic renal insufficiency otherwise electrolytes and hepatic function is normal.  Lactic elevated initially, he is given a bolus of fluids, it did downtrend on reassessment.  He is on Eliquis, took his dose this morning.  CT angio abdominal aorta did show chronic  occlusions, spoke with the vascular doctor that the patient sees in the office she recommended transferring to [facility], contacting the [facility] on-call Dr.  [facility] on-call  Did recommend ED to ED transfer, recommended heparin drip with no bolus, holding Eliquis.  Patient was made n.p.o. at midnight here, given morphine and Zofran for pain and nausea, started on heparin drip, will be transferred downtown to the ER for further management.        Patient and or family in agreement and understanding of treatment plan.  All questions answered.      I reviewed the case with the attending ED physician. The attending ED physician agrees with the plan. Patient and/or patient´s representative was counseled regarding labs, imaging, likely diagnosis, and plan. All questions were answered.    ED Medications administered this visit:    Medications   heparin 25,000 Units in dextrose 5% 250 mL (100 Units/mL) infusion (premix) (has no administration in time range)   ondansetron (Zofran) injection 4 mg (4 mg intravenous Given 3/26/25 1803)   morphine injection 4 mg (4 mg intravenous Given 3/26/25 1803)   lactated Ringer's bolus 1,000 mL (0 mL intravenous Stopped 3/26/25 2054)   iohexol (OMNIPaque) 350 mg iodine/mL solution 90 mL (90 mL intravenous Given 3/26/25 2001)       Final Impression:   1. Ischemic foot          (Please note that portions of this note were completed with a voice recognition program.  Efforts were made to edit the dictations but occasionally words are mis-transcribed.)     Torres Grewal DO  Resident  03/26/25 4068

## 2025-03-27 ENCOUNTER — APPOINTMENT (OUTPATIENT)
Dept: VASCULAR MEDICINE | Facility: HOSPITAL | Age: 75
DRG: 271 | End: 2025-03-27
Payer: MEDICARE

## 2025-03-27 ENCOUNTER — HOSPITAL ENCOUNTER (INPATIENT)
Facility: HOSPITAL | Age: 75
End: 2025-03-27
Admitting: SURGERY
Payer: MEDICARE

## 2025-03-27 VITALS
TEMPERATURE: 98.5 F | OXYGEN SATURATION: 95 % | DIASTOLIC BLOOD PRESSURE: 80 MMHG | HEIGHT: 71 IN | RESPIRATION RATE: 20 BRPM | SYSTOLIC BLOOD PRESSURE: 163 MMHG | WEIGHT: 180 LBS | BODY MASS INDEX: 25.2 KG/M2 | HEART RATE: 77 BPM

## 2025-03-27 DIAGNOSIS — R94.31 ABNORMAL ELECTROCARDIOGRAM (ECG) (EKG): ICD-10-CM

## 2025-03-27 DIAGNOSIS — I99.8 LOWER LIMB ISCHEMIA: ICD-10-CM

## 2025-03-27 DIAGNOSIS — Z01.810 PREOP CARDIOVASCULAR EXAM: ICD-10-CM

## 2025-03-27 DIAGNOSIS — Z01.818 ENCOUNTER FOR OTHER PREPROCEDURAL EXAMINATION: ICD-10-CM

## 2025-03-27 DIAGNOSIS — I25.2 HISTORY OF NON-ST ELEVATION MYOCARDIAL INFARCTION (NSTEMI): ICD-10-CM

## 2025-03-27 DIAGNOSIS — I50.30 HEART FAILURE WITH PRESERVED EJECTION FRACTION, UNSPECIFIED HF CHRONICITY: ICD-10-CM

## 2025-03-27 DIAGNOSIS — Z86.79 HISTORY OF CARDIOVASCULAR DISORDER: ICD-10-CM

## 2025-03-27 DIAGNOSIS — N18.9 CHRONIC KIDNEY DISEASE, UNSPECIFIED CKD STAGE: Chronic | ICD-10-CM

## 2025-03-27 DIAGNOSIS — I48.92 ATRIAL FLUTTER, UNSPECIFIED TYPE (MULTI): ICD-10-CM

## 2025-03-27 DIAGNOSIS — N18.6 END-STAGE RENAL DISEASE (MULTI): ICD-10-CM

## 2025-03-27 DIAGNOSIS — G89.18 ACUTE POSTOPERATIVE PAIN: ICD-10-CM

## 2025-03-27 DIAGNOSIS — L97.914: ICD-10-CM

## 2025-03-27 DIAGNOSIS — I46.9 CARDIAC ARREST WITH SUCCESSFUL RESUSCITATION: ICD-10-CM

## 2025-03-27 DIAGNOSIS — Z98.61 HX OF PERCUTANEOUS TRANSLUMINAL CORONARY ANGIOPLASTY: ICD-10-CM

## 2025-03-27 DIAGNOSIS — N18.31 STAGE 3A CHRONIC KIDNEY DISEASE (MULTI): ICD-10-CM

## 2025-03-27 DIAGNOSIS — Z95.5 PRESENCE OF STENT IN CORONARY ARTERY: ICD-10-CM

## 2025-03-27 DIAGNOSIS — I73.9 PAD (PERIPHERAL ARTERY DISEASE) (CMS-HCC): Primary | ICD-10-CM

## 2025-03-27 DIAGNOSIS — I15.1 HYPERTENSION SECONDARY TO OTHER RENAL DISORDERS: ICD-10-CM

## 2025-03-27 LAB
ALBUMIN SERPL BCP-MCNC: 4.3 G/DL (ref 3.4–5)
ALBUMIN SERPL BCP-MCNC: 4.3 G/DL (ref 3.4–5)
ALP SERPL-CCNC: 74 U/L (ref 33–136)
ALT SERPL W P-5'-P-CCNC: 17 U/L (ref 10–52)
ANION GAP SERPL CALC-SCNC: 14 MMOL/L (ref 10–20)
ANION GAP SERPL CALC-SCNC: 14 MMOL/L (ref 10–20)
APTT PPP: 43 SECONDS (ref 26–36)
APTT PPP: 57 SECONDS (ref 26–36)
AST SERPL W P-5'-P-CCNC: 23 U/L (ref 9–39)
BASOPHILS # BLD AUTO: 0.06 X10*3/UL (ref 0–0.1)
BASOPHILS NFR BLD AUTO: 0.7 %
BILIRUB SERPL-MCNC: 0.5 MG/DL (ref 0–1.2)
BUN SERPL-MCNC: 25 MG/DL (ref 6–23)
BUN SERPL-MCNC: 25 MG/DL (ref 6–23)
CALCIUM SERPL-MCNC: 9.4 MG/DL (ref 8.6–10.6)
CALCIUM SERPL-MCNC: 9.5 MG/DL (ref 8.6–10.6)
CHLORIDE SERPL-SCNC: 102 MMOL/L (ref 98–107)
CHLORIDE SERPL-SCNC: 102 MMOL/L (ref 98–107)
CO2 SERPL-SCNC: 25 MMOL/L (ref 21–32)
CO2 SERPL-SCNC: 25 MMOL/L (ref 21–32)
CREAT SERPL-MCNC: 1.91 MG/DL (ref 0.5–1.3)
CREAT SERPL-MCNC: 1.91 MG/DL (ref 0.5–1.3)
EGFRCR SERPLBLD CKD-EPI 2021: 36 ML/MIN/1.73M*2
EGFRCR SERPLBLD CKD-EPI 2021: 36 ML/MIN/1.73M*2
EOSINOPHIL # BLD AUTO: 0.08 X10*3/UL (ref 0–0.4)
EOSINOPHIL NFR BLD AUTO: 1 %
ERYTHROCYTE [DISTWIDTH] IN BLOOD BY AUTOMATED COUNT: 13.2 % (ref 11.5–14.5)
GLUCOSE BLD MANUAL STRIP-MCNC: 118 MG/DL (ref 74–99)
GLUCOSE BLD MANUAL STRIP-MCNC: 131 MG/DL (ref 74–99)
GLUCOSE BLD MANUAL STRIP-MCNC: 144 MG/DL (ref 74–99)
GLUCOSE BLD MANUAL STRIP-MCNC: 86 MG/DL (ref 74–99)
GLUCOSE SERPL-MCNC: 126 MG/DL (ref 74–99)
GLUCOSE SERPL-MCNC: 129 MG/DL (ref 74–99)
HCT VFR BLD AUTO: 41.8 % (ref 41–52)
HGB BLD-MCNC: 13 G/DL (ref 13.5–17.5)
HOLD SPECIMEN: NORMAL
IMM GRANULOCYTES # BLD AUTO: 0.03 X10*3/UL (ref 0–0.5)
IMM GRANULOCYTES NFR BLD AUTO: 0.4 % (ref 0–0.9)
INR PPP: 1.2 (ref 0.9–1.1)
LYMPHOCYTES # BLD AUTO: 1.18 X10*3/UL (ref 0.8–3)
LYMPHOCYTES NFR BLD AUTO: 14.5 %
MCH RBC QN AUTO: 25 PG (ref 26–34)
MCHC RBC AUTO-ENTMCNC: 31.1 G/DL (ref 32–36)
MCV RBC AUTO: 80 FL (ref 80–100)
MONOCYTES # BLD AUTO: 0.82 X10*3/UL (ref 0.05–0.8)
MONOCYTES NFR BLD AUTO: 10.1 %
NEUTROPHILS # BLD AUTO: 5.95 X10*3/UL (ref 1.6–5.5)
NEUTROPHILS NFR BLD AUTO: 73.3 %
NRBC BLD-RTO: 0 /100 WBCS (ref 0–0)
PHOSPHATE SERPL-MCNC: 3.2 MG/DL (ref 2.5–4.9)
PLATELET # BLD AUTO: 228 X10*3/UL (ref 150–450)
POTASSIUM SERPL-SCNC: 4.5 MMOL/L (ref 3.5–5.3)
POTASSIUM SERPL-SCNC: 4.7 MMOL/L (ref 3.5–5.3)
PROT SERPL-MCNC: 7.1 G/DL (ref 6.4–8.2)
PROTHROMBIN TIME: 13 SECONDS (ref 9.8–12.4)
RBC # BLD AUTO: 5.21 X10*6/UL (ref 4.5–5.9)
SODIUM SERPL-SCNC: 136 MMOL/L (ref 136–145)
SODIUM SERPL-SCNC: 136 MMOL/L (ref 136–145)
UFH PPP CHRO-ACNC: 0.5 IU/ML
UFH PPP CHRO-ACNC: 0.7 IU/ML
WBC # BLD AUTO: 8.1 X10*3/UL (ref 4.4–11.3)

## 2025-03-27 PROCEDURE — 93970 EXTREMITY STUDY: CPT | Performed by: SURGERY

## 2025-03-27 PROCEDURE — 85025 COMPLETE CBC W/AUTO DIFF WBC: CPT

## 2025-03-27 PROCEDURE — 93922 UPR/L XTREMITY ART 2 LEVELS: CPT | Performed by: SURGERY

## 2025-03-27 PROCEDURE — 85610 PROTHROMBIN TIME: CPT

## 2025-03-27 PROCEDURE — 85520 HEPARIN ASSAY: CPT

## 2025-03-27 PROCEDURE — 96374 THER/PROPH/DIAG INJ IV PUSH: CPT

## 2025-03-27 PROCEDURE — 85730 THROMBOPLASTIN TIME PARTIAL: CPT

## 2025-03-27 PROCEDURE — 2500000002 HC RX 250 W HCPCS SELF ADMINISTERED DRUGS (ALT 637 FOR MEDICARE OP, ALT 636 FOR OP/ED)

## 2025-03-27 PROCEDURE — 36415 COLL VENOUS BLD VENIPUNCTURE: CPT

## 2025-03-27 PROCEDURE — 82947 ASSAY GLUCOSE BLOOD QUANT: CPT

## 2025-03-27 PROCEDURE — 93970 EXTREMITY STUDY: CPT

## 2025-03-27 PROCEDURE — 1200000002 HC GENERAL ROOM WITH TELEMETRY DAILY

## 2025-03-27 PROCEDURE — 99222 1ST HOSP IP/OBS MODERATE 55: CPT | Performed by: STUDENT IN AN ORGANIZED HEALTH CARE EDUCATION/TRAINING PROGRAM

## 2025-03-27 PROCEDURE — 2500000004 HC RX 250 GENERAL PHARMACY W/ HCPCS (ALT 636 FOR OP/ED)

## 2025-03-27 PROCEDURE — 2500000001 HC RX 250 WO HCPCS SELF ADMINISTERED DRUGS (ALT 637 FOR MEDICARE OP)

## 2025-03-27 PROCEDURE — 99223 1ST HOSP IP/OBS HIGH 75: CPT | Performed by: SURGERY

## 2025-03-27 PROCEDURE — 80069 RENAL FUNCTION PANEL: CPT

## 2025-03-27 PROCEDURE — 99285 EMERGENCY DEPT VISIT HI MDM: CPT

## 2025-03-27 PROCEDURE — 99285 EMERGENCY DEPT VISIT HI MDM: CPT | Mod: 25

## 2025-03-27 PROCEDURE — 93922 UPR/L XTREMITY ART 2 LEVELS: CPT

## 2025-03-27 PROCEDURE — 99222 1ST HOSP IP/OBS MODERATE 55: CPT | Performed by: INTERNAL MEDICINE

## 2025-03-27 RX ORDER — PREDNISONE 5 MG/1
5 TABLET ORAL DAILY
Status: DISCONTINUED | OUTPATIENT
Start: 2025-03-27 | End: 2025-03-28

## 2025-03-27 RX ORDER — ONDANSETRON HYDROCHLORIDE 2 MG/ML
4 INJECTION, SOLUTION INTRAVENOUS EVERY 8 HOURS PRN
Status: DISCONTINUED | OUTPATIENT
Start: 2025-03-27 | End: 2025-03-28

## 2025-03-27 RX ORDER — MYCOPHENOLATE MOFETIL 250 MG/1
500 CAPSULE ORAL
Status: DISCONTINUED | OUTPATIENT
Start: 2025-03-27 | End: 2025-03-28

## 2025-03-27 RX ORDER — NITROGLYCERIN 0.4 MG/1
0.4 TABLET SUBLINGUAL EVERY 5 MIN PRN
Status: DISCONTINUED | OUTPATIENT
Start: 2025-03-27 | End: 2025-03-28

## 2025-03-27 RX ORDER — ACETAMINOPHEN 325 MG/1
650 TABLET ORAL EVERY 4 HOURS PRN
Status: DISCONTINUED | OUTPATIENT
Start: 2025-03-27 | End: 2025-03-28

## 2025-03-27 RX ORDER — HEPARIN SODIUM 10000 [USP'U]/100ML
0-4500 INJECTION, SOLUTION INTRAVENOUS CONTINUOUS
Status: DISCONTINUED | OUTPATIENT
Start: 2025-03-27 | End: 2025-04-10

## 2025-03-27 RX ORDER — METHOCARBAMOL 500 MG/1
500 TABLET, FILM COATED ORAL EVERY 8 HOURS PRN
COMMUNITY

## 2025-03-27 RX ORDER — NALOXONE HYDROCHLORIDE 0.4 MG/ML
0.2 INJECTION, SOLUTION INTRAMUSCULAR; INTRAVENOUS; SUBCUTANEOUS EVERY 5 MIN PRN
Status: DISCONTINUED | OUTPATIENT
Start: 2025-03-27 | End: 2025-03-28

## 2025-03-27 RX ORDER — SODIUM CHLORIDE, SODIUM LACTATE, POTASSIUM CHLORIDE, CALCIUM CHLORIDE 600; 310; 30; 20 MG/100ML; MG/100ML; MG/100ML; MG/100ML
100 INJECTION, SOLUTION INTRAVENOUS CONTINUOUS
Status: ACTIVE | OUTPATIENT
Start: 2025-03-27 | End: 2025-03-28

## 2025-03-27 RX ORDER — OXYCODONE HYDROCHLORIDE 5 MG/1
5 TABLET ORAL EVERY 6 HOURS PRN
Status: DISCONTINUED | OUTPATIENT
Start: 2025-03-27 | End: 2025-03-28

## 2025-03-27 RX ORDER — METOPROLOL TARTRATE 50 MG/1
50 TABLET ORAL 2 TIMES DAILY
Status: DISCONTINUED | OUTPATIENT
Start: 2025-03-27 | End: 2025-03-28

## 2025-03-27 RX ORDER — PRAVASTATIN SODIUM 20 MG/1
40 TABLET ORAL NIGHTLY
Status: DISCONTINUED | OUTPATIENT
Start: 2025-03-27 | End: 2025-03-28

## 2025-03-27 RX ORDER — FLUTICASONE PROPIONATE 50 MCG
2 SPRAY, SUSPENSION (ML) NASAL DAILY PRN
Status: DISCONTINUED | OUTPATIENT
Start: 2025-03-27 | End: 2025-03-28

## 2025-03-27 RX ORDER — INSULIN LISPRO 100 [IU]/ML
0-5 INJECTION, SOLUTION INTRAVENOUS; SUBCUTANEOUS EVERY 4 HOURS
Status: DISCONTINUED | OUTPATIENT
Start: 2025-03-27 | End: 2025-03-28

## 2025-03-27 RX ORDER — TAMSULOSIN HYDROCHLORIDE 0.4 MG/1
0.4 CAPSULE ORAL NIGHTLY
Status: DISCONTINUED | OUTPATIENT
Start: 2025-03-27 | End: 2025-03-28

## 2025-03-27 RX ORDER — ONDANSETRON 4 MG/1
4 TABLET, FILM COATED ORAL EVERY 8 HOURS PRN
Status: DISCONTINUED | OUTPATIENT
Start: 2025-03-27 | End: 2025-03-28

## 2025-03-27 RX ORDER — FAMOTIDINE 20 MG/1
20 TABLET, FILM COATED ORAL DAILY
Status: DISCONTINUED | OUTPATIENT
Start: 2025-03-27 | End: 2025-03-28

## 2025-03-27 RX ORDER — MYCOPHENOLATE MOFETIL 250 MG/1
750 CAPSULE ORAL
Status: DISCONTINUED | OUTPATIENT
Start: 2025-03-27 | End: 2025-03-27

## 2025-03-27 RX ADMIN — PREDNISONE 5 MG: 5 TABLET ORAL at 08:46

## 2025-03-27 RX ADMIN — SODIUM CHLORIDE, POTASSIUM CHLORIDE, SODIUM LACTATE AND CALCIUM CHLORIDE 100 ML/HR: 600; 310; 30; 20 INJECTION, SOLUTION INTRAVENOUS at 05:47

## 2025-03-27 RX ADMIN — LEVOTHYROXINE SODIUM 137 MCG: 137 TABLET ORAL at 11:23

## 2025-03-27 RX ADMIN — TACROLIMUS 1.5 MG: 0.75 TABLET, EXTENDED RELEASE ORAL at 08:41

## 2025-03-27 RX ADMIN — METOPROLOL TARTRATE 50 MG: 50 TABLET, FILM COATED ORAL at 08:41

## 2025-03-27 RX ADMIN — METOPROLOL TARTRATE 50 MG: 50 TABLET, FILM COATED ORAL at 20:27

## 2025-03-27 RX ADMIN — OXYCODONE 5 MG: 5 TABLET ORAL at 16:07

## 2025-03-27 RX ADMIN — MYCOPHENOLATE MOFETIL 750 MG: 250 CAPSULE ORAL at 05:50

## 2025-03-27 RX ADMIN — FAMOTIDINE 20 MG: 20 TABLET ORAL at 18:46

## 2025-03-27 RX ADMIN — MYCOPHENOLATE MOFETIL 500 MG: 250 CAPSULE ORAL at 18:46

## 2025-03-27 RX ADMIN — ACETAMINOPHEN 650 MG: 325 TABLET ORAL at 08:41

## 2025-03-27 RX ADMIN — PRAVASTATIN SODIUM 40 MG: 40 TABLET ORAL at 20:28

## 2025-03-27 RX ADMIN — TAMSULOSIN HYDROCHLORIDE 0.4 MG: 0.4 CAPSULE ORAL at 20:27

## 2025-03-27 RX ADMIN — HEPARIN SODIUM 1500 UNITS/HR: 10000 INJECTION, SOLUTION INTRAVENOUS at 02:22

## 2025-03-27 SDOH — SOCIAL STABILITY: SOCIAL INSECURITY: ARE THERE ANY APPARENT SIGNS OF INJURIES/BEHAVIORS THAT COULD BE RELATED TO ABUSE/NEGLECT?: NO

## 2025-03-27 SDOH — SOCIAL STABILITY: SOCIAL INSECURITY
WITHIN THE LAST YEAR, HAVE YOU BEEN RAPED OR FORCED TO HAVE ANY KIND OF SEXUAL ACTIVITY BY YOUR PARTNER OR EX-PARTNER?: NO

## 2025-03-27 SDOH — SOCIAL STABILITY: SOCIAL INSECURITY: WITHIN THE LAST YEAR, HAVE YOU BEEN HUMILIATED OR EMOTIONALLY ABUSED IN OTHER WAYS BY YOUR PARTNER OR EX-PARTNER?: NO

## 2025-03-27 SDOH — ECONOMIC STABILITY: TRANSPORTATION INSECURITY: IN THE PAST 12 MONTHS, HAS LACK OF TRANSPORTATION KEPT YOU FROM MEDICAL APPOINTMENTS OR FROM GETTING MEDICATIONS?: NO

## 2025-03-27 SDOH — HEALTH STABILITY: MENTAL HEALTH
DO YOU FEEL STRESS - TENSE, RESTLESS, NERVOUS, OR ANXIOUS, OR UNABLE TO SLEEP AT NIGHT BECAUSE YOUR MIND IS TROUBLED ALL THE TIME - THESE DAYS?: NOT AT ALL

## 2025-03-27 SDOH — SOCIAL STABILITY: SOCIAL INSECURITY: ABUSE: ADULT

## 2025-03-27 SDOH — ECONOMIC STABILITY: FOOD INSECURITY: HOW HARD IS IT FOR YOU TO PAY FOR THE VERY BASICS LIKE FOOD, HOUSING, MEDICAL CARE, AND HEATING?: NOT HARD AT ALL

## 2025-03-27 SDOH — SOCIAL STABILITY: SOCIAL INSECURITY: WITHIN THE LAST YEAR, HAVE YOU BEEN AFRAID OF YOUR PARTNER OR EX-PARTNER?: NO

## 2025-03-27 SDOH — SOCIAL STABILITY: SOCIAL INSECURITY
WITHIN THE LAST YEAR, HAVE YOU BEEN KICKED, HIT, SLAPPED, OR OTHERWISE PHYSICALLY HURT BY YOUR PARTNER OR EX-PARTNER?: NO

## 2025-03-27 SDOH — SOCIAL STABILITY: SOCIAL INSECURITY: WERE YOU ABLE TO COMPLETE ALL THE BEHAVIORAL HEALTH SCREENINGS?: YES

## 2025-03-27 SDOH — ECONOMIC STABILITY: INCOME INSECURITY: IN THE PAST 12 MONTHS HAS THE ELECTRIC, GAS, OIL, OR WATER COMPANY THREATENED TO SHUT OFF SERVICES IN YOUR HOME?: NO

## 2025-03-27 SDOH — ECONOMIC STABILITY: FOOD INSECURITY: WITHIN THE PAST 12 MONTHS, YOU WORRIED THAT YOUR FOOD WOULD RUN OUT BEFORE YOU GOT THE MONEY TO BUY MORE.: NEVER TRUE

## 2025-03-27 SDOH — HEALTH STABILITY: PHYSICAL HEALTH
HOW OFTEN DO YOU NEED TO HAVE SOMEONE HELP YOU WHEN YOU READ INSTRUCTIONS, PAMPHLETS, OR OTHER WRITTEN MATERIAL FROM YOUR DOCTOR OR PHARMACY?: NEVER

## 2025-03-27 SDOH — ECONOMIC STABILITY: HOUSING INSECURITY: IN THE LAST 12 MONTHS, WAS THERE A TIME WHEN YOU WERE NOT ABLE TO PAY THE MORTGAGE OR RENT ON TIME?: NO

## 2025-03-27 SDOH — SOCIAL STABILITY: SOCIAL INSECURITY: ARE YOU OR HAVE YOU BEEN THREATENED OR ABUSED PHYSICALLY, EMOTIONALLY, OR SEXUALLY BY ANYONE?: NO

## 2025-03-27 SDOH — HEALTH STABILITY: PHYSICAL HEALTH: ON AVERAGE, HOW MANY DAYS PER WEEK DO YOU ENGAGE IN MODERATE TO STRENUOUS EXERCISE (LIKE A BRISK WALK)?: 1 DAY

## 2025-03-27 SDOH — SOCIAL STABILITY: SOCIAL INSECURITY: DO YOU FEEL ANYONE HAS EXPLOITED OR TAKEN ADVANTAGE OF YOU FINANCIALLY OR OF YOUR PERSONAL PROPERTY?: NO

## 2025-03-27 SDOH — ECONOMIC STABILITY: FOOD INSECURITY: WITHIN THE PAST 12 MONTHS, THE FOOD YOU BOUGHT JUST DIDN'T LAST AND YOU DIDN'T HAVE MONEY TO GET MORE.: NEVER TRUE

## 2025-03-27 SDOH — HEALTH STABILITY: PHYSICAL HEALTH: ON AVERAGE, HOW MANY MINUTES DO YOU ENGAGE IN EXERCISE AT THIS LEVEL?: 30 MIN

## 2025-03-27 SDOH — ECONOMIC STABILITY: HOUSING INSECURITY: IN THE PAST 12 MONTHS, HOW MANY TIMES HAVE YOU MOVED WHERE YOU WERE LIVING?: 0

## 2025-03-27 SDOH — ECONOMIC STABILITY: HOUSING INSECURITY: AT ANY TIME IN THE PAST 12 MONTHS, WERE YOU HOMELESS OR LIVING IN A SHELTER (INCLUDING NOW)?: NO

## 2025-03-27 SDOH — SOCIAL STABILITY: SOCIAL INSECURITY: DO YOU FEEL UNSAFE GOING BACK TO THE PLACE WHERE YOU ARE LIVING?: NO

## 2025-03-27 SDOH — SOCIAL STABILITY: SOCIAL INSECURITY: DOES ANYONE TRY TO KEEP YOU FROM HAVING/CONTACTING OTHER FRIENDS OR DOING THINGS OUTSIDE YOUR HOME?: NO

## 2025-03-27 SDOH — SOCIAL STABILITY: SOCIAL INSECURITY: HAVE YOU HAD THOUGHTS OF HARMING ANYONE ELSE?: NO

## 2025-03-27 SDOH — SOCIAL STABILITY: SOCIAL INSECURITY: HAS ANYONE EVER THREATENED TO HURT YOUR FAMILY OR YOUR PETS?: NO

## 2025-03-27 SDOH — SOCIAL STABILITY: SOCIAL INSECURITY: HAVE YOU HAD ANY THOUGHTS OF HARMING ANYONE ELSE?: NO

## 2025-03-27 ASSESSMENT — COGNITIVE AND FUNCTIONAL STATUS - GENERAL
DAILY ACTIVITIY SCORE: 24
MOBILITY SCORE: 24
PATIENT BASELINE BEDBOUND: NO

## 2025-03-27 ASSESSMENT — PAIN SCALES - GENERAL
PAINLEVEL_OUTOF10: 2
PAINLEVEL_OUTOF10: 4
PAINLEVEL_OUTOF10: 5 - MODERATE PAIN
PAINLEVEL_OUTOF10: 5 - MODERATE PAIN
PAINLEVEL_OUTOF10: 2
PAINLEVEL_OUTOF10: 9

## 2025-03-27 ASSESSMENT — PATIENT HEALTH QUESTIONNAIRE - PHQ9
1. LITTLE INTEREST OR PLEASURE IN DOING THINGS: NOT AT ALL
SUM OF ALL RESPONSES TO PHQ9 QUESTIONS 1 & 2: 0
2. FEELING DOWN, DEPRESSED OR HOPELESS: NOT AT ALL

## 2025-03-27 ASSESSMENT — PAIN DESCRIPTION - LOCATION
LOCATION: FOOT
LOCATION: LEG
LOCATION: FOOT

## 2025-03-27 ASSESSMENT — PAIN DESCRIPTION - ORIENTATION
ORIENTATION: RIGHT
ORIENTATION: RIGHT

## 2025-03-27 ASSESSMENT — COLUMBIA-SUICIDE SEVERITY RATING SCALE - C-SSRS
1. IN THE PAST MONTH, HAVE YOU WISHED YOU WERE DEAD OR WISHED YOU COULD GO TO SLEEP AND NOT WAKE UP?: NO
2. HAVE YOU ACTUALLY HAD ANY THOUGHTS OF KILLING YOURSELF?: NO
6. HAVE YOU EVER DONE ANYTHING, STARTED TO DO ANYTHING, OR PREPARED TO DO ANYTHING TO END YOUR LIFE?: NO
6. HAVE YOU EVER DONE ANYTHING, STARTED TO DO ANYTHING, OR PREPARED TO DO ANYTHING TO END YOUR LIFE?: NO
2. HAVE YOU ACTUALLY HAD ANY THOUGHTS OF KILLING YOURSELF?: NO

## 2025-03-27 ASSESSMENT — PAIN DESCRIPTION - DESCRIPTORS: DESCRIPTORS: ACHING;SHARP;THROBBING

## 2025-03-27 ASSESSMENT — ACTIVITIES OF DAILY LIVING (ADL)
LACK_OF_TRANSPORTATION: NO
FEEDING YOURSELF: INDEPENDENT
GROOMING: INDEPENDENT
HEARING - LEFT EAR: FUNCTIONAL
TOILETING: INDEPENDENT
LACK_OF_TRANSPORTATION: NO
PATIENT'S MEMORY ADEQUATE TO SAFELY COMPLETE DAILY ACTIVITIES?: NO
WALKS IN HOME: INDEPENDENT
ADEQUATE_TO_COMPLETE_ADL: NO
DRESSING YOURSELF: INDEPENDENT
HEARING - RIGHT EAR: FUNCTIONAL
JUDGMENT_ADEQUATE_SAFELY_COMPLETE_DAILY_ACTIVITIES: NO
BATHING: INDEPENDENT

## 2025-03-27 ASSESSMENT — LIFESTYLE VARIABLES
AUDIT-C TOTAL SCORE: 0
HOW MANY STANDARD DRINKS CONTAINING ALCOHOL DO YOU HAVE ON A TYPICAL DAY: PATIENT DOES NOT DRINK
SUBSTANCE_ABUSE_PAST_12_MONTHS: NO
HOW OFTEN DO YOU HAVE A DRINK CONTAINING ALCOHOL: NEVER
PRESCIPTION_ABUSE_PAST_12_MONTHS: NO
SKIP TO QUESTIONS 9-10: 1
HOW OFTEN DO YOU HAVE 6 OR MORE DRINKS ON ONE OCCASION: NEVER
AUDIT-C TOTAL SCORE: 0

## 2025-03-27 ASSESSMENT — PAIN DESCRIPTION - PAIN TYPE: TYPE: ACUTE PAIN

## 2025-03-27 ASSESSMENT — PAIN - FUNCTIONAL ASSESSMENT
PAIN_FUNCTIONAL_ASSESSMENT: 0-10

## 2025-03-27 NOTE — PROGRESS NOTES
Pharmacy Medication History Review    Dilip Haynes is a 74 y.o. male admitted for Lower limb ischemia. Pharmacy reviewed the patient's [id number] medications and allergies for accuracy.    The list below reflects the updated PTA list.   Prior to Admission Medications   Prescriptions Last Dose Informant Patient Reported? Taking?   amLODIPine (Norvasc) 10 mg tablet 3/26/2025 Morning Self Yes Yes   Sig: Take 1 tablet (10 mg) by mouth once daily.   apixaban (Eliquis) 2.5 mg tablet 3/26/2025 Morning Self No Yes   Sig: TAKE 1 TABLET BY MOUTH TWICE A DAY   empagliflozin (Jardiance) 10 mg 3/26/2025 Morning Self No Yes   Sig: Take 1 tablet (10 mg) by mouth once daily.   famotidine (Pepcid) 20 mg tablet 3/26/2025 Bedtime Self No Yes   Sig: TAKE 1 TABLET BY MOUTH EVERY DAY AS DIRECTED   fluticasone (Flonase) 50 mcg/actuation nasal spray Past Week Self Yes Yes   Si sprays by Does not apply route once daily as needed for allergies.   levothyroxine (Synthroid, Levoxyl) 137 mcg tablet 3/26/2025 Morning Self Yes Yes   Sig: Take 1 tablet (137 mcg) by mouth once daily.   losartan (Cozaar) 25 mg tablet  Self No No   Sig: TAKE 1 TABLET BY MOUTH EVERY DAY   Patient not taking: Reported on 3/26/2025   losartan (Cozaar) 50 mg tablet 3/26/2025 Morning Self No Yes   Sig: Take 1.5 tablets (75 mg) by mouth once daily.   methocarbamol (Robaxin) 500 mg tablet Unknown Self Yes Yes   Sig: Take 1 tablet (500 mg) by mouth every 8 hours if needed for muscle spasms.   metoprolol tartrate (Lopressor) 50 mg tablet 3/26/2025 Self No Yes   Sig: TAKE 1 TABLET BY MOUTH TWICE A DAY   multivitamin tablet 3/26/2025 Morning Self Yes Yes   Sig: Take 1 tablet by mouth once daily.   mycophenolate (Cellcept) 250 mg capsule 3/26/2025 Bedtime Self No Yes   Sig: TAKE 3 CAPSULES BY MOUTH TWICE A DAY   nitroglycerin (Nitrostat) 0.4 mg SL tablet Unknown Self Yes Yes   Sig: Place 1 tablet (0.4 mg) under the tongue every 5 minutes if needed for chest pain.  "  pravastatin (Pravachol) 40 mg tablet 3/26/2025 Bedtime Self No Yes   Sig: TAKE 1 TABLET BY MOUTH EVERYDAY AT BEDTIME   predniSONE (Deltasone) 5 mg tablet 3/26/2025 Morning Self No Yes   Sig: TAKE 1 TABLET BY MOUTH EVERY DAY   tacrolimus ER (Envarsus XR) 0.75 mg tablet ER 3/26/2025 Morning Self No Yes   Sig: Take 2 tablets (1.5 mg) by mouth once daily.   tamsulosin (Flomax) 0.4 mg 24 hr capsule 3/26/2025 Bedtime Self No Yes   Sig: Take 1 capsule (0.4 mg) by mouth once daily at bedtime.   torsemide (Demadex) 20 mg tablet 3/26/2025 Evening Self No Yes   Sig: Take 1 tablet (20 mg) by mouth once daily.      Facility-Administered Medications: None        The list below reflects the updated allergy list. Please review each documented allergy for additional clarification and justification.  Allergies  Reviewed by Luz Orosco RN on 3/27/2025        Severity Reactions Comments    Lovastatin Not Specified Myalgia Leg cramps Muscle cramps    Simvastatin Not Specified Myalgia Muscle cramps    Adhesive Low Rash     Naproxen Low Rash             Patient accepts M2B at discharge.     Sources:   Patient Interview - good historian, able to confirm medications from verbally presented list and independently state accurate directions for administration  Admission MedRec Grid  [place] - oxycodone 5mg LF: 3/12/25, #12, 3DS  [facility] medication dispense report    Medications ADDED:  Methocarbamol 500mg - patient has supply, has not used  Medications CHANGED:  None  Medications REMOVED/MARKED NOT TAKING:   None     Additional Comments:  Patient requests refill of nitroglycerin at discharge    Emiliana Montes, PharmD  Transitions of Care Pharmacist  03/27/25     Secure Chat preferred   If no response call [phone] or Vocera \"Med Rec\"    "

## 2025-03-27 NOTE — ED PROVIDER NOTES
History of Present Illness     History provided by: Patient  Limitations to History: None  External Records Reviewed with Brief Summary: See ED course    HPI:  Dilip Haynes is a 74 y.o. male who has prior history of HTN, chronic kidney disease s/p renal transplant, T2DM, aortic aneurysm, peripheral arterial disease, and pulmonary embolism on Eliquis who presents emergency department with right-sided foot pain.  States he was at the podiatrist 2 weeks ago for similar pain and concerns of ischemic limb, however had dopplerable signals within the bilateral feet and was discharged home with vascular follow-up.  Patient then presented to outpatient hospital for persistent right foot pain and is unable to sleep at night in the setting of severe pain.  He was transferred here for vascular consultation given CT angio findings that suggested complete occlusion distally of the right superficial femoral artery with reconstitution and anterior tibial artery occlusion with peroneal artery intermittent occlusion.  On arrival to the ED, he is currently on a heparin drip, noting significant pain in his right foot.     Physical Exam   Triage vitals:  T 37.1 °C (98.7 °F)  HR 54  /83  RR 16  O2 94 % None (Room air)    GEN:  Awake, alert, no acute distress.  HEENT: Normocephalic, atraumatic. Gaze conjugate. No scleral icterus or injection. Moist mucous membranes.  CARDIO: Normal rate and regular rhythm. No murmur. Radial pulses 2+ bilaterally.   PULM: Clear to auscultation bilaterally. Speaking in full sentences. No accessory muscle use or stridor.  GI: Soft, non-tender, non-distended. No rebound tenderness or guarding.   SKIN: Warm and dry. Color appropriate. No rashes, contusions, or wounds.  MSK: Normal range of motion in the bilateral lower extremities with normal ambulation.  Slight tenderness to palpation over the right foot with normal sensation aside from the right medial malleoli with slightly diminished sensation.   Dopplerable signals in the bilateral feet.  Otherwise ROM intact in all 4 extremities.  NEURO: No focal findings identified. No confusion or gross mental status changes. Face symmetric. Speech is fluent.   PSYCH: Appropriate mood and behavior, converses and responds appropriately during exam.    Medical Decision Making & ED Course   Medical Decision Makin y.o. male with prior history of peripheral arterial disease and renal transplant presenting to the emergency department for vascular consultation for right foot pain and multiple occluded vessels within the right leg.  Patient arrived on heparin, which was continued here.  On my exam he still had dopplerable signals in the bilateral feet, noting slight improvement of his pain, however still persistent. Discussed with vascular surgery who recommended admission to their service.  Will plan to discuss with transplant regarding IV contrast and additional management.    ----      Differential diagnoses considered include but are not limited to: See ED course and MDM      Social Determinants of Health which Significantly Impact Care: None identified     EKG Independent Interpretation: See ED course for interpretation of EKG    Independent Result Review and Interpretation:  See ED course and MDM for interpretation of results and interpretation    Chronic conditions affecting the patient's care: See ED course and MDM for interpretation of chronic conditions.    The patient was discussed with the following consultants/services: Vascular Surgery regarding acute ischemic limb, currently on heparin, recommended admission to their service.    Care Considerations: As documented in ED course and MDM.    ED Course:  ED Course as of 25 1218   Thu Mar 27, 2025   023 Discussed with vascular surgery, will plan to staff with follow-up. [AD]   0231 Heparin reordered [AD]      ED Course User Index  [AD] Radha Taylor,          Diagnoses as of 25 1218   Lower  limb ischemia       Disposition   As a result of their workup, the patient will require admission to the hospital.  The patient was informed of his diagnosis.  The patient was given the opportunity to ask questions and I answered them. The patient agreed to be admitted to the hospital.    Procedures   Procedures    Patient seen and discussed with ED attending physician.    Radha Lawrence DO  Emergency Medicine     Radha Lawrence DO  Resident  03/28/25 1216

## 2025-03-27 NOTE — ED TRIAGE NOTES
Pt presents to the ED as a transfer from [facility] to see vascular surgery. Pt was seen at OSH for a wound check on his right foot with worsening swelling/pain over foot and toes. Pt was sent in by his podiatrist with concern for ischemia. Pt has a hx of PAD and had a kidney transplant back in 2020. Pt is AxOx4 and able to ambulate. Pt was started on heparin at OSH at 1500U/hour.

## 2025-03-27 NOTE — H&P
VASCULAR SURGERY HISTORY AND PHYSICAL    Assessment/Plan   Dilip Haynes is 74 y.o. male with PMHx significant for CAD (s/p PCI), HTN, afib s/p DCCV 6/2020 (on Eliquis last took 3/26 AM), carotid endarterectomy for asymptomatic stenosis, ESRD s/p DDKT (2020), hypothyroidism, and PAD (s/p L iliac ([id number] stent) with R fem and profunda endarterectomy with patch angioplasty (2012) who presents today as transfer from OSH with concern of worsening limb ischemia. Patient exam consistent with previously noted right foot tissue loss and rest pain without signs of worsening ischemia on exam. Patient with monophasic PT on R and mono DP/PT on left with intact motor and sensory function.     Plan:  Admit to vascular surgery  Continue heparin drip (hold home Eliquis)   NPO except sips with meds   Transplant nephrology consultation in AM for assistance in managing immunosuppression and determining risks/benefits of possible lower extremity angiogram given DDKT and need for contrast   Continue home mycophenolate, prednisone, Envarsus   Continue home Pepcid, flonase, synthroid, metoprolol, pravastatin, flomax  Holding home amlodipine, Jardiance, losartan, torsemide     D/w fellow Dr. Roth. D/w attending, Dr. Loretta Munoz MD  Pgy-2 Gen Surg  Vascular Surgery [id number]    Subjective   HPI:  Dilip Haynes is 74 y.o. male with PMHx significant for CAD (s/p PCI), HTN, afib s/p DCCV 6/2020 (on Eliquis), carotid endarterectomy for asymptomatic stenosis, ESRD s/p DDKT (2020), hypothyroidism, and PAD (s/p L iliac ([id number] stent) with R fem and profunda endarterectomy with patch angioplasty (2012) who presents today as transfer from OSH with concern of worsening limb ischemia.     Patient was recently seen in vascular surgery clinic with Dr. Gant for tissue loss and rest pain. Patient has had right calf pain with walking for a few months now (able to walk ~100-200 feet before pain stops him). Pain improves with rest.  Patient states that he has been having progressively worsening pain in his right foot that has been more significant over the past three days. Patient states that he had an ingrown toenail reomved about 6-8 weeks ago that has then progressed to a chronic wound. Patient also notes that he has been having bilateral lower extremity swelling. Patient does endorse having some pain of his right foot at rest that has minimal improvement with dangling feet off of bed.     Patient states that he has not had any changes to his motor function or sensation over the past month but that pain had worsened three days ago. Patient states that the pain over the past three days has been pretty consistent but improved after pain medication and heparin drip initiation. Patient denies any fevers, chills, nausea, vomiting, chest pain, shortness of breath, diarrhea, constipation, dysuria, bright red blood per rectum, or melena. Patient is a former smoker (over 50 years but quit in 2015). Patient currently takes Eliquis but does not take aspirin anymore due to bleeding when on both agents.     Vascular History:  R carotid endarterectomy   L iliac ([id number] stent) with R fem, profunda endarterectomy with patch angioplasty (2012)  R and L renal artery stenting (2014)    PMH:  CAD (s/p PCI), afib s/p DCCV 6/2020 (on Eliquis), HTN, carotid endarterectomy for asymptomatic stenosis, ESRD s/p DDKT (2020), hypothyroidism, and PAD   Past Medical History:   Diagnosis Date    Encounter for other preprocedural examination 05/10/2020    Pre-transplant evaluation for kidney transplant    Old myocardial infarction 01/19/2022    H/O non-ST elevation myocardial infarction (NSTEMI)    Personal history of other diseases of the circulatory system 12/22/2015    History of stenosis of renal artery    Personal history of other diseases of the circulatory system 04/13/2021    History of carotid artery stenosis    Personal history of other diseases of the  circulatory system 01/19/2022    History of essential hypertension    Personal history of other diseases of the circulatory system 07/23/2016    History of claudication    Personal history of other endocrine, nutritional and metabolic disease 12/22/2015    History of thyroid disease    Raynaud's syndrome without gangrene 12/22/2015    Raynauds disease         PSH: Knee arthroscopy x2 (2009), R carotid endarterectomy (2009), L iliac ([id number] stent) with R fem, profunda endarterectomy with patch angioplasty (2012); thyroid lobectomy (2013); R and L renal artery stenting (2014), DDKT (2020)  Past Surgical History:   Procedure Laterality Date    CAROTID ENDARTERECTOMY  12/11/2021    Carotid Thromboendarterectomy    KNEE SURGERY  09/12/2019    Knee Surgery    OTHER SURGICAL HISTORY  01/19/2022    Kidney transplantation    OTHER SURGICAL HISTORY  09/22/2020    Transcath Intravascular Stent Placement Percutaneous Femoral    OTHER SURGICAL HISTORY  01/19/2022    Peritoneal Dialysis Catheter    TOTAL THYROIDECTOMY  12/11/2021    Thyroid Surgery Total Thyroidectomy         Home Meds:  Current Facility-Administered Medications on File Prior to Encounter   Medication Dose Route Frequency Provider Last Rate Last Admin    heparin 25,000 Units in dextrose 5% 250 mL (100 Units/mL) infusion (premix)  0-4,500 Units/hr intravenous Continuous Torres Carmina, DO 15 mL/hr at 03/26/25 2332 1,500 Units/hr at 03/26/25 2332    [COMPLETED] iohexol (OMNIPaque) 350 mg iodine/mL solution 90 mL  90 mL intravenous Once in imaging Sony Cortez, DO   90 mL at 03/26/25 2001    [COMPLETED] lactated Ringer's bolus 1,000 mL  1,000 mL intravenous Once Torres Carmina, DO   Stopped at 03/26/25 2054    [COMPLETED] morphine injection 4 mg  4 mg intravenous Once Torres Carmina, DO   4 mg at 03/26/25 1803    [COMPLETED] ondansetron (Zofran) injection 4 mg  4 mg intravenous Once Torres Carmina, DO   4 mg at 03/26/25 1803     Current Outpatient Medications on File  Prior to Encounter   Medication Sig Dispense Refill    amLODIPine (Norvasc) 10 mg tablet Take 1 tablet (10 mg) by mouth once daily.      apixaban (Eliquis) 2.5 mg tablet TAKE 1 TABLET BY MOUTH TWICE A DAY 60 tablet 11    empagliflozin (Jardiance) 10 mg Take 1 tablet (10 mg) by mouth once daily. 30 tablet 11    famotidine (Pepcid) 20 mg tablet TAKE 1 TABLET BY MOUTH EVERY DAY AS DIRECTED 90 tablet 3    fluticasone (Flonase) 50 mcg/actuation nasal spray 2 sprays by Does not apply route once daily as needed for allergies.      levothyroxine (Synthroid, Levoxyl) 137 mcg tablet Take 1 tablet (137 mcg) by mouth once daily.      losartan (Cozaar) 25 mg tablet TAKE 1 TABLET BY MOUTH EVERY DAY (Patient not taking: Reported on 3/26/2025) 90 tablet 1    losartan (Cozaar) 50 mg tablet Take 1.5 tablets (75 mg) by mouth once daily. 135 tablet 3    metoprolol tartrate (Lopressor) 50 mg tablet TAKE 1 TABLET BY MOUTH TWICE A  tablet 3    multivitamin tablet Take 1 tablet by mouth once daily.      mycophenolate (Cellcept) 250 mg capsule TAKE 3 CAPSULES BY MOUTH TWICE A  capsule 11    nitroglycerin (Nitrostat) 0.4 mg SL tablet Place 1 tablet (0.4 mg) under the tongue every 5 minutes if needed for chest pain.      pravastatin (Pravachol) 40 mg tablet TAKE 1 TABLET BY MOUTH EVERYDAY AT BEDTIME 90 tablet 3    predniSONE (Deltasone) 5 mg tablet TAKE 1 TABLET BY MOUTH EVERY DAY 30 tablet 23    tacrolimus ER (Envarsus XR) 0.75 mg tablet ER Take 2 tablets (1.5 mg) by mouth once daily. 60 tablet 11    tamsulosin (Flomax) 0.4 mg 24 hr capsule Take 1 capsule (0.4 mg) by mouth once daily at bedtime. 90 capsule 3    torsemide (Demadex) 20 mg tablet Take 1 tablet (20 mg) by mouth once daily. 90 tablet 3    [DISCONTINUED] cholecalciferol (Vitamin D-3) 50 MCG (2000 UT) tablet Take 1 tablet (2,000 Units) by mouth once daily.      [DISCONTINUED] cloNIDine (Catapres) 0.3 mg tablet Take 1 tablet (0.3 mg) by mouth once daily.       "[DISCONTINUED] clopidogrel (Plavix) 75 mg tablet Take 1 tablet (75 mg) by mouth once daily.      [DISCONTINUED] doxycycline (Monodox) 100 mg capsule Take 1 capsule (100 mg) by mouth every 12 hours.      [DISCONTINUED] methocarbamol (Robaxin) 500 mg tablet Take 1 tablet (500 mg) by mouth every 8 hours if needed for muscle spasms.          Allergies:  Allergies   Allergen Reactions    Lovastatin Myalgia     Leg cramps    Muscle cramps    Simvastatin Myalgia     Muscle cramps    Adhesive Rash    Naproxen Rash       SH/FH: Former tobacco use (about 50 years but quit in 2015)  Tobacco Use: Medium Risk (3/27/2025)    Patient History     Smoking Tobacco Use: Former     Smokeless Tobacco Use: Never     Passive Exposure: Not on file     Social History     Substance and Sexual Activity   Alcohol Use Yes    Comment: 1 per year         Social History     Substance and Sexual Activity   Drug Use Never         ROS: 12 system negative except HPI    Objective   Vitals:  Heart Rate:  [58-85]   Temperature:  [36.9 °C (98.5 °F)-37.5 °C (99.5 °F)]   Respirations:  [16-20]   BP: (151-198)/(80-93)   Height:  [180.3 cm (5' 11\")]   Weight:  [81.6 kg (180 lb)]   Pulse Ox:  [92 %-96 %]     Exam:  Constitutional: No acute distress, resting comfortably  Neuro:  AOx3, grossly intact  ENMT: moist mucous membranes  Head/neck: atraumatic  CV: no tachycardia  Pulm: non-labored on room air  GI: soft, non-tender, non-distended  Skin: warm and dry; area of tissue loss to right big toe as pictured below; scattered bruising and scars along right shin   Musculoskeletal: moving all extremities. 5/5 strength in bilateral lower extremities   Pulse Exam: Palpable femoral pulses bilaterally. Monophasic R PT, monophasic L DP/PT         Labs:  Results from last 7 days   Lab Units 03/26/25  1757   WBC AUTO x10*3/uL 8.9   HEMOGLOBIN g/dL 14.9   PLATELETS AUTO x10*3/uL 242      Results from last 7 days   Lab Units 03/26/25  1757   SODIUM mmol/L 136   POTASSIUM " mmol/L 4.7   CHLORIDE mmol/L 99   CO2 mmol/L 24   BUN mg/dL 26*   CREATININE mg/dL 2.12*   GLUCOSE mg/dL 128*      Results from last 7 days   Lab Units 03/26/25  1757   INR  1.1   PROTIME seconds 12.6*           Imaging:  Reviewed independently by vascular team:  CT angio aorta and bilateral iliofemoral runoff including without contrast if performed  Narrative: Interpreted By:  Sid Gonzalez,   STUDY:  CT ANGIO AORTA AND BILATERAL ILIOFEMORAL RUN OFF INCLUDING WITHOUT  CONTRAST IF PERFORMED;  3/26/2025 8:20 pm      INDICATION:  Signs/Symptoms:No pulse in the right foot, no dorsalis pedis or  posterior tibial with Doppler, right foot is cool and mottled.  Popliteal pulses palpable, concern for distal arterial occlusion.          COMPARISON:  None.      ACCESSION NUMBER(S):  [id number]      ORDERING CLINICIAN:  NEELAM ZAZUETA      TECHNIQUE:  Thin-section axial images of the abdomen, pelvis and bilateral lower  extremities were obtained in the arterial phase after intravenous  administration of iodinated contrast. Delayed images the legs were  obtained for assessment of venous patency. Coronal and sagittal  reformatted images were reconstructed from the axial data.  Multiplanar MIPs and 3D reconstructions were created on an  independent workstation and reviewed.      FINDINGS:  VASCULATURE:      ABDOMINAL AORTA: There is a saccular aneurysm arising from the  anterior wall of the infrarenal aorta that measures 2.4 cm x 3 cm x  2.1 cm is partially thrombosed. This is superimposed upon a fusiform  infrarenal abdominal aortic aneurysm with diameter of ~ 3.3 cm x  2.9 cm. Diffuse moderate abdominal aortic atherosclerosis. Aortic  atherosclerosis.      ABDOMINAL ARTERIES: Moderate stenosis of the celiac artery ostium.  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 The splenic artery arises from the SMA. There  are stents in the bilateral renal arteries which appear  grossly  patent. The inferior mesenteric artery is chronically occluded  proximally but reconstitutes distally.          RIGHT LOWER EXTREMITY:      - Right Common Iliac Artery: Mild fusiform dilatation measuring 1.9  cm. Atherosclerosis without significant stenosis. - Right External  Iliac Artery: Moderate-severe stenosis at the origin and  mild-to-moderate stenosis distally. - Right Internal Iliac Artery:  Severe stenosis at the origin and then chronic occlusion thereafter  with reconstitution at the gluteal arteries.      - Right Common Femoral Artery: Varying degrees of mild to moderate  stenosis. - Right Profunda Artery: Severe stenosis at the origin.  - Right Superficial Femoral Artery: Diffuse severe stenosis in the  proximal aspect which then progresses to complete occlusion distally.  There is an reconstitution as enters the popliteal fossa but remains  severely narrowed. - Right Popliteal Artery: Moderate severe stenosis  just above the tibioperoneal trunk. - Right Anterior Tibial,  Posterior Tibial, Peroneal Arteries: Limited evaluation due heavy  calcification. However the anterior tibial artery is occluded and the  posterior tibial artery is patent into the foot. The peroneal artery  is intermittently occluded.              LEFT LOWER EXTREMITY:      - Left Common Iliac Artery: Patent stent.  - Left External Iliac Artery: Patent stent.  - Left Internal Iliac Artery: Chronically occluded, distally  reconstitute at the gluteal arteries.      - Left Common Femoral Artery: Postsurgical change at the common  femoral artery. No hemodynamically significant stenosis. - Left  Profunda Artery:  No hemodynamically significant stenosis. - Left  Superficial Femoral Artery: Severe diffuse calcific atherosclerosis  with the ventral chronic occlusion in the mid thigh, remaining  completely occluded to the level of the popliteal artery. - Left  Popliteal Artery: Moderate to severe atherosclerosis with areas of  greater  than 75% stenosis proximally. - Left Anterior Tibial,  Posterior Tibial, Peroneal Arteries: Intermittent occlusion of the  anterior tibial artery and remains non-opacified into the foot on  arterial images but then fills in on delayed images. The left  posterior tibial and peroneal arteries are patent into the foot.                  ABDOMEN/PELVIS:      LIVER: Multiple subcentimeter hepatic simple cysts. A 1.1 cm  hyperenhancing focus in segment 5 is noted. Similarly, there is a 0.5  cm ill-defined hyperenhancing focus in segment 2 in subcapsular  location. The liver demonstrates a nodular contour concerning for  cirrhosis.      BILE DUCTS: No significant intrahepatic or extrahepatic dilatation.      GALLBLADDER: No significant abnormality.      PANCREAS: No significant abnormality.      SPLEEN: No significant abnormality.      ADRENALS: Multiple subcentimeter bilateral adrenal adenomas are noted.      KIDNEYS, URETERS, BLADDER: The native kidneys are severely atrophic  with nonobstructing subcentimeter right renal calculus noted. No  hydronephrosis. The urinary bladder wall thickness appears within  normal limits for degree of distention. There is a right lower  quadrant transplant kidney without perinephric fluid collection or  hydronephrosis.      REPRODUCTIVE ORGANS: No significant abnormality.      VESSELS: (See above). No additional significant abnormality.      RETROPERITONEUM/LYMPH NODES: No acute retroperitoneal abnormality. No  enlarged lymph nodes.      BOWEL/PERITONEUM: There is colonic diverticulosis without evidence of  acute diverticulitis. No inflammatory bowel wall thickening or  dilatation. Normal appendix.      No ascites, free air, or fluid collection.          MUSCULOSKELETAL: No acute osseous abnormality.  No suspicious osseous  lesion.      ABDOMINAL WALL: No significant abnormality.      LOWER CHEST: Mild atelectasis in the right middle lobe and right  lower lobe. Small right pleural  effusion.      Impression: RIGHT LOWER EXTREMITY:      - Right Common Iliac Artery: Mild fusiform dilatation measuring 1.9  cm. Atherosclerosis without significant stenosis. - Right External  Iliac Artery: Moderate-severe stenosis at the origin and  mild-to-moderate stenosis distally. - Right Internal Iliac Artery:  Severe stenosis at the origin and then chronic occlusion thereafter  with reconstitution at the gluteal arteries.      - Right Common Femoral Artery: Varying degrees of mild to moderate  stenosis. - Right Profunda Artery: Severe stenosis at the origin.  - Right Superficial Femoral Artery: Diffuse severe stenosis in the  proximal aspect which then progresses to complete occlusion distally.  There is an reconstitution as enters the popliteal fossa but remains  severely narrowed. - Right Popliteal Artery: Moderate severe stenosis  just above the tibioperoneal trunk. - Right Anterior Tibial,  Posterior Tibial, Peroneal Arteries: Limited evaluation due heavy  calcification. However the anterior tibial artery is occluded and the  posterior tibial artery is patent into the foot. The peroneal artery  is intermittently occluded.      LEFT LOWER EXTREMITY:      - Left Common Iliac Artery: Patent stent.  - Left External Iliac Artery: Patent stent.  - Left Internal Iliac Artery: Chronically occluded, distally  reconstitute at the gluteal arteries.      - Left Common Femoral Artery: Postsurgical change at the common  femoral artery. No hemodynamically significant stenosis. - Left  Profunda Artery:  No hemodynamically significant stenosis. - Left  Superficial Femoral Artery: Severe diffuse calcific atherosclerosis  with the ventral chronic occlusion in the mid thigh, remaining  completely occluded to the level of the popliteal artery. - Left  Popliteal Artery: Moderate to severe atherosclerosis with areas of  greater than 75% stenosis proximally. - Left Anterior Tibial,  Posterior Tibial, Peroneal Arteries:  Intermittent occlusion of the  anterior tibial artery and remains non-opacified into the foot on  arterial images but then fills in on delayed images. The left  posterior tibial and peroneal arteries are patent into the foot.      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  This is superimposed upon a fusiform infrarenal abdominal aortic  aneurysm with diameter of ~ 3.3 cm x 2.9 cm.      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      Cirrhotic appearing liver with 2 hypervascular foci that could  represent shunts (favored) however, recommend nonemergent MRI liver  for further evaluation given elevated risk factors.      Renal transplant right lower quadrant without evidence of acute  abnormality.      MACRO:  Critical Finding:  See findings. Notification was initiated on  3/26/2025 at 9:01 pm by  Sid Gonzalez.  (**-YCF-**) Instructions:      Signed by: Sid Gonzalez 3/26/2025 9:01 PM  Dictation workstation:   [id number]

## 2025-03-27 NOTE — CONSULTS
Inpatient consult to Cardiology  Consult performed by: Rene Tipton MD  Consult ordered by: Antonio Burgos MD PhD        History Of Present Illness:      Mr. Haynes is a 74 year old M with PMH of CAD, HTN, afib s/p DCCV on eliquis, carotid endarterectomy for asymptomatic stenosis, ESRD s/p DDKT (2020), hypothyroidism, PAD (s/p L iliac stent with R fem and profunda endarterectomy with patch angioplasty) who presented on 3/27 for concern for worsening limb ischemia. Cardiology consulted for periop risk assessment.     Patient declining chest pain, no shortness of breath. His functional status is limited by his leg pain. He denies orthopnea, PND.     Regarding his cardiac history, his last TTE was 2/17/23 which demonstrated preserved LVEF, mild to moderate MR. His last heart cath was 2/2023 which was performed for typical angina with showed 100% mid LCX and 100% PDA with nonobstructive LM and LAD. This was medically managed. He is being followed by Dr. Linn in HF. Last seen 3/14/2025. He had a previous cardiac MRI which showed partial viability in the region of the LCX.     Last Recorded Vitals:  Vitals:    03/27/25 0900 03/27/25 1123 03/27/25 1300 03/27/25 1400   BP: 144/76 125/64 145/66 159/78   BP Location: Left arm Left arm Left arm Left arm   Patient Position: Sitting Lying Lying Lying   Pulse: 75 57 55 68   Resp: 18 17 19 20   Temp:       TempSrc:       SpO2: 97% 96% 95% 95%   Weight:       Height:           Last Labs:  CBC - 3/27/2025:  2:07 AM  8.1 13.0 228    41.8      CMP - 3/27/2025:  2:07 AM;  2:07 AM  9.4; 9.5 7.1 23 --- 0.5   3.2 4.3; 4.3 17 74      PTT - 3/27/2025:  2:07 AM;  2:07 AM  1.2   13.0 57; 43     Troponin I   Date/Time Value Ref Range Status   08/27/2023 02:23 AM 55 (HH) 0 - 20 ng/L Final     Comment:     .  Less than 99th percentile of normal range cutoff-  Female and children under 18 years old <14 ng/L; Male <21 ng/L: Negative  Repeat testing should be performed if clinically  indicated.   .  Female and children under 18 years old 14-50 ng/L; Male 21-50 ng/L:  Consistent with possible cardiac damage and possible increased clinical   risk. Serial measurements may help to assess extent of myocardial damage.   .  >50 ng/L: Consistent with cardiac damage, increased clinical risk and  myocardial infarction. Serial measurements may help assess extent of   myocardial damage.   .   NOTE: Children less than 1 year old may have higher baseline troponin   levels and results should be interpreted in conjunction with the overall   clinical context.   .  NOTE: Troponin I testing is performed using a different   testing methodology at [facility] than at other   system hospitals. Direct result comparisons should only   be made within the same method.  [facility] CALLED RB TO MICHEL LUONG., 08/27/2023 03:26     08/26/2023 11:33 PM 44 (H) 0 - 20 ng/L Final     Comment:     .  Less than 99th percentile of normal range cutoff-  Female and children under 18 years old <14 ng/L; Male <21 ng/L: Negative  Repeat testing should be performed if clinically indicated.   .  Female and children under 18 years old 14-50 ng/L; Male 21-50 ng/L:  Consistent with possible cardiac damage and possible increased clinical   risk. Serial measurements may help to assess extent of myocardial damage.   .  >50 ng/L: Consistent with cardiac damage, increased clinical risk and  myocardial infarction. Serial measurements may help assess extent of   myocardial damage.   .   NOTE: Children less than 1 year old may have higher baseline troponin   levels and results should be interpreted in conjunction with the overall   clinical context.   .  NOTE: Troponin I testing is performed using a different   testing methodology at [facility] than at other   [facility] hospitals. Direct result comparisons should only   be made within the same method.     02/17/2023 02:00  (H) 0 - 20 ng/L Final     Comment:      .  Less than 99th percentile of normal range cutoff-  Female and children under 18 years old <14 ng/L; Male <21 ng/L: Negative  Repeat testing should be performed if clinically indicated.   .  Female and children under 18 years old 14-50 ng/L; Male 21-50 ng/L:  Consistent with possible cardiac damage and possible increased clinical   risk. Serial measurements may help to assess extent of myocardial damage.   .  >50 ng/L: Consistent with cardiac damage, increased clinical risk and  myocardial infarction. Serial measurements may help assess extent of   myocardial damage.   .   NOTE: Children less than 1 year old may have higher baseline troponin   levels and results should be interpreted in conjunction with the overall   clinical context.   .  NOTE: Troponin I testing is performed using a different   testing methodology at [facility] than at other   [facility]. Direct result comparisons should only   be made within the same method.  This is a critical result.    Per Laboratory policy, critical results for this test   only qualify to the call list once per 24 hours.        BNP   Date/Time Value Ref Range Status   02/04/2025 07:15  (H) 0 - 99 pg/mL Final   10/31/2024 07:26 AM 1,072 (H) 0 - 99 pg/mL Final     Hemoglobin A1C   Date/Time Value Ref Range Status   02/20/2025 11:54 AM 6.4 (H) 4.3 - 5.6 % Final     Comment:     American Diabetes Association guidelines indicate that patients with HgbA1c in the range 5.7-6.4% are at increased risk for development of diabetes, and intervention by lifestyle modification may be beneficial. HgbA1c greater or equal to 6.5% is considered diagnostic of diabetes.   06/11/2024 01:15 PM 6.2 (H) see below % Final   09/21/2023 01:04 PM 5.2 4.3 - 5.6 % Final     Comment:     American Diabetes Association guidelines indicate that patients with HgbA1c in the range 5.7-6.4% are at increased risk for development of diabetes, and intervention by lifestyle modification  "may be beneficial. HgbA1c greater or equal to 6.5% is considered diagnostic of diabetes.     VLDL   Date/Time Value Ref Range Status   06/05/2023 10:25 AM 24 0 - 40 mg/dL Final   10/19/2020 09:50 AM 19 0 - 40 mg/dL Final      Last I/O:  No intake/output data recorded.    Past Cardiology Tests (Last 3 Years):  EKG:  ECG 12 Lead 08/09/2024      ECG 12 Lead 07/05/2024    Echo:  No results found for this or any previous visit from the past 1095 days.    Ejection Fractions:  No results found for: \"EF\"  Cath:  No results found for this or any previous visit from the past 1095 days.    Stress Test:  No results found for this or any previous visit from the past 1095 days.    Cardiac Imaging:  MR cardiac w and wo IV contrast w regadenoson stress for MORPH FUNCT and valve DZ 07/10/2024      MR cardiac morphology and function w and wo IV contrast 11/01/2023      MR cardiac morphology and function w and wo IV contrast       Past Medical History:  He has a past medical history of Encounter for other preprocedural examination (05/10/2020), Old myocardial infarction (01/19/2022), Personal history of other diseases of the circulatory system (12/22/2015), Personal history of other diseases of the circulatory system (04/13/2021), Personal history of other diseases of the circulatory system (01/19/2022), Personal history of other diseases of the circulatory system (07/23/2016), Personal history of other endocrine, nutritional and metabolic disease (12/22/2015), and Raynaud's syndrome without gangrene (12/22/2015).    Past Surgical History:  He has a past surgical history that includes Total thyroidectomy (12/11/2021); Carotid endarterectomy (12/11/2021); Other surgical history (01/19/2022); Other surgical history (09/22/2020); Knee surgery (09/12/2019); and Other surgical history (01/19/2022).      Social History:  He reports that he has quit smoking. His smoking use included cigarettes. He has never used smokeless tobacco. He reports " current alcohol use. He reports that he does not use drugs.    Family History:  No family history on file.     Allergies:  Lovastatin, Simvastatin, Adhesive, and Naproxen    Inpatient Medications:  Scheduled medications   Medication Dose Route Frequency    famotidine  20 mg oral Daily    insulin lispro  0-5 Units subcutaneous q4h    levothyroxine  137 mcg oral Daily    metoprolol tartrate  50 mg oral BID    mycophenolate  500 mg oral 2 times per day    pravastatin  40 mg oral Nightly    predniSONE  5 mg oral Daily    tacrolimus ER  1.5 mg oral Once per day on Sunday Monday Tuesday Wednesday Thursday Friday    tamsulosin  0.4 mg oral Nightly     PRN medications   Medication    acetaminophen    fluticasone    nitroglycerin    ondansetron    Or    ondansetron     Continuous Medications   Medication Dose Last Rate    heparin  0-4,500 Units/hr 1,500 Units/hr (03/27/25 1329)    lactated Ringer's  100 mL/hr 100 mL/hr (03/27/25 1329)     Outpatient Medications:  Current Outpatient Medications   Medication Instructions    amLODIPine (NORVASC) 10 mg, Daily    apixaban (Eliquis) 2.5 mg tablet TAKE 1 TABLET BY MOUTH TWICE A DAY    empagliflozin (JARDIANCE) 10 mg, oral, Daily    Envarsus XR 1.5 mg, oral, Daily    famotidine (PEPCID) 20 mg, oral, Daily, as directed    fluticasone (Flonase) 50 mcg/actuation nasal spray 2 sprays, Daily PRN    levothyroxine (SYNTHROID, LEVOXYL) 137 mcg, Daily    losartan (COZAAR) 75 mg, oral, Daily    losartan (COZAAR) 25 mg, oral, Daily    methocarbamol (ROBAXIN) 500 mg, oral, Every 8 hours PRN    metoprolol tartrate (LOPRESSOR) 50 mg, oral, 2 times daily    multivitamin tablet 1 tablet, Daily    mycophenolate (CELLCEPT) 750 mg, oral, Daily    nitroglycerin (NITROSTAT) 0.4 mg, sublingual, Every 5 min PRN    pravastatin (PRAVACHOL) 40 mg, oral, Nightly    predniSONE (DELTASONE) 5 mg, oral, Daily    tamsulosin (FLOMAX) 0.4 mg, oral, Nightly    torsemide (DEMADEX) 20 mg, oral, Daily       Physical  Exam:  General: In no acute distress  HEENT; No JVD  Cardio: RRR  Pulm: Non labored breathing  Extremities: No lower extremity edema bilaterally      Assessment/Plan     Mr. Haynes is a 74 year old M with PMH of CAD, HTN, afib s/p DCCV on eliquis, carotid endarterectomy for asymptomatic stenosis, ESRD s/p DDKT (2020), hypothyroidism, PAD (s/p L iliac stent with R fem and profunda endarterectomy with patch angioplasty) who presented on 3/27 for concern for worsening limb ischemia. Cardiology consulted for periop risk assessment.     Clinical history, EKG, imaging reviewed. In summary, patient with preserved LVEF presents for bilateral lower extremity angiogram with possible intervention. He has known unrevascularized CAD with  of LCX and PDA that was opted for medical optimization. He has a cardiac MRI from 4/2023 with partial viability in this region. EKG NSR with PAC's    Reccomendations:  > RCRI- 2 (Ischemic heart disease, high risk surgery). He has unrevasculizad coronary disease with  of mid LCX. His left main and prox LAD demonstrate nonobstructive disease. This is from a cardiac cath in 2023.He has no chest pain or shortness of breath. He is at elevated , but not prohibitive risk for surgery  > Please repeat limited TTE to ensure LVEF remains preserved  > Rest of care per primary team    Patient to be staffed in AM       Peripheral IV 03/27/25 20 G Right Antecubital (Active)   Site Assessment Clean;Dry;Intact 03/27/25 0209   Number of days: 0       Code Status:  Full Code            Rene Tipton MD

## 2025-03-28 ENCOUNTER — APPOINTMENT (OUTPATIENT)
Dept: RADIOLOGY | Facility: HOSPITAL | Age: 75
DRG: 271 | End: 2025-03-28
Payer: MEDICARE

## 2025-03-28 ENCOUNTER — ANESTHESIA EVENT (OUTPATIENT)
Dept: OPERATING ROOM | Facility: HOSPITAL | Age: 75
End: 2025-03-28
Payer: MEDICARE

## 2025-03-28 ENCOUNTER — ANESTHESIA (OUTPATIENT)
Dept: OPERATING ROOM | Facility: HOSPITAL | Age: 75
End: 2025-03-28
Payer: MEDICARE

## 2025-03-28 ENCOUNTER — APPOINTMENT (OUTPATIENT)
Dept: CARDIOLOGY | Facility: HOSPITAL | Age: 75
DRG: 271 | End: 2025-03-28
Payer: MEDICARE

## 2025-03-28 ENCOUNTER — APPOINTMENT (OUTPATIENT)
Dept: VASCULAR MEDICINE | Facility: HOSPITAL | Age: 75
End: 2025-03-28
Payer: MEDICARE

## 2025-03-28 ENCOUNTER — APPOINTMENT (OUTPATIENT)
Dept: CARDIOLOGY | Facility: HOSPITAL | Age: 75
End: 2025-03-28
Payer: MEDICARE

## 2025-03-28 ENCOUNTER — APPOINTMENT (OUTPATIENT)
Dept: VASCULAR SURGERY | Facility: HOSPITAL | Age: 75
End: 2025-03-28
Payer: MEDICARE

## 2025-03-28 LAB
ALBUMIN SERPL BCP-MCNC: 4 G/DL (ref 3.4–5)
ALBUMIN SERPL BCP-MCNC: 4.2 G/DL (ref 3.4–5)
ANION GAP BLDA CALCULATED.4IONS-SCNC: 14 MMO/L (ref 10–25)
ANION GAP SERPL CALC-SCNC: 12 MMOL/L (ref 10–20)
ANION GAP SERPL CALC-SCNC: 15 MMOL/L (ref 10–20)
AORTIC VALVE PEAK VELOCITY: 1.19 M/S
AV PEAK GRADIENT: 6 MMHG
AVA (PEAK VEL): 2.53 CM2
BASE EXCESS BLDA CALC-SCNC: -3.5 MMOL/L (ref -2–3)
BODY TEMPERATURE: 37 DEGREES CELSIUS
BUN SERPL-MCNC: 22 MG/DL (ref 6–23)
BUN SERPL-MCNC: 24 MG/DL (ref 6–23)
CA-I BLD-SCNC: 1.18 MMOL/L (ref 1.1–1.33)
CA-I BLDA-SCNC: 1.22 MMOL/L (ref 1.1–1.33)
CALCIUM SERPL-MCNC: 9.2 MG/DL (ref 8.6–10.6)
CALCIUM SERPL-MCNC: 9.6 MG/DL (ref 8.6–10.6)
CARDIAC TROPONIN I PNL SERPL HS: 45 NG/L (ref 0–53)
CARDIAC TROPONIN I PNL SERPL HS: 47 NG/L (ref 0–53)
CARDIAC TROPONIN I PNL SERPL HS: 55 NG/L (ref 0–53)
CHLORIDE BLDA-SCNC: 102 MMOL/L (ref 98–107)
CHLORIDE SERPL-SCNC: 103 MMOL/L (ref 98–107)
CHLORIDE SERPL-SCNC: 103 MMOL/L (ref 98–107)
CO2 SERPL-SCNC: 21 MMOL/L (ref 21–32)
CO2 SERPL-SCNC: 26 MMOL/L (ref 21–32)
CREAT SERPL-MCNC: 1.66 MG/DL (ref 0.5–1.3)
CREAT SERPL-MCNC: 1.85 MG/DL (ref 0.5–1.3)
EGFRCR SERPLBLD CKD-EPI 2021: 37 ML/MIN/1.73M*2
EGFRCR SERPLBLD CKD-EPI 2021: 42 ML/MIN/1.73M*2
EJECTION FRACTION APICAL 4 CHAMBER: 58.9
EJECTION FRACTION: 60 %
ERYTHROCYTE [DISTWIDTH] IN BLOOD BY AUTOMATED COUNT: 13.2 % (ref 11.5–14.5)
ERYTHROCYTE [DISTWIDTH] IN BLOOD BY AUTOMATED COUNT: 13.3 % (ref 11.5–14.5)
GLUCOSE BLD MANUAL STRIP-MCNC: 101 MG/DL (ref 74–99)
GLUCOSE BLD MANUAL STRIP-MCNC: 113 MG/DL (ref 74–99)
GLUCOSE BLD MANUAL STRIP-MCNC: 133 MG/DL (ref 74–99)
GLUCOSE BLD MANUAL STRIP-MCNC: 138 MG/DL (ref 74–99)
GLUCOSE BLD MANUAL STRIP-MCNC: 90 MG/DL (ref 74–99)
GLUCOSE BLD MANUAL STRIP-MCNC: 94 MG/DL (ref 74–99)
GLUCOSE BLDA-MCNC: 132 MG/DL (ref 74–99)
GLUCOSE SERPL-MCNC: 124 MG/DL (ref 74–99)
GLUCOSE SERPL-MCNC: 81 MG/DL (ref 74–99)
HCO3 BLDA-SCNC: 20.4 MMOL/L (ref 22–26)
HCT VFR BLD AUTO: 36.9 % (ref 41–52)
HCT VFR BLD AUTO: 40.2 % (ref 41–52)
HCT VFR BLD EST: 44 % (ref 41–52)
HGB BLD-MCNC: 11.6 G/DL (ref 13.5–17.5)
HGB BLD-MCNC: 12.6 G/DL (ref 13.5–17.5)
HGB BLDA-MCNC: 14.6 G/DL (ref 13.5–17.5)
INHALED O2 CONCENTRATION: 40 %
LACTATE BLDA-SCNC: 1.4 MMOL/L (ref 0.4–2)
LEFT ATRIUM VOLUME AREA LENGTH INDEX BSA: 30.5 ML/M2
LEFT VENTRICLE INTERNAL DIMENSION DIASTOLE: 4.6 CM (ref 3.5–6)
LEFT VENTRICULAR OUTFLOW TRACT DIAMETER: 1.9 CM
MAGNESIUM SERPL-MCNC: 1.96 MG/DL (ref 1.6–2.4)
MAGNESIUM SERPL-MCNC: 1.97 MG/DL (ref 1.6–2.4)
MCH RBC QN AUTO: 24.3 PG (ref 26–34)
MCH RBC QN AUTO: 24.6 PG (ref 26–34)
MCHC RBC AUTO-ENTMCNC: 31.3 G/DL (ref 32–36)
MCHC RBC AUTO-ENTMCNC: 31.4 G/DL (ref 32–36)
MCV RBC AUTO: 78 FL (ref 80–100)
MCV RBC AUTO: 78 FL (ref 80–100)
NRBC BLD-RTO: 0 /100 WBCS (ref 0–0)
NRBC BLD-RTO: 0 /100 WBCS (ref 0–0)
OXYHGB MFR BLDA: 95.9 % (ref 94–98)
PCO2 BLDA: 33 MM HG (ref 38–42)
PH BLDA: 7.4 PH (ref 7.38–7.42)
PHOSPHATE SERPL-MCNC: 3.2 MG/DL (ref 2.5–4.9)
PHOSPHATE SERPL-MCNC: 3.6 MG/DL (ref 2.5–4.9)
PLATELET # BLD AUTO: 190 X10*3/UL (ref 150–450)
PLATELET # BLD AUTO: 207 X10*3/UL (ref 150–450)
PO2 BLDA: 93 MM HG (ref 85–95)
POTASSIUM BLDA-SCNC: 4.6 MMOL/L (ref 3.5–5.3)
POTASSIUM SERPL-SCNC: 4.6 MMOL/L (ref 3.5–5.3)
POTASSIUM SERPL-SCNC: 4.7 MMOL/L (ref 3.5–5.3)
RBC # BLD AUTO: 4.72 X10*6/UL (ref 4.5–5.9)
RBC # BLD AUTO: 5.19 X10*6/UL (ref 4.5–5.9)
RIGHT VENTRICLE FREE WALL PEAK S': 6.18 CM/S
SAO2 % BLDA: 99 % (ref 94–100)
SODIUM BLDA-SCNC: 132 MMOL/L (ref 136–145)
SODIUM SERPL-SCNC: 134 MMOL/L (ref 136–145)
SODIUM SERPL-SCNC: 136 MMOL/L (ref 136–145)
TRICUSPID ANNULAR PLANE SYSTOLIC EXCURSION: 0.8 CM
UFH PPP CHRO-ACNC: 0.2 IU/ML
UFH PPP CHRO-ACNC: 0.6 IU/ML
UFH PPP CHRO-ACNC: 0.8 IU/ML
WBC # BLD AUTO: 6.9 X10*3/UL (ref 4.4–11.3)
WBC # BLD AUTO: 8.1 X10*3/UL (ref 4.4–11.3)

## 2025-03-28 PROCEDURE — 2500000004 HC RX 250 GENERAL PHARMACY W/ HCPCS (ALT 636 FOR OP/ED): Performed by: SURGERY

## 2025-03-28 PROCEDURE — 85027 COMPLETE CBC AUTOMATED: CPT

## 2025-03-28 PROCEDURE — 83735 ASSAY OF MAGNESIUM: CPT

## 2025-03-28 PROCEDURE — 71045 X-RAY EXAM CHEST 1 VIEW: CPT

## 2025-03-28 PROCEDURE — 71045 X-RAY EXAM CHEST 1 VIEW: CPT | Performed by: RADIOLOGY

## 2025-03-28 PROCEDURE — 36620 INSERTION CATHETER ARTERY: CPT | Performed by: STUDENT IN AN ORGANIZED HEALTH CARE EDUCATION/TRAINING PROGRAM

## 2025-03-28 PROCEDURE — 99233 SBSQ HOSP IP/OBS HIGH 50: CPT | Performed by: INTERNAL MEDICINE

## 2025-03-28 PROCEDURE — 2500000001 HC RX 250 WO HCPCS SELF ADMINISTERED DRUGS (ALT 637 FOR MEDICARE OP)

## 2025-03-28 PROCEDURE — 2020000001 HC ICU ROOM DAILY

## 2025-03-28 PROCEDURE — 84484 ASSAY OF TROPONIN QUANT: CPT

## 2025-03-28 PROCEDURE — 2500000004 HC RX 250 GENERAL PHARMACY W/ HCPCS (ALT 636 FOR OP/ED)

## 2025-03-28 PROCEDURE — 82330 ASSAY OF CALCIUM: CPT

## 2025-03-28 PROCEDURE — 84132 ASSAY OF SERUM POTASSIUM: CPT

## 2025-03-28 PROCEDURE — 3700000002 HC GENERAL ANESTHESIA TIME - EACH INCREMENTAL 1 MINUTE: Performed by: SURGERY

## 2025-03-28 PROCEDURE — 2500000002 HC RX 250 W HCPCS SELF ADMINISTERED DRUGS (ALT 637 FOR MEDICARE OP, ALT 636 FOR OP/ED): Performed by: STUDENT IN AN ORGANIZED HEALTH CARE EDUCATION/TRAINING PROGRAM

## 2025-03-28 PROCEDURE — B246ZZZ ULTRASONOGRAPHY OF RIGHT AND LEFT HEART: ICD-10-PCS | Performed by: INTERNAL MEDICINE

## 2025-03-28 PROCEDURE — 85520 HEPARIN ASSAY: CPT

## 2025-03-28 PROCEDURE — C8929 TTE W OR WO FOL WCON,DOPPLER: HCPCS

## 2025-03-28 PROCEDURE — 36246 INS CATH ABD/L-EXT ART 2ND: CPT | Performed by: SURGERY

## 2025-03-28 PROCEDURE — 80069 RENAL FUNCTION PANEL: CPT

## 2025-03-28 PROCEDURE — 51702 INSERT TEMP BLADDER CATH: CPT

## 2025-03-28 PROCEDURE — 93005 ELECTROCARDIOGRAM TRACING: CPT

## 2025-03-28 PROCEDURE — 36415 COLL VENOUS BLD VENIPUNCTURE: CPT

## 2025-03-28 PROCEDURE — 93010 ELECTROCARDIOGRAM REPORT: CPT | Performed by: INTERNAL MEDICINE

## 2025-03-28 PROCEDURE — 3700000001 HC GENERAL ANESTHESIA TIME - INITIAL BASE CHARGE: Performed by: SURGERY

## 2025-03-28 PROCEDURE — 82947 ASSAY GLUCOSE BLOOD QUANT: CPT

## 2025-03-28 PROCEDURE — 2500000005 HC RX 250 GENERAL PHARMACY W/O HCPCS

## 2025-03-28 PROCEDURE — 84484 ASSAY OF TROPONIN QUANT: CPT | Performed by: STUDENT IN AN ORGANIZED HEALTH CARE EDUCATION/TRAINING PROGRAM

## 2025-03-28 PROCEDURE — B41G1ZZ FLUOROSCOPY OF LEFT LOWER EXTREMITY ARTERIES USING LOW OSMOLAR CONTRAST: ICD-10-PCS | Performed by: SURGERY

## 2025-03-28 PROCEDURE — 75710 ARTERY X-RAYS ARM/LEG: CPT | Performed by: SURGERY

## 2025-03-28 PROCEDURE — 2720000007 HC OR 272 NO HCPCS: Performed by: SURGERY

## 2025-03-28 PROCEDURE — C1769 GUIDE WIRE: HCPCS | Performed by: SURGERY

## 2025-03-28 PROCEDURE — 3600000003 HC OR TIME - INITIAL BASE CHARGE - PROCEDURE LEVEL THREE: Performed by: SURGERY

## 2025-03-28 PROCEDURE — 2550000001 HC RX 255 CONTRASTS: Performed by: SURGERY

## 2025-03-28 PROCEDURE — 2500000005 HC RX 250 GENERAL PHARMACY W/O HCPCS: Performed by: SURGERY

## 2025-03-28 PROCEDURE — 3600000008 HC OR TIME - EACH INCREMENTAL 1 MINUTE - PROCEDURE LEVEL THREE: Performed by: SURGERY

## 2025-03-28 PROCEDURE — 99291 CRITICAL CARE FIRST HOUR: CPT

## 2025-03-28 PROCEDURE — 7100000002 HC RECOVERY ROOM TIME - EACH INCREMENTAL 1 MINUTE: Performed by: SURGERY

## 2025-03-28 PROCEDURE — 2500000004 HC RX 250 GENERAL PHARMACY W/ HCPCS (ALT 636 FOR OP/ED): Performed by: STUDENT IN AN ORGANIZED HEALTH CARE EDUCATION/TRAINING PROGRAM

## 2025-03-28 PROCEDURE — C1887 CATHETER, GUIDING: HCPCS | Performed by: SURGERY

## 2025-03-28 PROCEDURE — 2500000004 HC RX 250 GENERAL PHARMACY W/ HCPCS (ALT 636 FOR OP/ED): Performed by: NURSE ANESTHETIST, CERTIFIED REGISTERED

## 2025-03-28 PROCEDURE — 2500000002 HC RX 250 W HCPCS SELF ADMINISTERED DRUGS (ALT 637 FOR MEDICARE OP, ALT 636 FOR OP/ED)

## 2025-03-28 PROCEDURE — B41F1ZZ FLUOROSCOPY OF RIGHT LOWER EXTREMITY ARTERIES USING LOW OSMOLAR CONTRAST: ICD-10-PCS | Performed by: SURGERY

## 2025-03-28 PROCEDURE — 37799 UNLISTED PX VASCULAR SURGERY: CPT | Performed by: STUDENT IN AN ORGANIZED HEALTH CARE EDUCATION/TRAINING PROGRAM

## 2025-03-28 PROCEDURE — 7100000001 HC RECOVERY ROOM TIME - INITIAL BASE CHARGE: Performed by: SURGERY

## 2025-03-28 PROCEDURE — 37799 UNLISTED PX VASCULAR SURGERY: CPT

## 2025-03-28 PROCEDURE — 2500000001 HC RX 250 WO HCPCS SELF ADMINISTERED DRUGS (ALT 637 FOR MEDICARE OP): Performed by: NURSE PRACTITIONER

## 2025-03-28 PROCEDURE — 93306 TTE W/DOPPLER COMPLETE: CPT | Performed by: INTERNAL MEDICINE

## 2025-03-28 RX ORDER — ALBUTEROL SULFATE 0.83 MG/ML
2.5 SOLUTION RESPIRATORY (INHALATION) ONCE AS NEEDED
Status: COMPLETED | OUTPATIENT
Start: 2025-03-28 | End: 2025-03-28

## 2025-03-28 RX ORDER — ONDANSETRON HYDROCHLORIDE 2 MG/ML
4 INJECTION, SOLUTION INTRAVENOUS EVERY 8 HOURS PRN
Status: DISCONTINUED | OUTPATIENT
Start: 2025-03-28 | End: 2025-04-07

## 2025-03-28 RX ORDER — INSULIN LISPRO 100 [IU]/ML
0-5 INJECTION, SOLUTION INTRAVENOUS; SUBCUTANEOUS EVERY 4 HOURS
Status: DISCONTINUED | OUTPATIENT
Start: 2025-03-29 | End: 2025-03-30

## 2025-03-28 RX ORDER — OXYCODONE HYDROCHLORIDE 5 MG/1
10 TABLET ORAL EVERY 4 HOURS PRN
Status: DISCONTINUED | OUTPATIENT
Start: 2025-03-28 | End: 2025-03-28 | Stop reason: HOSPADM

## 2025-03-28 RX ORDER — HYDROMORPHONE HYDROCHLORIDE 0.2 MG/ML
0.2 INJECTION INTRAMUSCULAR; INTRAVENOUS; SUBCUTANEOUS EVERY 5 MIN PRN
Status: DISCONTINUED | OUTPATIENT
Start: 2025-03-28 | End: 2025-03-28 | Stop reason: HOSPADM

## 2025-03-28 RX ORDER — MAGNESIUM SULFATE HEPTAHYDRATE 40 MG/ML
2 INJECTION, SOLUTION INTRAVENOUS EVERY 6 HOURS PRN
Status: DISCONTINUED | OUTPATIENT
Start: 2025-03-28 | End: 2025-03-30

## 2025-03-28 RX ORDER — DEXTROSE 50 % IN WATER (D50W) INTRAVENOUS SYRINGE
25
Status: DISCONTINUED | OUTPATIENT
Start: 2025-03-28 | End: 2025-04-10 | Stop reason: HOSPADM

## 2025-03-28 RX ORDER — FAMOTIDINE 10 MG/ML
20 INJECTION, SOLUTION INTRAVENOUS DAILY
Status: DISCONTINUED | OUTPATIENT
Start: 2025-03-28 | End: 2025-03-28

## 2025-03-28 RX ORDER — MAGNESIUM SULFATE HEPTAHYDRATE 40 MG/ML
2 INJECTION, SOLUTION INTRAVENOUS EVERY 6 HOURS PRN
Status: DISCONTINUED | OUTPATIENT
Start: 2025-03-28 | End: 2025-03-28

## 2025-03-28 RX ORDER — FLUTICASONE PROPIONATE 50 MCG
2 SPRAY, SUSPENSION (ML) NASAL DAILY PRN
Status: DISCONTINUED | OUTPATIENT
Start: 2025-03-28 | End: 2025-04-10 | Stop reason: HOSPADM

## 2025-03-28 RX ORDER — CALCIUM GLUCONATE 20 MG/ML
2 INJECTION, SOLUTION INTRAVENOUS EVERY 6 HOURS PRN
Status: DISCONTINUED | OUTPATIENT
Start: 2025-03-28 | End: 2025-03-30

## 2025-03-28 RX ORDER — DEXTROSE 50 % IN WATER (D50W) INTRAVENOUS SYRINGE
25
Status: DISCONTINUED | OUTPATIENT
Start: 2025-03-28 | End: 2025-03-28

## 2025-03-28 RX ORDER — LABETALOL HYDROCHLORIDE 5 MG/ML
5 INJECTION, SOLUTION INTRAVENOUS ONCE AS NEEDED
Status: COMPLETED | OUTPATIENT
Start: 2025-03-28 | End: 2025-03-28

## 2025-03-28 RX ORDER — SODIUM CHLORIDE 0.9 G/100ML
INJECTION, SOLUTION IRRIGATION AS NEEDED
Status: DISCONTINUED | OUTPATIENT
Start: 2025-03-28 | End: 2025-03-28 | Stop reason: HOSPADM

## 2025-03-28 RX ORDER — LIDOCAINE HYDROCHLORIDE 10 MG/ML
0.1 INJECTION, SOLUTION INFILTRATION; PERINEURAL ONCE
Status: DISCONTINUED | OUTPATIENT
Start: 2025-03-28 | End: 2025-03-28 | Stop reason: HOSPADM

## 2025-03-28 RX ORDER — NALOXONE HYDROCHLORIDE 0.4 MG/ML
0.2 INJECTION, SOLUTION INTRAMUSCULAR; INTRAVENOUS; SUBCUTANEOUS EVERY 5 MIN PRN
Status: DISCONTINUED | OUTPATIENT
Start: 2025-03-28 | End: 2025-04-10 | Stop reason: HOSPADM

## 2025-03-28 RX ORDER — ONDANSETRON HYDROCHLORIDE 2 MG/ML
4 INJECTION, SOLUTION INTRAVENOUS ONCE AS NEEDED
Status: DISCONTINUED | OUTPATIENT
Start: 2025-03-28 | End: 2025-03-28 | Stop reason: HOSPADM

## 2025-03-28 RX ORDER — AMLODIPINE BESYLATE 10 MG/1
10 TABLET ORAL DAILY
Status: DISCONTINUED | OUTPATIENT
Start: 2025-03-28 | End: 2025-03-28

## 2025-03-28 RX ORDER — METOPROLOL TARTRATE 50 MG/1
50 TABLET ORAL 2 TIMES DAILY
Status: DISCONTINUED | OUTPATIENT
Start: 2025-03-29 | End: 2025-03-29

## 2025-03-28 RX ORDER — OXYCODONE HYDROCHLORIDE 5 MG/1
5 TABLET ORAL EVERY 4 HOURS PRN
Status: DISCONTINUED | OUTPATIENT
Start: 2025-03-28 | End: 2025-03-28 | Stop reason: HOSPADM

## 2025-03-28 RX ORDER — HYDRALAZINE HYDROCHLORIDE 20 MG/ML
INJECTION INTRAMUSCULAR; INTRAVENOUS AS NEEDED
Status: DISCONTINUED | OUTPATIENT
Start: 2025-03-28 | End: 2025-03-28

## 2025-03-28 RX ORDER — FAMOTIDINE 20 MG/1
20 TABLET, FILM COATED ORAL DAILY
Status: DISCONTINUED | OUTPATIENT
Start: 2025-03-29 | End: 2025-03-28 | Stop reason: SDUPTHER

## 2025-03-28 RX ORDER — POTASSIUM CHLORIDE 14.9 MG/ML
20 INJECTION INTRAVENOUS EVERY 6 HOURS PRN
Status: DISCONTINUED | OUTPATIENT
Start: 2025-03-28 | End: 2025-03-30

## 2025-03-28 RX ORDER — SODIUM CHLORIDE, SODIUM LACTATE, POTASSIUM CHLORIDE, CALCIUM CHLORIDE 600; 310; 30; 20 MG/100ML; MG/100ML; MG/100ML; MG/100ML
100 INJECTION, SOLUTION INTRAVENOUS CONTINUOUS
Status: DISCONTINUED | OUTPATIENT
Start: 2025-03-28 | End: 2025-03-28

## 2025-03-28 RX ORDER — MIDAZOLAM HYDROCHLORIDE 1 MG/ML
INJECTION INTRAMUSCULAR; INTRAVENOUS AS NEEDED
Status: DISCONTINUED | OUTPATIENT
Start: 2025-03-28 | End: 2025-03-28

## 2025-03-28 RX ORDER — INSULIN LISPRO 100 [IU]/ML
0-5 INJECTION, SOLUTION INTRAVENOUS; SUBCUTANEOUS EVERY 4 HOURS
Status: DISCONTINUED | OUTPATIENT
Start: 2025-03-28 | End: 2025-03-28

## 2025-03-28 RX ORDER — CALCIUM GLUCONATE 20 MG/ML
1 INJECTION, SOLUTION INTRAVENOUS EVERY 6 HOURS PRN
Status: DISCONTINUED | OUTPATIENT
Start: 2025-03-28 | End: 2025-03-30

## 2025-03-28 RX ORDER — LIDOCAINE HYDROCHLORIDE 10 MG/ML
INJECTION, SOLUTION INFILTRATION; PERINEURAL AS NEEDED
Status: DISCONTINUED | OUTPATIENT
Start: 2025-03-28 | End: 2025-03-28 | Stop reason: HOSPADM

## 2025-03-28 RX ORDER — FAMOTIDINE 20 MG/1
20 TABLET, FILM COATED ORAL DAILY
Status: DISCONTINUED | OUTPATIENT
Start: 2025-03-28 | End: 2025-03-28

## 2025-03-28 RX ORDER — DEXTROSE 50 % IN WATER (D50W) INTRAVENOUS SYRINGE
12.5
Status: DISCONTINUED | OUTPATIENT
Start: 2025-03-28 | End: 2025-04-10 | Stop reason: HOSPADM

## 2025-03-28 RX ORDER — PREDNISONE 5 MG/1
5 TABLET ORAL DAILY
Status: DISCONTINUED | OUTPATIENT
Start: 2025-03-29 | End: 2025-04-10 | Stop reason: HOSPADM

## 2025-03-28 RX ORDER — FAMOTIDINE 10 MG/ML
20 INJECTION, SOLUTION INTRAVENOUS DAILY
Status: DISCONTINUED | OUTPATIENT
Start: 2025-03-29 | End: 2025-03-31

## 2025-03-28 RX ORDER — FENTANYL CITRATE 50 UG/ML
INJECTION, SOLUTION INTRAMUSCULAR; INTRAVENOUS AS NEEDED
Status: DISCONTINUED | OUTPATIENT
Start: 2025-03-28 | End: 2025-03-28

## 2025-03-28 RX ORDER — CALCIUM GLUCONATE 20 MG/ML
1 INJECTION, SOLUTION INTRAVENOUS EVERY 6 HOURS PRN
Status: DISCONTINUED | OUTPATIENT
Start: 2025-03-28 | End: 2025-03-28

## 2025-03-28 RX ORDER — FAMOTIDINE 20 MG/1
20 TABLET, FILM COATED ORAL DAILY
Status: DISCONTINUED | OUTPATIENT
Start: 2025-03-29 | End: 2025-04-07

## 2025-03-28 RX ORDER — DEXTROSE 50 % IN WATER (D50W) INTRAVENOUS SYRINGE
12.5
Status: DISCONTINUED | OUTPATIENT
Start: 2025-03-28 | End: 2025-03-28

## 2025-03-28 RX ORDER — MAGNESIUM SULFATE HEPTAHYDRATE 40 MG/ML
4 INJECTION, SOLUTION INTRAVENOUS EVERY 6 HOURS PRN
Status: DISCONTINUED | OUTPATIENT
Start: 2025-03-28 | End: 2025-03-30

## 2025-03-28 RX ORDER — ACETAMINOPHEN 325 MG/1
650 TABLET ORAL EVERY 4 HOURS PRN
Status: DISCONTINUED | OUTPATIENT
Start: 2025-03-28 | End: 2025-04-10 | Stop reason: HOSPADM

## 2025-03-28 RX ORDER — ALBUTEROL SULFATE 0.83 MG/ML
2.5 SOLUTION RESPIRATORY (INHALATION) ONCE AS NEEDED
Status: DISCONTINUED | OUTPATIENT
Start: 2025-03-28 | End: 2025-04-10 | Stop reason: HOSPADM

## 2025-03-28 RX ORDER — HYDRALAZINE HYDROCHLORIDE 20 MG/ML
5 INJECTION INTRAMUSCULAR; INTRAVENOUS EVERY 30 MIN PRN
Status: DISCONTINUED | OUTPATIENT
Start: 2025-03-28 | End: 2025-03-28 | Stop reason: HOSPADM

## 2025-03-28 RX ORDER — NITROGLYCERIN 0.4 MG/1
0.4 TABLET SUBLINGUAL EVERY 5 MIN PRN
Status: DISCONTINUED | OUTPATIENT
Start: 2025-03-28 | End: 2025-04-07

## 2025-03-28 RX ORDER — POTASSIUM CHLORIDE 14.9 MG/ML
20 INJECTION INTRAVENOUS EVERY 6 HOURS PRN
Status: DISCONTINUED | OUTPATIENT
Start: 2025-03-28 | End: 2025-03-28

## 2025-03-28 RX ORDER — OXYCODONE HYDROCHLORIDE 5 MG/1
5 TABLET ORAL EVERY 6 HOURS PRN
Status: DISCONTINUED | OUTPATIENT
Start: 2025-03-28 | End: 2025-04-07

## 2025-03-28 RX ORDER — LEVOTHYROXINE SODIUM 137 UG/1
137 TABLET ORAL DAILY
Status: DISCONTINUED | OUTPATIENT
Start: 2025-03-29 | End: 2025-04-10 | Stop reason: HOSPADM

## 2025-03-28 RX ORDER — ONDANSETRON HYDROCHLORIDE 2 MG/ML
INJECTION, SOLUTION INTRAVENOUS AS NEEDED
Status: DISCONTINUED | OUTPATIENT
Start: 2025-03-28 | End: 2025-03-28

## 2025-03-28 RX ORDER — SODIUM CHLORIDE, SODIUM LACTATE, POTASSIUM CHLORIDE, CALCIUM CHLORIDE 600; 310; 30; 20 MG/100ML; MG/100ML; MG/100ML; MG/100ML
75 INJECTION, SOLUTION INTRAVENOUS CONTINUOUS
Status: DISCONTINUED | OUTPATIENT
Start: 2025-03-28 | End: 2025-03-29

## 2025-03-28 RX ORDER — NITROGLYCERIN 20 MG/100ML
5-200 INJECTION INTRAVENOUS CONTINUOUS
Status: DISCONTINUED | OUTPATIENT
Start: 2025-03-28 | End: 2025-03-29

## 2025-03-28 RX ORDER — TAMSULOSIN HYDROCHLORIDE 0.4 MG/1
0.4 CAPSULE ORAL NIGHTLY
Status: DISCONTINUED | OUTPATIENT
Start: 2025-03-29 | End: 2025-04-10 | Stop reason: HOSPADM

## 2025-03-28 RX ORDER — MYCOPHENOLATE MOFETIL 250 MG/1
500 CAPSULE ORAL
Status: DISCONTINUED | OUTPATIENT
Start: 2025-03-29 | End: 2025-04-10 | Stop reason: HOSPADM

## 2025-03-28 RX ORDER — IODIXANOL 320 MG/ML
INJECTION, SOLUTION INTRAVASCULAR AS NEEDED
Status: DISCONTINUED | OUTPATIENT
Start: 2025-03-28 | End: 2025-03-28 | Stop reason: HOSPADM

## 2025-03-28 RX ORDER — ONDANSETRON 4 MG/1
4 TABLET, FILM COATED ORAL EVERY 8 HOURS PRN
Status: DISCONTINUED | OUTPATIENT
Start: 2025-03-28 | End: 2025-04-07

## 2025-03-28 RX ORDER — AMLODIPINE BESYLATE 10 MG/1
10 TABLET ORAL DAILY
Status: DISCONTINUED | OUTPATIENT
Start: 2025-03-29 | End: 2025-03-29

## 2025-03-28 RX ORDER — SODIUM CHLORIDE, SODIUM LACTATE, POTASSIUM CHLORIDE, CALCIUM CHLORIDE 600; 310; 30; 20 MG/100ML; MG/100ML; MG/100ML; MG/100ML
50 INJECTION, SOLUTION INTRAVENOUS CONTINUOUS
Status: DISCONTINUED | OUTPATIENT
Start: 2025-03-28 | End: 2025-03-28 | Stop reason: HOSPADM

## 2025-03-28 RX ORDER — ACETAMINOPHEN 325 MG/1
650 TABLET ORAL EVERY 4 HOURS PRN
Status: DISCONTINUED | OUTPATIENT
Start: 2025-03-28 | End: 2025-03-28 | Stop reason: HOSPADM

## 2025-03-28 RX ORDER — PRAVASTATIN SODIUM 20 MG/1
40 TABLET ORAL NIGHTLY
Status: DISCONTINUED | OUTPATIENT
Start: 2025-03-29 | End: 2025-04-07

## 2025-03-28 RX ORDER — CALCIUM GLUCONATE 20 MG/ML
2 INJECTION, SOLUTION INTRAVENOUS EVERY 6 HOURS PRN
Status: DISCONTINUED | OUTPATIENT
Start: 2025-03-28 | End: 2025-03-28

## 2025-03-28 RX ADMIN — HYDRALAZINE HYDROCHLORIDE 10 MG: 20 INJECTION INTRAMUSCULAR; INTRAVENOUS at 13:58

## 2025-03-28 RX ADMIN — ONDANSETRON 4 MG: 2 INJECTION, SOLUTION INTRAMUSCULAR; INTRAVENOUS at 13:58

## 2025-03-28 RX ADMIN — METOPROLOL TARTRATE 50 MG: 50 TABLET, FILM COATED ORAL at 08:14

## 2025-03-28 RX ADMIN — ALBUTEROL SULFATE 2.5 MG: 2.5 SOLUTION RESPIRATORY (INHALATION) at 16:04

## 2025-03-28 RX ADMIN — FENTANYL CITRATE 50 MCG: 50 INJECTION, SOLUTION INTRAMUSCULAR; INTRAVENOUS at 13:27

## 2025-03-28 RX ADMIN — NITROGLYCERIN 5 MCG/MIN: 20 INJECTION INTRAVENOUS at 15:12

## 2025-03-28 RX ADMIN — AMLODIPINE BESYLATE 10 MG: 10 TABLET ORAL at 12:14

## 2025-03-28 RX ADMIN — PREDNISONE 5 MG: 5 TABLET ORAL at 08:14

## 2025-03-28 RX ADMIN — MIDAZOLAM HYDROCHLORIDE 2 MG: 2 INJECTION, SOLUTION INTRAMUSCULAR; INTRAVENOUS at 13:21

## 2025-03-28 RX ADMIN — Medication 4 L/MIN: at 19:00

## 2025-03-28 RX ADMIN — SODIUM CHLORIDE, SODIUM LACTATE, POTASSIUM CHLORIDE, AND CALCIUM CHLORIDE: 600; 310; 30; 20 INJECTION, SOLUTION INTRAVENOUS at 13:21

## 2025-03-28 RX ADMIN — FENTANYL CITRATE 50 MCG: 50 INJECTION, SOLUTION INTRAMUSCULAR; INTRAVENOUS at 13:30

## 2025-03-28 RX ADMIN — PERFLUTREN 3 ML OF DILUTION: 6.52 INJECTION, SUSPENSION INTRAVENOUS at 11:06

## 2025-03-28 RX ADMIN — HYDRALAZINE HYDROCHLORIDE 10 MG: 20 INJECTION INTRAMUSCULAR; INTRAVENOUS at 13:25

## 2025-03-28 RX ADMIN — LABETALOL HYDROCHLORIDE 5 MG: 5 INJECTION INTRAVENOUS at 14:50

## 2025-03-28 RX ADMIN — LEVOTHYROXINE SODIUM 137 MCG: 137 TABLET ORAL at 08:14

## 2025-03-28 RX ADMIN — FAMOTIDINE 20 MG: 20 TABLET ORAL at 08:14

## 2025-03-28 RX ADMIN — TACROLIMUS 1.5 MG: 0.75 TABLET, EXTENDED RELEASE ORAL at 06:17

## 2025-03-28 RX ADMIN — SODIUM CHLORIDE, POTASSIUM CHLORIDE, SODIUM LACTATE AND CALCIUM CHLORIDE 100 ML/HR: 600; 310; 30; 20 INJECTION, SOLUTION INTRAVENOUS at 11:42

## 2025-03-28 RX ADMIN — HEPARIN SODIUM 1500 UNITS/HR: 10000 INJECTION, SOLUTION INTRAVENOUS at 09:55

## 2025-03-28 RX ADMIN — MYCOPHENOLATE MOFETIL 500 MG: 250 CAPSULE ORAL at 06:17

## 2025-03-28 ASSESSMENT — PAIN SCALES - GENERAL
PAINLEVEL_OUTOF10: 0 - NO PAIN
PAIN_LEVEL: 3
PAINLEVEL_OUTOF10: 0 - NO PAIN

## 2025-03-28 ASSESSMENT — PAIN - FUNCTIONAL ASSESSMENT

## 2025-03-28 ASSESSMENT — ENCOUNTER SYMPTOMS
PALPITATIONS: 0
WHEEZING: 1
CHEST TIGHTNESS: 0
ACTIVITY CHANGE: 0
SHORTNESS OF BREATH: 0
APNEA: 0
ABDOMINAL DISTENTION: 0

## 2025-03-28 NOTE — ANESTHESIA PREPROCEDURE EVALUATION
Patient: Dilip Haynes    Procedure Information       Date/Time: 03/28/25 2262    Procedure: Bilateral lower extremity angiogram with possible interventions (Bilateral)    Location: [facility] OR 16 / [facility] OR    Surgeons: Joshua Alvarez MD            Relevant Problems   Cardiac   (+) AAA (abdominal aortic aneurysm) without rupture   (+) Aortic aneurysm   (+) Arteriosclerosis of coronary artery   (+) Atrial ectopy   (+) Atrial fibrillation (Multi)   (+) Atrial flutter (Multi)   (+) Yung-tachy syndrome (Multi)   (+) Cardiac arrest with successful resuscitation   (+) Chronic diastolic congestive heart failure   (+) Hyperlipidemia   (+) Hypertension   (+) Lower limb ischemia   (+) Myocardial infarction (Multi)   (+) PAD (peripheral artery disease) (CMS-HCC)   (+) Presence of stent in coronary artery   (+) Renal artery stenosis (CMS-HCC)      Pulmonary   (+) Pulmonary embolism      GI   (+) GERD (gastroesophageal reflux disease)      /Renal  S/P renal transplant   (+) BPH with obstruction/lower urinary tract symptoms   (+) End-stage renal disease (Multi)      Liver   (+) Hepatitis C virus infection      Endocrine   (+) Hypothyroidism   (+) Secondary hyperparathyroidism (Multi)   (+) Type 2 diabetes mellitus      Hematology   (+) Anemia   (+) Iron deficiency anemia      Musculoskeletal   (+) RA (rheumatoid arthritis)      ID   (+) Hepatitis C virus infection       Clinical information reviewed:   Tobacco  Allergies  Meds   Med Hx  Surg Hx   Fam Hx  Soc Hx        NPO Detail:  No data recorded     PHYSICAL EXAM    Anesthesia Plan    History of general anesthesia?: yes  History of complications of general anesthesia?: no    ASA 3     MAC     intravenous induction

## 2025-03-28 NOTE — CARE PLAN
The patient's goals for the shift include no pain    The clinical goals for the shift include decresase pain    Over the shift, the patient did not make progress toward the following goals. Barriers to progression include pain Recommendations to address these barriers include consults  Problem: Pain - Adult  Goal: Verbalizes/displays adequate comfort level or baseline comfort level  Outcome: Progressing     Problem: Safety - Adult  Goal: Free from fall injury  Outcome: Progressing

## 2025-03-28 NOTE — ANESTHESIA POSTPROCEDURE EVALUATION
Patient: Dilip Haynes    Procedure Summary       Date: 03/28/25 Room / Location: [facility] OR 16 / [facility]    Anesthesia Start: 1321 Anesthesia Stop: 1417    Procedure: Right lower extremity angiogram (Bilateral) Diagnosis:       Lower limb ischemia      (Lower limb ischemia [I99.8])    Surgeons: Joshua Alvarez MD Responsible Provider: Keyla Gailcia MD    Anesthesia Type: MAC ASA Status: 3            Anesthesia Type: MAC    Vitals Value Taken Time   /80 03/28/25 1417   Temp 36.6 03/28/25 1417   Pulse 72 03/28/25 1417   Resp 16 03/28/25 1417   SpO2 94 03/28/25 1417       Anesthesia Post Evaluation    Patient location during evaluation: bedside  Patient participation: complete - patient participated  Level of consciousness: awake and alert  Pain score: 3  Pain management: adequate  Airway patency: patent  Cardiovascular status: acceptable  Respiratory status: acceptable and nasal cannula  Hydration status: acceptable  Postoperative Nausea and Vomiting: none        There were no known notable events for this encounter.

## 2025-03-28 NOTE — PROCEDURES
Arterial Line:    Date/Time: 3/28/2025 3:45 PM    Staffing  Performed: attending   Authorized by: Keyla Galicia MD    Performed by: Keyla Galicia MD    An arterial line was placed. Procedure performed using surface landmarks.in the PACU for the following indication(s): continuous blood pressure monitoring.    A 20 gauge (size), 1 and 1/4 inch (length), Angiocath (type) catheter was placed into the Left radial artery, secured by Tegisaias   Seldingjeferson technique used.  Events:  patient tolerated procedure well with no complications.      Additional notes:  Patient verbally consented to arterial line for continuous blood pressure monitoring. Safety time out performed verifying patient, procedure, laterality, and allergies. Left wrist was supinated, prepped with chloroprep, and draped. Skin was anesthetized with 1mL 2% lidocaine. 20 gauge angiocath advanced into left radial artery until flash was obtained, angiocatheter was advanced off needle into vessel. Needle withdrawn with excellent pulsatile flow. Connected to pressure tubing with arterial waveform. Line was secured with tegaderm and tape. No obvious complications.

## 2025-03-28 NOTE — SIGNIFICANT EVENT
Post-op Check  S:    POD 0 from diagnostic angiogram cb developing cp and ST changes  Currently states has improved breathing and decreased cp      O:   Vital signs are stable, normotensive, afebrile, no new or worsening oxygen requirement, not tachycardic  Visit Vitals  /79   Pulse 87   Temp 36.6 °C (97.9 °F) (Temporal)   Resp 18        Constitutional: no acute distress  Skin: warm and dry overall   Neuro: A/O x4, no gross deficits   HEENT: Atraumatic, no scleral icterus  Cardiac: RRR  Pulmonary: Unlabored respirations   Abdomen: Non distended, non tender  : No voids  LLE: No hematoma over access site. DP and PT signals, intact strength and sensation  RLE: PT signal. Intact strength and sensation, chronic wounds stable from admission pics    A/P:  Overall, patient is doing well postoperatively with no acute concerns.  Will continue to optimize pain control as needed.  Will continue to monitor clinical exam, vitals, I&O's, and labs when available.  Will follow up on the patient in the a.m. or sooner as needed.  -Admit to SICU  [facility] for retention and strict I/O per SICU team      Anthony Johnson DO  PGY-1

## 2025-03-28 NOTE — H&P
History Of Present Illness  Dilip Haynes is a 74 y.o. male presenting with PMHx significant for obstructive CAD s/p PCI mRCA in-stent restenosis in 5/25/2016, HFrimprovedEF (follows with Dr. Linn), renal artery stenosis, HTN, afib s/p DCCV 6/2020 (on Eliquis last took 3/26 AM), carotid endarterectomy for asymptomatic stenosis, ESRD s/p DDKT (5/2020), hypothyroidism, and PAD (s/p L iliac ([id number] stent) with R fem and profunda endarterectomy with patch angioplasty (2012) who presented to [facility] 3/27 from [place] ED with c/f acute on chronic right limb ischemia. Patient underwent diagnostic bilateral lower extremity angiography 3/28/25 with Dr. Alvarez under MAC. Following procedure, patient with 230s/90s with NIBP in PACU. Given labetalol 5mg x1 and hydralazine 10mg x2. NIBPs still in the 190s/80s. Patient with noted ST depressions. ECG obtained and is similar to prior.    Ultimately started on nitroglycerin gtt and admitted to SICU for ongoing management. [place] placed in PACU.     OR course:  Received 100 mcg of fentanyl, 2mg versed, intra-op.   EBL: 5ml  UOP: None  Crystalloid: 250cc  Intubation: MAC  Access: left radial perfecto, PIV x2     Past Medical History  Past Medical History:   Diagnosis Date    Encounter for other preprocedural examination 05/10/2020    Pre-transplant evaluation for kidney transplant    Old myocardial infarction 01/19/2022    H/O non-ST elevation myocardial infarction (NSTEMI)    Personal history of other diseases of the circulatory system 12/22/2015    History of stenosis of renal artery    Personal history of other diseases of the circulatory system 04/13/2021    History of carotid artery stenosis    Personal history of other diseases of the circulatory system 01/19/2022    History of essential hypertension    Personal history of other diseases of the circulatory system 07/23/2016    History of claudication    Personal history of other endocrine, nutritional and metabolic disease  "12/22/2015    History of thyroid disease    Raynaud's syndrome without gangrene 12/22/2015    Raynauds disease       Surgical History  Past Surgical History:   Procedure Laterality Date    CAROTID ENDARTERECTOMY  12/11/2021    Carotid Thromboendarterectomy    KNEE SURGERY  09/12/2019    Knee Surgery    OTHER SURGICAL HISTORY  01/19/2022    Kidney transplantation    OTHER SURGICAL HISTORY  09/22/2020    Transcath Intravascular Stent Placement Percutaneous Femoral    OTHER SURGICAL HISTORY  01/19/2022    Peritoneal Dialysis Catheter    TOTAL THYROIDECTOMY  12/11/2021    Thyroid Surgery Total Thyroidectomy        Social History  He reports that he has quit smoking. His smoking use included cigarettes. He has never used smokeless tobacco. He reports current alcohol use. He reports that he does not use drugs.    Family History  No family history on file.     Allergies  Lovastatin, Simvastatin, Adhesive, and Naproxen    Review of Systems   Constitutional:  Negative for activity change.   HENT:          \"Lots of secretions\"   Respiratory:  Positive for wheezing. Negative for apnea, chest tightness and shortness of breath.    Cardiovascular:  Negative for chest pain and palpitations.   Gastrointestinal:  Negative for abdominal distention.        Physical Exam  Constitutional:       Appearance: Normal appearance.   HENT:      Head: Normocephalic and atraumatic.      Mouth/Throat:      Mouth: Mucous membranes are moist.   Eyes:      Extraocular Movements: Extraocular movements intact.   Cardiovascular:      Rate and Rhythm: Normal rate and regular rhythm.   Pulmonary:      Effort: Pulmonary effort is normal.      Breath sounds: Wheezing present.      Comments: End expiratory wheezes L>R  Abdominal:      Palpations: Abdomen is soft.   Musculoskeletal:         General: No swelling or tenderness.      Comments: Right great toe with ulceration    Skin:     General: Skin is warm and dry.   Neurological:      Mental Status: He is " "alert and oriented to person, place, and time.   Psychiatric:         Mood and Affect: Mood normal.         Thought Content: Thought content normal.          Last Recorded Vitals  Blood pressure (!) 195/86, pulse 81, temperature 36.6 °C (97.9 °F), temperature source Temporal, resp. rate 20, height 1.778 m (5' 10\"), weight 80.3 kg (177 lb 0.5 oz), SpO2 93%.    Relevant Results  Scheduled medications  [Transfer Hold] amLODIPine, 10 mg, oral, Daily  [Transfer Hold] famotidine, 20 mg, oral, Daily  [Transfer Hold] insulin lispro, 0-5 Units, subcutaneous, q4h  [Transfer Hold] levothyroxine, 137 mcg, oral, Daily  lidocaine, 0.1 mL, subcutaneous, Once  [Transfer Hold] metoprolol tartrate, 50 mg, oral, BID  [Transfer Hold] mycophenolate, 500 mg, oral, 2 times per day  [Transfer Hold] pravastatin, 40 mg, oral, Nightly  [Transfer Hold] predniSONE, 5 mg, oral, Daily  [Transfer Hold] tacrolimus ER, 1.5 mg, oral, Once per day on Sunday Monday Tuesday Wednesday Thursday Friday  [Transfer Hold] tamsulosin, 0.4 mg, oral, Nightly      Continuous medications  heparin, 0-4,500 Units/hr, Last Rate: Stopped (03/28/25 1321)  lactated Ringer's, 100 mL/hr, Last Rate: 100 mL/hr (03/28/25 1142)  lactated Ringer's, 50 mL/hr  nitroglycerin, 5-200 mcg/min      PRN medications  PRN medications: [Transfer Hold] acetaminophen, acetaminophen, albuterol, [Transfer Hold] fluticasone, hydrALAZINE, HYDROmorphone, HYDROmorphone, [Transfer Hold] naloxone, [Transfer Hold] nitroglycerin, [Transfer Hold] ondansetron **OR** [Transfer Hold] ondansetron, ondansetron, oxyCODONE, [Transfer Hold] oxyCODONE, oxyCODONE  Results for orders placed or performed during the hospital encounter of 03/27/25 (from the past 24 hours)   POCT GLUCOSE   Result Value Ref Range    POCT Glucose 118 (H) 74 - 99 mg/dL   POCT GLUCOSE   Result Value Ref Range    POCT Glucose 131 (H) 74 - 99 mg/dL   POCT GLUCOSE   Result Value Ref Range    POCT Glucose 138 (H) 74 - 99 mg/dL   CBC "   Result Value Ref Range    WBC 6.9 4.4 - 11.3 x10*3/uL    nRBC 0.0 0.0 - 0.0 /100 WBCs    RBC 4.72 4.50 - 5.90 x10*6/uL    Hemoglobin 11.6 (L) 13.5 - 17.5 g/dL    Hematocrit 36.9 (L) 41.0 - 52.0 %    MCV 78 (L) 80 - 100 fL    MCH 24.6 (L) 26.0 - 34.0 pg    MCHC 31.4 (L) 32.0 - 36.0 g/dL    RDW 13.2 11.5 - 14.5 %    Platelets 190 150 - 450 x10*3/uL   Renal Function Panel   Result Value Ref Range    Glucose 81 74 - 99 mg/dL    Sodium 136 136 - 145 mmol/L    Potassium 4.6 3.5 - 5.3 mmol/L    Chloride 103 98 - 107 mmol/L    Bicarbonate 26 21 - 32 mmol/L    Anion Gap 12 10 - 20 mmol/L    Urea Nitrogen 24 (H) 6 - 23 mg/dL    Creatinine 1.85 (H) 0.50 - 1.30 mg/dL    eGFR 37 (L) >60 mL/min/1.73m*2    Calcium 9.2 8.6 - 10.6 mg/dL    Phosphorus 3.6 2.5 - 4.9 mg/dL    Albumin 4.0 3.4 - 5.0 g/dL   Magnesium   Result Value Ref Range    Magnesium 1.96 1.60 - 2.40 mg/dL   Heparin Assay, UFH   Result Value Ref Range    Heparin Unfractionated 0.8 See Comment Below for Therapeutic Ranges IU/mL   POCT GLUCOSE   Result Value Ref Range    POCT Glucose 90 74 - 99 mg/dL   POCT GLUCOSE   Result Value Ref Range    POCT Glucose 94 74 - 99 mg/dL   Transthoracic Echo (TTE) Complete   Result Value Ref Range    AV pk eusebio 1.19 m/s    LVOT diam 1.90 cm    LA vol index A/L 30.5 ml/m2    Tricuspid annular plane systolic excursion 0.8 cm    LV EF 60 %    RV free wall pk S' 6.18 cm/s    LVIDd 4.60 cm    Aortic Valve Area by Continuity of Peak Velocity 2.53 cm2    AV pk grad 6 mmHg    LV A4C EF 58.9    POCT GLUCOSE   Result Value Ref Range    POCT Glucose 101 (H) 74 - 99 mg/dL     *Note: Due to a large number of results and/or encounters for the requested time period, some results have not been displayed. A complete set of results can be found in Results Review.       Assessment/Plan   Assessment & Plan  Lower limb ischemia      Assessment:  74 y.o. male presenting with PMHx significant for CAD (s/p PCI), HTN, afib s/p DCCV 6/2020 (on Eliquis last  "took 3/26 AM), carotid endarterectomy for asymptomatic stenosis, ESRD s/p DDKT (2020), hypothyroidism, and PAD (s/p L iliac (06i42mm stent) with R fem and profunda endarterectomy with patch angioplasty (2012) who presented to [facility] 3/27 from [place] ED with c/f acute on chronic right limb ischemia. Patient underwent diagnostic bilateral lower extremity angiography 3/28/25 with Dr. Alvarez under MAC. Following procedure, patient with 230s/90s with NIBP in PACU. Given labetalol 5mg x1 and hydralazine 10mg x2. NIBPs still in the 190s/80s. Patient with noted ST depressions.     Ultimately started on nitroglycerin gtt and admitted to SICU for ongoing management. [place] placed in PACU.     NEURO: History of lumbar radiculopathy. A&O x3 post-op. Acute post-op pain well controlled.    - ongoing neuro and pain assessments  - PRN hydromorphone for pain control  - PT/OT consult  - Home meds: none     MSK: RA. History of right elbow and right knee surgery  - monitor for symptoms     CV: History of NSTEMI/CAD s/p stents (2000, 2006, 2007) and most recently WILLIS x1 to RCA for in-stent restenosis 5/2016. [facility]/17/[facility] performed for typical angina with showed 100% mid LCX and 100% PDA with nonobstructive LM and LAD, Previous cardiac MRI which showed partial viability in the region of the LCX, HTN, pAfib s/p DCCV 6/2020 (on Eliquis last took 3/26 AM). Diastolic heart failure. Loop recorder in place, last check 3/26 without any events. Baseline -170s/60-80s. Baseline echo (3/28/25) with EF 60%, RV reduced function, no significant change from prior. Cardiac MRI 4/2023 which showed areas of infarction and in the region of the LCx there was \"partial viability.\" Hypertensive urgency in PACU today, started on NTG infusion. Admitted to ICU for further management. Arrived to SICU on nitroglycerin gtt.  - continuous EKG/abp monitoring  - Goal systolic range <160s  - trend troponins  - volume as indicated  - continue nitroglycerin gtt for " now, titrate to keep systolics <160s   - Home meds: amlodipine 10mg daily, losartan 75mg daily, metoprolol tartrate 50mg bid, torsemide 20mg daily, pravastatin 40mg at bedtime, empagliflozin 10mg daily, apixaban 2.5mg bid, prn SL NTG     Vascular: History of carotid endarterectomy for asymptomatic stenosis, Raynaud's, renal artery stenosis, PAD (s/p L iliac ([id number] stent) with R fem and profunda endarterectomy with patch angioplasty (2012) who presented to [facility] 3/27 from [place] ED with c/f acute on chronic right limb ischemia. Patient underwent diagnostic bilateral lower extremity angiography 3/28/25 with Dr. Alvarez where noted to have Right common femoral stenosis. Right SFA occlusion with reconstitution of diseased right above knee popliteal artery with one vessel runoff to the foot via the posterior tibial artery.   - Patient will eventually need bypass surgery per Dr. Alvarez  - F/U with vascular surgery regarding ultimate surgical date if applicable while in ICU  - chronic nonhealing right hallux ulceration -> referred to podiatry for hyperbaric oxygen therapy, not covered since he doesn't have osteomyelitis  - consider podiatry consult, will discuss with vascular surgery     PULM: Former smoker, 75 pack years, quit 10yrs ago. On NC post op  - Wean FiO2 as tolerated.    - Q1h incentive spirometer while awake  - additional pulm toilet prn.    - F/U post op CXR     GI: Diverticulosis of colon.  - keep npo except for sips with meds for now  - PPI for GI prophylaxis     : ESRD s/p DDKT 2020. CKD IIIb. Renal artery stenosis s/p bilateral stent placement. . Baseline creatinine 1.9.  - Maintenance IVF ->LR @ 75ml/hr  - Maintain U/O >0.5ml/kg/hr  - Check renal function panel post op, daily and prn  - IS/ppx per transplant nephrology: mmf, tac, pred  - Replete electrolytes to goal K>4, Mg>2, Phos>2.5, ionized Ca>1.10.     HEME: Baseline H/H 13/45  - Check CBC and coags post op and daily  - SCDs for DVT  prophylaxis  - holding SC heparin for now  - high intensity heparin gtt ordered per vascular   - ongoing monitoring for s/s bleeding     ENDO: Pre-DM, A1c 6.4%. Uninodular goiter s/p thyroidectomy. Hypothyroidism  - Q4h BG   - SSI Lispro per ICU protocol  - continue synthroid     ID: Afebrile. Awaiting post op WBC.  - temp q4h, wbc daily  - court-op cefazolin for 24hrs  - ongoing monitoring for s/s infection     Lines:  - radial arterial line  - PIV x2     Dispo: Admit to ICU. Patient seen and discussed with ICU attending Dr. Moody.    Adriel Marie DO

## 2025-03-28 NOTE — INTERVAL H&P NOTE
Remains stable. Planning for angiogram, possible intervention but likely diagnostic for bypass planning.    H&P reviewed. The patient was examined and there are no changes to the H&P.

## 2025-03-28 NOTE — SIGNIFICANT EVENT
Called to bedside as patient remains hypertensive in PACU despite hydralazine and labetalol boluses. On arrival patient short of breath and complaining of chest pressure. Telemetry with T wave inversions. Discussed with surgical team and decision made to escalate care to ICU. Arterial line and additional IV access placed. Nitroglycerin infusion initiated with initial goal SBP < 160, CXR, EKG, and troponins ordered. Cardiology following patient and evaluated patient at bedside in PACU. Agree with nitroglycerin infusion and escalation to ICU care. ICU team called and accepted patient for transfer.

## 2025-03-28 NOTE — CONSULTS
Reason For Consult  S/p DDKT    History Of Present Illness  Dilip Haynes is a 74 y.o. 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 S/p HCV treatment with SVR. post txp course was complicated by urinary retention and enlarged prostate.    Other PMH: CAD, HTN, DM2, Afib s/p DCCV on eliquis, carotid endarterectomy for asymptomatic stenosis, ESRD s/p DDKT (2020), hypothyroidism, PAD (s/p L iliac stent with R fem and profunda endarterectomy with patch angioplasty)     Pt presented to [facility] on 3/27 for concern for worsening limb ischemia. H/o rt hallux ulceration for few weeks, follows with podiatry. C/o rest pain    CT angio aorta and b/l iliofemoral w/o contrast 3/26/25.  Started on heparin drip.   Vascular surgery consulted for further management.   Plan for LE angiogram and possible bypass this admission.     Today, pt reports persistent LE pain.     Home IS: Envarsus 1.5 mg/d,  mg bid, pred 5 mg/d..  Baseline Cr ~2 on recent labs.     ROS neg otherwise.     Past Medical History  He has a past medical history of Encounter for other preprocedural examination (05/10/2020), Old myocardial infarction (01/19/2022), Personal history of other diseases of the circulatory system (12/22/2015), Personal history of other diseases of the circulatory system (04/13/2021), Personal history of other diseases of the circulatory system (01/19/2022), Personal history of other diseases of the circulatory system (07/23/2016), Personal history of other endocrine, nutritional and metabolic disease (12/22/2015), and Raynaud's syndrome without gangrene (12/22/2015).    Surgical History  He has a past surgical history that includes Total thyroidectomy (12/11/2021); Carotid endarterectomy (12/11/2021); Other surgical history (01/19/2022); Other surgical history (09/22/2020); Knee surgery (09/12/2019); and Other surgical history (01/19/2022).     Social History  He reports that he has quit smoking. His smoking  "use included cigarettes. He has never used smokeless tobacco. He reports current alcohol use. He reports that he does not use drugs.    Family History  No family history on file.     Allergies  Lovastatin, Simvastatin, Adhesive, and Naproxen    Scheduled medications  famotidine, 20 mg, oral, Daily  insulin lispro, 0-5 Units, subcutaneous, q4h  levothyroxine, 137 mcg, oral, Daily  metoprolol tartrate, 50 mg, oral, BID  mycophenolate, 500 mg, oral, 2 times per day  pravastatin, 40 mg, oral, Nightly  predniSONE, 5 mg, oral, Daily  tacrolimus ER, 1.5 mg, oral, Once per day on Sunday Monday Tuesday Wednesday Thursday Friday  tamsulosin, 0.4 mg, oral, Nightly      Continuous medications  heparin, 0-4,500 Units/hr, Last Rate: 1,500 Units/hr (03/27/25 2036)  lactated Ringer's, 100 mL/hr, Last Rate: 100 mL/hr (03/27/25 2036)      PRN medications  PRN medications: acetaminophen, fluticasone, naloxone, nitroglycerin, ondansetron **OR** ondansetron, oxyCODONE       Physical Exam     Last Recorded Vitals  Blood pressure 158/85, pulse 84, temperature 36.6 °C (97.9 °F), resp. rate 19, height 1.803 m (5' 10.98\"), weight 80.3 kg (177 lb 0.5 oz), SpO2 96%.    A&ox3, no distress, pleasant  MMM, no lesions  Lungs with equal air entry, no added sounds  Rrr, no m/r/g  Abd soft, nt, nd  No allograft tenderness  RLE with erythema, tender to touch, tissue loss Rt hallux    Labs:  Results for orders placed or performed during the hospital encounter of 03/27/25 (from the past 24 hours)   Comprehensive metabolic panel   Result Value Ref Range    Glucose 126 (H) 74 - 99 mg/dL    Sodium 136 136 - 145 mmol/L    Potassium 4.7 3.5 - 5.3 mmol/L    Chloride 102 98 - 107 mmol/L    Bicarbonate 25 21 - 32 mmol/L    Anion Gap 14 10 - 20 mmol/L    Urea Nitrogen 25 (H) 6 - 23 mg/dL    Creatinine 1.91 (H) 0.50 - 1.30 mg/dL    eGFR 36 (L) >60 mL/min/1.73m*2    Calcium 9.4 8.6 - 10.6 mg/dL    Albumin 4.3 3.4 - 5.0 g/dL    Alkaline Phosphatase 74 33 - 136 U/L "    Total Protein 7.1 6.4 - 8.2 g/dL    AST 23 9 - 39 U/L    Bilirubin, Total 0.5 0.0 - 1.2 mg/dL    ALT 17 10 - 52 U/L   Renal function panel   Result Value Ref Range    Glucose 129 (H) 74 - 99 mg/dL    Sodium 136 136 - 145 mmol/L    Potassium 4.5 3.5 - 5.3 mmol/L    Chloride 102 98 - 107 mmol/L    Bicarbonate 25 21 - 32 mmol/L    Anion Gap 14 10 - 20 mmol/L    Urea Nitrogen 25 (H) 6 - 23 mg/dL    Creatinine 1.91 (H) 0.50 - 1.30 mg/dL    eGFR 36 (L) >60 mL/min/1.73m*2    Calcium 9.5 8.6 - 10.6 mg/dL    Phosphorus 3.2 2.5 - 4.9 mg/dL    Albumin 4.3 3.4 - 5.0 g/dL   Coagulation Screen   Result Value Ref Range    Protime 13.0 (H) 9.8 - 12.4 seconds    INR 1.2 (H) 0.9 - 1.1    aPTT 57 (H) 26 - 36 seconds   CBC and Auto Differential   Result Value Ref Range    WBC 8.1 4.4 - 11.3 x10*3/uL    nRBC 0.0 0.0 - 0.0 /100 WBCs    RBC 5.21 4.50 - 5.90 x10*6/uL    Hemoglobin 13.0 (L) 13.5 - 17.5 g/dL    Hematocrit 41.8 41.0 - 52.0 %    MCV 80 80 - 100 fL    MCH 25.0 (L) 26.0 - 34.0 pg    MCHC 31.1 (L) 32.0 - 36.0 g/dL    RDW 13.2 11.5 - 14.5 %    Platelets 228 150 - 450 x10*3/uL    Neutrophils % 73.3 40.0 - 80.0 %    Immature Granulocytes %, Automated 0.4 0.0 - 0.9 %    Lymphocytes % 14.5 13.0 - 44.0 %    Monocytes % 10.1 2.0 - 10.0 %    Eosinophils % 1.0 0.0 - 6.0 %    Basophils % 0.7 0.0 - 2.0 %    Neutrophils Absolute 5.95 (H) 1.60 - 5.50 x10*3/uL    Immature Granulocytes Absolute, Automated 0.03 0.00 - 0.50 x10*3/uL    Lymphocytes Absolute 1.18 0.80 - 3.00 x10*3/uL    Monocytes Absolute 0.82 (H) 0.05 - 0.80 x10*3/uL    Eosinophils Absolute 0.08 0.00 - 0.40 x10*3/uL    Basophils Absolute 0.06 0.00 - 0.10 x10*3/uL   aPTT - baseline   Result Value Ref Range    aPTT 43 (H) 26 - 36 seconds   Heparin Assay, UFH   Result Value Ref Range    Heparin Unfractionated 0.5 See Comment Below for Therapeutic Ranges IU/mL   Lavender Top   Result Value Ref Range    Extra Tube Hold for add-ons.    Heparin Assay, UFH   Result Value Ref Range     Heparin Unfractionated 0.7 See Comment Below for Therapeutic Ranges IU/mL   POCT GLUCOSE   Result Value Ref Range    POCT Glucose 86 74 - 99 mg/dL   POCT GLUCOSE   Result Value Ref Range    POCT Glucose 144 (H) 74 - 99 mg/dL   POCT GLUCOSE   Result Value Ref Range    POCT Glucose 118 (H) 74 - 99 mg/dL   POCT GLUCOSE   Result Value Ref Range    POCT Glucose 131 (H) 74 - 99 mg/dL     *Note: Due to a large number of results and/or encounters for the requested time period, some results have not been displayed. A complete set of results can be found in Results Review.         CT angio 3/26/25  IMPRESSION:  RIGHT LOWER EXTREMITY:      - Right Common Iliac Artery: Mild fusiform dilatation measuring 1.9  cm. Atherosclerosis without significant stenosis. - Right External  Iliac Artery: Moderate-severe stenosis at the origin and  mild-to-moderate stenosis distally. - Right Internal Iliac Artery:  Severe stenosis at the origin and then chronic occlusion thereafter  with reconstitution at the gluteal arteries.      - Right Common Femoral Artery: Varying degrees of mild to moderate  stenosis. - Right Profunda Artery: Severe stenosis at the origin.  - Right Superficial Femoral Artery: Diffuse severe stenosis in the  proximal aspect which then progresses to complete occlusion distally.  There is an reconstitution as enters the popliteal fossa but remains  severely narrowed. - Right Popliteal Artery: Moderate severe stenosis  just above the tibioperoneal trunk. - Right Anterior Tibial,  Posterior Tibial, Peroneal Arteries: Limited evaluation due heavy  calcification. However the anterior tibial artery is occluded and the  posterior tibial artery is patent into the foot. The peroneal artery  is intermittently occluded.      LEFT LOWER EXTREMITY:      - Left Common Iliac Artery: Patent stent.  - Left External Iliac Artery: Patent stent.  - Left Internal Iliac Artery: Chronically occluded, distally  reconstitute at the gluteal  arteries.      - Left Common Femoral Artery: Postsurgical change at the common  femoral artery. No hemodynamically significant stenosis. - Left  Profunda Artery:  No hemodynamically significant stenosis. - Left  Superficial Femoral Artery: Severe diffuse calcific atherosclerosis  with the ventral chronic occlusion in the mid thigh, remaining  completely occluded to the level of the popliteal artery. - Left  Popliteal Artery: Moderate to severe atherosclerosis with areas of  greater than 75% stenosis proximally. - Left Anterior Tibial,  Posterior Tibial, Peroneal Arteries: Intermittent occlusion of the  anterior tibial artery and remains non-opacified into the foot on  arterial images but then fills in on delayed images. The left  posterior tibial and peroneal arteries are patent into the foot.      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  This is superimposed upon a fusiform infrarenal abdominal aortic  aneurysm with diameter of ~ 3.3 cm x 2.9 cm.      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      Cirrhotic appearing liver with 2 hypervascular foci that could  represent shunts (favored) however, recommend nonemergent MRI liver  for further evaluation given elevated risk factors.      Renal transplant right lower quadrant without evidence of acute  abnormality.     Assessment/Plan     74 y.o. 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 S/p HCV treatment with SVR. post txp course was complicated by urinary retention and enlarged prostate.    Other PMH: CAD, HTN, DM2, Afib s/p DCCV on eliquis, carotid endarterectomy for asymptomatic stenosis, ESRD s/p DDKT (2020), hypothyroidism, PAD (s/p L iliac stent with R fem and profunda endarterectomy with patch angioplasty)     Pt presented to [facility] on 3/27 for concern for worsening rt limb ischemia. On  heparin. Vascular surgery plans for angiogram and possible bypass this admission.    Allograft function: s/p DDKT 5/2020  - baseline Cr ~2, stable this admission  - metabolic indices stable  - nonoliguric. No hypervolemia on exam.  - ok to proceed with limb saving angiogram to evaluate RLE vascular disease. Would recommend hydration pre- and post-contrast exposure  - HTN: Bps acceptable. Cont home meds, titrate as indicated  - Anemia: Hb 13, stable  - CKD-MBD: Ca, Phos stable. , vit D 36 2/2025  - dose meds for CrCl    Immunosuppression:  - Cont Envarsus 1.5 mg/d. Goal Fk 5-8  - reduce MMF to 500mg bid (anticipated procedures), cont pred 5mg/d    Rest of the management per primary team. Will follow    I spent 60 minutes in the professional and overall care of this patient.      Krystian Thompson MD

## 2025-03-28 NOTE — PROGRESS NOTES
Social Work Note    Interdisciplinary Treatment Plan : Right lower extremity angiogram today.   - Additional information : C/w heparin gtt  - Payor : Medicare  - Planned Disposition : Pending on medical outcome  - Barrier to discharge : None noted at this time.     SW visited pt to offer support and obtain information. Pt is off floor at the moment. SW will re-attempt to visit pt. SW will continue to follow and assist.     ERNESTO McmanusA, LSW

## 2025-03-28 NOTE — BRIEF OP NOTE
Date: 3/28/2025  OR Location: [facility] OR    Name: Dilip Haynes, : 1950, Age: 74 y.o., MRN: [id number], Sex: male    Diagnosis  Pre-op Diagnosis      * Lower limb ischemia [I99.8] Post-op Diagnosis     * Lower limb ischemia [I99.8]     Procedures  Right lower extremity angiogram  14252 - CHG ANGIOGRAPHY EXTREMITY BILATERAL RS&I      Surgeons      * Joshua Alvarez - Primary    Resident/Fellow/Other Assistant:  Surgeons and Role:  * No surgeons found with a matching role *    Staff:   Scrub Person: Naila  Scrub Person: Steven Fitzgerald Scrub: Franc Fitzgerald Circulator: Brielle  Circulator: Adelfo  Circulator: Valentina    Anesthesia Staff: Anesthesiologist: Keyla Galicia MD  CRNA: MARTA Forrester-VISHNU    Procedure Summary  Anesthesia: Monitor Anesthesia Care  ASA: III  Estimated Blood Loss: 5mL  Intra-op Medications:   Administrations occurring from 1256 to 1501 on 25:   Medication Name Total Dose   lidocaine (Xylocaine) 10 mg/mL (1 %) injection 7 mL   iodixanol (VISIPaque) 320 mg iodine/mL injection 25 mL   heparin (porcine) 10,000 Units in sodium chloride 0.9% 1,000 mL irrigation 10,000 Units   sodium chloride 0.9 % irrigation solution 1,000 mL   acetaminophen (Tylenol) tablet 650 mg Cannot be calculated   amLODIPine (Norvasc) tablet 10 mg Cannot be calculated   famotidine (Pepcid) tablet 20 mg Cannot be calculated   fluticasone (Flonase) nasal spray 2 spray Cannot be calculated   heparin 25,000 Units in dextrose 5% 250 mL (100 Units/mL) infusion (premix) Cannot be calculated   insulin lispro injection 0-5 Units Cannot be calculated   levothyroxine (Synthroid, Levoxyl) tablet 137 mcg Cannot be calculated   metoprolol tartrate (Lopressor) tablet 50 mg Cannot be calculated   mycophenolate (Cellcept) capsule 500 mg Cannot be calculated   naloxone (Narcan) injection 0.2 mg Cannot be calculated   nitroglycerin (Nitrostat) SL tablet 0.4 mg Cannot be calculated   oxyCODONE (Roxicodone) immediate release  tablet 5 mg Cannot be calculated   pravastatin (Pravachol) tablet 40 mg Cannot be calculated   predniSONE (Deltasone) tablet 5 mg Cannot be calculated   tacrolimus ER (Envarsus XR) tablet ER 1.5 mg Cannot be calculated   tamsulosin (Flomax) 24 hr capsule 0.4 mg Cannot be calculated   fentaNYL (Sublimaze) injection 50 mcg/mL 100 mcg   hydrALAZINE (Apresoline) injection 20 mg   LR bolus Cannot be calculated   midazolam PF (Versed) injection 1 mg/mL 2 mg   ondansetron (Zofran) 2 mg/mL injection 4 mg              Anesthesia Record               Intraprocedure I/O Totals          Intake    LR bolus 250.00 mL    Heparin Drip 0.00 mL    The total shown is the total volume documented since Anesthesia Start was filed.    Total Intake 250 mL          Specimen: No specimens collected               Findings:   Right common femoral stenosis.  Right SFA occlusion with reconstitution of diseased right above knee popliteal artery with one vessel runoff to the foot via the posterior tibial artery.  4fr sheath placed to left common femoral artery with hemostasis ensured with direct manual compression.     Complications:  None; patient tolerated the procedure well.     Disposition: PACU - hemodynamically stable.  Condition: stable  Specimens Collected: No specimens collected  Attending Attestation: I performed the procedure.    Joshua Alvarez  Phone Number: [phone]

## 2025-03-29 ENCOUNTER — APPOINTMENT (OUTPATIENT)
Dept: RADIOLOGY | Facility: HOSPITAL | Age: 75
DRG: 271 | End: 2025-03-29
Payer: MEDICARE

## 2025-03-29 ENCOUNTER — APPOINTMENT (OUTPATIENT)
Dept: RADIOLOGY | Facility: HOSPITAL | Age: 75
End: 2025-03-29
Payer: MEDICARE

## 2025-03-29 LAB
ABO GROUP (TYPE) IN BLOOD: NORMAL
ALBUMIN SERPL BCP-MCNC: 3.8 G/DL (ref 3.4–5)
ANION GAP SERPL CALC-SCNC: 15 MMOL/L (ref 10–20)
ANTIBODY SCREEN: NORMAL
BASOPHILS # BLD AUTO: 0.06 X10*3/UL (ref 0–0.1)
BASOPHILS NFR BLD AUTO: 0.6 %
BUN SERPL-MCNC: 21 MG/DL (ref 6–23)
CA-I BLD-SCNC: 1.18 MMOL/L (ref 1.1–1.33)
CALCIUM SERPL-MCNC: 9.1 MG/DL (ref 8.6–10.6)
CARDIAC TROPONIN I PNL SERPL HS: 117 NG/L (ref 0–53)
CARDIAC TROPONIN I PNL SERPL HS: 133 NG/L (ref 0–53)
CARDIAC TROPONIN I PNL SERPL HS: 135 NG/L (ref 0–53)
CARDIAC TROPONIN I PNL SERPL HS: 76 NG/L (ref 0–53)
CHLORIDE SERPL-SCNC: 103 MMOL/L (ref 98–107)
CO2 SERPL-SCNC: 21 MMOL/L (ref 21–32)
CREAT SERPL-MCNC: 1.65 MG/DL (ref 0.5–1.3)
EGFRCR SERPLBLD CKD-EPI 2021: 43 ML/MIN/1.73M*2
EOSINOPHIL # BLD AUTO: 0.03 X10*3/UL (ref 0–0.4)
EOSINOPHIL NFR BLD AUTO: 0.3 %
ERYTHROCYTE [DISTWIDTH] IN BLOOD BY AUTOMATED COUNT: 13.2 % (ref 11.5–14.5)
ERYTHROCYTE [DISTWIDTH] IN BLOOD BY AUTOMATED COUNT: 13.5 % (ref 11.5–14.5)
GLUCOSE BLD MANUAL STRIP-MCNC: 107 MG/DL (ref 74–99)
GLUCOSE BLD MANUAL STRIP-MCNC: 108 MG/DL (ref 74–99)
GLUCOSE BLD MANUAL STRIP-MCNC: 109 MG/DL (ref 74–99)
GLUCOSE BLD MANUAL STRIP-MCNC: 111 MG/DL (ref 74–99)
GLUCOSE BLD MANUAL STRIP-MCNC: 114 MG/DL (ref 74–99)
GLUCOSE SERPL-MCNC: 107 MG/DL (ref 74–99)
HCT VFR BLD AUTO: 36.7 % (ref 41–52)
HCT VFR BLD AUTO: 37.8 % (ref 41–52)
HGB BLD-MCNC: 11.3 G/DL (ref 13.5–17.5)
HGB BLD-MCNC: 11.7 G/DL (ref 13.5–17.5)
IMM GRANULOCYTES # BLD AUTO: 0.03 X10*3/UL (ref 0–0.5)
IMM GRANULOCYTES NFR BLD AUTO: 0.3 % (ref 0–0.9)
LYMPHOCYTES # BLD AUTO: 0.95 X10*3/UL (ref 0.8–3)
LYMPHOCYTES NFR BLD AUTO: 9.9 %
MAGNESIUM SERPL-MCNC: 1.9 MG/DL (ref 1.6–2.4)
MCH RBC QN AUTO: 24.9 PG (ref 26–34)
MCH RBC QN AUTO: 25.4 PG (ref 26–34)
MCHC RBC AUTO-ENTMCNC: 30.8 G/DL (ref 32–36)
MCHC RBC AUTO-ENTMCNC: 31 G/DL (ref 32–36)
MCV RBC AUTO: 81 FL (ref 80–100)
MCV RBC AUTO: 82 FL (ref 80–100)
MONOCYTES # BLD AUTO: 1.08 X10*3/UL (ref 0.05–0.8)
MONOCYTES NFR BLD AUTO: 11.3 %
NEUTROPHILS # BLD AUTO: 7.45 X10*3/UL (ref 1.6–5.5)
NEUTROPHILS NFR BLD AUTO: 77.6 %
NRBC BLD-RTO: 0 /100 WBCS (ref 0–0)
NRBC BLD-RTO: 0 /100 WBCS (ref 0–0)
PHOSPHATE SERPL-MCNC: 3.3 MG/DL (ref 2.5–4.9)
PLATELET # BLD AUTO: 211 X10*3/UL (ref 150–450)
PLATELET # BLD AUTO: 218 X10*3/UL (ref 150–450)
POTASSIUM SERPL-SCNC: 4.1 MMOL/L (ref 3.5–5.3)
RBC # BLD AUTO: 4.54 X10*6/UL (ref 4.5–5.9)
RBC # BLD AUTO: 4.61 X10*6/UL (ref 4.5–5.9)
RH FACTOR (ANTIGEN D): NORMAL
SODIUM SERPL-SCNC: 135 MMOL/L (ref 136–145)
UFH PPP CHRO-ACNC: 0.6 IU/ML
WBC # BLD AUTO: 8.7 X10*3/UL (ref 4.4–11.3)
WBC # BLD AUTO: 9.6 X10*3/UL (ref 4.4–11.3)

## 2025-03-29 PROCEDURE — 84484 ASSAY OF TROPONIN QUANT: CPT | Performed by: STUDENT IN AN ORGANIZED HEALTH CARE EDUCATION/TRAINING PROGRAM

## 2025-03-29 PROCEDURE — 71045 X-RAY EXAM CHEST 1 VIEW: CPT | Performed by: RADIOLOGY

## 2025-03-29 PROCEDURE — 2500000004 HC RX 250 GENERAL PHARMACY W/ HCPCS (ALT 636 FOR OP/ED)

## 2025-03-29 PROCEDURE — 80069 RENAL FUNCTION PANEL: CPT

## 2025-03-29 PROCEDURE — 86901 BLOOD TYPING SEROLOGIC RH(D): CPT

## 2025-03-29 PROCEDURE — 71045 X-RAY EXAM CHEST 1 VIEW: CPT

## 2025-03-29 PROCEDURE — 2500000002 HC RX 250 W HCPCS SELF ADMINISTERED DRUGS (ALT 637 FOR MEDICARE OP, ALT 636 FOR OP/ED)

## 2025-03-29 PROCEDURE — 37799 UNLISTED PX VASCULAR SURGERY: CPT

## 2025-03-29 PROCEDURE — 2500000004 HC RX 250 GENERAL PHARMACY W/ HCPCS (ALT 636 FOR OP/ED): Performed by: STUDENT IN AN ORGANIZED HEALTH CARE EDUCATION/TRAINING PROGRAM

## 2025-03-29 PROCEDURE — 99222 1ST HOSP IP/OBS MODERATE 55: CPT | Performed by: STUDENT IN AN ORGANIZED HEALTH CARE EDUCATION/TRAINING PROGRAM

## 2025-03-29 PROCEDURE — 82947 ASSAY GLUCOSE BLOOD QUANT: CPT

## 2025-03-29 PROCEDURE — 83735 ASSAY OF MAGNESIUM: CPT

## 2025-03-29 PROCEDURE — 85025 COMPLETE CBC W/AUTO DIFF WBC: CPT

## 2025-03-29 PROCEDURE — 85520 HEPARIN ASSAY: CPT

## 2025-03-29 PROCEDURE — 82330 ASSAY OF CALCIUM: CPT

## 2025-03-29 PROCEDURE — 84484 ASSAY OF TROPONIN QUANT: CPT

## 2025-03-29 PROCEDURE — 2500000001 HC RX 250 WO HCPCS SELF ADMINISTERED DRUGS (ALT 637 FOR MEDICARE OP)

## 2025-03-29 PROCEDURE — 99291 CRITICAL CARE FIRST HOUR: CPT

## 2025-03-29 PROCEDURE — 99233 SBSQ HOSP IP/OBS HIGH 50: CPT | Performed by: INTERNAL MEDICINE

## 2025-03-29 PROCEDURE — 2020000001 HC ICU ROOM DAILY

## 2025-03-29 PROCEDURE — 85027 COMPLETE CBC AUTOMATED: CPT

## 2025-03-29 RX ORDER — HYDRALAZINE HYDROCHLORIDE 25 MG/1
25 TABLET, FILM COATED ORAL 4 TIMES DAILY
Status: DISCONTINUED | OUTPATIENT
Start: 2025-03-29 | End: 2025-03-29

## 2025-03-29 RX ORDER — LABETALOL HYDROCHLORIDE 5 MG/ML
10 INJECTION, SOLUTION INTRAVENOUS EVERY 4 HOURS PRN
Status: DISCONTINUED | OUTPATIENT
Start: 2025-03-29 | End: 2025-04-07

## 2025-03-29 RX ORDER — NITROGLYCERIN 20 MG/100ML
5-200 INJECTION INTRAVENOUS CONTINUOUS
Status: DISCONTINUED | OUTPATIENT
Start: 2025-03-29 | End: 2025-03-29

## 2025-03-29 RX ORDER — HYDRALAZINE HYDROCHLORIDE 20 MG/ML
10 INJECTION INTRAMUSCULAR; INTRAVENOUS EVERY 4 HOURS PRN
Status: DISCONTINUED | OUTPATIENT
Start: 2025-03-29 | End: 2025-04-07

## 2025-03-29 RX ORDER — HYDRALAZINE HYDROCHLORIDE 10 MG/1
TABLET, FILM COATED ORAL
Status: DISCONTINUED
Start: 2025-03-29 | End: 2025-03-29 | Stop reason: WASHOUT

## 2025-03-29 RX ORDER — HYDRALAZINE HYDROCHLORIDE 20 MG/ML
10 INJECTION INTRAMUSCULAR; INTRAVENOUS EVERY 4 HOURS PRN
Status: DISCONTINUED | OUTPATIENT
Start: 2025-03-29 | End: 2025-03-29

## 2025-03-29 RX ORDER — METOPROLOL TARTRATE 50 MG/1
50 TABLET ORAL 2 TIMES DAILY
Status: DISCONTINUED | OUTPATIENT
Start: 2025-03-29 | End: 2025-04-03

## 2025-03-29 RX ORDER — HYDRALAZINE HYDROCHLORIDE 25 MG/1
25 TABLET, FILM COATED ORAL 3 TIMES DAILY
Status: DISCONTINUED | OUTPATIENT
Start: 2025-03-29 | End: 2025-03-29

## 2025-03-29 RX ORDER — HYDRALAZINE HYDROCHLORIDE 50 MG/1
50 TABLET, FILM COATED ORAL 4 TIMES DAILY
Status: DISCONTINUED | OUTPATIENT
Start: 2025-03-29 | End: 2025-03-30

## 2025-03-29 RX ORDER — AMLODIPINE BESYLATE 10 MG/1
10 TABLET ORAL DAILY
Status: DISCONTINUED | OUTPATIENT
Start: 2025-03-29 | End: 2025-04-04

## 2025-03-29 RX ORDER — LABETALOL HYDROCHLORIDE 5 MG/ML
10 INJECTION, SOLUTION INTRAVENOUS EVERY 6 HOURS PRN
Status: DISCONTINUED | OUTPATIENT
Start: 2025-03-29 | End: 2025-03-29

## 2025-03-29 RX ORDER — LABETALOL HYDROCHLORIDE 5 MG/ML
10 INJECTION, SOLUTION INTRAVENOUS EVERY 4 HOURS PRN
Status: DISCONTINUED | OUTPATIENT
Start: 2025-03-29 | End: 2025-03-29

## 2025-03-29 RX ORDER — TORSEMIDE 20 MG/1
20 TABLET ORAL DAILY
Status: DISCONTINUED | OUTPATIENT
Start: 2025-03-29 | End: 2025-04-10

## 2025-03-29 RX ORDER — MAGNESIUM SULFATE HEPTAHYDRATE 40 MG/ML
2 INJECTION, SOLUTION INTRAVENOUS ONCE
Status: COMPLETED | OUTPATIENT
Start: 2025-03-29 | End: 2025-03-29

## 2025-03-29 RX ADMIN — FAMOTIDINE 20 MG: 20 TABLET, FILM COATED ORAL at 16:58

## 2025-03-29 RX ADMIN — HEPARIN SODIUM 1500 UNITS/HR: 10000 INJECTION, SOLUTION INTRAVENOUS at 15:45

## 2025-03-29 RX ADMIN — LEVOTHYROXINE SODIUM 137 MCG: 25 TABLET ORAL at 06:14

## 2025-03-29 RX ADMIN — HYDRALAZINE HYDROCHLORIDE 25 MG: 25 TABLET ORAL at 16:58

## 2025-03-29 RX ADMIN — HYDRALAZINE HYDROCHLORIDE 50 MG: 50 TABLET ORAL at 20:05

## 2025-03-29 RX ADMIN — PRAVASTATIN SODIUM 40 MG: 20 TABLET ORAL at 20:05

## 2025-03-29 RX ADMIN — LOSARTAN POTASSIUM 75 MG: 50 TABLET, FILM COATED ORAL at 06:59

## 2025-03-29 RX ADMIN — METOPROLOL TARTRATE 50 MG: 50 TABLET, FILM COATED ORAL at 20:06

## 2025-03-29 RX ADMIN — TAMSULOSIN HYDROCHLORIDE 0.4 MG: 0.4 CAPSULE ORAL at 20:06

## 2025-03-29 RX ADMIN — NITROGLYCERIN 100 MCG/MIN: 20 INJECTION INTRAVENOUS at 00:22

## 2025-03-29 RX ADMIN — TORSEMIDE 20 MG: 20 TABLET ORAL at 01:58

## 2025-03-29 RX ADMIN — MAGNESIUM SULFATE HEPTAHYDRATE 2 G: 40 INJECTION, SOLUTION INTRAVENOUS at 03:40

## 2025-03-29 RX ADMIN — METOPROLOL TARTRATE 50 MG: 50 TABLET, FILM COATED ORAL at 08:24

## 2025-03-29 RX ADMIN — MYCOPHENOLATE MOFETIL 500 MG: 250 CAPSULE ORAL at 18:32

## 2025-03-29 RX ADMIN — HYDRALAZINE HYDROCHLORIDE 25 MG: 25 TABLET ORAL at 11:20

## 2025-03-29 RX ADMIN — PREDNISONE 5 MG: 5 TABLET ORAL at 08:24

## 2025-03-29 RX ADMIN — AMLODIPINE BESYLATE 10 MG: 10 TABLET ORAL at 03:40

## 2025-03-29 RX ADMIN — METOPROLOL TARTRATE 50 MG: 50 TABLET, FILM COATED ORAL at 03:40

## 2025-03-29 RX ADMIN — MYCOPHENOLATE MOFETIL 500 MG: 250 CAPSULE ORAL at 06:14

## 2025-03-29 ASSESSMENT — COGNITIVE AND FUNCTIONAL STATUS - GENERAL
DAILY ACTIVITIY SCORE: 24
MOBILITY SCORE: 24

## 2025-03-29 ASSESSMENT — PAIN SCALES - GENERAL
PAINLEVEL_OUTOF10: 0 - NO PAIN

## 2025-03-29 ASSESSMENT — PAIN - FUNCTIONAL ASSESSMENT
PAIN_FUNCTIONAL_ASSESSMENT: 0-10

## 2025-03-29 NOTE — PROGRESS NOTES
Subjective Data:  Patient underwent angiogram 3/28,  post op. Started on nitroglycerin gtt. Patient had some chest tightness post procedure. Upon evaluation this morning, patient is chest pain free.       Objective Data:  Last Recorded Vitals:  Vitals:    03/29/25 0500 03/29/25 0600 03/29/25 0700 03/29/25 0800   BP:       BP Location:       Patient Position:       Pulse: 73 74 71    Resp: 18 22 20    Temp:    36.1 °C (97 °F)   TempSrc:    Temporal   SpO2: 93% 96% 97%    Weight:  83.1 kg (183 lb 3.2 oz)     Height:           Last Labs:  CBC - 3/29/2025:  2:02 AM  9.6 11.7 218    37.8      CMP - 3/29/2025:  2:02 AM  9.1 7.1 23 --- 0.5   3.3 3.8 17 74      PTT - 3/27/2025:  2:07 AM;  2:07 AM  1.2   13.0 57; 43     TROPHS   Date/Time Value Ref Range Status   03/29/2025 08:31  0 - 53 ng/L Final   03/29/2025 02:02 AM 76 0 - 53 ng/L Final   03/28/2025 10:33 PM 55 0 - 53 ng/L Final     BNP   Date/Time Value Ref Range Status   02/04/2025 07:15  0 - 99 pg/mL Final   10/31/2024 07:26 AM 1,072 0 - 99 pg/mL Final     HGBA1C   Date/Time Value Ref Range Status   02/20/2025 11:54 AM 6.4 4.3 - 5.6 % Final     Comment:     American Diabetes Association guidelines indicate that patients with HgbA1c in the range 5.7-6.4% are at increased risk for development of diabetes, and intervention by lifestyle modification may be beneficial. HgbA1c greater or equal to 6.5% is considered diagnostic of diabetes.   06/11/2024 01:15 PM 6.2 see below % Final   09/21/2023 01:04 PM 5.2 4.3 - 5.6 % Final     Comment:     American Diabetes Association guidelines indicate that patients with HgbA1c in the range 5.7-6.4% are at increased risk for development of diabetes, and intervention by lifestyle modification may be beneficial. HgbA1c greater or equal to 6.5% is considered diagnostic of diabetes.     VLDL   Date/Time Value Ref Range Status   06/05/2023 10:25 AM 24 0 - 40 mg/dL Final   10/19/2020 09:50 AM 19 0 - 40 mg/dL Final      Last  I/O:  I/O last 3 completed shifts:  In: 1765.2 (21.2 mL/kg) [I.V.:1515.2 (18.2 mL/kg); IV Piggyback:250]  Out: 1420 (17.1 mL/kg) [Urine:1420 (0.5 mL/kg/hr)]  Weight: 83.1 kg     Past Cardiology Tests (Last 3 Years):  EKG:  ECG 12 Lead 08/09/2024      ECG 12 Lead 07/05/2024    Echo:  Transthoracic Echo (TTE) Complete 03/28/2025    Ejection Fractions:  EF   Date/Time Value Ref Range Status   03/28/2025 11:16 AM 60 %      Cath:  No results found for this or any previous visit from the past 1095 days.    Stress Test:  No results found for this or any previous visit from the past 1095 days.    Cardiac Imaging:  MR cardiac w and wo IV contrast w regadenoson stress for MORPH FUNCT and valve DZ 07/10/2024      MR cardiac morphology and function w and wo IV contrast 11/01/2023      MR cardiac morphology and function w and wo IV contrast       Inpatient Medications:  Scheduled medications   Medication Dose Route Frequency    amLODIPine  10 mg oral Daily    famotidine  20 mg oral Daily    Or    famotidine  20 mg intravenous Daily    hydrALAZINE        hydrALAZINE  25 mg oral TID    insulin lispro  0-5 Units subcutaneous q4h    levothyroxine  137 mcg oral Daily    losartan  75 mg oral Daily    metoprolol tartrate  50 mg oral BID    mycophenolate  500 mg oral 2 times per day    pravastatin  40 mg oral Nightly    predniSONE  5 mg oral Daily    [START ON 3/30/2025] tacrolimus ER  1.5 mg oral Once per day on Sunday Monday Tuesday Wednesday Thursday Friday    tamsulosin  0.4 mg oral Nightly    torsemide  20 mg oral Daily     PRN medications   Medication    acetaminophen    albuterol    calcium gluconate    calcium gluconate    dextrose    dextrose    fluticasone    glucagon    glucagon    hydrALAZINE    hydrALAZINE    labetaloL    magnesium sulfate    [Transfer Hold] magnesium sulfate    naloxone    nitroglycerin    ondansetron    Or    ondansetron    oxyCODONE    oxygen    potassium chloride     Continuous Medications   Medication  Dose Last Rate    heparin  0-4,500 Units/hr 1,500 Units/hr (03/28/25 1802)    lactated Ringer's  75 mL/hr 75 mL/hr (03/28/25 1415)    nitroglycerin  5-200 mcg/min         Physical Exam:  General: In no acute distress  HEENT; No JVD  Cardio: RRR  Pulm: Non labored breathing  Extremities: No lower extremity edema bilaterally      Assessment/Plan     Mr. Haynes is a 74 year old M with PMH of CAD, HTN, afib s/p DCCV on eliquis, carotid endarterectomy for asymptomatic stenosis, ESRD s/p DDKT (2020), hypothyroidism, PAD (s/p L iliac stent with R fem and profunda endarterectomy with patch angioplasty) who presented on 3/27 for concern for worsening limb ischemia. Cardiology consulted for periop risk assessment.      Clinical history, EKG, imaging reviewed. In summary, patient with preserved LVEF presents for bilateral lower extremity angiogram with possible intervention. He has known unrevascularized CAD with  of LCX and PDA that was opted for medical optimization. He has a cardiac MRI from 4/2023 with partial viability in this region. EKG NSR with PAC's.    Patient troponin trend:  55 --> 76 --> 133. Patient BP's prior to OR and immedaitely post op in the 190-200's systolic. Started on nitroglycerin gtt. Had some mild chest pain that has resolved. Troponin 55, now 133. His LVEF from 3/27 is preserved.     Reccomendations:  > Patient elevated troponin likely in the setting of markedly elevated BP's. He is now chest pain free with more controlled BP's. Trend troponin q4 to peak but do not believe this reflects an ACS event. No need for further cardiac workup.  > Agree with optimizing BP. Off nitroglycerin drip. Agree with PO Hydralazine. Can uptitrate as needed for optimal BP control.   > RCRI- 2 (Ischemic heart disease, high risk surgery). He has unrevasculizad coronary disease with  of mid LCX. His left main and prox LAD demonstrate nonobstructive disease. This is from a cardiac cath in 2023. He is at elevated , but  not prohibitive risk for surgery  > Rest of care per primary team     Patient discussed with Dr. Moran    Peripheral IV 03/26/25 20 G Left Antecubital (Active)   Site Assessment Clean;Dry;Intact 03/29/25 0400   Dressing Type Transparent 03/29/25 0400   Line Status Infusing 03/29/25 0400   Dressing Status Clean;Dry;Occlusive 03/29/25 0400   Number of days: 3       Peripheral IV 03/27/25 20 G Right Antecubital (Active)   Site Assessment Clean;Dry;Intact 03/29/25 0400   Dressing Type Transparent 03/29/25 0400   Line Status Flushed 03/29/25 0400   Dressing Status Clean;Dry;Occlusive 03/29/25 0400   Number of days: 2       Peripheral IV 03/28/25 16 G Right;Posterior Forearm (Active)   Site Assessment Clean;Dry;Intact 03/29/25 0400   Dressing Type Transparent 03/29/25 0400   Line Status Infusing 03/29/25 0400   Dressing Status Clean;Dry;Occlusive 03/29/25 0400   Number of days: 1       Arterial Line 03/28/25 Left (Active)   Site Assessment Clean;Dry;Intact 03/29/25 0400   Line Status Pulsatile blood flow 03/29/25 0400   Art Line Waveform Appropriate 03/29/25 0400   Color/Movement/Sensation Capillary refill less than 3 sec 03/29/25 0400   Dressing Type Antimicrobial patch;Transparent 03/29/25 0400   Dressing Status Clean;Dry;Occlusive 03/29/25 0400   Dressing Change Due 04/04/25 03/29/25 0400   Number of days: 1       Urethral Catheter Coude (Active)   Output (mL) 85 mL 03/29/25 1000   Number of days: 1       Code Status:  Full Code          Rene Tipton MD

## 2025-03-29 NOTE — OP NOTE
Right lower extremity angiogram (B) Operative Note     Date: 3/28/2025  OR Location: [facility] OR    Name: Dilip Haynes, : 1950, Age: 74 y.o., MRN: [id number], Sex: male    Diagnosis  Pre-op Diagnosis      * Lower limb ischemia [I99.8] Post-op Diagnosis     * Lower limb ischemia [I99.8]     Procedures  Right lower extremity angiogram  61479 - CHG ANGIOGRAPHY EXTREMITY BILATERAL RS&I      Surgeons      * Joshua Alvarez - Primary    Resident/Fellow/Other Assistant:  Surgeons and Role:  * No surgeons found with a matching role *    Staff:   Scrub Person: Naila  Scrub Person: Steven Fitzgerald Scrub: Franc Fitzgerald Circulator: Brielle  Circulator: Adelfo  Circulator: Valentina    Anesthesia Staff: Anesthesiologist: Keyla Galicia MD  CRNA: MARTA Forrester-VISHNU    Procedure Summary  Anesthesia: Monitor Anesthesia Care  ASA: III  Estimated Blood Loss: 5mL  Intra-op Medications:   Administrations occurring from 1256 to 1501 on 25:   Medication Name Total Dose   lidocaine (Xylocaine) 10 mg/mL (1 %) injection 7 mL   iodixanol (VISIPaque) 320 mg iodine/mL injection 25 mL   heparin (porcine) 10,000 Units in sodium chloride 0.9% 1,000 mL irrigation 10,000 Units   sodium chloride 0.9 % irrigation solution 1,000 mL   heparin 25,000 Units in dextrose 5% 250 mL (100 Units/mL) infusion (premix) Cannot be calculated   labetaloL (Normodyne,Trandate) injection 5 mg 5 mg   acetaminophen (Tylenol) tablet 650 mg Cannot be calculated   amLODIPine (Norvasc) tablet 10 mg Cannot be calculated   famotidine (Pepcid) tablet 20 mg Cannot be calculated   fentaNYL (Sublimaze) injection 50 mcg/mL 100 mcg   fluticasone (Flonase) nasal spray 2 spray Cannot be calculated   hydrALAZINE (Apresoline) injection 20 mg   insulin lispro injection 0-5 Units Cannot be calculated   LR bolus Cannot be calculated   levothyroxine (Synthroid, Levoxyl) tablet 137 mcg Cannot be calculated   metoprolol tartrate (Lopressor) tablet 50 mg Cannot be  calculated   midazolam PF (Versed) injection 1 mg/mL 2 mg   mycophenolate (Cellcept) capsule 500 mg Cannot be calculated   naloxone (Narcan) injection 0.2 mg Cannot be calculated   nitroglycerin (Nitrostat) SL tablet 0.4 mg Cannot be calculated   ondansetron (Zofran) 2 mg/mL injection 4 mg   oxyCODONE (Roxicodone) immediate release tablet 5 mg Cannot be calculated   pravastatin (Pravachol) tablet 40 mg Cannot be calculated   predniSONE (Deltasone) tablet 5 mg Cannot be calculated   tacrolimus ER (Envarsus XR) tablet ER 1.5 mg Cannot be calculated   tamsulosin (Flomax) 24 hr capsule 0.4 mg Cannot be calculated              Anesthesia Record               Intraprocedure I/O Totals          Intake    LR bolus 250.00 mL    Heparin Drip 0.00 mL    The total shown is the total volume documented since Anesthesia Start was filed.    Total Intake 250 mL          Specimen: No specimens collected              Drains and/or Catheters:   Urethral Catheter Coude (Active)   Site Assessment Clean;Skin intact 03/29/25 0319   Collection Container Standard drainage bag 03/28/25 1918   Securement Method Securing device (Describe) 03/28/25 1918   Reason for Continuing Urinary Catheterization acute urinary retention 03/28/25 1918   Output (mL) 85 mL 03/29/25 1000   $ Urethral Catheter Charge Indwelling cath 03/28/25 1918       Tourniquet Times:         Implants:     Findings:   Right common femoral atherosclerotic occlusive disease.  Right SFA occlusion with reconstitution of diseased above knee popliteal artery with runoff to the foot via the posterior tibial artery.      Indications: Dilip Haynes is an 74 y.o. male who is having surgery for Lower limb ischemia [I99.8].  He has chronic limb threatening ischemia with tissue loss to his right hallux.  Plan for diagnostic angiogram to help determine revascularization options.    The patient was seen in the preoperative area. The risks, benefits, complications, treatment options,  non-operative alternatives, expected recovery and outcomes were discussed with the patient. The possibilities of reaction to medication, pulmonary aspiration, injury to surrounding structures, bleeding, recurrent infection, the need for additional procedures, failure to diagnose a condition, and creating a complication requiring transfusion or operation were discussed with the patient. The patient concurred with the proposed plan, giving informed consent.  The site of surgery was properly noted/marked if necessary per policy. The patient has been actively warmed in preoperative area. Preoperative antibiotics are not indicated. Venous thrombosis prophylaxis are not indicated.    Procedure Details:   The patient was placed on the operating room table in supine position.  Sedation was administered by the anesthesia team.  His bilateral groins were prepped and draped in normal sterile fashion using chlorhexidine prep.  A timeout was performed identifying the patient by name, MRN, and date of birth.  Using ultrasound guidance, the left common femoral artery was accessed percutaneously with a 4fr micropuncture kit.  A 4fr sheath was placed.  Using a 4fr UF catheter the aortic bifurcation was traversed and a 4fr glide catheter was advanced into the right common femoral artery.  A right leg angiogram was performed which showed a severely diseased right common femoral artery, occlusion of his SFA, reconstitution of a diseased but patent above knee popliteal artery and runoff to the foot via the posterior tibial artery.  The glide catheter and sheath were removed and hemostasis ensured with 20 minutes of direct manual compression.  The patient was then taken to the PACU for further monitoring.        Complications:  None; patient tolerated the procedure well.    Disposition: PACU - hemodynamically stable.  Condition: stable     Additional Details:   None    Attending Attestation: I performed the procedure.    Joshua HENRY  Antonio  Phone Number: [phone]

## 2025-03-29 NOTE — PROGRESS NOTES
"  VASCULAR SURGERY PROGRESS NOTE  Assessment/Plan   Dilip Haynes is 74 y.o. male with PMHx significant for CAD (s/p PCI), HTN, afib s/p DCCV 6/2020 (on Eliquis last took 3/26 AM), carotid endarterectomy for asymptomatic stenosis, ESRD s/p DDKT (2020), hypothyroidism, and PAD (s/p L iliac ([id number] stent) with R fem and profunda endarterectomy with patch angioplasty (2012) who presented as transfer from OSH with concern of worsening limb ischemia. Patient exam consistent with previously noted right foot tissue loss and rest pain without signs of worsening ischemia on exam. Patient with monophasic PT on R and mono DP/PT on left with intact motor and sensory function.     Underwent diagnostic RLE angiogram on 3/28 showing R CFA disease, SFA occlusion, and PT runoff. Post-op course complicated by chest pain in the setting of hypertension, transferred to ICU for nitroglycerin gtt where he remains with no further chest pain and a stable vascular exam. Stable renal function.    Ultimately will require right femoral-PT bypass with vein for CLTI 5, tentatively early-mid next week    Plan:  Pain control per ICU  SBP < 160  Appreciate cardiology recommendations and risk-stratification  Continue heparin gtt  IS hourly  Ok for regular diet  Appreciate transplant nephrology recommendations  Daily labs   Activity as tolerated  ICU until stable off of anti-hypertensive infusions    D/w attending, Dr. Antonio Singleton MD, PhD  Vascular Surgery, PGY3  [id number]      Subjective   NAEO, stable on nitroglycerin     Objective   Vitals:  Heart Rate:  []   Temp:  [36.1 °C (97 °F)-36.7 °C (98.1 °F)]   Resp:  [15-25]   BP: (163-231)/(73-95)   Height:  [177.8 cm (5' 10\")]   Weight:  [80.3 kg (177 lb 0.5 oz)-83.1 kg (183 lb 3.2 oz)]   SpO2:  [92 %-98 %]     Exam:  Constitutional: No acute distress, resting comfortably  Neuro:  AOx3, grossly intact  ENMT: moist mucous membranes  CV: no tachycardia  Pulm: non-labored on 2L NC  GI: " soft, non-tender, non-distended  Skin: warm and dry  Musculoskeletal: moving all extremities  Extremities: left groin puncture without hematoma. Right hallux distal superficial dry ulceration  Pulse Exam: monophasic/mixed venous right PT, left pedal signals; motor/sensory intact    Labs:  Results from last 7 days   Lab Units 03/29/25  0202 03/29/25  0016 03/28/25  1614   WBC AUTO x10*3/uL 9.6 8.7 8.1   HEMOGLOBIN g/dL 11.7* 11.3* 12.6*   PLATELETS AUTO x10*3/uL 218 211 207      Results from last 7 days   Lab Units 03/29/25  0203 03/29/25  0202 03/28/25  1615 03/28/25  0415   SODIUM mmol/L  --  135* 134* 136   POTASSIUM mmol/L  --  4.1 4.7 4.6   CHLORIDE mmol/L  --  103 103 103   CO2 mmol/L  --  21 21 26   BUN mg/dL  --  21 22 24*   CREATININE mg/dL  --  1.65* 1.66* 1.85*   GLUCOSE mg/dL  --  107* 124* 81   MAGNESIUM mg/dL 1.90  --  1.97 1.96   PHOSPHORUS mg/dL  --  3.3 3.2 3.6      Results from last 7 days   Lab Units 03/27/25  0207 03/26/25  1757   INR  1.2* 1.1   PROTIME seconds 13.0* 12.6*   APTT seconds 43*  57*  --       Results from last 7 days   Lab Units 03/29/25  0202 03/28/25  2233 03/28/25  1615   ANTI XA UNFRACTIONATED IU/mL 0.6 0.6 0.2

## 2025-03-29 NOTE — PROGRESS NOTES
Dilip Haynes is a 74 y.o. male on day 2 of admission presenting with Lower limb ischemia.    SW met with patient at bedside to introduce self, and SW role.  According to patient, he lives at home with his spouse Alda Haynes.  He reports being independent with ADL's prior to admission.  No HHC services.  No DME.  There is one step to enter the home.  His pharmacy of choice is [facility] located at [address] [place] [address].  Mr. Haynes denies having any questions/concerns at this time.  SW will continue to follow, and assist as needed    Discharge Disposition: Home     Donna GASTON LSW

## 2025-03-29 NOTE — PROGRESS NOTES
"Dilip Haynes is a 74 y.o. male on day 2 of admission presenting with Lower limb ischemia.    Subjective   Feels well this morning  No chest pain  Nitroglycerin gtt remains, downtrending with resumption of home medications  Pulses palpable bilaterally  Troponinemia 45->55->76->133    Objective     Physical Exam  Vitals reviewed.   Constitutional:       Appearance: Normal appearance.   HENT:      Head: Normocephalic and atraumatic.      Mouth/Throat:      Mouth: Mucous membranes are dry.   Eyes:      Extraocular Movements: Extraocular movements intact.   Cardiovascular:      Rate and Rhythm: Normal rate and regular rhythm.      Comments: Palpable DP and PT pulses bilaterally this AM  Pulmonary:      Effort: Pulmonary effort is normal. No respiratory distress.      Breath sounds: Normal breath sounds. No wheezing.   Abdominal:      Palpations: Abdomen is soft.      Comments: Protuberant    Musculoskeletal:         General: No tenderness.      Comments: Right great toe ulceration    Skin:     General: Skin is warm and dry.   Neurological:      General: No focal deficit present.      Mental Status: He is alert and oriented to person, place, and time.   Psychiatric:         Mood and Affect: Mood normal.         Behavior: Behavior normal.         Last Recorded Vitals  Blood pressure 167/75, pulse 71, temperature 36.5 °C (97.7 °F), temperature source Temporal, resp. rate 20, height 1.778 m (5' 10\"), weight 83.1 kg (183 lb 3.2 oz), SpO2 97%.  Intake/Output last 3 Shifts:  I/O last 3 completed shifts:  In: 1765.2 (21.2 mL/kg) [I.V.:1515.2 (18.2 mL/kg); IV Piggyback:250]  Out: 1420 (17.1 mL/kg) [Urine:1420 (0.5 mL/kg/hr)]  Weight: 83.1 kg     Relevant Results  Scheduled medications  amLODIPine, 10 mg, oral, Daily  famotidine, 20 mg, oral, Daily   Or  famotidine, 20 mg, intravenous, Daily  insulin lispro, 0-5 Units, subcutaneous, q4h  levothyroxine, 137 mcg, oral, Daily  losartan, 75 mg, oral, Daily  metoprolol tartrate, 50 " mg, oral, BID  mycophenolate, 500 mg, oral, 2 times per day  pravastatin, 40 mg, oral, Nightly  predniSONE, 5 mg, oral, Daily  [START ON 3/30/2025] tacrolimus ER, 1.5 mg, oral, Once per day on Sunday Monday Tuesday Wednesday Thursday Friday  tamsulosin, 0.4 mg, oral, Nightly  torsemide, 20 mg, oral, Daily      Continuous medications  heparin, 0-4,500 Units/hr, Last Rate: 1,500 Units/hr (03/28/25 1802)  lactated Ringer's, 75 mL/hr, Last Rate: 75 mL/hr (03/28/25 1415)  nitroglycerin, 5-200 mcg/min, Last Rate: 50 mcg/min (03/29/25 0735)      PRN medications  PRN medications: acetaminophen, albuterol, calcium gluconate, calcium gluconate, dextrose, dextrose, fluticasone, glucagon, glucagon, hydrALAZINE, labetaloL, magnesium sulfate, [Transfer Hold] magnesium sulfate, naloxone, nitroglycerin, ondansetron **OR** ondansetron, oxyCODONE, oxygen, potassium chloride  Results for orders placed or performed during the hospital encounter of 03/27/25 (from the past 24 hours)   POCT GLUCOSE   Result Value Ref Range    POCT Glucose 94 74 - 99 mg/dL   Transthoracic Echo (TTE) Complete   Result Value Ref Range    AV pk eusebio 1.19 m/s    LVOT diam 1.90 cm    LA vol index A/L 30.5 ml/m2    Tricuspid annular plane systolic excursion 0.8 cm    LV EF 60 %    RV free wall pk S' 6.18 cm/s    LVIDd 4.60 cm    Aortic Valve Area by Continuity of Peak Velocity 2.53 cm2    AV pk grad 6 mmHg    LV A4C EF 58.9    POCT GLUCOSE   Result Value Ref Range    POCT Glucose 101 (H) 74 - 99 mg/dL   Troponin I, High Sensitivity, Initial   Result Value Ref Range    Troponin I, High Sensitivity (CMC) 47 0 - 53 ng/L   Blood Gas Arterial Full Panel   Result Value Ref Range    POCT pH, Arterial 7.40 7.38 - 7.42 pH    POCT pCO2, Arterial 33 (L) 38 - 42 mm Hg    POCT pO2, Arterial 93 85 - 95 mm Hg    POCT SO2, Arterial 99 94 - 100 %    POCT Oxy Hemoglobin, Arterial 95.9 94.0 - 98.0 %    POCT Hematocrit Calculated, Arterial 44.0 41.0 - 52.0 %    POCT Sodium, Arterial  132 (L) 136 - 145 mmol/L    POCT Potassium, Arterial 4.6 3.5 - 5.3 mmol/L    POCT Chloride, Arterial 102 98 - 107 mmol/L    POCT Ionized Calcium, Arterial 1.22 1.10 - 1.33 mmol/L    POCT Glucose, Arterial 132 (H) 74 - 99 mg/dL    POCT Lactate, Arterial 1.4 0.4 - 2.0 mmol/L    POCT Base Excess, Arterial -3.5 (L) -2.0 - 3.0 mmol/L    POCT HCO3 Calculated, Arterial 20.4 (L) 22.0 - 26.0 mmol/L    POCT Hemoglobin, Arterial 14.6 13.5 - 17.5 g/dL    POCT Anion Gap, Arterial 14 10 - 25 mmo/L    Patient Temperature 37.0 degrees Celsius    FiO2 40 %   CBC   Result Value Ref Range    WBC 8.1 4.4 - 11.3 x10*3/uL    nRBC 0.0 0.0 - 0.0 /100 WBCs    RBC 5.19 4.50 - 5.90 x10*6/uL    Hemoglobin 12.6 (L) 13.5 - 17.5 g/dL    Hematocrit 40.2 (L) 41.0 - 52.0 %    MCV 78 (L) 80 - 100 fL    MCH 24.3 (L) 26.0 - 34.0 pg    MCHC 31.3 (L) 32.0 - 36.0 g/dL    RDW 13.3 11.5 - 14.5 %    Platelets 207 150 - 450 x10*3/uL   Heparin Assay, UFH   Result Value Ref Range    Heparin Unfractionated 0.2 See Comment Below for Therapeutic Ranges IU/mL   Magnesium   Result Value Ref Range    Magnesium 1.97 1.60 - 2.40 mg/dL   Renal Function Panel   Result Value Ref Range    Glucose 124 (H) 74 - 99 mg/dL    Sodium 134 (L) 136 - 145 mmol/L    Potassium 4.7 3.5 - 5.3 mmol/L    Chloride 103 98 - 107 mmol/L    Bicarbonate 21 21 - 32 mmol/L    Anion Gap 15 10 - 20 mmol/L    Urea Nitrogen 22 6 - 23 mg/dL    Creatinine 1.66 (H) 0.50 - 1.30 mg/dL    eGFR 42 (L) >60 mL/min/1.73m*2    Calcium 9.6 8.6 - 10.6 mg/dL    Phosphorus 3.2 2.5 - 4.9 mg/dL    Albumin 4.2 3.4 - 5.0 g/dL   Troponin I, High Sensitivity   Result Value Ref Range    Troponin I, High Sensitivity (CMC) 45 0 - 53 ng/L   Calcium, Ionized   Result Value Ref Range    POCT Calcium, Ionized 1.18 1.1 - 1.33 mmol/L   POCT GLUCOSE   Result Value Ref Range    POCT Glucose 133 (H) 74 - 99 mg/dL   Troponin I, High Sensitivity   Result Value Ref Range    Troponin I, High Sensitivity (CMC) 55 (H) 0 - 53 ng/L    Heparin Assay, UFH   Result Value Ref Range    Heparin Unfractionated 0.6 See Comment Below for Therapeutic Ranges IU/mL   POCT GLUCOSE   Result Value Ref Range    POCT Glucose 113 (H) 74 - 99 mg/dL   CBC   Result Value Ref Range    WBC 8.7 4.4 - 11.3 x10*3/uL    nRBC 0.0 0.0 - 0.0 /100 WBCs    RBC 4.54 4.50 - 5.90 x10*6/uL    Hemoglobin 11.3 (L) 13.5 - 17.5 g/dL    Hematocrit 36.7 (L) 41.0 - 52.0 %    MCV 81 80 - 100 fL    MCH 24.9 (L) 26.0 - 34.0 pg    MCHC 30.8 (L) 32.0 - 36.0 g/dL    RDW 13.2 11.5 - 14.5 %    Platelets 211 150 - 450 x10*3/uL   POCT GLUCOSE   Result Value Ref Range    POCT Glucose 109 (H) 74 - 99 mg/dL   Troponin I, High Sensitivity   Result Value Ref Range    Troponin I, High Sensitivity (CMC) 76 (H) 0 - 53 ng/L   CBC and Auto Differential   Result Value Ref Range    WBC 9.6 4.4 - 11.3 x10*3/uL    nRBC 0.0 0.0 - 0.0 /100 WBCs    RBC 4.61 4.50 - 5.90 x10*6/uL    Hemoglobin 11.7 (L) 13.5 - 17.5 g/dL    Hematocrit 37.8 (L) 41.0 - 52.0 %    MCV 82 80 - 100 fL    MCH 25.4 (L) 26.0 - 34.0 pg    MCHC 31.0 (L) 32.0 - 36.0 g/dL    RDW 13.5 11.5 - 14.5 %    Platelets 218 150 - 450 x10*3/uL    Neutrophils % 77.6 40.0 - 80.0 %    Immature Granulocytes %, Automated 0.3 0.0 - 0.9 %    Lymphocytes % 9.9 13.0 - 44.0 %    Monocytes % 11.3 2.0 - 10.0 %    Eosinophils % 0.3 0.0 - 6.0 %    Basophils % 0.6 0.0 - 2.0 %    Neutrophils Absolute 7.45 (H) 1.60 - 5.50 x10*3/uL    Immature Granulocytes Absolute, Automated 0.03 0.00 - 0.50 x10*3/uL    Lymphocytes Absolute 0.95 0.80 - 3.00 x10*3/uL    Monocytes Absolute 1.08 (H) 0.05 - 0.80 x10*3/uL    Eosinophils Absolute 0.03 0.00 - 0.40 x10*3/uL    Basophils Absolute 0.06 0.00 - 0.10 x10*3/uL   Renal function panel   Result Value Ref Range    Glucose 107 (H) 74 - 99 mg/dL    Sodium 135 (L) 136 - 145 mmol/L    Potassium 4.1 3.5 - 5.3 mmol/L    Chloride 103 98 - 107 mmol/L    Bicarbonate 21 21 - 32 mmol/L    Anion Gap 15 10 - 20 mmol/L    Urea Nitrogen 21 6 - 23 mg/dL     Creatinine 1.65 (H) 0.50 - 1.30 mg/dL    eGFR 43 (L) >60 mL/min/1.73m*2    Calcium 9.1 8.6 - 10.6 mg/dL    Phosphorus 3.3 2.5 - 4.9 mg/dL    Albumin 3.8 3.4 - 5.0 g/dL   Heparin Assay, UFH   Result Value Ref Range    Heparin Unfractionated 0.6 See Comment Below for Therapeutic Ranges IU/mL   Magnesium   Result Value Ref Range    Magnesium 1.90 1.60 - 2.40 mg/dL   Calcium, Ionized   Result Value Ref Range    POCT Calcium, Ionized 1.18 1.1 - 1.33 mmol/L   POCT GLUCOSE   Result Value Ref Range    POCT Glucose 108 (H) 74 - 99 mg/dL   POCT GLUCOSE   Result Value Ref Range    POCT Glucose 114 (H) 74 - 99 mg/dL     *Note: Due to a large number of results and/or encounters for the requested time period, some results have not been displayed. A complete set of results can be found in Results Review.       This patient has a urinary catheter   Reason for the urinary catheter remaining today? critically ill patient who need accurate urinary output measurements         Assessment/Plan   Assessment & Plan  Lower limb ischemia    74 y.o. male presenting with PMHx significant for CAD (s/p PCI), HTN, afib s/p DCCV 6/2020 (on Eliquis last took 3/26 AM), carotid endarterectomy for asymptomatic stenosis, ESRD s/p DDKT (2020), hypothyroidism, and PAD (s/p L iliac ([id number] stent) with R fem and profunda endarterectomy with patch angioplasty (2012) who presented to [facility] 3/27 from [place] ED with c/f acute on chronic right limb ischemia. Patient underwent diagnostic bilateral lower extremity angiography 3/28/25 with Dr. Alvarez under MAC. Following procedure, patient with 230s/90s with NIBP in PACU. Given labetalol 5mg x1 and hydralazine 10mg x2. NIBPs still in the 190s/80s. Patient with noted ST depressions.      Ultimately started on nitroglycerin gtt and admitted to SICU for ongoing management. Alva placed in PACU.      NEURO: History of lumbar radiculopathy. A&O x3 post-op. Acute post-op pain well controlled.    - ongoing neuro and  "pain assessments  - PRN hydromorphone for pain control  - PT/OT consult  - Home meds: none     MSK: RA. History of right elbow and right knee surgery  - monitor for symptoms     CV: History of NSTEMI/CAD s/p stents (2000, 2006, 2007) and most recently WILLIS x1 to RCA for in-stent restenosis 5/2016. C 2/17/23 performed for typical angina with showed 100% mid LCX and 100% PDA with nonobstructive LM and LAD, Previous cardiac MRI which showed partial viability in the region of the LCX, HTN, pAfib s/p DCCV 6/2020 (on Eliquis last took 3/26 AM). Diastolic heart failure. Loop recorder in place, last check 3/26 without any events. Baseline -170s/60-80s. Baseline echo (3/28/25) with EF 60%, RV reduced function, no significant change from prior. Cardiac MRI 4/2023 which showed areas of infarction and in the region of the LCx there was \"partial viability.\" Hypertensive urgency in PACU post-op, started on NTG infusion. Admitted to ICU for further management. Arrived to SICU on nitroglycerin gtt, now being weaned down with resumption of home meds. Troponins continuing to uptrend 45-55-76->133  - continuous EKG/abp monitoring  - Goal systolic range <180 per vascular surgery  - wean nitroglycerin gtt  - start hydralazine 25mg tid for now  - continue to trend troponins until peak  - will ask cardiology to follow-up on patient for additional recommenations  - volume as indicated  - prn labetalol and prn hydralazine for breakthrough BP  - home amlodipine, losartan, metoprolol, torsemide resumed  - Home meds: amlodipine 10mg daily, losartan 75mg daily, metoprolol tartrate 50mg bid, torsemide 20mg daily, pravastatin 40mg at bedtime, empagliflozin 10mg daily, apixaban 2.5mg bid, prn SL NTG     Vascular: History of carotid endarterectomy for asymptomatic stenosis, sacular infrarenal AAA (2.4 x 3 x2.1) superimobosed on fusiform infrarenal AAA (3.3 x 2.0cm), moderate celiac artery stenosis, critical stenosis of SMA), Raynaud's, renal " artery stenosis, PAD (s/p L iliac ([id number] stent) with R fem and profunda endarterectomy with patch angioplasty (2012) who presented to [facility] 3/27 from [place] ED with c/f acute on chronic right limb ischemia. Patient underwent diagnostic bilateral lower extremity angiography 3/28/25 with Dr. Alvarez where noted to have Right common femoral stenosis. Right SFA occlusion with reconstitution of diseased right above knee popliteal artery with one vessel runoff to the foot via the posterior tibial artery.   - Patient will eventually need bypass surgery per Dr. Alvarez  - surgical date pending per vascular surgery  - chronic nonhealing right hallux ulceration -> referred to podiatry for hyperbaric oxygen therapy, not covered since he doesn't have osteomyelitis  - wound care consult     PULM: Former smoker, 75 pack years, quit 10yrs ago. On NC   - Wean FiO2 as tolerated.    - Q1h incentive spirometer while awake  - additional pulm toilet prn.    - F/U post op CXR     GI: Diverticulosis of colon. Cirrhotic liver by CTA abdomen 3/26/25 with 2 hypervascular foci favored to represent shunts  - pt recommended to have MRI liver by last CT scan, nonurgent  - keep npo except for sips with meds for now  - PPI for GI prophylaxis     : ESRD s/p DDKT 2020. CKD IIIb. Renal artery stenosis s/p bilateral stent placement. . Baseline creatinine 1.9.  - Maintenance IVF ->LR @ 75ml/hr - will dc when able to take PO   - Maintain U/O >0.5ml/kg/hr  - Check renal function panel post op, daily and prn  - transplant nephrology following  - IS/ppx per transplant nephrology: mmf, tac, pred  - Replete electrolytes to goal K>4, Mg>2, Phos>2.5, ionized Ca>1.10.     HEME: Baseline H/H 13/45  - Check CBC and coags post op and daily  - SCDs for DVT prophylaxis  - holding SC heparin for now  - high intensity heparin gtt ordered per vascular   - ongoing monitoring for s/s bleeding     ENDO: Pre-DM, A1c 6.4%. Uninodular goiter s/p thyroidectomy.  Hypothyroidism  - Q4h BG   - SSI Lispro per ICU protocol  - continue synthroid     ID: Afebrile. Awaiting post op WBC.  - temp q4h, wbc daily  - ongoing monitoring for s/s infection     Lines:  - radial arterial line - ? Remove later today if BP improves  - PIV x2     Dispo: Potentially would be appropriate for floor later today if NIBP stable off nitroglycerin gtt. Patient seen and discussed with ICU attending Dr. Moody.     Adriel Marie, DO

## 2025-03-30 ENCOUNTER — APPOINTMENT (OUTPATIENT)
Dept: RADIOLOGY | Facility: HOSPITAL | Age: 75
End: 2025-03-30
Payer: MEDICARE

## 2025-03-30 VITALS
SYSTOLIC BLOOD PRESSURE: 119 MMHG | HEIGHT: 70 IN | WEIGHT: 181.66 LBS | BODY MASS INDEX: 26.01 KG/M2 | TEMPERATURE: 98.1 F | DIASTOLIC BLOOD PRESSURE: 63 MMHG | RESPIRATION RATE: 18 BRPM | HEART RATE: 80 BPM | OXYGEN SATURATION: 97 %

## 2025-03-30 LAB
ALBUMIN SERPL BCP-MCNC: 3.6 G/DL (ref 3.4–5)
ANION GAP SERPL CALC-SCNC: 14 MMOL/L (ref 10–20)
BUN SERPL-MCNC: 23 MG/DL (ref 6–23)
CALCIUM SERPL-MCNC: 8.8 MG/DL (ref 8.6–10.6)
CHLORIDE SERPL-SCNC: 102 MMOL/L (ref 98–107)
CO2 SERPL-SCNC: 22 MMOL/L (ref 21–32)
CREAT SERPL-MCNC: 1.86 MG/DL (ref 0.5–1.3)
EGFRCR SERPLBLD CKD-EPI 2021: 37 ML/MIN/1.73M*2
ERYTHROCYTE [DISTWIDTH] IN BLOOD BY AUTOMATED COUNT: 13.2 % (ref 11.5–14.5)
GLUCOSE BLD MANUAL STRIP-MCNC: 107 MG/DL (ref 74–99)
GLUCOSE BLD MANUAL STRIP-MCNC: 113 MG/DL (ref 74–99)
GLUCOSE BLD MANUAL STRIP-MCNC: 144 MG/DL (ref 74–99)
GLUCOSE BLD MANUAL STRIP-MCNC: 193 MG/DL (ref 74–99)
GLUCOSE SERPL-MCNC: 104 MG/DL (ref 74–99)
HCT VFR BLD AUTO: 35.2 % (ref 41–52)
HGB BLD-MCNC: 11.5 G/DL (ref 13.5–17.5)
MAGNESIUM SERPL-MCNC: 2.2 MG/DL (ref 1.6–2.4)
MCH RBC QN AUTO: 25.2 PG (ref 26–34)
MCHC RBC AUTO-ENTMCNC: 32.7 G/DL (ref 32–36)
MCV RBC AUTO: 77 FL (ref 80–100)
NRBC BLD-RTO: 0 /100 WBCS (ref 0–0)
PHOSPHATE SERPL-MCNC: 3.9 MG/DL (ref 2.5–4.9)
PLATELET # BLD AUTO: 196 X10*3/UL (ref 150–450)
POTASSIUM SERPL-SCNC: 4.4 MMOL/L (ref 3.5–5.3)
RBC # BLD AUTO: 4.57 X10*6/UL (ref 4.5–5.9)
SODIUM SERPL-SCNC: 134 MMOL/L (ref 136–145)
UFH PPP CHRO-ACNC: 0.5 IU/ML
WBC # BLD AUTO: 7.7 X10*3/UL (ref 4.4–11.3)

## 2025-03-30 PROCEDURE — 82947 ASSAY GLUCOSE BLOOD QUANT: CPT

## 2025-03-30 PROCEDURE — 51702 INSERT TEMP BLADDER CATH: CPT

## 2025-03-30 PROCEDURE — 85520 HEPARIN ASSAY: CPT

## 2025-03-30 PROCEDURE — 2500000001 HC RX 250 WO HCPCS SELF ADMINISTERED DRUGS (ALT 637 FOR MEDICARE OP)

## 2025-03-30 PROCEDURE — 99233 SBSQ HOSP IP/OBS HIGH 50: CPT | Performed by: INTERNAL MEDICINE

## 2025-03-30 PROCEDURE — 2500000004 HC RX 250 GENERAL PHARMACY W/ HCPCS (ALT 636 FOR OP/ED)

## 2025-03-30 PROCEDURE — 1200000002 HC GENERAL ROOM WITH TELEMETRY DAILY

## 2025-03-30 PROCEDURE — 71045 X-RAY EXAM CHEST 1 VIEW: CPT | Performed by: RADIOLOGY

## 2025-03-30 PROCEDURE — 2500000002 HC RX 250 W HCPCS SELF ADMINISTERED DRUGS (ALT 637 FOR MEDICARE OP, ALT 636 FOR OP/ED)

## 2025-03-30 PROCEDURE — 85027 COMPLETE CBC AUTOMATED: CPT

## 2025-03-30 PROCEDURE — 83735 ASSAY OF MAGNESIUM: CPT

## 2025-03-30 PROCEDURE — 99291 CRITICAL CARE FIRST HOUR: CPT

## 2025-03-30 PROCEDURE — 80069 RENAL FUNCTION PANEL: CPT

## 2025-03-30 PROCEDURE — 71045 X-RAY EXAM CHEST 1 VIEW: CPT

## 2025-03-30 PROCEDURE — 37799 UNLISTED PX VASCULAR SURGERY: CPT

## 2025-03-30 PROCEDURE — 2500000005 HC RX 250 GENERAL PHARMACY W/O HCPCS

## 2025-03-30 RX ORDER — INSULIN LISPRO 100 [IU]/ML
0-5 INJECTION, SOLUTION INTRAVENOUS; SUBCUTANEOUS
Status: DISCONTINUED | OUTPATIENT
Start: 2025-03-31 | End: 2025-04-07

## 2025-03-30 RX ORDER — HYDRALAZINE HYDROCHLORIDE 50 MG/1
50 TABLET, FILM COATED ORAL EVERY 8 HOURS
Status: DISCONTINUED | OUTPATIENT
Start: 2025-03-30 | End: 2025-04-02

## 2025-03-30 RX ADMIN — AMLODIPINE BESYLATE 10 MG: 10 TABLET ORAL at 08:40

## 2025-03-30 RX ADMIN — HYDRALAZINE HYDROCHLORIDE 50 MG: 50 TABLET, FILM COATED ORAL at 15:25

## 2025-03-30 RX ADMIN — MYCOPHENOLATE MOFETIL 500 MG: 250 CAPSULE ORAL at 18:35

## 2025-03-30 RX ADMIN — MYCOPHENOLATE MOFETIL 500 MG: 250 CAPSULE ORAL at 06:27

## 2025-03-30 RX ADMIN — LEVOTHYROXINE SODIUM 137 MCG: 25 TABLET ORAL at 06:27

## 2025-03-30 RX ADMIN — TORSEMIDE 20 MG: 20 TABLET ORAL at 08:40

## 2025-03-30 RX ADMIN — HEPARIN SODIUM 1500 UNITS/HR: 10000 INJECTION, SOLUTION INTRAVENOUS at 04:27

## 2025-03-30 RX ADMIN — PREDNISONE 5 MG: 5 TABLET ORAL at 08:44

## 2025-03-30 RX ADMIN — LOSARTAN POTASSIUM 75 MG: 50 TABLET, FILM COATED ORAL at 08:40

## 2025-03-30 RX ADMIN — HYDRALAZINE HYDROCHLORIDE 50 MG: 50 TABLET, FILM COATED ORAL at 06:40

## 2025-03-30 RX ADMIN — TAMSULOSIN HYDROCHLORIDE 0.4 MG: 0.4 CAPSULE ORAL at 20:57

## 2025-03-30 RX ADMIN — METOPROLOL TARTRATE 50 MG: 50 TABLET, FILM COATED ORAL at 20:57

## 2025-03-30 RX ADMIN — Medication 3 L/MIN: at 08:00

## 2025-03-30 RX ADMIN — PRAVASTATIN SODIUM 40 MG: 20 TABLET ORAL at 20:57

## 2025-03-30 RX ADMIN — FAMOTIDINE 20 MG: 20 TABLET, FILM COATED ORAL at 08:40

## 2025-03-30 RX ADMIN — METOPROLOL TARTRATE 50 MG: 50 TABLET, FILM COATED ORAL at 08:40

## 2025-03-30 RX ADMIN — TACROLIMUS 1.5 MG: 0.75 TABLET, EXTENDED RELEASE ORAL at 06:27

## 2025-03-30 ASSESSMENT — PAIN SCALES - GENERAL
PAINLEVEL_OUTOF10: 0 - NO PAIN

## 2025-03-30 ASSESSMENT — PAIN - FUNCTIONAL ASSESSMENT
PAIN_FUNCTIONAL_ASSESSMENT: 0-10

## 2025-03-30 ASSESSMENT — COGNITIVE AND FUNCTIONAL STATUS - GENERAL
MOBILITY SCORE: 24
DAILY ACTIVITIY SCORE: 24

## 2025-03-30 NOTE — PROGRESS NOTES
"Dilip Haynes is a 74 y.o. male on day 3 of admission presenting with Lower limb ischemia.    Subjective   Off nitroglycerin since ~11am yesterday, not receiving any prns for breakthrough pressures  Currently on home meds, hydral 50mg q8h started since transfer to ICU  Bps mostly 130s-160s/50s  Intermittent right foot pain, ongoing x1 month  Reports back to baseline 100%. No chest pains or difficulty breathing  On 2L by NC    Objective     Physical Exam  Vitals reviewed.   Constitutional:       Appearance: Normal appearance.   HENT:      Head: Normocephalic and atraumatic.      Mouth/Throat:      Mouth: Mucous membranes are moist.   Eyes:      Extraocular Movements: Extraocular movements intact.   Cardiovascular:      Rate and Rhythm: Normal rate and regular rhythm.      Comments: Palpable DP and PT pulses bilaterally this AM. Feet warm. Frequent PVCs on tele  Pulmonary:      Effort: Pulmonary effort is normal. No respiratory distress.      Breath sounds: Normal breath sounds.      Comments: On 2L by NC  Abdominal:      Palpations: Abdomen is soft.      Comments: Protuberant    Genitourinary:     Comments: Davis draining pito urine with slight pink tinge  Musculoskeletal:         General: No tenderness.      Comments: Right great toe ulceration    Skin:     General: Skin is warm and dry.   Neurological:      General: No focal deficit present.      Mental Status: He is alert and oriented to person, place, and time.   Psychiatric:         Mood and Affect: Mood normal.         Behavior: Behavior normal.         Last Recorded Vitals  Blood pressure 156/62, pulse 81, temperature 36.2 °C (97.2 °F), temperature source Temporal, resp. rate 21, height 1.778 m (5' 10\"), weight 82.4 kg (181 lb 10.5 oz), SpO2 93%.  Intake/Output last 3 Shifts:  I/O last 3 completed shifts:  In: 1382.5 (16.8 mL/kg) [P.O.:400; I.V.:982.5 (11.9 mL/kg)]  Out: 2235 (27.1 mL/kg) [Urine:2235 (0.8 mL/kg/hr)]  Weight: 82.4 kg     Relevant " Results  Scheduled medications  amLODIPine, 10 mg, oral, Daily  famotidine, 20 mg, oral, Daily   Or  famotidine, 20 mg, intravenous, Daily  hydrALAZINE, 50 mg, oral, q8h  insulin lispro, 0-5 Units, subcutaneous, q4h  levothyroxine, 137 mcg, oral, Daily  losartan, 75 mg, oral, Daily  metoprolol tartrate, 50 mg, oral, BID  mycophenolate, 500 mg, oral, 2 times per day  pravastatin, 40 mg, oral, Nightly  predniSONE, 5 mg, oral, Daily  tacrolimus ER, 1.5 mg, oral, Once per day on Sunday Monday Tuesday Wednesday Thursday Friday  tamsulosin, 0.4 mg, oral, Nightly  torsemide, 20 mg, oral, Daily      Continuous medications  heparin, 0-4,500 Units/hr, Last Rate: 1,500 Units/hr (03/30/25 0800)      PRN medications  PRN medications: acetaminophen, albuterol, calcium gluconate, calcium gluconate, dextrose, dextrose, fluticasone, glucagon, glucagon, hydrALAZINE, labetaloL, magnesium sulfate, magnesium sulfate, naloxone, nitroglycerin, ondansetron **OR** ondansetron, oxyCODONE, oxygen, potassium chloride  Results for orders placed or performed during the hospital encounter of 03/27/25 (from the past 24 hours)   Type And Screen   Result Value Ref Range    ABO TYPE O     Rh TYPE POS     ANTIBODY SCREEN NEG    Troponin I, High Sensitivity   Result Value Ref Range    Troponin I, High Sensitivity (CMC) 117 (H) 0 - 53 ng/L   POCT GLUCOSE   Result Value Ref Range    POCT Glucose 111 (H) 74 - 99 mg/dL   POCT GLUCOSE   Result Value Ref Range    POCT Glucose 107 (H) 74 - 99 mg/dL   POCT GLUCOSE   Result Value Ref Range    POCT Glucose 113 (H) 74 - 99 mg/dL   CBC   Result Value Ref Range    WBC 7.7 4.4 - 11.3 x10*3/uL    nRBC 0.0 0.0 - 0.0 /100 WBCs    RBC 4.57 4.50 - 5.90 x10*6/uL    Hemoglobin 11.5 (L) 13.5 - 17.5 g/dL    Hematocrit 35.2 (L) 41.0 - 52.0 %    MCV 77 (L) 80 - 100 fL    MCH 25.2 (L) 26.0 - 34.0 pg    MCHC 32.7 32.0 - 36.0 g/dL    RDW 13.2 11.5 - 14.5 %    Platelets 196 150 - 450 x10*3/uL   Heparin Assay, UFH   Result Value  Ref Range    Heparin Unfractionated 0.5 See Comment Below for Therapeutic Ranges IU/mL   Magnesium   Result Value Ref Range    Magnesium 2.20 1.60 - 2.40 mg/dL   Renal Function Panel   Result Value Ref Range    Glucose 104 (H) 74 - 99 mg/dL    Sodium 134 (L) 136 - 145 mmol/L    Potassium 4.4 3.5 - 5.3 mmol/L    Chloride 102 98 - 107 mmol/L    Bicarbonate 22 21 - 32 mmol/L    Anion Gap 14 10 - 20 mmol/L    Urea Nitrogen 23 6 - 23 mg/dL    Creatinine 1.86 (H) 0.50 - 1.30 mg/dL    eGFR 37 (L) >60 mL/min/1.73m*2    Calcium 8.8 8.6 - 10.6 mg/dL    Phosphorus 3.9 2.5 - 4.9 mg/dL    Albumin 3.6 3.4 - 5.0 g/dL   POCT GLUCOSE   Result Value Ref Range    POCT Glucose 107 (H) 74 - 99 mg/dL   POCT GLUCOSE   Result Value Ref Range    POCT Glucose 144 (H) 74 - 99 mg/dL     *Note: Due to a large number of results and/or encounters for the requested time period, some results have not been displayed. A complete set of results can be found in Results Review.       This patient has a urinary catheter   Reason for the urinary catheter remaining today? critically ill patient who need accurate urinary output measurements         Assessment/Plan   Assessment & Plan  Lower limb ischemia    74 y.o. male presenting with PMHx significant for CAD (s/p PCI), HTN, afib s/p DCCV 6/2020 (on Eliquis last took 3/26 AM), carotid endarterectomy for asymptomatic stenosis, ESRD s/p DDKT (2020), hypothyroidism, and PAD (s/p L iliac ([id number] stent) with R fem and profunda endarterectomy with patch angioplasty (2012) who presented to [facility] 3/27 from [place] ED with c/f acute on chronic right limb ischemia. Patient underwent diagnostic bilateral lower extremity angiography 3/28/25 with Dr. Alvarez under MAC. Following procedure, patient with 230s/90s with NIBP in PACU. Given labetalol 5mg x1 and hydralazine 10mg x2. NIBPs still in the 190s/80s. Patient with noted ST depressions.      Ultimately started on nitroglycerin gtt and admitted to SICU for ongoing  "management. Juany placed in PACU.      NEURO: History of lumbar radiculopathy. A&O x3 post-op. Acute post-op pain well controlled.    - ongoing neuro and pain assessments  - PRN hydromorphone for pain control  - PT/OT consult  - Home meds: none     MSK: GIORGIO. History of right elbow and right knee surgery  - monitor for symptoms     CV: History of NSTEMI/CAD s/p stents (2000, 2006, 2007) and most recently WILLIS x1 to RCA for in-stent restenosis 5/2016. LHC 2/17/23 performed for typical angina with showed 100% mid LCX and 100% PDA with nonobstructive LM and LAD, Previous cardiac MRI which showed partial viability in the region of the LCX, HTN, pAfib s/p DCCV 6/2020 (on Eliquis last took 3/26 AM). Diastolic heart failure. Loop recorder in place, last check 3/26 without any events. Baseline -170s/60-80s. Baseline echo (3/28/25) with EF 60%, RV reduced function, no significant change from prior. Cardiac MRI 4/2023 which showed areas of infarction and in the region of the LCx there was \"partial viability.\" Hypertensive urgency in PACU post-op, started on NTG infusion. Admitted to ICU for further management. Arrived to SICU on nitroglycerin gtt, now weaned off with resumption of home meds and additional of hydral. Troponins plateauded and downtrended 45-55-76->133 -> 135 -> 117  - continuous EKG monitoring  - Goal systolic range <180 per vascular surgery  - off NTG gtt AM 3/29  - hydralazine 25mg tid increased to 50mg 3/29  - Appreciate cardiology follow up, troponinemia likely 2/2 acute on chronic hypertension following surgical procedure. \"No need for further cardiac workup\"  - volume as indicated  - prn labetalol and prn hydralazine for breakthrough BP - did not use any since coming off NTG  - home amlodipine, losartan, metoprolol, torsemide, statin resumed  - Home meds: amlodipine 10mg daily, losartan 75mg daily, metoprolol tartrate 50mg bid, torsemide 20mg daily, pravastatin 40mg at bedtime, empagliflozin 10mg " daily, apixaban 2.5mg bid, prn SL NTG     Vascular: History of carotid endarterectomy for asymptomatic stenosis, sacular infrarenal AAA (2.4 x 3 x2.1) superimobosed on fusiform infrarenal AAA (3.3 x 2.0cm), moderate celiac artery stenosis, critical stenosis of SMA), Raynaud's, renal artery stenosis, PAD (s/p L iliac ([id number] stent) with R fem and profunda endarterectomy with patch angioplasty (2012) who presented to [facility] 3/27 from [place] ED with c/f acute on chronic right limb ischemia. Patient underwent diagnostic bilateral lower extremity angiography 3/28/25 with Dr. Alvarez where noted to have Right common femoral stenosis. Right SFA occlusion with reconstitution of diseased right above knee popliteal artery with one vessel runoff to the foot via the posterior tibial artery.   - Patient will Ultimately will require right femoral-PT bypass with vein for CLTI 5 per Dr. Alvarez  - surgical date pending per vascular surgery  - chronic nonhealing right hallux ulceration -> referred to podiatry for hyperbaric oxygen therapy, not covered since he doesn't have osteomyelitis  - wound care consulted     PULM: Former smoker, 75 pack years, quit 10yrs ago. On NC   - Wean FiO2 as tolerated.    - Q1h incentive spirometer while awake  - additional pulm toilet prn.    - F/U post op CXR     GI: Diverticulosis of colon. Cirrhotic liver by CTA abdomen 3/26/25 with 2 hypervascular foci favored to represent shunts  - pt recommended to have MRI liver by last CT scan, nonurgent  - regular diet  - PPI for GI prophylaxis     : ESRD s/p DDKT 2020. CKD IIIb. Renal artery stenosis s/p bilateral stent placement. . Baseline creatinine 1.9.  - Maintain U/O >0.5ml/kg/hr  - Check renal function panel post op, daily and prn  - transplant nephrology following  - IS/ppx per transplant nephrology: mmf, tac, pred  - Replete electrolytes to goal K>4, Mg>2, Phos>2.5, ionized Ca>1.10.     HEME: Baseline H/H 13/45  - Check CBC and coags post op and  daily  - SCDs for DVT prophylaxis  - holding SC heparin for now  - high intensity heparin gtt ordered per vascular   - ongoing monitoring for s/s bleeding     ENDO: Pre-DM, A1c 6.4%. Uninodular goiter s/p thyroidectomy. Hypothyroidism  - Q4h BG   - SSI Lispro per ICU protocol  - continue synthroid     ID: Afebrile. Awaiting post op WBC.  - temp q4h, wbc daily  - ongoing monitoring for s/s infection     Lines:  - radial arterial line - remove now  - PIV x2     Dispo: Transfer out of ICU. Discussed with vascular team. Patient seen and discussed with ICU attending Dr. Moody.     Adriel Marie DO

## 2025-03-30 NOTE — PROGRESS NOTES
VASCULAR SURGERY PROGRESS NOTE  Assessment/Plan   Dilip Haynes is 74 y.o. male with PMHx significant for CAD (s/p PCI), HTN, afib s/p DCCV 6/2020 (on Eliquis last took 3/26 AM), carotid endarterectomy for asymptomatic stenosis, ESRD s/p DDKT (2020), hypothyroidism, and PAD (s/p L iliac ([id number] stent) with R fem and profunda endarterectomy with patch angioplasty (2012) who presented as transfer from OSH with concern of worsening limb ischemia. Patient exam consistent with previously noted right foot tissue loss and rest pain without signs of worsening ischemia on exam. Patient with monophasic PT on R and mono DP/PT on left with intact motor and sensory function.     Underwent diagnostic RLE angiogram on 3/28 showing R CFA disease, SFA occlusion, and PT runoff. Post-op course complicated by chest pain in the setting of hypertension, transferred to ICU for nitroglycerin gtt where he remains with no further chest pain and a stable vascular exam. Stable renal function.    Ultimately will require right femoral-PT bypass with vein for CLTI 5, tentatively early-mid next week    Plan:  PO pain control  SBP < 160  Appreciate cardiology recommendations and risk-stratification; given troponemia with moderate hypertension and after simple sedation, recommend considering a repeat LHC as last was over two years ago and new occlusive disease would ; ok for DAPT as long as no Brilinta   Continue heparin gtt  IS hourly  Ok for regular diet  Appreciate transplant nephrology recommendations  Daily labs   Activity as tolerated  RNF today  Timing of bypass surgery pending possible further cardiac workup     D/w attending, Dr. Antonio Singleton MD, PhD  Vascular Surgery, PGY3  [id number]      Subjective   NAVEED, no complaints this morning     Objective   Vitals:  Heart Rate:  [54-96]   Temp:  [36.2 °C (97.2 °F)-36.8 °C (98.2 °F)]   Resp:  [15-31]   BP: (146-192)/(67-86)   Weight:  [82.4 kg (181 lb 10.5 oz)]   SpO2:   [90 %-100 %]     Exam:  Constitutional: No acute distress, resting comfortably  Neuro:  AOx3, grossly intact  ENMT: moist mucous membranes  CV: no tachycardia  Pulm: non-labored on room air  GI: soft, non-tender, non-distended  Skin: warm and dry  Musculoskeletal: moving all extremities  Extremities: left groin puncture without hematoma. Right hallux distal superficial dry ulceration  Pulse Exam: monophasic/mixed venous right PT, left pedal signals; motor/sensory intact    Labs:  Results from last 7 days   Lab Units 03/30/25 0422 03/29/25 0202 03/29/25  0016   WBC AUTO x10*3/uL 7.7 9.6 8.7   HEMOGLOBIN g/dL 11.5* 11.7* 11.3*   PLATELETS AUTO x10*3/uL 196 218 211      Results from last 7 days   Lab Units 03/30/25 0422 03/29/25 0203 03/29/25 0202 03/28/25  1615   SODIUM mmol/L 134*  --  135* 134*   POTASSIUM mmol/L 4.4  --  4.1 4.7   CHLORIDE mmol/L 102  --  103 103   CO2 mmol/L 22  --  21 21   BUN mg/dL 23  --  21 22   CREATININE mg/dL 1.86*  --  1.65* 1.66*   GLUCOSE mg/dL 104*  --  107* 124*   MAGNESIUM mg/dL 2.20   < >  --  1.97   PHOSPHORUS mg/dL 3.9  --  3.3 3.2    < > = values in this interval not displayed.      Results from last 7 days   Lab Units 03/27/25 0207 03/26/25  1757   INR  1.2* 1.1   PROTIME seconds 13.0* 12.6*   APTT seconds 43*  57*  --       Results from last 7 days   Lab Units 03/30/25 0422 03/29/25  0202 03/28/25  2233   ANTI XA UNFRACTIONATED IU/mL 0.5 0.6 0.6

## 2025-03-31 ENCOUNTER — APPOINTMENT (OUTPATIENT)
Dept: RADIOLOGY | Facility: HOSPITAL | Age: 75
DRG: 271 | End: 2025-03-31
Payer: MEDICARE

## 2025-03-31 ENCOUNTER — APPOINTMENT (OUTPATIENT)
Dept: CARDIOLOGY | Facility: HOSPITAL | Age: 75
End: 2025-03-31
Payer: MEDICARE

## 2025-03-31 LAB
ALBUMIN SERPL BCP-MCNC: 3.7 G/DL (ref 3.4–5)
ANION GAP SERPL CALC-SCNC: 15 MMOL/L (ref 10–20)
ATRIAL RATE: 85 BPM
ATRIAL RATE: 91 BPM
BUN SERPL-MCNC: 23 MG/DL (ref 6–23)
CALCIUM SERPL-MCNC: 9.1 MG/DL (ref 8.6–10.6)
CHLORIDE SERPL-SCNC: 101 MMOL/L (ref 98–107)
CO2 SERPL-SCNC: 24 MMOL/L (ref 21–32)
CREAT SERPL-MCNC: 1.94 MG/DL (ref 0.5–1.3)
EGFRCR SERPLBLD CKD-EPI 2021: 35 ML/MIN/1.73M*2
ERYTHROCYTE [DISTWIDTH] IN BLOOD BY AUTOMATED COUNT: 13.5 % (ref 11.5–14.5)
GLUCOSE BLD MANUAL STRIP-MCNC: 106 MG/DL (ref 74–99)
GLUCOSE BLD MANUAL STRIP-MCNC: 148 MG/DL (ref 74–99)
GLUCOSE BLD MANUAL STRIP-MCNC: 149 MG/DL (ref 74–99)
GLUCOSE BLD MANUAL STRIP-MCNC: 174 MG/DL (ref 74–99)
GLUCOSE SERPL-MCNC: 110 MG/DL (ref 74–99)
HCT VFR BLD AUTO: 36.4 % (ref 41–52)
HGB BLD-MCNC: 11.2 G/DL (ref 13.5–17.5)
MAGNESIUM SERPL-MCNC: 2.24 MG/DL (ref 1.6–2.4)
MCH RBC QN AUTO: 25.4 PG (ref 26–34)
MCHC RBC AUTO-ENTMCNC: 30.8 G/DL (ref 32–36)
MCV RBC AUTO: 83 FL (ref 80–100)
NRBC BLD-RTO: 0 /100 WBCS (ref 0–0)
P AXIS: 62 DEGREES
P AXIS: 69 DEGREES
P OFFSET: 148 MS
P OFFSET: 158 MS
P ONSET: 118 MS
P ONSET: 124 MS
PHOSPHATE SERPL-MCNC: 3.7 MG/DL (ref 2.5–4.9)
PLATELET # BLD AUTO: 191 X10*3/UL (ref 150–450)
POTASSIUM SERPL-SCNC: 4.2 MMOL/L (ref 3.5–5.3)
PR INTERVAL: 180 MS
PR INTERVAL: 198 MS
Q ONSET: 214 MS
Q ONSET: 217 MS
QRS COUNT: 14 BEATS
QRS COUNT: 15 BEATS
QRS DURATION: 84 MS
QRS DURATION: 86 MS
QT INTERVAL: 348 MS
QT INTERVAL: 394 MS
QTC CALCULATION(BAZETT): 414 MS
QTC CALCULATION(BAZETT): 484 MS
QTC FREDERICIA: 390 MS
QTC FREDERICIA: 453 MS
R AXIS: -13 DEGREES
R AXIS: -16 DEGREES
RBC # BLD AUTO: 4.41 X10*6/UL (ref 4.5–5.9)
SODIUM SERPL-SCNC: 136 MMOL/L (ref 136–145)
T AXIS: 192 DEGREES
T AXIS: 213 DEGREES
T OFFSET: 388 MS
T OFFSET: 414 MS
UFH PPP CHRO-ACNC: 0.6 IU/ML
VENTRICULAR RATE: 85 BPM
VENTRICULAR RATE: 91 BPM
WBC # BLD AUTO: 7.1 X10*3/UL (ref 4.4–11.3)

## 2025-03-31 PROCEDURE — 99233 SBSQ HOSP IP/OBS HIGH 50: CPT | Performed by: NURSE PRACTITIONER

## 2025-03-31 PROCEDURE — 82947 ASSAY GLUCOSE BLOOD QUANT: CPT

## 2025-03-31 PROCEDURE — 80069 RENAL FUNCTION PANEL: CPT

## 2025-03-31 PROCEDURE — 3430000001 HC RX 343 DIAGNOSTIC RADIOPHARMACEUTICALS: Performed by: STUDENT IN AN ORGANIZED HEALTH CARE EDUCATION/TRAINING PROGRAM

## 2025-03-31 PROCEDURE — 93016 CV STRESS TEST SUPVJ ONLY: CPT | Performed by: INTERNAL MEDICINE

## 2025-03-31 PROCEDURE — 85027 COMPLETE CBC AUTOMATED: CPT

## 2025-03-31 PROCEDURE — 83735 ASSAY OF MAGNESIUM: CPT

## 2025-03-31 PROCEDURE — 2500000002 HC RX 250 W HCPCS SELF ADMINISTERED DRUGS (ALT 637 FOR MEDICARE OP, ALT 636 FOR OP/ED)

## 2025-03-31 PROCEDURE — 97161 PT EVAL LOW COMPLEX 20 MIN: CPT | Mod: GP

## 2025-03-31 PROCEDURE — 2500000001 HC RX 250 WO HCPCS SELF ADMINISTERED DRUGS (ALT 637 FOR MEDICARE OP)

## 2025-03-31 PROCEDURE — 93017 CV STRESS TEST TRACING ONLY: CPT

## 2025-03-31 PROCEDURE — 1200000002 HC GENERAL ROOM WITH TELEMETRY DAILY

## 2025-03-31 PROCEDURE — 3430000001 HC RX 343 DIAGNOSTIC RADIOPHARMACEUTICALS: Performed by: SURGERY

## 2025-03-31 PROCEDURE — 93018 CV STRESS TEST I&R ONLY: CPT | Performed by: INTERNAL MEDICINE

## 2025-03-31 PROCEDURE — 2500000004 HC RX 250 GENERAL PHARMACY W/ HCPCS (ALT 636 FOR OP/ED)

## 2025-03-31 PROCEDURE — A9502 TC99M TETROFOSMIN: HCPCS | Performed by: STUDENT IN AN ORGANIZED HEALTH CARE EDUCATION/TRAINING PROGRAM

## 2025-03-31 PROCEDURE — RXMED WILLOW AMBULATORY MEDICATION CHARGE

## 2025-03-31 PROCEDURE — 85520 HEPARIN ASSAY: CPT

## 2025-03-31 PROCEDURE — 99233 SBSQ HOSP IP/OBS HIGH 50: CPT | Performed by: INTERNAL MEDICINE

## 2025-03-31 PROCEDURE — 36415 COLL VENOUS BLD VENIPUNCTURE: CPT

## 2025-03-31 PROCEDURE — 78452 HT MUSCLE IMAGE SPECT MULT: CPT

## 2025-03-31 PROCEDURE — A9502 TC99M TETROFOSMIN: HCPCS | Performed by: SURGERY

## 2025-03-31 PROCEDURE — 78452 HT MUSCLE IMAGE SPECT MULT: CPT | Performed by: STUDENT IN AN ORGANIZED HEALTH CARE EDUCATION/TRAINING PROGRAM

## 2025-03-31 RX ORDER — REGADENOSON 0.08 MG/ML
0.4 INJECTION, SOLUTION INTRAVENOUS
Status: CANCELLED | OUTPATIENT
Start: 2025-03-31

## 2025-03-31 RX ORDER — LOSARTAN POTASSIUM 50 MG/1
100 TABLET ORAL DAILY
Status: DISCONTINUED | OUTPATIENT
Start: 2025-04-01 | End: 2025-04-02

## 2025-03-31 RX ORDER — AMINOPHYLLINE 25 MG/ML
125 INJECTION, SOLUTION INTRAVENOUS AS NEEDED
Status: CANCELLED | OUTPATIENT
Start: 2025-03-31

## 2025-03-31 RX ADMIN — HYDRALAZINE HYDROCHLORIDE 50 MG: 50 TABLET, FILM COATED ORAL at 00:13

## 2025-03-31 RX ADMIN — MYCOPHENOLATE MOFETIL 500 MG: 250 CAPSULE ORAL at 18:18

## 2025-03-31 RX ADMIN — HYDRALAZINE HYDROCHLORIDE 50 MG: 50 TABLET, FILM COATED ORAL at 16:33

## 2025-03-31 RX ADMIN — HEPARIN SODIUM 1500 UNITS/HR: 10000 INJECTION, SOLUTION INTRAVENOUS at 18:40

## 2025-03-31 RX ADMIN — METOPROLOL TARTRATE 50 MG: 50 TABLET, FILM COATED ORAL at 21:16

## 2025-03-31 RX ADMIN — LEVOTHYROXINE SODIUM 137 MCG: 25 TABLET ORAL at 06:01

## 2025-03-31 RX ADMIN — OXYCODONE HYDROCHLORIDE 5 MG: 5 TABLET ORAL at 21:16

## 2025-03-31 RX ADMIN — TAMSULOSIN HYDROCHLORIDE 0.4 MG: 0.4 CAPSULE ORAL at 21:16

## 2025-03-31 RX ADMIN — TETROFOSMIN 35 MILLICURIE: 0.23 INJECTION, POWDER, LYOPHILIZED, FOR SOLUTION INTRAVENOUS at 11:26

## 2025-03-31 RX ADMIN — TETROFOSMIN 11 MILLICURIE: 0.23 INJECTION, POWDER, LYOPHILIZED, FOR SOLUTION INTRAVENOUS at 10:02

## 2025-03-31 RX ADMIN — PRAVASTATIN SODIUM 40 MG: 20 TABLET ORAL at 21:16

## 2025-03-31 RX ADMIN — TACROLIMUS 1.5 MG: 0.75 TABLET, EXTENDED RELEASE ORAL at 08:42

## 2025-03-31 RX ADMIN — METOPROLOL TARTRATE 50 MG: 50 TABLET, FILM COATED ORAL at 08:43

## 2025-03-31 RX ADMIN — AMLODIPINE BESYLATE 10 MG: 10 TABLET ORAL at 08:44

## 2025-03-31 RX ADMIN — MYCOPHENOLATE MOFETIL 500 MG: 250 CAPSULE ORAL at 06:01

## 2025-03-31 RX ADMIN — HEPARIN SODIUM 1500 UNITS/HR: 10000 INJECTION, SOLUTION INTRAVENOUS at 01:40

## 2025-03-31 RX ADMIN — FAMOTIDINE 20 MG: 20 TABLET, FILM COATED ORAL at 08:44

## 2025-03-31 RX ADMIN — HYDRALAZINE HYDROCHLORIDE 50 MG: 50 TABLET, FILM COATED ORAL at 08:42

## 2025-03-31 RX ADMIN — LOSARTAN POTASSIUM 75 MG: 50 TABLET, FILM COATED ORAL at 08:43

## 2025-03-31 RX ADMIN — TORSEMIDE 20 MG: 20 TABLET ORAL at 08:42

## 2025-03-31 RX ADMIN — PREDNISONE 5 MG: 5 TABLET ORAL at 08:43

## 2025-03-31 ASSESSMENT — COGNITIVE AND FUNCTIONAL STATUS - GENERAL
CLIMB 3 TO 5 STEPS WITH RAILING: A LITTLE
DAILY ACTIVITIY SCORE: 24
WALKING IN HOSPITAL ROOM: A LITTLE
DAILY ACTIVITIY SCORE: 24
MOVING TO AND FROM BED TO CHAIR: A LITTLE
MOBILITY SCORE: 24
MOBILITY SCORE: 20
STANDING UP FROM CHAIR USING ARMS: A LITTLE
MOBILITY SCORE: 24

## 2025-03-31 ASSESSMENT — PAIN SCALES - GENERAL
PAINLEVEL_OUTOF10: 5 - MODERATE PAIN
PAINLEVEL_OUTOF10: 4
PAINLEVEL_OUTOF10: 5 - MODERATE PAIN

## 2025-03-31 ASSESSMENT — PAIN DESCRIPTION - ORIENTATION: ORIENTATION: RIGHT

## 2025-03-31 ASSESSMENT — PAIN DESCRIPTION - LOCATION: LOCATION: FOOT

## 2025-03-31 ASSESSMENT — PAIN - FUNCTIONAL ASSESSMENT
PAIN_FUNCTIONAL_ASSESSMENT: 0-10

## 2025-03-31 ASSESSMENT — ACTIVITIES OF DAILY LIVING (ADL): ADL_ASSISTANCE: INDEPENDENT

## 2025-03-31 NOTE — CONSULTS
Wound Care Consult     Visit Date: 3/31/2025      Patient Name: Dilip Haynes         MRN: [id number]           YOB: 1950     Reason for Consult: Wound to right great toe.        Wound History: Dilip Haynes is 74 y.o. male with PMHx significant for CAD (s/p PCI), HTN, afib s/p DCCV 6/2020 (on Eliquis last took 3/26 AM), carotid endarterectomy for asymptomatic stenosis, ESRD s/p DDKT (2020), hypothyroidism, and PAD (s/p L iliac ([id number] stent) with R fem and profunda endarterectomy with patch angioplasty (2012) who presents today as transfer from OSH with concern of worsening limb ischemia.      Pertinent Labs:   Albumin   Date Value Ref Range Status   03/31/2025 3.7 3.4 - 5.0 g/dL Final       Wound Assessment:  Wound 03/12/25 Arterial Ulcer Foot Dorsal foot;Right (Active)   Wound Image   03/31/25 1347   Site Assessment Red 03/31/25 1347   Wound Length (cm) 1.5 cm 03/31/25 1456   Wound Width (cm) 2 cm 03/31/25 1456   Wound Surface Area (cm^2) 3 cm^2 03/31/25 1456   Wound Depth (cm) 0 cm 03/31/25 1456   Wound Volume (cm^3) 0 cm^3 03/31/25 1456   State of Healing Non-healing 03/31/25 1347   Margins Poorly defined 03/31/25 1347   Drainage Description None 03/31/25 1347   Drainage Amount None 03/31/25 1347   Dressing Open to air 03/31/25 1347   Dressing Status Clean 03/27/25 2134       Wound 03/28/25 Puncture Groin Left (Active)   Site Assessment Clean;Dry;Intact 03/30/25 2000   Gris-Wound Assessment Clean;Dry;Intact 03/30/25 1600   Shape no hematoma 03/28/25 1630   Drainage Description None 03/30/25 1630   Drainage Amount None 03/30/25 1630   Dressing Gauze;Transparent film 03/30/25 2000   Dressing Status Clean;Dry 03/30/25 2000       Wound Team Summary Assessment: Patient in bed, alert and oriented. States the wound to his toe is from his attempts to remove an ingrown toenail. He then went to his podiatrist, who realized he had poor perfusion to his right lower leg and sent him to see vascular. The  plan per the patient is for him to have surgery on Thursday 4/3/25 , to re-perfuse his leg. The podiatrist feels this will help to heal the spot on his great toe. It is now dry, intact and open to air.      Wound Team Plan: Wound team will not continue to follow. Please re-consult if needed.      SUZANNE GERHARDT, RN, BSN, CWOCN  3/31/2025  3:39 PM

## 2025-03-31 NOTE — PROGRESS NOTES
"Dilip Haynes is a 74 y.o. male on day 4 of admission presenting with Lower limb ischemia.    Pt doing well with no complaints. No chest pain, no SOB. Davis in place with good UOP.     Scheduled medications  amLODIPine, 10 mg, oral, Daily  famotidine, 20 mg, oral, Daily   Or  famotidine, 20 mg, intravenous, Daily  hydrALAZINE, 50 mg, oral, q8h  insulin lispro, 0-5 Units, subcutaneous, TID AC  levothyroxine, 137 mcg, oral, Daily  losartan, 75 mg, oral, Daily  metoprolol tartrate, 50 mg, oral, BID  mycophenolate, 500 mg, oral, 2 times per day  perflutren protein A microsphere, 0.5 mL, intravenous, Once in imaging  pravastatin, 40 mg, oral, Nightly  predniSONE, 5 mg, oral, Daily  sulfur hexafluoride microsphr, 2 mL, intravenous, Once in imaging  tacrolimus ER, 1.5 mg, oral, Once per day on Sunday Monday Tuesday Wednesday Thursday Friday  tamsulosin, 0.4 mg, oral, Nightly  torsemide, 20 mg, oral, Daily      Continuous medications  heparin, 0-4,500 Units/hr, Last Rate: 1,500 Units/hr (03/31/25 0140)      PRN medications  PRN medications: acetaminophen, albuterol, dextrose, dextrose, fluticasone, glucagon, glucagon, hydrALAZINE, labetaloL, naloxone, nitroglycerin, ondansetron **OR** ondansetron, oxyCODONE, oxygen        Last Recorded Vitals  Blood pressure 145/65, pulse 63, temperature 36.7 °C (98.1 °F), resp. rate 17, height 1.778 m (5' 10\"), weight 82.8 kg (182 lb 9.6 oz), SpO2 99%.  Intake/Output last 3 Shifts:  I/O last 3 completed shifts:  In: 1681.9 (20.3 mL/kg) [P.O.:880; I.V.:801.9 (9.7 mL/kg)]  Out: 1515 (18.3 mL/kg) [Urine:1515 (0.5 mL/kg/hr)]  Weight: 82.8 kg     A&ox3, no distress, pleasant  MMM, no lesions  CTAB  RRR  Davis  Abd soft, nt, nd  No allograft tenderness  No edema b/l    Results for orders placed or performed during the hospital encounter of 03/27/25 (from the past 24 hours)   POCT GLUCOSE   Result Value Ref Range    POCT Glucose 144 (H) 74 - 99 mg/dL   POCT GLUCOSE   Result Value Ref Range    " POCT Glucose 193 (H) 74 - 99 mg/dL   POCT GLUCOSE   Result Value Ref Range    POCT Glucose 106 (H) 74 - 99 mg/dL     *Note: Due to a large number of results and/or encounters for the requested time period, some results have not been displayed. A complete set of results can be found in Results Review.       TTE: 3/28/25   1. Poorly visualized anatomical structures due to suboptimal image quality.   2. Left ventricular ejection fraction is normal, calculated by Smith's biplane at 60%.   3. Left ventricular diastolic filling is indeterminate.   4. There is reduced right ventricular systolic function.   5. Mildly enlarged right ventricle.   6. The left atrium is mildly dilated.   7. Compared with study dated 12/20/2021, no significant change Technically difficult exam, no gross changes seen.   8. The patient is in atrial fibrillation which may influence the estimate of left ventricular function and transvalvular flows.    B/l LE angiogram: 3/282/25  Right common femoral atherosclerotic occlusive disease. Right SFA occlusion with reconstitution of diseased above knee popliteal artery with runoff to the foot via the posterior tibial artery.       Assessment/Plan     74 y.o. 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 S/p HCV treatment with SVR. post txp course was complicated by urinary retention and enlarged prostate. Pt presented to [facility] on 3/27 for concern for worsening rt limb ischemia. On heparin. S/p diagnostic b/l LE angiogram 3/28/25 showing R CFA disease, SFA occlusion, and PT runoff.   Post-procedure course complicated by HTN urgency, chest pain. Transferred to SICU for BP control, required NTG drip now weaned off and transferred back to medical floor.       #Allograft function: s/p DDKT 5/2020  - baseline Cr ~2, stable this admission  - Renal function overall stable with acceptable metabolic parameters, euvolemic on exam  -pending further  periop workup for potential femoral-PT bypass and LHC prior to surgery for periop clearance. Discussed risks vs benefits of contrast exposure with patient and possible risk of kidney injury. If to proceed with contrast exposure recommend ppx with IVF prior to procedure, no contraindications from nephrology standpoint.     #Immunosuppression:  - Cont Envarsus 1.5 mg/d. Goal Fk 5-8  - pls check Fk trough daily AM.   - cont MMF 500mg bid, cont pred 5mg/d    #HTN: HTN urgency post-angiogram 3/28.   -Bps now improved. Monitor and titrate meds as indicated.   -continue amlodipine, hydralazine, losartan, metoprolol, Torsemide     #Anemia: Hb 11.5, stable  #CKD-MBD: Ca, Phos stable. , vit D 36 2/2025        Will follow    Kj Decker DO

## 2025-03-31 NOTE — PROGRESS NOTES
Occupational Therapy                 Therapy Communication Note    Patient Name: Dilip Haynes  MRN: [id number]  Department: [facility] [place] [place] [id number] NONV1  Room: [address] [place] [address]  Today's Date: 3/31/2025     Discipline: Occupational Therapy    OT Missed Visit: Yes     Missed Visit Reason: Missed Visit Reason: Patient in a medical procedure    Missed Time: Beth Loomis, OTR/L

## 2025-03-31 NOTE — PROGRESS NOTES
Subjective Data:  Had CP and mildly elevated troponin (peaked 135 and down trended) I/s/o    elevated BP  Since then BP better controlled and denies further episodes of CP  SR 60-70s  For NST today      Objective Data:  Last Recorded Vitals:  Vitals:    03/30/25 1628 03/30/25 2022 03/31/25 0500 03/31/25 0752   BP: 159/67 119/63  145/65   BP Location:       Patient Position:       Pulse:    63   Resp:  18  17   Temp:  36.7 °C (98.1 °F)     TempSrc:       SpO2: 97%   99%   Weight:   82.8 kg (182 lb 9.6 oz)    Height:           Last Labs:  CBC - 3/30/2025:  4:22 AM  7.7 11.5 196    35.2      CMP - 3/30/2025:  4:22 AM  8.8 7.1 23 --- 0.5   3.9 3.6 17 74      PTT - 3/27/2025:  2:07 AM;  2:07 AM  1.2   13.0 57; 43     TROPHS   Date/Time Value Ref Range Status   03/29/2025 06:48  0 - 53 ng/L Final   03/29/2025 12:12  0 - 53 ng/L Final     Comment:     Previous result verified on 3/29/2025 1000 on specimen/case [id number] called with component [facility] for procedure Troponin, High Sensitivity, 1 Hour with value 133 ng/L.   03/29/2025 08:31  0 - 53 ng/L Final     BNP   Date/Time Value Ref Range Status   02/04/2025 07:15  0 - 99 pg/mL Final   10/31/2024 07:26 AM 1,072 0 - 99 pg/mL Final     HGBA1C   Date/Time Value Ref Range Status   02/20/2025 11:54 AM 6.4 4.3 - 5.6 % Final     Comment:     American Diabetes Association guidelines indicate that patients with HgbA1c in the range 5.7-6.4% are at increased risk for development of diabetes, and intervention by lifestyle modification may be beneficial. HgbA1c greater or equal to 6.5% is considered diagnostic of diabetes.   06/11/2024 01:15 PM 6.2 see below % Final   09/21/2023 01:04 PM 5.2 4.3 - 5.6 % Final     Comment:     American Diabetes Association guidelines indicate that patients with HgbA1c in the range 5.7-6.4% are at increased risk for development of diabetes, and intervention by lifestyle modification may be beneficial. HgbA1c greater or equal  to 6.5% is considered diagnostic of diabetes.     VLDL   Date/Time Value Ref Range Status   06/05/2023 10:25 AM 24 0 - 40 mg/dL Final   10/19/2020 09:50 AM 19 0 - 40 mg/dL Final      Last I/O:  I/O last 3 completed shifts:  In: 1681.9 (20.3 mL/kg) [P.O.:880; I.V.:801.9 (9.7 mL/kg)]  Out: 1515 (18.3 mL/kg) [Urine:1515 (0.5 mL/kg/hr)]  Weight: 82.8 kg       03/28/2025 Transthoracic Echo (TTE) Complete   CONCLUSIONS:   1. Poorly visualized anatomical structures due to suboptimal image quality.   2. Left ventricular ejection fraction is normal, calculated by Smith's biplane at 60%.   3. Left ventricular diastolic filling is indeterminate.   4. There is reduced right ventricular systolic function.   5. Mildly enlarged right ventricle.   6. The left atrium is mildly dilated.   7. Compared with study dated 12/20/2021, no significant change Technically difficult exam, no gross changes seen.   8. The patient is in atrial fibrillation which may influence the estimate of left ventricular function and transvalvular flows.      3/31/25 Cardiology Interpretation Of Nuclear Stress    1. No evidence of inducible myocardial ischemia. Redemonstration of   previously seen small-sized moderate to severe perfusion defects   involving apical lateral and apical inferior wall, similar to prior   exam, likely representing prior infarction   2. The left ventricle is normal in size.   3. Irregular wall motion with a post-stress LV EF estimated at 42%,   was 30% previously     Inpatient Medications:  Scheduled medications   Medication Dose Route Frequency    amLODIPine  10 mg oral Daily    famotidine  20 mg oral Daily    Or    famotidine  20 mg intravenous Daily    hydrALAZINE  50 mg oral q8h    insulin lispro  0-5 Units subcutaneous TID AC    levothyroxine  137 mcg oral Daily    losartan  75 mg oral Daily    metoprolol tartrate  50 mg oral BID    mycophenolate  500 mg oral 2 times per day    perflutren protein A microsphere  0.5 mL  intravenous Once in imaging    pravastatin  40 mg oral Nightly    predniSONE  5 mg oral Daily    sulfur hexafluoride microsphr  2 mL intravenous Once in imaging    tacrolimus ER  1.5 mg oral Once per day on Sunday Monday Tuesday Wednesday Thursday Friday    tamsulosin  0.4 mg oral Nightly    torsemide  20 mg oral Daily     PRN medications   Medication    acetaminophen    albuterol    dextrose    dextrose    fluticasone    glucagon    glucagon    hydrALAZINE    labetaloL    naloxone    nitroglycerin    ondansetron    Or    ondansetron    oxyCODONE    oxygen     Continuous Medications   Medication Dose Last Rate    heparin  0-4,500 Units/hr 1,500 Units/hr (03/31/25 0140)       Physical Exam:  General: Sitting up in bed, 3L nc, NAD  Skin:  warm and dry  HEENT: EOMI. MMM  Cardiovascular: RRR. No JVD at 45 degrees   Respiratory: CTAB  Gastrointestinal: soft, non-distended  Extremities: no edema  Neurological: no gross focal deficits  Psychiatric: appropriate mood and affect      Assessment/Plan   Mr. Haynes is a 74 year old M with PMH of CAD, HTN, afib s/p DCCV on eliquis, carotid endarterectomy for asymptomatic stenosis, ESRD s/p DDKT (2020), hypothyroidism, PAD (s/p L iliac stent with R fem and profunda endarterectomy with patch angioplasty) who presented on 3/27 for concern for worsening limb ischemia. Cardiology consulted for periop risk assessment.      Clinical history, EKG, imaging reviewed. In summary, patient with preserved LVEF presents for bilateral lower extremity angiogram with possible intervention. He has known unrevascularized CAD with  of LCX and PDA that was opted for medical optimization. He has a cardiac MRI from 4/2023 with partial viability in this region. EKG NSR with PAC's.    Patient troponin trend:  55 --> 76 --> 133 --> 135 --> 117. Patient BP's prior to OR and immedaitely post op in the 190-200's systolic. Started on nitroglycerin gtt. Had some mild chest pain that has resolved. Troponin  down trended. Off nitroglycerin, chest pain free. His LVEF from 3/27 is preserved.     Reccomendations:  > Patient elevated troponin likely in the setting of markedly elevated BP's. He is now chest pain free with more controlled BP's.   > Agree with optimizing BP. No longer requiring nitroglycerin drip.   - Currently on Amlodipine 10 mg daily, Hydralazine 50 mg q8h, Losartan 75 mg daily, Metoprolol 50 mg BID  - would increase Losartan to 100 mg daily  > RCRI- 2 (Ischemic heart disease, high risk surgery). He has unrevasculizad coronary disease with  of mid LCX. His left main and prox LAD demonstrate nonobstructive disease. This is from a cardiac cath in 2023. He is at elevated , but not prohibitive risk for surgery  > 3/31/25 Stress test without evidence of inducible myocardial ischemia.  - No further cardiac testing required    Cardiology will sign off  Case discussed with Dr. Frank Hudson, APRN-CNP  Cardiology Consults    Please call with any questions  General Cardiology Consult Pager: [phone] (weekday 7AM-6PM and weekend 7AM-2PM) and other: [phone]  EP Consult Pager: [phone] (weekday 7AM-6PM and weekend 7AM-2PM) and other: [phone]  CICU Fellow Pager: [phone] anytime  EP Device Nurse Pager: [phone] (weekday 7AM-4PM)  Advanced Heart Failure Consult Pager: [phone] anytime        Code Status:  Full Code

## 2025-03-31 NOTE — PROGRESS NOTES
"Dilip Haynes is a 74 y.o. male on day 4 of admission presenting with Lower limb ischemia.    S/p diagnostic b/l LE angiography this AM showing R CFA disease, SFA occlusion, and PT runoff.   Post-procedure course complicated by severely elevated Bps (230/90s), chest pain, transferred to SICU for further management. Noted to have ST depression.   Started on NTG drip. Bps now better.   Pt w/o any complaints.     Scheduled medications  amLODIPine, 10 mg, oral, Daily  famotidine, 20 mg, oral, Daily   Or  famotidine, 20 mg, intravenous, Daily  hydrALAZINE, 50 mg, oral, q8h  insulin lispro, 0-5 Units, subcutaneous, TID AC  levothyroxine, 137 mcg, oral, Daily  losartan, 75 mg, oral, Daily  metoprolol tartrate, 50 mg, oral, BID  mycophenolate, 500 mg, oral, 2 times per day  perflutren protein A microsphere, 0.5 mL, intravenous, Once in imaging  pravastatin, 40 mg, oral, Nightly  predniSONE, 5 mg, oral, Daily  sulfur hexafluoride microsphr, 2 mL, intravenous, Once in imaging  tacrolimus ER, 1.5 mg, oral, Once per day on Sunday Monday Tuesday Wednesday Thursday Friday  tamsulosin, 0.4 mg, oral, Nightly  torsemide, 20 mg, oral, Daily      Continuous medications  heparin, 0-4,500 Units/hr, Last Rate: 1,500 Units/hr (03/31/25 0140)      PRN medications  PRN medications: acetaminophen, albuterol, dextrose, dextrose, fluticasone, glucagon, glucagon, hydrALAZINE, labetaloL, naloxone, nitroglycerin, ondansetron **OR** ondansetron, oxyCODONE, oxygen        Last Recorded Vitals  Blood pressure 119/63, pulse 80, temperature 36.7 °C (98.1 °F), resp. rate 18, height 1.778 m (5' 10\"), weight 82.4 kg (181 lb 10.5 oz), SpO2 97%.  Intake/Output last 3 Shifts:  I/O last 3 completed shifts:  In: 1722.7 (20.9 mL/kg) [P.O.:880; I.V.:842.7 (10.2 mL/kg)]  Out: 1295 (15.7 mL/kg) [Urine:1295 (0.4 mL/kg/hr)]  Weight: 82.4 kg     A&ox3, no distress, pleasant  MMM, no lesions  Lungs with equal air entry, no added sounds  Rrr, no m/r/g  Abd soft, nt, " nd  No allograft tenderness  No edema b/l    Results for orders placed or performed during the hospital encounter of 03/27/25 (from the past 24 hours)   POCT GLUCOSE   Result Value Ref Range    POCT Glucose 113 (H) 74 - 99 mg/dL   CBC   Result Value Ref Range    WBC 7.7 4.4 - 11.3 x10*3/uL    nRBC 0.0 0.0 - 0.0 /100 WBCs    RBC 4.57 4.50 - 5.90 x10*6/uL    Hemoglobin 11.5 (L) 13.5 - 17.5 g/dL    Hematocrit 35.2 (L) 41.0 - 52.0 %    MCV 77 (L) 80 - 100 fL    MCH 25.2 (L) 26.0 - 34.0 pg    MCHC 32.7 32.0 - 36.0 g/dL    RDW 13.2 11.5 - 14.5 %    Platelets 196 150 - 450 x10*3/uL   Heparin Assay, UFH   Result Value Ref Range    Heparin Unfractionated 0.5 See Comment Below for Therapeutic Ranges IU/mL   Magnesium   Result Value Ref Range    Magnesium 2.20 1.60 - 2.40 mg/dL   Renal Function Panel   Result Value Ref Range    Glucose 104 (H) 74 - 99 mg/dL    Sodium 134 (L) 136 - 145 mmol/L    Potassium 4.4 3.5 - 5.3 mmol/L    Chloride 102 98 - 107 mmol/L    Bicarbonate 22 21 - 32 mmol/L    Anion Gap 14 10 - 20 mmol/L    Urea Nitrogen 23 6 - 23 mg/dL    Creatinine 1.86 (H) 0.50 - 1.30 mg/dL    eGFR 37 (L) >60 mL/min/1.73m*2    Calcium 8.8 8.6 - 10.6 mg/dL    Phosphorus 3.9 2.5 - 4.9 mg/dL    Albumin 3.6 3.4 - 5.0 g/dL   POCT GLUCOSE   Result Value Ref Range    POCT Glucose 107 (H) 74 - 99 mg/dL   POCT GLUCOSE   Result Value Ref Range    POCT Glucose 144 (H) 74 - 99 mg/dL   POCT GLUCOSE   Result Value Ref Range    POCT Glucose 193 (H) 74 - 99 mg/dL     *Note: Due to a large number of results and/or encounters for the requested time period, some results have not been displayed. A complete set of results can be found in Results Review.       TTE: 3/28/25   1. Poorly visualized anatomical structures due to suboptimal image quality.   2. Left ventricular ejection fraction is normal, calculated by Smith's biplane at 60%.   3. Left ventricular diastolic filling is indeterminate.   4. There is reduced right ventricular systolic  function.   5. Mildly enlarged right ventricle.   6. The left atrium is mildly dilated.   7. Compared with study dated 12/20/2021, no significant change Technically difficult exam, no gross changes seen.   8. The patient is in atrial fibrillation which may influence the estimate of left ventricular function and transvalvular flows.    B/l LE angiogram: 3/282/25  Right common femoral atherosclerotic occlusive disease. Right SFA occlusion with reconstitution of diseased above knee popliteal artery with runoff to the foot via the posterior tibial artery.       Assessment/Plan     74 y.o. 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 S/p HCV treatment with SVR. post txp course was complicated by urinary retention and enlarged prostate.    Other PMH: CAD, HTN, DM2, Afib s/p DCCV on eliquis, carotid endarterectomy for asymptomatic stenosis, ESRD s/p DDKT (2020), hypothyroidism, PAD (s/p L iliac stent with R fem and profunda endarterectomy with patch angioplasty)      Pt presented to [facility] on 3/27 for concern for worsening rt limb ischemia. On heparin. S/p diagnostic b/l LE angiogram 3/28/25 showing R CFA disease, SFA occlusion, and PT runoff. Possible rt femoral-PT bypass next week.       Allograft function: s/p DDKT 5/2020  - baseline Cr ~2, stable this admission  - metabolic indices stable  - nonoliguric. No hypervolemia on exam.  - HTN: HTN urgency post-angiogram 3/28. Bps now improved. Monitor and titrate meds as indicated   - Anemia: Hb 11.5, stable  - CKD-MBD: Ca, Phos stable. , vit D 36 2/2025  - dose meds for CrCl     Immunosuppression:  - Cont Envarsus 1.5 mg/d. Goal Fk 5-8  - pls check Fk trough daily AM.   - reduce MMF to 500mg bid (anticipated procedures), cont pred 5mg/d     Rest of the management per primary team. Will follow    Krystian Thompson MD

## 2025-03-31 NOTE — CARE PLAN
Problem: Safety - Adult  Goal: Free from fall injury  Outcome: Progressing     Problem: Pain - Adult  Goal: Verbalizes/displays adequate comfort level or baseline comfort level  Outcome: Progressing   The patient's goals for the shift include no pain    The clinical goals for the shift include Pt will sleep a minimum of 4 hours by the end of this shift

## 2025-03-31 NOTE — PROGRESS NOTES
"Dilip Haynes is a 74 y.o. male on day 4 of admission presenting with Lower limb ischemia.    Bps better, doing well.  NTG off.  Pt w/o any complaints.     Scheduled medications  amLODIPine, 10 mg, oral, Daily  famotidine, 20 mg, oral, Daily   Or  famotidine, 20 mg, intravenous, Daily  hydrALAZINE, 50 mg, oral, q8h  insulin lispro, 0-5 Units, subcutaneous, TID AC  levothyroxine, 137 mcg, oral, Daily  losartan, 75 mg, oral, Daily  metoprolol tartrate, 50 mg, oral, BID  mycophenolate, 500 mg, oral, 2 times per day  perflutren protein A microsphere, 0.5 mL, intravenous, Once in imaging  pravastatin, 40 mg, oral, Nightly  predniSONE, 5 mg, oral, Daily  sulfur hexafluoride microsphr, 2 mL, intravenous, Once in imaging  tacrolimus ER, 1.5 mg, oral, Once per day on Sunday Monday Tuesday Wednesday Thursday Friday  tamsulosin, 0.4 mg, oral, Nightly  torsemide, 20 mg, oral, Daily      Continuous medications  heparin, 0-4,500 Units/hr, Last Rate: 1,500 Units/hr (03/31/25 0140)      PRN medications  PRN medications: acetaminophen, albuterol, dextrose, dextrose, fluticasone, glucagon, glucagon, hydrALAZINE, labetaloL, naloxone, nitroglycerin, ondansetron **OR** ondansetron, oxyCODONE, oxygen        Last Recorded Vitals  Blood pressure 119/63, pulse 80, temperature 36.7 °C (98.1 °F), resp. rate 18, height 1.778 m (5' 10\"), weight 82.4 kg (181 lb 10.5 oz), SpO2 97%.  Intake/Output last 3 Shifts:  I/O last 3 completed shifts:  In: 1722.7 (20.9 mL/kg) [P.O.:880; I.V.:842.7 (10.2 mL/kg)]  Out: 1295 (15.7 mL/kg) [Urine:1295 (0.4 mL/kg/hr)]  Weight: 82.4 kg     A&ox3, no distress, pleasant  MMM, no lesions  Lungs with equal air entry, no added sounds  Rrr, no m/r/g  Abd soft, nt, nd  No allograft tenderness  No edema b/l    Results for orders placed or performed during the hospital encounter of 03/27/25 (from the past 24 hours)   POCT GLUCOSE   Result Value Ref Range    POCT Glucose 113 (H) 74 - 99 mg/dL   CBC   Result Value Ref Range "    WBC 7.7 4.4 - 11.3 x10*3/uL    nRBC 0.0 0.0 - 0.0 /100 WBCs    RBC 4.57 4.50 - 5.90 x10*6/uL    Hemoglobin 11.5 (L) 13.5 - 17.5 g/dL    Hematocrit 35.2 (L) 41.0 - 52.0 %    MCV 77 (L) 80 - 100 fL    MCH 25.2 (L) 26.0 - 34.0 pg    MCHC 32.7 32.0 - 36.0 g/dL    RDW 13.2 11.5 - 14.5 %    Platelets 196 150 - 450 x10*3/uL   Heparin Assay, UFH   Result Value Ref Range    Heparin Unfractionated 0.5 See Comment Below for Therapeutic Ranges IU/mL   Magnesium   Result Value Ref Range    Magnesium 2.20 1.60 - 2.40 mg/dL   Renal Function Panel   Result Value Ref Range    Glucose 104 (H) 74 - 99 mg/dL    Sodium 134 (L) 136 - 145 mmol/L    Potassium 4.4 3.5 - 5.3 mmol/L    Chloride 102 98 - 107 mmol/L    Bicarbonate 22 21 - 32 mmol/L    Anion Gap 14 10 - 20 mmol/L    Urea Nitrogen 23 6 - 23 mg/dL    Creatinine 1.86 (H) 0.50 - 1.30 mg/dL    eGFR 37 (L) >60 mL/min/1.73m*2    Calcium 8.8 8.6 - 10.6 mg/dL    Phosphorus 3.9 2.5 - 4.9 mg/dL    Albumin 3.6 3.4 - 5.0 g/dL   POCT GLUCOSE   Result Value Ref Range    POCT Glucose 107 (H) 74 - 99 mg/dL   POCT GLUCOSE   Result Value Ref Range    POCT Glucose 144 (H) 74 - 99 mg/dL   POCT GLUCOSE   Result Value Ref Range    POCT Glucose 193 (H) 74 - 99 mg/dL     *Note: Due to a large number of results and/or encounters for the requested time period, some results have not been displayed. A complete set of results can be found in Results Review.       TTE: 3/28/25   1. Poorly visualized anatomical structures due to suboptimal image quality.   2. Left ventricular ejection fraction is normal, calculated by Smith's biplane at 60%.   3. Left ventricular diastolic filling is indeterminate.   4. There is reduced right ventricular systolic function.   5. Mildly enlarged right ventricle.   6. The left atrium is mildly dilated.   7. Compared with study dated 12/20/2021, no significant change Technically difficult exam, no gross changes seen.   8. The patient is in atrial fibrillation which may  influence the estimate of left ventricular function and transvalvular flows.    B/l LE angiogram: 3/282/25  Right common femoral atherosclerotic occlusive disease. Right SFA occlusion with reconstitution of diseased above knee popliteal artery with runoff to the foot via the posterior tibial artery.       Assessment/Plan     74 y.o. 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 S/p HCV treatment with SVR. post txp course was complicated by urinary retention and enlarged prostate.    Other PMH: CAD, HTN, DM2, Afib s/p DCCV on eliquis, carotid endarterectomy for asymptomatic stenosis, ESRD s/p DDKT (2020), hypothyroidism, PAD (s/p L iliac stent with R fem and profunda endarterectomy with patch angioplasty)      Pt presented to [facility] on 3/27 for concern for worsening rt limb ischemia. On heparin. S/p diagnostic b/l LE angiogram 3/28/25 showing R CFA disease, SFA occlusion, and PT runoff.     Post-procedure course complicated by HTN urgency, chest pain. Transferred to SICU for BP control, required NTG drip now weaned off. No concern for ACS.     Possible rt femoral-PT bypass next week.    Allograft function: s/p DDKT 5/2020  - baseline Cr ~2, stable this admission  - metabolic indices stable  - nonoliguric. No hypervolemia on exam.  - HTN: HTN urgency post-angiogram 3/28. Bps now improved. Monitor and titrate meds as indicated. On amlodipine, hydralazine, losartan, metoprolol  - Anemia: Hb 11.5, stable  - CKD-MBD: Ca, Phos stable. , vit D 36 2/2025  - dose meds for CrCl     Immunosuppression:  - Cont Envarsus 1.5 mg/d. Goal Fk 5-8  - pls check Fk trough daily AM.   - cont MMF 500mg bid, cont pred 5mg/d     Rest of the management per primary team. Will follow    Krystian Thompson MD

## 2025-03-31 NOTE — PROGRESS NOTES
"Dilip Haynes is a 74 y.o. male on day 4 of admission presenting with Lower limb ischemia.    Bps better, doing well.  NTG drip being weaned off.  Pt w/o any complaints.     Scheduled medications  amLODIPine, 10 mg, oral, Daily  famotidine, 20 mg, oral, Daily   Or  famotidine, 20 mg, intravenous, Daily  hydrALAZINE, 50 mg, oral, q8h  insulin lispro, 0-5 Units, subcutaneous, TID AC  levothyroxine, 137 mcg, oral, Daily  losartan, 75 mg, oral, Daily  metoprolol tartrate, 50 mg, oral, BID  mycophenolate, 500 mg, oral, 2 times per day  perflutren protein A microsphere, 0.5 mL, intravenous, Once in imaging  pravastatin, 40 mg, oral, Nightly  predniSONE, 5 mg, oral, Daily  sulfur hexafluoride microsphr, 2 mL, intravenous, Once in imaging  tacrolimus ER, 1.5 mg, oral, Once per day on Sunday Monday Tuesday Wednesday Thursday Friday  tamsulosin, 0.4 mg, oral, Nightly  torsemide, 20 mg, oral, Daily      Continuous medications  heparin, 0-4,500 Units/hr, Last Rate: 1,500 Units/hr (03/31/25 0140)      PRN medications  PRN medications: acetaminophen, albuterol, dextrose, dextrose, fluticasone, glucagon, glucagon, hydrALAZINE, labetaloL, naloxone, nitroglycerin, ondansetron **OR** ondansetron, oxyCODONE, oxygen        Last Recorded Vitals  Blood pressure 119/63, pulse 80, temperature 36.7 °C (98.1 °F), resp. rate 18, height 1.778 m (5' 10\"), weight 82.4 kg (181 lb 10.5 oz), SpO2 97%.  Intake/Output last 3 Shifts:  I/O last 3 completed shifts:  In: 1722.7 (20.9 mL/kg) [P.O.:880; I.V.:842.7 (10.2 mL/kg)]  Out: 1295 (15.7 mL/kg) [Urine:1295 (0.4 mL/kg/hr)]  Weight: 82.4 kg     A&ox3, no distress, pleasant  MMM, no lesions  Lungs with equal air entry, no added sounds  Rrr, no m/r/g  Abd soft, nt, nd  No allograft tenderness  No edema b/l    Results for orders placed or performed during the hospital encounter of 03/27/25 (from the past 24 hours)   POCT GLUCOSE   Result Value Ref Range    POCT Glucose 113 (H) 74 - 99 mg/dL   CBC "   Result Value Ref Range    WBC 7.7 4.4 - 11.3 x10*3/uL    nRBC 0.0 0.0 - 0.0 /100 WBCs    RBC 4.57 4.50 - 5.90 x10*6/uL    Hemoglobin 11.5 (L) 13.5 - 17.5 g/dL    Hematocrit 35.2 (L) 41.0 - 52.0 %    MCV 77 (L) 80 - 100 fL    MCH 25.2 (L) 26.0 - 34.0 pg    MCHC 32.7 32.0 - 36.0 g/dL    RDW 13.2 11.5 - 14.5 %    Platelets 196 150 - 450 x10*3/uL   Heparin Assay, UFH   Result Value Ref Range    Heparin Unfractionated 0.5 See Comment Below for Therapeutic Ranges IU/mL   Magnesium   Result Value Ref Range    Magnesium 2.20 1.60 - 2.40 mg/dL   Renal Function Panel   Result Value Ref Range    Glucose 104 (H) 74 - 99 mg/dL    Sodium 134 (L) 136 - 145 mmol/L    Potassium 4.4 3.5 - 5.3 mmol/L    Chloride 102 98 - 107 mmol/L    Bicarbonate 22 21 - 32 mmol/L    Anion Gap 14 10 - 20 mmol/L    Urea Nitrogen 23 6 - 23 mg/dL    Creatinine 1.86 (H) 0.50 - 1.30 mg/dL    eGFR 37 (L) >60 mL/min/1.73m*2    Calcium 8.8 8.6 - 10.6 mg/dL    Phosphorus 3.9 2.5 - 4.9 mg/dL    Albumin 3.6 3.4 - 5.0 g/dL   POCT GLUCOSE   Result Value Ref Range    POCT Glucose 107 (H) 74 - 99 mg/dL   POCT GLUCOSE   Result Value Ref Range    POCT Glucose 144 (H) 74 - 99 mg/dL   POCT GLUCOSE   Result Value Ref Range    POCT Glucose 193 (H) 74 - 99 mg/dL     *Note: Due to a large number of results and/or encounters for the requested time period, some results have not been displayed. A complete set of results can be found in Results Review.       TTE: 3/28/25   1. Poorly visualized anatomical structures due to suboptimal image quality.   2. Left ventricular ejection fraction is normal, calculated by Smith's biplane at 60%.   3. Left ventricular diastolic filling is indeterminate.   4. There is reduced right ventricular systolic function.   5. Mildly enlarged right ventricle.   6. The left atrium is mildly dilated.   7. Compared with study dated 12/20/2021, no significant change Technically difficult exam, no gross changes seen.   8. The patient is in atrial  fibrillation which may influence the estimate of left ventricular function and transvalvular flows.    B/l LE angiogram: 3/282/25  Right common femoral atherosclerotic occlusive disease. Right SFA occlusion with reconstitution of diseased above knee popliteal artery with runoff to the foot via the posterior tibial artery.       Assessment/Plan     74 y.o. 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 S/p HCV treatment with SVR. post txp course was complicated by urinary retention and enlarged prostate.    Other PMH: CAD, HTN, DM2, Afib s/p DCCV on eliquis, carotid endarterectomy for asymptomatic stenosis, ESRD s/p DDKT (2020), hypothyroidism, PAD (s/p L iliac stent with R fem and profunda endarterectomy with patch angioplasty)      Pt [facility] 3/27 for concern for worsening rt limb ischemia. On heparin. S/p diagnostic b/l LE angiogram 3/28/25 showing R CFA disease, SFA occlusion, and PT runoff. Possible rt femoral-PT bypass next week.    Post-procedure course complicated by HTN urgency, chest pain. Transferred to SICU for BP control, required NTG drip now weaned off. No concern for ACS.     Allograft function: s/p DDKT 5/2020  - baseline Cr ~2, stable this admission  - metabolic indices stable  - nonoliguric. No hypervolemia on exam.  - HTN: HTN urgency post-angiogram 3/28. Bps now improved. Monitor and titrate meds as indicated.   - Anemia: Hb 11.5, stable  - CKD-MBD: Ca, Phos stable. , vit D 36 2/2025  - dose meds for CrCl     Immunosuppression:  - Cont Envarsus 1.5 mg/d. Goal Fk 5-8  - pls check Fk trough daily AM.   - reduce MMF to 500mg bid (anticipated procedures), cont pred 5mg/d     Rest of the management per primary team. Will follow    Krystian Thompson MD

## 2025-03-31 NOTE — PROGRESS NOTES
VASCULAR SURGERY PROGRESS NOTE  Assessment/Plan   Dilip Haynes is 74 y.o. male with PMHx significant for CAD (s/p PCI), HTN, afib s/p DCCV 6/2020 (on Eliquis last took 3/26 AM), carotid endarterectomy for asymptomatic stenosis, ESRD s/p DDKT (2020), hypothyroidism, and PAD (s/p L iliac ([id number] stent) with R fem and profunda endarterectomy with patch angioplasty (2012) who presented as transfer from OSH with concern of worsening limb ischemia. Patient exam consistent with previously noted right foot tissue loss and rest pain without signs of worsening ischemia on exam. Patient with monophasic PT on R and mono DP/PT on left with intact motor and sensory function.     Underwent diagnostic RLE angiogram on 3/28 showing R CFA disease, SFA occlusion, and PT runoff. Post-op course complicated by chest pain in the setting of hypertension, transferred to ICU for nitroglycerin gtt where he remains with no further chest pain and a stable vascular exam. Stable renal function.    Ultimately will require right femoral-PT bypass with vein for CLTI 5, tentatively Thursday. Continuing cardiac workup for perioperative risk stratification. Stress Test today.     Plan:  PO pain control  SBP < 160  Appreciate cardiology recommendations and risk-stratification; given troponemia with moderate hypertension and after simple sedation, recommend considering a repeat LHC as last was over two years ago and new occlusive disease would ; ok for DAPT as long as no Brilinta   Continue heparin gtt  IS hourly  Ok for regular diet  Appreciate transplant nephrology recommendations  Daily labs   Activity as tolerated  RNF today  Timing of bypass surgery pending possible further cardiac workup     D/w attending, Dr. Stalin Brown MD  Vascular Surgery p. [phone]      Subjective   NAVEED, no complaints this morning     Objective   Vitals:  Heart Rate:  [60-83]   Temp:  [35.6 °C (96.1 °F)-36.7 °C (98.1 °F)]   Resp:  [15-26]    BP: (119-159)/(63-74)   Weight:  [82.8 kg (182 lb 9.6 oz)]   SpO2:  [96 %-100 %]     Exam:  Constitutional: No acute distress, resting comfortably  Neuro:  AOx3, grossly intact  ENMT: moist mucous membranes  CV: no tachycardia  Pulm: non-labored on room air  GI: soft, non-tender, non-distended  Skin: warm and dry  Musculoskeletal: moving all extremities  Extremities: left groin puncture without hematoma. Right hallux distal superficial dry ulceration  Pulse Exam: monophasic/mixed venous right PT, left pedal signals; motor/sensory intact    Labs:  Results from last 7 days   Lab Units 03/30/25 0422 03/29/25 0202 03/29/25  0016   WBC AUTO x10*3/uL 7.7 9.6 8.7   HEMOGLOBIN g/dL 11.5* 11.7* 11.3*   PLATELETS AUTO x10*3/uL 196 218 211      Results from last 7 days   Lab Units 03/30/25 0422 03/29/25 0203 03/29/25 0202 03/28/25  1615   SODIUM mmol/L 134*  --  135* 134*   POTASSIUM mmol/L 4.4  --  4.1 4.7   CHLORIDE mmol/L 102  --  103 103   CO2 mmol/L 22  --  21 21   BUN mg/dL 23  --  21 22   CREATININE mg/dL 1.86*  --  1.65* 1.66*   GLUCOSE mg/dL 104*  --  107* 124*   MAGNESIUM mg/dL 2.20   < >  --  1.97   PHOSPHORUS mg/dL 3.9  --  3.3 3.2    < > = values in this interval not displayed.      Results from last 7 days   Lab Units 03/27/25 0207 03/26/25  1757   INR  1.2* 1.1   PROTIME seconds 13.0* 12.6*   APTT seconds 43*  57*  --       Results from last 7 days   Lab Units 03/30/25 0422 03/29/25 0202 03/28/25  2233   ANTI XA UNFRACTIONATED IU/mL 0.5 0.6 0.6

## 2025-03-31 NOTE — PROGRESS NOTES
Physical Therapy    Physical Therapy Evaluation    Patient Name: Dilip Haynes  MRN: [id number]  Department: [place] [address]  Room: 70/7028-A  Today's Date: 3/31/2025   Time Calculation  Start Time: 1435  Stop Time: 1450  Time Calculation (min): 15 min    Assessment/Plan   PT Assessment  PT Assessment Results: Decreased endurance  Rehab Prognosis: Good  Barriers to Discharge Home: No anticipated barriers  Evaluation/Treatment Tolerance: Patient limited by pain  Medical Staff Made Aware: Yes  Strengths: Housing layout, Physical health, Premorbid level of function  Barriers to Participation:  (pain)  End of Session Communication: Bedside nurse  Assessment Comment: Patient open to talking with PT and sistting edge of bed, but declined all standing/walking today secondary to right toe pain. PT observed patient standing and walking to a bed when getting ready for transport. When walking earlier, he did not require a walker to walk ~20 feet and did not have any visible loss of balance. Patient reports that he can walk without issue in his home, but struggles to walk more than a block or to grocery shop. PT unable to have ideal functional mobility observation today, but patient showing signs that he may be successful at home with support. Patient educated on endurance/interval training and patient on board to start next session. Patient reports feeling safe going home when medically cleared. PT hopeful to have a better perspective on patient's level of independence next session.  End of Session Patient Position: Bed, 2 rail up, Alarm off, not on at start of session  IP OR SWING BED PT PLAN  Inpatient or Swing Bed: Inpatient  PT Plan  Treatment/Interventions: Bed mobility, Transfer training, Gait training, Stair training, Balance training, Neuromuscular re-education, Neurodevelopmental intervention, Strengthening, Endurance training, Range of motion, Therapeutic exercise, Therapeutic activity, Home exercise program  PT Plan:  "Ongoing PT  PT Frequency: 3 times per week  PT Discharge Recommendations: Low intensity level of continued care  PT Recommended Transfer Status: Contact guard  PT - OK to Discharge: Yes    Subjective   General Visit Information:  General  Reason for Referral: Patient  presented to [facility] 3/[facility] for concern for worsening rt limb ischemia. On heparin. S/p diagnostic b/l LE angiogram 3/28/25 showing R CFA disease, SFA occlusion, and PT runoff.   Post-procedure course complicated by HTN urgency, chest pain. Transferred to SICU for BP control, required NTG drip now weaned off and transferred back to medical floor.  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 \"I am not walking today\"  Home Living:  Home Living  Type of Home: House  Lives With:  (family)  Home Adaptive Equipment: None  Home Layout: Two level, Able to live on main level with bedroom/bathroom, Stairs to alternate level with rails  Alternate Level Stairs-Number of Steps: 10  Home Access: Stairs to enter without rails  Entrance Stairs-Number of Steps: 1  Home Living Comments: Can live on main floor if needed. Bathrooms on both floors.  Prior Level of Function:  Prior Function Per Pt/Caregiver Report  Level of [place]: Independent with ADLs and functional transfers, Independent with homemaking with ambulation, Independent with homemaking with wheelchair  ADL Assistance: Independent  Homemaking Assistance: Independent  Ambulatory Assistance: Independent  Prior Function Comments: Patient reports being independent in his home without as walker nor a cane. Patient reports " difficulty walking > 1 block or grocery shopping (>300ft)  Precautions:  Precautions  Precautions Comment: Right limb ischemia      Date/Time Vitals Session Patient Position Pulse Resp SpO2 BP MAP (mmHg)    03/31/25 13:52:53 --  --  77  16  99 %  121/68  86                 Objective   Pain:  Pain Assessment  Pain Assessment: 0-10  0-10 (Numeric) Pain Score: 5 - Moderate pain  Pain Type: Acute pain  Pain Location: Foot  Pain Orientation: Right  Cognition:  Cognition  Overall Cognitive Status: Within Functional Limits  Orientation Level: Oriented X4  Impulsive: Within functional limits  Processing Speed: Within funtional limits    General Assessments:  Activity Tolerance  Endurance: Decreased tolerance for upright activites    Sensation  Light Touch:  (Pain in right toes)    Strength  Strength Comments: Generalized functional weakness  Strength  Strength Comments: Generalized functional weakness    Perception  Inattention/Neglect: Appears intact      Coordination  Movements are Fluid and Coordinated: Yes    Postural Control  Postural Control: Within Functional Limits    Static Sitting Balance  Static Sitting-Balance Support: Feet supported, No upper extremity supported  Static Sitting-Level of Assistance: Independent  Dynamic Sitting Balance  Dynamic Sitting-Balance Support: Feet supported, No upper extremity supported  Dynamic Sitting-Level of Assistance: Independent  Dynamic Sitting-Comments: Reaching forward       Functional Assessments:  Bed Mobility  Bed Mobility: Yes  Bed Mobility 1  Bed Mobility 1: Supine to sitting, Sitting to supine  Level of Assistance 1: Independent  Bed Mobility Comments 1: Did not use bed rails. no PT assist    Transfers  Transfer: No (PT observed patient standing without assist without walker when going to transport)    Ambulation/Gait Training  Ambulation/Gait Training Performed: No (PT observed patient walking ~20 feet without assist without walker when going to  transport)  Extremity/Trunk Assessments:  RLE   RLE :  (FullROM, pain when weight bearing)  LLE   LLE : Within Functional Limits  Outcome Measures:  [facility] Basic Mobility  Turning from your back to your side while in a flat bed without using bedrails: None  Moving from lying on your back to sitting on the side of a flat bed without using bedrails: None  Moving to and from bed to chair (including a wheelchair): A little  Standing up from a chair using your arms (e.g. wheelchair or bedside chair): A little  To walk in hospital room: A little  Climbing 3-5 steps with railing: A little  Basic Mobility - Total Score: 20    Encounter Problems       Encounter Problems (Active)       PT Problem       Patient is able to ambulate 150 feet without loss of balance requiring contact guard or less assist  (Progressing)       Start:  03/31/25    Expected End:  04/14/25            Patient is able to ascend 4 stairs without loss of balance requiring contact guard or less assist  (Progressing)       Start:  03/31/25    Expected End:  04/14/25            Patient is able to score>9/12 on modified Dynamic Gait Index for dynamic ambulatory balance to reduce fall risk  (Progressing)       Start:  03/31/25    Expected End:  04/14/25               Pain - Adult              Education Documentation  Precautions, taught by Adriel Correa PT at 3/31/2025  3:17 PM.  Learner: Patient  Readiness: Acceptance  Method: Demonstration, Explanation  Response: Verbalizes Understanding  Comment: Goals of PT, benefits of mobility    Body Mechanics, taught by Adriel Correa PT at 3/31/2025  3:17 PM.  Learner: Patient  Readiness: Acceptance  Method: Demonstration, Explanation  Response: Verbalizes Understanding  Comment: Goals of PT, benefits of mobility    Home Exercise Program, taught by Adriel Correa PT at 3/31/2025  3:17 PM.  Learner: Patient  Readiness: Acceptance  Method: Demonstration, Explanation  Response: Verbalizes  Understanding  Comment: Goals of PT, benefits of mobility    Mobility Training, taught by Adriel Correa PT at 3/31/2025  3:17 PM.  Learner: Patient  Readiness: Acceptance  Method: Demonstration, Explanation  Response: Verbalizes Understanding  Comment: Goals of PT, benefits of mobility    Education Comments  No comments found.

## 2025-04-01 LAB
ALBUMIN SERPL BCP-MCNC: 3.7 G/DL (ref 3.4–5)
ANION GAP SERPL CALC-SCNC: 16 MMOL/L (ref 10–20)
BUN SERPL-MCNC: 25 MG/DL (ref 6–23)
CALCIUM SERPL-MCNC: 9.2 MG/DL (ref 8.6–10.6)
CHLORIDE SERPL-SCNC: 100 MMOL/L (ref 98–107)
CO2 SERPL-SCNC: 24 MMOL/L (ref 21–32)
CREAT SERPL-MCNC: 2.28 MG/DL (ref 0.5–1.3)
EGFRCR SERPLBLD CKD-EPI 2021: 29 ML/MIN/1.73M*2
ERYTHROCYTE [DISTWIDTH] IN BLOOD BY AUTOMATED COUNT: 13.5 % (ref 11.5–14.5)
GLUCOSE BLD MANUAL STRIP-MCNC: 125 MG/DL (ref 74–99)
GLUCOSE BLD MANUAL STRIP-MCNC: 176 MG/DL (ref 74–99)
GLUCOSE SERPL-MCNC: 98 MG/DL (ref 74–99)
HCT VFR BLD AUTO: 37.4 % (ref 41–52)
HGB BLD-MCNC: 11.4 G/DL (ref 13.5–17.5)
MAGNESIUM SERPL-MCNC: 2.25 MG/DL (ref 1.6–2.4)
MCH RBC QN AUTO: 25.3 PG (ref 26–34)
MCHC RBC AUTO-ENTMCNC: 30.5 G/DL (ref 32–36)
MCV RBC AUTO: 83 FL (ref 80–100)
NRBC BLD-RTO: 0 /100 WBCS (ref 0–0)
PHOSPHATE SERPL-MCNC: 4.9 MG/DL (ref 2.5–4.9)
PLATELET # BLD AUTO: 185 X10*3/UL (ref 150–450)
POTASSIUM SERPL-SCNC: 4.5 MMOL/L (ref 3.5–5.3)
RBC # BLD AUTO: 4.51 X10*6/UL (ref 4.5–5.9)
SODIUM SERPL-SCNC: 135 MMOL/L (ref 136–145)
TACROLIMUS BLD-MCNC: 3.6 NG/ML
UFH PPP CHRO-ACNC: 0.7 IU/ML
WBC # BLD AUTO: 7.4 X10*3/UL (ref 4.4–11.3)

## 2025-04-01 PROCEDURE — 97116 GAIT TRAINING THERAPY: CPT | Mod: GP

## 2025-04-01 PROCEDURE — 83735 ASSAY OF MAGNESIUM: CPT

## 2025-04-01 PROCEDURE — 80069 RENAL FUNCTION PANEL: CPT

## 2025-04-01 PROCEDURE — 36415 COLL VENOUS BLD VENIPUNCTURE: CPT

## 2025-04-01 PROCEDURE — 2500000001 HC RX 250 WO HCPCS SELF ADMINISTERED DRUGS (ALT 637 FOR MEDICARE OP)

## 2025-04-01 PROCEDURE — 2500000002 HC RX 250 W HCPCS SELF ADMINISTERED DRUGS (ALT 637 FOR MEDICARE OP, ALT 636 FOR OP/ED)

## 2025-04-01 PROCEDURE — 2500000004 HC RX 250 GENERAL PHARMACY W/ HCPCS (ALT 636 FOR OP/ED)

## 2025-04-01 PROCEDURE — 80197 ASSAY OF TACROLIMUS: CPT | Performed by: INTERNAL MEDICINE

## 2025-04-01 PROCEDURE — 85520 HEPARIN ASSAY: CPT

## 2025-04-01 PROCEDURE — 85027 COMPLETE CBC AUTOMATED: CPT

## 2025-04-01 PROCEDURE — 1200000002 HC GENERAL ROOM WITH TELEMETRY DAILY

## 2025-04-01 PROCEDURE — 82947 ASSAY GLUCOSE BLOOD QUANT: CPT

## 2025-04-01 PROCEDURE — 2500000001 HC RX 250 WO HCPCS SELF ADMINISTERED DRUGS (ALT 637 FOR MEDICARE OP): Performed by: STUDENT IN AN ORGANIZED HEALTH CARE EDUCATION/TRAINING PROGRAM

## 2025-04-01 PROCEDURE — 97165 OT EVAL LOW COMPLEX 30 MIN: CPT | Mod: GO

## 2025-04-01 PROCEDURE — 99233 SBSQ HOSP IP/OBS HIGH 50: CPT | Performed by: INTERNAL MEDICINE

## 2025-04-01 RX ADMIN — HYDRALAZINE HYDROCHLORIDE 50 MG: 50 TABLET, FILM COATED ORAL at 08:44

## 2025-04-01 RX ADMIN — TAMSULOSIN HYDROCHLORIDE 0.4 MG: 0.4 CAPSULE ORAL at 20:34

## 2025-04-01 RX ADMIN — LOSARTAN POTASSIUM 100 MG: 50 TABLET, FILM COATED ORAL at 08:44

## 2025-04-01 RX ADMIN — PREDNISONE 5 MG: 5 TABLET ORAL at 08:44

## 2025-04-01 RX ADMIN — METOPROLOL TARTRATE 50 MG: 50 TABLET, FILM COATED ORAL at 20:34

## 2025-04-01 RX ADMIN — TORSEMIDE 20 MG: 20 TABLET ORAL at 08:44

## 2025-04-01 RX ADMIN — HYDRALAZINE HYDROCHLORIDE 50 MG: 50 TABLET, FILM COATED ORAL at 22:33

## 2025-04-01 RX ADMIN — LEVOTHYROXINE SODIUM 137 MCG: 25 TABLET ORAL at 06:30

## 2025-04-01 RX ADMIN — HEPARIN SODIUM 1500 UNITS/HR: 10000 INJECTION, SOLUTION INTRAVENOUS at 12:42

## 2025-04-01 RX ADMIN — OXYCODONE HYDROCHLORIDE 5 MG: 5 TABLET ORAL at 20:42

## 2025-04-01 RX ADMIN — HYDRALAZINE HYDROCHLORIDE 50 MG: 50 TABLET, FILM COATED ORAL at 16:28

## 2025-04-01 RX ADMIN — MYCOPHENOLATE MOFETIL 500 MG: 250 CAPSULE ORAL at 06:30

## 2025-04-01 RX ADMIN — PRAVASTATIN SODIUM 40 MG: 20 TABLET ORAL at 20:34

## 2025-04-01 RX ADMIN — HYDRALAZINE HYDROCHLORIDE 50 MG: 50 TABLET, FILM COATED ORAL at 00:06

## 2025-04-01 RX ADMIN — AMLODIPINE BESYLATE 10 MG: 10 TABLET ORAL at 08:44

## 2025-04-01 RX ADMIN — METOPROLOL TARTRATE 50 MG: 50 TABLET, FILM COATED ORAL at 08:44

## 2025-04-01 RX ADMIN — FAMOTIDINE 20 MG: 20 TABLET, FILM COATED ORAL at 08:44

## 2025-04-01 RX ADMIN — MYCOPHENOLATE MOFETIL 500 MG: 250 CAPSULE ORAL at 18:39

## 2025-04-01 RX ADMIN — TACROLIMUS 1.5 MG: 0.75 TABLET, EXTENDED RELEASE ORAL at 08:44

## 2025-04-01 ASSESSMENT — COGNITIVE AND FUNCTIONAL STATUS - GENERAL
TOILETING: A LITTLE
MOVING TO AND FROM BED TO CHAIR: A LITTLE
MOBILITY SCORE: 24
WALKING IN HOSPITAL ROOM: A LITTLE
DAILY ACTIVITIY SCORE: 21
MOBILITY SCORE: 24
MOBILITY SCORE: 20
DAILY ACTIVITIY SCORE: 24
CLIMB 3 TO 5 STEPS WITH RAILING: A LITTLE
HELP NEEDED FOR BATHING: A LITTLE
STANDING UP FROM CHAIR USING ARMS: A LITTLE
DAILY ACTIVITIY SCORE: 24
DRESSING REGULAR LOWER BODY CLOTHING: A LITTLE

## 2025-04-01 ASSESSMENT — ACTIVITIES OF DAILY LIVING (ADL)
BATHING_ASSISTANCE: STAND BY
ADL_ASSISTANCE: INDEPENDENT

## 2025-04-01 ASSESSMENT — PAIN - FUNCTIONAL ASSESSMENT
PAIN_FUNCTIONAL_ASSESSMENT: 0-10

## 2025-04-01 ASSESSMENT — PAIN SCALES - GENERAL
PAINLEVEL_OUTOF10: 4
PAINLEVEL_OUTOF10: 3
PAINLEVEL_OUTOF10: 8
PAINLEVEL_OUTOF10: 5 - MODERATE PAIN

## 2025-04-01 ASSESSMENT — PAIN DESCRIPTION - DESCRIPTORS: DESCRIPTORS: THROBBING

## 2025-04-01 NOTE — PROGRESS NOTES
Social Work Note    Interdisciplinary Treatment Plan : It will require right femoral-PT bypass with vein for CLTI 5, anticipating Thursday   - Additional information : none noted at this time   - Support : Wife, Alda is listed NOK  - Payor : Medicare  - Planned Disposition : Low intensity currently.   - Barrier to discharge : None noted at this time.     ADOD would be this weekend or next Monday. SW will continue to follow and assist.     ERNESTO McmanusA, LSW

## 2025-04-01 NOTE — PROGRESS NOTES
Physical Therapy    Physical Therapy Treatment    Patient Name: Dilip Haynes  MRN: [id number]  Department: [place] [address]  Room: 70[facility]7028-A  Today's Date: 4/1/2025  Time Calculation  Start Time: 1140  Stop Time: 1204  Time Calculation (min): 24 min         Assessment/Plan   PT Assessment  PT Assessment Results: Decreased endurance  Rehab Prognosis: Good  Barriers to Discharge Home: No anticipated barriers  Evaluation/Treatment Tolerance: Patient limited by pain  Medical Staff Made Aware: Yes  Strengths: Housing layout, Physical health, Premorbid level of function  Barriers to Participation:  (pain)  End of Session Communication: Bedside nurse  Assessment Comment: Patient was able to walk 240 feet with 1 seated rest break with HR 60-74 and SPO2 >95% on 3L O2. Patient planned a seated rest break when right LE pain increased with reduction when seated. Patient would benefit from frequent sub-pain ambulation/ interval training (Ex. intermittant claudication protocols) with goal of increasing ambulatory ability without increasing pain when upright. As patient does not require much physical assistance, PT frequency will be reduced, but patient can still benefit from in hospital PT services at this time.  End of Session Patient Position: Bed, 2 rail up, Alarm off, not on at start of session     PT Plan  Treatment/Interventions: Bed mobility, Transfer training, Gait training, Stair training, Balance training, Neuromuscular re-education, Neurodevelopmental intervention, Strengthening, Endurance training, Range of motion, Therapeutic exercise, Therapeutic activity, Home exercise program  PT Plan: Ongoing PT  PT Frequency: 2 times per week  PT Discharge Recommendations: Low intensity level of continued care  PT Recommended Transfer Status: Stand by assist  PT - OK to Discharge: Yes      General Visit Information:   PT  Visit  PT Received On: 04/01/25  Response to Previous Treatment: Patient with no complaints from previous  session.  General  Reason for Referral: Patient  presented to [facility] on 3/27 for concern for worsening rt limb ischemia. On heparin. S/p diagnostic b/l LE angiogram 3/28/25 showing R CFA disease, SFA occlusion, and PT runoff.   Post-procedure course complicated by HTN urgency, chest pain. Transferred to SICU for BP control, required NTG drip now weaned off and transferred back to medical floor.  Past Medical History Relevant to Rehab: DM, HTN, Afib on eliquis, CAD, ESRD sec to HTN s/p increased risk and HCV+ DDKT on 5/5/2020  Family/Caregiver Present: No  Prior to Session Communication: Bedside nurse  Patient Position Received: Bed, 2 rail up, Alarm off, not on at start of session  General Comment: Patient open to working with PT    Subjective   Precautions:  Precautions  Precautions Comment: Post-op shoes would be best.            Objective   Pain:  Pain Assessment  Pain Assessment: 0-10  0-10 (Numeric) Pain Score: 4  Pain Type: Acute pain  Pain Location: Foot  Pain Orientation: Right  Pain Interventions: Repositioned, Ambulation/increased activity  Cognition:  Cognition  Overall Cognitive Status: Within Functional Limits  Orientation Level: Oriented X4  Impulsive: Within functional limits  Processing Speed: Within funtional limits  Coordination:  Movements are Fluid and Coordinated: Yes  Postural Control:  Postural Control  Postural Control: Within Functional Limits  Static Sitting Balance  Static Sitting-Balance Support: Feet supported, No upper extremity supported  Static Sitting-Level of Assistance: Independent  Dynamic Sitting Balance  Dynamic Sitting-Balance Support: Feet supported, No upper extremity supported  Dynamic Sitting-Level of Assistance: Independent  Dynamic Sitting-Comments: Reaching forward  Static Standing Balance  Static Standing-Balance Support: No upper extremity supported  Static Standing-Level of Assistance: Close supervision    Activity Tolerance:  Activity Tolerance  Endurance: Decreased  tolerance for upright activites  Treatments:  Therapeutic Activity  Therapeutic Activity 1: Sit to stand x3  Therapeutic Activity 2: bed mobility    Bed Mobility  Bed Mobility: Yes  Bed Mobility 1  Bed Mobility 1: Supine to sitting, Sitting to supine  Level of Assistance 1: Independent  Bed Mobility Comments 1: No assistance needed    Ambulation/Gait Training  Ambulation/Gait Training Performed: Yes  Ambulation/Gait Training 1  Surface 1: Level tile  Device 1: No device  Assistance 1: Close supervision  Comments/Distance (ft) 1: 240 feet total with 1 seated rest break.  Transfers  Transfer: Yes  Transfer 1  Transfer From 1: Sit to, Stand to  Transfer to 1: Sit, Stand  Technique 1: Sit to stand, Stand to sit  Transfer Device 1:  (none)  Transfer Level of Assistance 1: Close supervision  Trials/Comments 1: No physical assistance needed    Outcome Measures:  [facility] Basic Mobility  Turning from your back to your side while in a flat bed without using bedrails: None  Moving from lying on your back to sitting on the side of a flat bed without using bedrails: None  Moving to and from bed to chair (including a wheelchair): A little  Standing up from a chair using your arms (e.g. wheelchair or bedside chair): A little  To walk in hospital room: A little  Climbing 3-5 steps with railing: A little  Basic Mobility - Total Score: 20    Education Documentation  Precautions, taught by Adriel Correa PT at 4/1/2025 12:30 PM.  Learner: Patient  Readiness: Acceptance  Method: Demonstration, Explanation  Response: Verbalizes Understanding, Demonstrated Understanding  Comment: Goals of PT, safety with mobility    Body Mechanics, taught by Adriel Correa PT at 4/1/2025 12:30 PM.  Learner: Patient  Readiness: Acceptance  Method: Demonstration, Explanation  Response: Verbalizes Understanding, Demonstrated Understanding  Comment: Goals of PT, safety with mobility    Home Exercise Program, taught by Adriel Correa PT at 4/1/2025  12:30 PM.  Learner: Patient  Readiness: Acceptance  Method: Demonstration, Explanation  Response: Verbalizes Understanding, Demonstrated Understanding  Comment: Goals of PT, safety with mobility    Mobility Training, taught by Adriel Correa PT at 4/1/2025 12:30 PM.  Learner: Patient  Readiness: Acceptance  Method: Demonstration, Explanation  Response: Verbalizes Understanding, Demonstrated Understanding  Comment: Goals of PT, safety with mobility    Education Comments  No comments found.        OP EDUCATION:       Encounter Problems       Encounter Problems (Active)       PT Problem       Patient is able to ambulate 150 feet without loss of balance requiring contact guard or less assist  (Met)       Start:  03/31/25    Expected End:  04/14/25    Resolved:  04/01/25         Patient is able to ascend 4 stairs without loss of balance requiring contact guard or less assist  (Progressing)       Start:  03/31/25    Expected End:  04/14/25            Patient is able to score>9/12 on modified Dynamic Gait Index for dynamic ambulatory balance to reduce fall risk  (Progressing)       Start:  03/31/25    Expected End:  04/14/25               PT Problem       Patient is able to ambulate 350 feet without loss of balance requiring contact guard or less assist  (Progressing)       Start:  04/01/25    Expected End:  04/15/25               Pain - Adult

## 2025-04-01 NOTE — CARE PLAN
The patient's goals for the shift include no pain    The clinical goals for the shift include pt will remain HDS and sleep at least four hours by end of shift      Problem: Pain - Adult  Goal: Verbalizes/displays adequate comfort level or baseline comfort level  Outcome: Progressing     Problem: Safety - Adult  Goal: Free from fall injury  Outcome: Progressing     Problem: Discharge Planning  Goal: Discharge to home or other facility with appropriate resources  Outcome: Progressing     Problem: Chronic Conditions and Co-morbidities  Goal: Patient's chronic conditions and co-morbidity symptoms are monitored and maintained or improved  Outcome: Progressing     Problem: Nutrition  Goal: Nutrient intake appropriate for maintaining nutritional needs  Outcome: Progressing     Problem: Pain  Goal: Takes deep breaths with improved pain control throughout the shift  Outcome: Progressing  Goal: Turns in bed with improved pain control throughout the shift  Outcome: Progressing  Goal: Walks with improved pain control throughout the shift  Outcome: Progressing  Goal: Performs ADL's with improved pain control throughout shift  Outcome: Progressing  Goal: Participates in PT with improved pain control throughout the shift  Outcome: Progressing  Goal: Free from opioid side effects throughout the shift  Outcome: Progressing  Goal: Free from acute confusion related to pain meds throughout the shift  Outcome: Progressing

## 2025-04-01 NOTE — PROGRESS NOTES
Occupational Therapy    Evaluation    Patient Name: Dilip Haynes  MRN: [id number]  Department: [place] [address]  Room: 70[facility]7028-A  Today's Date: 4/1/2025  Time Calculation  Start Time: 0944  Stop Time: 0955  Time Calculation (min): 11 min        Assessment:  OT Assessment: Pt will benefit from continued skilled OT to increase indepenendence in ADLs, functinoal mobility, activity tolerance, and safety.  Prognosis: Excellent  Barriers to Discharge Home: No anticipated barriers  Evaluation/Treatment Tolerance: Patient tolerated treatment well  Medical Staff Made Aware: Yes  End of Session Communication: Bedside nurse  End of Session Patient Position: Bed, 2 rail up, Alarm off, not on at start of session  OT Assessment Results: Decreased ADL status, Decreased safe judgment during ADL, Decreased endurance, Decreased functional mobility, Decreased IADLs  Prognosis: Excellent  Evaluation/Treatment Tolerance: Patient tolerated treatment well  Medical Staff Made Aware: Yes  Strengths: Attitude of self  Barriers to Participation: Comorbidities  Plan:  Treatment Interventions: ADL retraining, Functional transfer training, Endurance training, Patient/family training, Compensatory technique education  OT Frequency: 2 times per week  OT Discharge Recommendations: Low intensity level of continued care  Equipment Recommended upon Discharge: Wheeled walker  OT Recommended Transfer Status: Assist of 1  OT - OK to Discharge: Yes (upon medical clearance)  Treatment Interventions: ADL retraining, Functional transfer training, Endurance training, Patient/family training, Compensatory technique education    Subjective   Current Problem:  1. PAD (peripheral artery disease) (CMS-[facility])  Vascular US ankle brachial index (PEDRO PABLO) without exercise    Vascular US ankle brachial index (PEDRO PABLO) without exercise    Lower extremity vein mapping bilateral    Lower extremity vein mapping bilateral    Nuclear Stress Test    Nuclear Stress Test    Cardiology  Interpretation Of Nuclear Stress - See Other Report For Nuclear Portion    Cardiology Interpretation Of Nuclear Stress - See Other Report For Nuclear Portion    Case Request Operating Room: Femoral to distal artery bypass, angiogram, iliac stenting    Case Request Operating Room: Femoral to distal artery bypass, angiogram, iliac stenting    CANCELED: Vascular US ankle brachial index (PEDRO PABLO) without exercise    CANCELED: Vascular US ankle brachial index (PEDRO PABLO) without exercise      2. Lower limb ischemia  Case Request Operating Room: Bilateral lower extremity angiogram with possible interventions    Case Request Operating Room: Bilateral lower extremity angiogram with possible interventions      3. 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 Encounter for other preprocedural examination  Lower extremity vein mapping bilateral    Cardiology Interpretation Of Nuclear Stress - See Other Report For Nuclear Portion      5. Heart failure with preserved ejection fraction, unspecified HF chronicity        6. Hypertension secondary to other renal disorders  Transthoracic Echo (TTE) Complete    Transthoracic Echo (TTE) Complete      7. Hx of percutaneous transluminal coronary angioplasty  Transthoracic Echo (TTE) Complete    Transthoracic Echo (TTE) Complete      8. History of non-ST elevation myocardial infarction (NSTEMI)  Transthoracic Echo (TTE) Complete    Transthoracic Echo (TTE) Complete      9. History of cardiovascular disorder  Transthoracic Echo (TTE) Complete    Transthoracic Echo (TTE) Complete      10. Cardiac arrest with successful resuscitation        11. Atrial flutter, unspecified type (Multi)  Transthoracic Echo (TTE) Complete    Transthoracic Echo (TTE) Complete      12. Presence of stent in coronary artery  Transthoracic Echo (TTE) Complete    Transthoracic Echo (TTE)  Complete      13. Abnormal electrocardiogram (ECG) (EKG)  Nuclear Stress Test    Nuclear Stress Test    Cardiology Interpretation Of Nuclear Stress - See Other Report For Nuclear Portion    Cardiology Interpretation Of Nuclear Stress - See Other Report For Nuclear Portion      14. Preop cardiovascular exam  Cardiology Interpretation Of Nuclear Stress - See Other Report For Nuclear Portion    Cardiology Interpretation Of Nuclear Stress - See Other Report For Nuclear Portion        General:  General  Reason for Referral: Patient  presented to [facility] on 3/27 for concern for worsening rt limb ischemia. On heparin. S/p diagnostic b/l LE angiogram 3/28/25 showing R CFA disease, SFA occlusion, and PT runoff.   Post-procedure course complicated by HTN urgency, chest pain. Transferred to SICU for BP control, required NTG drip now weaned off and transferred back to medical floor.  Past Medical History Relevant to Rehab: DM, HTN, Afib on eliquis, CAD, ESRD sec to HTN s/p increased risk and HCV+ DDKT on 5/5/2020  Family/Caregiver Present: No  Prior to Session Communication: Bedside nurse  Patient Position Received: Bed, 2 rail up, Alarm off, not on at start of session  General Comment: Pt supine in bed upon arrival, agreeable to OT  Precautions:  Medical Precautions: Fall precautions    Pain:  Pain Assessment  Pain Assessment: 0-10  0-10 (Numeric) Pain Score: 8  Pain Type: Acute pain  Pain Location: Foot  Pain Orientation: Right  Pain Interventions: Repositioned, Distraction    Objective   Cognition:  Overall Cognitive Status: Within Functional Limits  Orientation Level: Oriented X4  Insight: Within function limits  Impulsive: Within functional limits  Processing Speed: Within funtional limits     Home Living:  Type of Home: House  Lives With: Spouse (wife)  Home Adaptive Equipment: Cane, Walker rolling or standard  Home Layout: Two level, Able to live on main level with bedroom/bathroom, Stairs to alternate level with  rails  Home Access: Stairs to enter without rails  Entrance Stairs-Number of Steps: 1  Bathroom Shower/Tub: Tub/shower unit  Bathroom Equipment: Grab bars in shower (shower bench)  Prior Function:  Level of [place]: Independent with ADLs and functional transfers  ADL Assistance: Independent  Homemaking Assistance: Independent  Ambulatory Assistance: Independent  Vocational: Retired  IADL History:  Homemaking Responsibilities: Yes  Occupation: Retired  ADL:  Eating Assistance: Independent (anticipated)  Grooming Assistance: Independent (anticipated)  Bathing Assistance: Stand by (anticipated seated)  UE Dressing Assistance: Independent (gown management)  LE Dressing Assistance: Stand by (donned B socks seated EOB SBA)  Toileting Assistance with Device: Stand by (anticipated)  Activity Tolerance:  Endurance: Tolerates 10 - 20 min exercise with multiple rests  Bed Mobility/Transfers: Bed Mobility  Bed Mobility: Yes  Bed Mobility 1  Bed Mobility 1: Supine to sitting, Sitting to supine  Level of Assistance 1: Independent    Transfers  Transfer: Yes  Transfer 1  Transfer From 1: Sit to, Stand to  Transfer to 1: Stand, Sit  Technique 1: Sit to stand, Stand to sit  Transfer Device 1: Walker  Transfer Level of Assistance 1: Close supervision  Trials/Comments 1: VCs for hand placement    Functional Mobility:  Functional Mobility  Functional Mobility Performed: Yes  Functional Mobility 1  Surface 1: Level tile  Device 1: Rolling walker  Assistance 1: Close supervision  Comments 1: mod household distance in hallway  Sitting Balance:  Static Sitting Balance  Static Sitting-Balance Support: Feet supported  Static Sitting-Level of Assistance: Independent  Dynamic Sitting Balance  Dynamic Sitting-Balance Support: Feet supported  Dynamic Sitting-Level of Assistance: Independent  Standing Balance:  Static Standing Balance  Static Standing-Balance Support: Bilateral upper extremity supported  Static Standing-Level of Assistance:  Close supervision  Dynamic Standing Balance  Dynamic Standing-Balance Support: Bilateral upper extremity supported  Dynamic Standing-Level of Assistance: Close supervision   Modalities:  Modalities Used: No  Sensation:  Light Touch: No apparent deficits  Strength:  Strength Comments: NANY BENJAMIN  Perception:  Inattention/Neglect: Appears intact  Coordination:  Movements are Fluid and Coordinated: Yes   Hand Function:  Gross Grasp: Functional  Coordination: Functional  Extremities: RUE   RUE : Within Functional Limits and LUE   LUE: Within Functional Limits    Outcome Measures:[facility] Daily Activity  Putting on and taking off regular lower body clothing: A little  Bathing (including washing, rinsing, drying): A little  Putting on and taking off regular upper body clothing: None  Toileting, which includes using toilet, bedpan or urinal: A little  Taking care of personal grooming such as brushing teeth: None  Eating Meals: None  Daily Activity - Total Score: 21     and OT Adult Other Outcome Measures  4AT: negative    Education Documentation  Body Mechanics, taught by Sushil Loomis OT at 4/1/2025  1:11 PM.  Learner: Patient  Readiness: Acceptance  Method: Explanation  Response: Verbalizes Understanding  Comment: OT POC, d/c rec, safety, ADLs    Precautions, taught by Sushil Loomis OT at 4/1/2025  1:11 PM.  Learner: Patient  Readiness: Acceptance  Method: Explanation  Response: Verbalizes Understanding  Comment: OT POC, d/c rec, safety, ADLs    ADL Training, taught by Sushil Loomis OT at 4/1/2025  1:11 PM.  Learner: Patient  Readiness: Acceptance  Method: Explanation  Response: Verbalizes Understanding  Comment: OT POC, d/c rec, safety, ADLs    Education Comments  No comments found.      Goals:  Encounter Problems       Encounter Problems (Active)       ADLs       Patient with complete lower body dressing with modified independent level of assistance donning and doffing all LE clothes  with PRN adaptive equipment while  supported sitting and standing (Progressing)       Start:  04/01/25    Expected End:  04/22/25            Patient will complete toileting including hygiene clothing management/hygiene with modified independent level of assistance and grab bars. (Progressing)       Start:  04/01/25    Expected End:  04/22/25               BALANCE       Pt will maintain dynamic standing balance during ADL task with modified independent level of assistance in order to demonstrate decreased risk of falling and improved postural control. (Progressing)       Start:  04/01/25    Expected End:  04/22/25               MOBILITY       Patient will perform Functional mobility max Household distances/Community Distances with modified independent level of assistance and least restrictive device in order to improve safety and functional mobility. (Progressing)       Start:  04/01/25    Expected End:  04/22/25               TRANSFERS       Patient will complete sit to stand transfer with modified independent level of assistance and least restrictive device in order to improve safety and prepare for out of bed mobility. (Progressing)       Start:  04/01/25    Expected End:  04/22/25                  SHOAIB Richardson/FIDEL

## 2025-04-01 NOTE — PROGRESS NOTES
"Dilip Haynes is a 74 y.o. male on day 5 of admission presenting with Lower limb ischemia.    Pt doing well with no complaints. No chest pain, no SOB.   Reports not eating yesterday due to procedure.    Scheduled medications  amLODIPine, 10 mg, oral, Daily  famotidine, 20 mg, oral, Daily  hydrALAZINE, 50 mg, oral, q8h  insulin lispro, 0-5 Units, subcutaneous, TID AC  levothyroxine, 137 mcg, oral, Daily  losartan, 100 mg, oral, Daily  metoprolol tartrate, 50 mg, oral, BID  mycophenolate, 500 mg, oral, 2 times per day  perflutren protein A microsphere, 0.5 mL, intravenous, Once in imaging  pravastatin, 40 mg, oral, Nightly  predniSONE, 5 mg, oral, Daily  sulfur hexafluoride microsphr, 2 mL, intravenous, Once in imaging  tacrolimus ER, 1.5 mg, oral, Once per day on Sunday Monday Tuesday Wednesday Thursday Friday  tamsulosin, 0.4 mg, oral, Nightly  torsemide, 20 mg, oral, Daily      Continuous medications  heparin, 0-4,500 Units/hr, Last Rate: 1,500 Units/hr (04/01/25 0122)      PRN medications  PRN medications: acetaminophen, albuterol, dextrose, dextrose, fluticasone, glucagon, glucagon, hydrALAZINE, labetaloL, naloxone, nitroglycerin, ondansetron **OR** ondansetron, oxyCODONE, oxygen        Last Recorded Vitals  Blood pressure 140/67, pulse 68, temperature 36.4 °C (97.5 °F), resp. rate 12, height 1.778 m (5' 10\"), weight 87.3 kg (192 lb 7.4 oz), SpO2 99%.  Intake/Output last 3 Shifts:  I/O last 3 completed shifts:  In: 563.9 (6.5 mL/kg) [I.V.:563.9 (6.5 mL/kg)]  Out: 1600 (18.3 mL/kg) [Urine:1600 (0.5 mL/kg/hr)]  Weight: 87.3 kg     A&ox3, no distress, pleasant  MMM, no lesions  CTAB  RRR  Davis  Abd soft, nt, nd  No allograft tenderness  No edema b/l    Results for orders placed or performed during the hospital encounter of 03/27/25 (from the past 24 hours)   POCT GLUCOSE   Result Value Ref Range    POCT Glucose 106 (H) 74 - 99 mg/dL   CBC   Result Value Ref Range    WBC 7.1 4.4 - 11.3 x10*3/uL    nRBC 0.0 0.0 - " 0.0 /100 WBCs    RBC 4.41 (L) 4.50 - 5.90 x10*6/uL    Hemoglobin 11.2 (L) 13.5 - 17.5 g/dL    Hematocrit 36.4 (L) 41.0 - 52.0 %    MCV 83 80 - 100 fL    MCH 25.4 (L) 26.0 - 34.0 pg    MCHC 30.8 (L) 32.0 - 36.0 g/dL    RDW 13.5 11.5 - 14.5 %    Platelets 191 150 - 450 x10*3/uL   Renal Function Panel   Result Value Ref Range    Glucose 110 (H) 74 - 99 mg/dL    Sodium 136 136 - 145 mmol/L    Potassium 4.2 3.5 - 5.3 mmol/L    Chloride 101 98 - 107 mmol/L    Bicarbonate 24 21 - 32 mmol/L    Anion Gap 15 10 - 20 mmol/L    Urea Nitrogen 23 6 - 23 mg/dL    Creatinine 1.94 (H) 0.50 - 1.30 mg/dL    eGFR 35 (L) >60 mL/min/1.73m*2    Calcium 9.1 8.6 - 10.6 mg/dL    Phosphorus 3.7 2.5 - 4.9 mg/dL    Albumin 3.7 3.4 - 5.0 g/dL   Magnesium   Result Value Ref Range    Magnesium 2.24 1.60 - 2.40 mg/dL   Heparin Assay, UFH   Result Value Ref Range    Heparin Unfractionated 0.6 See Comment Below for Therapeutic Ranges IU/mL   POCT GLUCOSE   Result Value Ref Range    POCT Glucose 148 (H) 74 - 99 mg/dL   POCT GLUCOSE   Result Value Ref Range    POCT Glucose 174 (H) 74 - 99 mg/dL   POCT GLUCOSE   Result Value Ref Range    POCT Glucose 149 (H) 74 - 99 mg/dL     *Note: Due to a large number of results and/or encounters for the requested time period, some results have not been displayed. A complete set of results can be found in Results Review.       TTE: 3/28/25   1. Poorly visualized anatomical structures due to suboptimal image quality.   2. Left ventricular ejection fraction is normal, calculated by Smith's biplane at 60%.   3. Left ventricular diastolic filling is indeterminate.   4. There is reduced right ventricular systolic function.   5. Mildly enlarged right ventricle.   6. The left atrium is mildly dilated.   7. Compared with study dated 12/20/2021, no significant change Technically difficult exam, no gross changes seen.   8. The patient is in atrial fibrillation which may influence the estimate of left ventricular function and  transvalvular flows.    B/l LE angiogram: 3/282/25  Right common femoral atherosclerotic occlusive disease. Right SFA occlusion with reconstitution of diseased above knee popliteal artery with runoff to the foot via the posterior tibial artery.     Lab Results   Component Value Date    WBC 7.4 04/01/2025    HGB 11.4 (L) 04/01/2025    HCT 37.4 (L) 04/01/2025    MCV 83 04/01/2025     04/01/2025     Lab Results   Component Value Date    GLUCOSE 98 04/01/2025    CALCIUM 9.2 04/01/2025     (L) 04/01/2025    K 4.5 04/01/2025    CO2 24 04/01/2025     04/01/2025    BUN 25 (H) 04/01/2025    CREATININE 2.28 (H) 04/01/2025     Assessment/Plan     74 y.o. 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 S/p HCV treatment with SVR. post txp course was complicated by urinary retention and enlarged prostate. Pt presented to [facility] on 3/27 for concern for worsening rt limb ischemia. On heparin. S/p diagnostic b/l LE angiogram 3/28/25 showing R CFA disease, SFA occlusion, and PT runoff.   Post-procedure course complicated by HTN urgency, chest pain. Transferred to SICU for BP control, required NTG drip now weaned off and transferred back to medical floor.     #Allograft function: s/p DDKT 5/2020  - baseline Cr ~2, stable this admission  - Renal function overall stable with acceptable metabolic parameters, euvolemic on exam  -pending further periop workup for potential femoral-PT bypass and LHC prior to surgery for periop clearance. Discussed risks vs benefits of contrast exposure with patient and possible risk of kidney injury. If to proceed with contrast exposure recommend ppx with IVF prior to procedure, no contraindications from nephrology standpoint.   -Uop robust. On Torsemide 20mg. Euvolemic on exam    #Immunosuppression:  - Envarsus . Goal Fk 5-8. Level 3.8  -Change to 2mg qday  - pls check Fk trough daily AM.   - cont MMF 500mg bid, cont pred  5mg/d    #HTN: HTN urgency post-angiogram 3/28.   -Bps now improved. Monitor and titrate meds as indicated.   -continue amlodipine, hydralazine, losartan, metoprolol, Torsemide     #Anemia: Hb 11.5, stable  #CKD-MBD: Ca, Phos stable. , vit D 36 2/2025     Discussed with Dr. Demarco.    Gregory Feliciano MD

## 2025-04-01 NOTE — PROGRESS NOTES
VASCULAR SURGERY PROGRESS NOTE  Assessment/Plan   Dilip Haynes is 74 y.o. male with PMHx significant for CAD (s/p PCI), HTN, afib s/p DCCV 6/2020 (on Eliquis last took 3/26 AM), carotid endarterectomy for asymptomatic stenosis, ESRD s/p DDKT (2020), hypothyroidism, and PAD (s/p L iliac ([id number] stent) with R fem and profunda endarterectomy with patch angioplasty (2012) who presented as transfer from OSH with concern of worsening limb ischemia. Patient exam consistent with previously noted right foot tissue loss and rest pain without signs of worsening ischemia on exam. Patient with monophasic PT on R and mono DP/PT on left with intact motor and sensory function.     Underwent diagnostic RLE angiogram on 3/28 showing R CFA disease, SFA occlusion, and PT runoff. Post-op course complicated by chest pain in the setting of hypertension, transferred to ICU for nitroglycerin gtt where he remains with no further chest pain and a stable vascular exam. Stable renal function.    Ultimately will require right femoral-PT bypass with vein for CLTI 5, anticipating Thursday. Cardiology recommendations indicate no further cardiac workup necessary for OR; patient will be at an elevated but not prohibitive risk for surgery.     Plan:  PO pain control  SBP < 160  Appreciate cardiology recommendations and risk-stratification; given troponemia with moderate hypertension and after simple sedation; ok for DAPT as long as no Brilinta   Continue heparin gtt  IS hourly  Ok for regular diet  Appreciate transplant nephrology recommendations  Daily labs   Activity as tolerated  RNF today    D/w attending, Dr. Stalin Brown MD  Vascular Surgery p. [phone]      Subjective   NAVEED, no complaints this morning     Objective   Vitals:  Heart Rate:  []   Temp:  [36.4 °C (97.5 °F)-37.1 °C (98.8 °F)]   Resp:  [12-19]   BP: (121-149)/(65-79)   Weight:  [87.3 kg (192 lb 7.4 oz)]   SpO2:  [95 %-99 %]     Exam:  Constitutional: No acute  distress, resting comfortably  Neuro:  AOx3, grossly intact  ENMT: moist mucous membranes  CV: no tachycardia  Pulm: non-labored on room air  GI: soft, non-tender, non-distended  Skin: warm and dry  Musculoskeletal: moving all extremities  Extremities: left groin puncture without hematoma. Right hallux distal superficial dry ulceration  Pulse Exam: monophasic/mixed venous right PT, left pedal signals; motor/sensory intact    Labs:  Results from last 7 days   Lab Units 03/31/25 0905 03/30/25 0422 03/29/25  0202   WBC AUTO x10*3/uL 7.1 7.7 9.6   HEMOGLOBIN g/dL 11.2* 11.5* 11.7*   PLATELETS AUTO x10*3/uL 191 196 218      Results from last 7 days   Lab Units 03/31/25 0905 03/30/25 0422 03/29/25 0203 03/29/25  0202   SODIUM mmol/L 136 134*  --  135*   POTASSIUM mmol/L 4.2 4.4  --  4.1   CHLORIDE mmol/L 101 102  --  103   CO2 mmol/L 24 22  --  21   BUN mg/dL 23 23  --  21   CREATININE mg/dL 1.94* 1.86*  --  1.65*   GLUCOSE mg/dL 110* 104*  --  107*   MAGNESIUM mg/dL 2.24 2.20   < >  --    PHOSPHORUS mg/dL 3.7 3.9  --  3.3    < > = values in this interval not displayed.      Results from last 7 days   Lab Units 03/27/25 0207 03/26/25  1757   INR  1.2* 1.1   PROTIME seconds 13.0* 12.6*   APTT seconds 43*  57*  --       Results from last 7 days   Lab Units 03/31/25 0905 03/30/25 0422 03/29/25  0202   ANTI XA UNFRACTIONATED IU/mL 0.6 0.5 0.6

## 2025-04-02 ENCOUNTER — ANESTHESIA EVENT (OUTPATIENT)
Dept: OPERATING ROOM | Facility: HOSPITAL | Age: 75
End: 2025-04-02
Payer: MEDICARE

## 2025-04-02 LAB
ALBUMIN SERPL BCP-MCNC: 3.8 G/DL (ref 3.4–5)
ANION GAP SERPL CALC-SCNC: 17 MMOL/L (ref 10–20)
BUN SERPL-MCNC: 27 MG/DL (ref 6–23)
CALCIUM SERPL-MCNC: 8.9 MG/DL (ref 8.6–10.6)
CHLORIDE SERPL-SCNC: 99 MMOL/L (ref 98–107)
CO2 SERPL-SCNC: 22 MMOL/L (ref 21–32)
CREAT SERPL-MCNC: 2.8 MG/DL (ref 0.5–1.3)
EGFRCR SERPLBLD CKD-EPI 2021: 23 ML/MIN/1.73M*2
ERYTHROCYTE [DISTWIDTH] IN BLOOD BY AUTOMATED COUNT: 13.6 % (ref 11.5–14.5)
GLUCOSE BLD MANUAL STRIP-MCNC: 101 MG/DL (ref 74–99)
GLUCOSE BLD MANUAL STRIP-MCNC: 119 MG/DL (ref 74–99)
GLUCOSE BLD MANUAL STRIP-MCNC: 120 MG/DL (ref 74–99)
GLUCOSE BLD MANUAL STRIP-MCNC: 144 MG/DL (ref 74–99)
GLUCOSE SERPL-MCNC: 99 MG/DL (ref 74–99)
HCT VFR BLD AUTO: 36.2 % (ref 41–52)
HGB BLD-MCNC: 11 G/DL (ref 13.5–17.5)
MAGNESIUM SERPL-MCNC: 2.19 MG/DL (ref 1.6–2.4)
MCH RBC QN AUTO: 24.9 PG (ref 26–34)
MCHC RBC AUTO-ENTMCNC: 30.4 G/DL (ref 32–36)
MCV RBC AUTO: 82 FL (ref 80–100)
NRBC BLD-RTO: 0 /100 WBCS (ref 0–0)
PHOSPHATE SERPL-MCNC: 4.7 MG/DL (ref 2.5–4.9)
PLATELET # BLD AUTO: 210 X10*3/UL (ref 150–450)
POTASSIUM SERPL-SCNC: 4.1 MMOL/L (ref 3.5–5.3)
RBC # BLD AUTO: 4.41 X10*6/UL (ref 4.5–5.9)
SODIUM SERPL-SCNC: 134 MMOL/L (ref 136–145)
TACROLIMUS BLD-MCNC: 7.2 NG/ML
UFH PPP CHRO-ACNC: 0.7 IU/ML
WBC # BLD AUTO: 7.2 X10*3/UL (ref 4.4–11.3)

## 2025-04-02 PROCEDURE — 82947 ASSAY GLUCOSE BLOOD QUANT: CPT

## 2025-04-02 PROCEDURE — 1200000002 HC GENERAL ROOM WITH TELEMETRY DAILY

## 2025-04-02 PROCEDURE — 83735 ASSAY OF MAGNESIUM: CPT

## 2025-04-02 PROCEDURE — 2500000001 HC RX 250 WO HCPCS SELF ADMINISTERED DRUGS (ALT 637 FOR MEDICARE OP)

## 2025-04-02 PROCEDURE — 2500000002 HC RX 250 W HCPCS SELF ADMINISTERED DRUGS (ALT 637 FOR MEDICARE OP, ALT 636 FOR OP/ED)

## 2025-04-02 PROCEDURE — 85027 COMPLETE CBC AUTOMATED: CPT

## 2025-04-02 PROCEDURE — 80069 RENAL FUNCTION PANEL: CPT

## 2025-04-02 PROCEDURE — 2500000001 HC RX 250 WO HCPCS SELF ADMINISTERED DRUGS (ALT 637 FOR MEDICARE OP): Performed by: INTERNAL MEDICINE

## 2025-04-02 PROCEDURE — 99233 SBSQ HOSP IP/OBS HIGH 50: CPT | Performed by: INTERNAL MEDICINE

## 2025-04-02 PROCEDURE — 2500000004 HC RX 250 GENERAL PHARMACY W/ HCPCS (ALT 636 FOR OP/ED)

## 2025-04-02 PROCEDURE — 36415 COLL VENOUS BLD VENIPUNCTURE: CPT

## 2025-04-02 PROCEDURE — 2500000005 HC RX 250 GENERAL PHARMACY W/O HCPCS

## 2025-04-02 PROCEDURE — 2500000004 HC RX 250 GENERAL PHARMACY W/ HCPCS (ALT 636 FOR OP/ED): Performed by: STUDENT IN AN ORGANIZED HEALTH CARE EDUCATION/TRAINING PROGRAM

## 2025-04-02 PROCEDURE — 80197 ASSAY OF TACROLIMUS: CPT | Performed by: NURSE PRACTITIONER

## 2025-04-02 PROCEDURE — 85520 HEPARIN ASSAY: CPT

## 2025-04-02 PROCEDURE — 2500000001 HC RX 250 WO HCPCS SELF ADMINISTERED DRUGS (ALT 637 FOR MEDICARE OP): Performed by: STUDENT IN AN ORGANIZED HEALTH CARE EDUCATION/TRAINING PROGRAM

## 2025-04-02 RX ORDER — CLONIDINE HYDROCHLORIDE 0.1 MG/1
0.1 TABLET ORAL EVERY 8 HOURS PRN
Status: DISCONTINUED | OUTPATIENT
Start: 2025-04-02 | End: 2025-04-07

## 2025-04-02 RX ORDER — SODIUM CHLORIDE 9 MG/ML
75 INJECTION, SOLUTION INTRAVENOUS CONTINUOUS
Status: DISCONTINUED | OUTPATIENT
Start: 2025-04-02 | End: 2025-04-03

## 2025-04-02 RX ORDER — HYDRALAZINE HYDROCHLORIDE 25 MG/1
100 TABLET, FILM COATED ORAL EVERY 8 HOURS
Status: DISCONTINUED | OUTPATIENT
Start: 2025-04-02 | End: 2025-04-07

## 2025-04-02 RX ADMIN — AMLODIPINE BESYLATE 10 MG: 10 TABLET ORAL at 09:59

## 2025-04-02 RX ADMIN — TACROLIMUS 1.5 MG: 0.75 TABLET, EXTENDED RELEASE ORAL at 06:02

## 2025-04-02 RX ADMIN — LOSARTAN POTASSIUM 100 MG: 50 TABLET, FILM COATED ORAL at 09:59

## 2025-04-02 RX ADMIN — SODIUM CHLORIDE 75 ML/HR: 0.9 INJECTION, SOLUTION INTRAVENOUS at 12:25

## 2025-04-02 RX ADMIN — ACETAMINOPHEN 650 MG: 325 TABLET ORAL at 18:00

## 2025-04-02 RX ADMIN — Medication 3 L/MIN: at 19:20

## 2025-04-02 RX ADMIN — TORSEMIDE 20 MG: 20 TABLET ORAL at 09:59

## 2025-04-02 RX ADMIN — PREDNISONE 5 MG: 5 TABLET ORAL at 09:59

## 2025-04-02 RX ADMIN — HEPARIN SODIUM 1500 UNITS/HR: 10000 INJECTION, SOLUTION INTRAVENOUS at 22:03

## 2025-04-02 RX ADMIN — HYDRALAZINE HYDROCHLORIDE 50 MG: 50 TABLET, FILM COATED ORAL at 14:40

## 2025-04-02 RX ADMIN — METOPROLOL TARTRATE 50 MG: 50 TABLET, FILM COATED ORAL at 09:59

## 2025-04-02 RX ADMIN — MYCOPHENOLATE MOFETIL 500 MG: 250 CAPSULE ORAL at 17:59

## 2025-04-02 RX ADMIN — FAMOTIDINE 20 MG: 20 TABLET, FILM COATED ORAL at 09:59

## 2025-04-02 RX ADMIN — HYDRALAZINE HYDROCHLORIDE 100 MG: 50 TABLET ORAL at 22:48

## 2025-04-02 RX ADMIN — MYCOPHENOLATE MOFETIL 500 MG: 250 CAPSULE ORAL at 06:02

## 2025-04-02 RX ADMIN — HYDRALAZINE HYDROCHLORIDE 50 MG: 50 TABLET, FILM COATED ORAL at 06:02

## 2025-04-02 RX ADMIN — TAMSULOSIN HYDROCHLORIDE 0.4 MG: 0.4 CAPSULE ORAL at 20:18

## 2025-04-02 RX ADMIN — METOPROLOL TARTRATE 50 MG: 50 TABLET, FILM COATED ORAL at 20:18

## 2025-04-02 RX ADMIN — LEVOTHYROXINE SODIUM 137 MCG: 25 TABLET ORAL at 06:02

## 2025-04-02 RX ADMIN — PRAVASTATIN SODIUM 40 MG: 20 TABLET ORAL at 20:18

## 2025-04-02 RX ADMIN — HEPARIN SODIUM 1500 UNITS/HR: 10000 INJECTION, SOLUTION INTRAVENOUS at 04:26

## 2025-04-02 ASSESSMENT — COGNITIVE AND FUNCTIONAL STATUS - GENERAL
DAILY ACTIVITIY SCORE: 24
DAILY ACTIVITIY SCORE: 24
MOBILITY SCORE: 24
MOBILITY SCORE: 24

## 2025-04-02 ASSESSMENT — PAIN DESCRIPTION - ORIENTATION: ORIENTATION: RIGHT

## 2025-04-02 ASSESSMENT — PAIN SCALES - GENERAL
PAINLEVEL_OUTOF10: 0 - NO PAIN
PAINLEVEL_OUTOF10: 3
PAINLEVEL_OUTOF10: 0 - NO PAIN
PAINLEVEL_OUTOF10: 0 - NO PAIN

## 2025-04-02 ASSESSMENT — PAIN DESCRIPTION - LOCATION: LOCATION: TOE (COMMENT WHICH ONE)

## 2025-04-02 ASSESSMENT — PAIN - FUNCTIONAL ASSESSMENT
PAIN_FUNCTIONAL_ASSESSMENT: 0-10

## 2025-04-02 NOTE — PROGRESS NOTES
"Dilip Haynes is a 74 y.o. male on day 6 of admission presenting with Lower limb ischemia.    No complaints this morning. Pending OR for bypass.    Scheduled medications  amLODIPine, 10 mg, oral, Daily  famotidine, 20 mg, oral, Daily  hydrALAZINE, 50 mg, oral, q8h  insulin lispro, 0-5 Units, subcutaneous, TID AC  levothyroxine, 137 mcg, oral, Daily  losartan, 100 mg, oral, Daily  metoprolol tartrate, 50 mg, oral, BID  mycophenolate, 500 mg, oral, 2 times per day  perflutren protein A microsphere, 0.5 mL, intravenous, Once in imaging  pravastatin, 40 mg, oral, Nightly  predniSONE, 5 mg, oral, Daily  sulfur hexafluoride microsphr, 2 mL, intravenous, Once in imaging  [START ON 4/3/2025] tacrolimus ER, 2 mg, oral, Once per day on Sunday Monday Tuesday Wednesday Thursday Friday  tamsulosin, 0.4 mg, oral, Nightly  torsemide, 20 mg, oral, Daily      Continuous medications  heparin, 0-4,500 Units/hr, Last Rate: 1,500 Units/hr (04/02/25 0426)      PRN medications  PRN medications: acetaminophen, albuterol, dextrose, dextrose, fluticasone, glucagon, glucagon, hydrALAZINE, labetaloL, naloxone, nitroglycerin, ondansetron **OR** ondansetron, oxyCODONE, oxygen        Last Recorded Vitals  Blood pressure 133/65, pulse 81, temperature 36.7 °C (98.1 °F), temperature source Temporal, resp. rate 16, height 1.778 m (5' 10\"), weight 86.6 kg (191 lb), SpO2 97%.  Intake/Output last 3 Shifts:  I/O last 3 completed shifts:  In: 923.9 (10.7 mL/kg) [P.O.:360; I.V.:563.9 (6.5 mL/kg)]  Out: 250 (2.9 mL/kg) [Urine:250 (0.1 mL/kg/hr)]  Weight: 86.6 kg     A&ox3, no distress, pleasant  MMM, no lesions  CTAB  RRR  Davis  Abd soft, nt, nd  No allograft tenderness  No edema b/l    Results for orders placed or performed during the hospital encounter of 03/27/25 (from the past 24 hours)   POCT GLUCOSE   Result Value Ref Range    POCT Glucose 125 (H) 74 - 99 mg/dL   POCT GLUCOSE   Result Value Ref Range    POCT Glucose 176 (H) 74 - 99 mg/dL   CBC "   Result Value Ref Range    WBC 7.2 4.4 - 11.3 x10*3/uL    nRBC 0.0 0.0 - 0.0 /100 WBCs    RBC 4.41 (L) 4.50 - 5.90 x10*6/uL    Hemoglobin 11.0 (L) 13.5 - 17.5 g/dL    Hematocrit 36.2 (L) 41.0 - 52.0 %    MCV 82 80 - 100 fL    MCH 24.9 (L) 26.0 - 34.0 pg    MCHC 30.4 (L) 32.0 - 36.0 g/dL    RDW 13.6 11.5 - 14.5 %    Platelets 210 150 - 450 x10*3/uL   Heparin Assay, UFH   Result Value Ref Range    Heparin Unfractionated 0.7 See Comment Below for Therapeutic Ranges IU/mL   POCT GLUCOSE   Result Value Ref Range    POCT Glucose 101 (H) 74 - 99 mg/dL     *Note: Due to a large number of results and/or encounters for the requested time period, some results have not been displayed. A complete set of results can be found in Results Review.       TTE: 3/28/25   1. Poorly visualized anatomical structures due to suboptimal image quality.   2. Left ventricular ejection fraction is normal, calculated by Smith's biplane at 60%.   3. Left ventricular diastolic filling is indeterminate.   4. There is reduced right ventricular systolic function.   5. Mildly enlarged right ventricle.   6. The left atrium is mildly dilated.   7. Compared with study dated 12/20/2021, no significant change Technically difficult exam, no gross changes seen.   8. The patient is in atrial fibrillation which may influence the estimate of left ventricular function and transvalvular flows.    B/l LE angiogram: 3/282/25  Right common femoral atherosclerotic occlusive disease. Right SFA occlusion with reconstitution of diseased above knee popliteal artery with runoff to the foot via the posterior tibial artery.     Lab Results   Component Value Date    WBC 7.2 04/02/2025    HGB 11.0 (L) 04/02/2025    HCT 36.2 (L) 04/02/2025    MCV 82 04/02/2025     04/02/2025     Lab Results   Component Value Date    GLUCOSE 98 04/01/2025    CALCIUM 9.2 04/01/2025     (L) 04/01/2025    K 4.5 04/01/2025    CO2 24 04/01/2025     04/01/2025    BUN 25 (H)  04/01/2025    CREATININE 2.28 (H) 04/01/2025     Assessment/Plan     74 y.o. 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 S/p HCV treatment with SVR. post txp course was complicated by urinary retention and enlarged prostate. Pt presented to [facility] on 3/27 for concern for worsening rt limb ischemia. On heparin. S/p diagnostic b/l LE angiogram 3/28/25 showing R CFA disease, SFA occlusion, and PT runoff.   Post-procedure course complicated by HTN urgency, chest pain. Transferred to SICU for BP control, required NTG drip now weaned off and transferred back to medical floor.     #Allograft function: s/p DDKT 5/2020  - baseline Cr ~2, stable this admission. Trended up yesterday likely from NPO and poor fluid intake.  - Renal function overall stable with acceptable metabolic parameters, euvolemic on exam  -pending further periop workup for potential femoral-PT bypass and LHC prior to surgery for periop clearance. Discussed risks vs benefits of contrast exposure with patient and possible risk of kidney injury. If to proceed with contrast exposure recommend ppx with IVF prior to procedure, no contraindications from nephrology standpoint.     #Immunosuppression:  - Envarsus 2mg (Increased 4/2) . Goal Fk 5-8.   -Level pending. May take 1-2 days to normalize  - pls check Fk trough daily AM.   - cont MMF 500mg bid, cont pred 5mg/d    #HTN: HTN urgency post-angiogram 3/28.   -Bps now improved. Monitor and titrate meds as indicated.   -continue amlodipine, hydralazine, losartan, metoprolol, Torsemide     #Anemia: Hb stable  #CKD-MBD: Ca, Phos stable. , vit D 36 2/2025     Discussed with Dr. Demarco.    Gregory Feliciano MD

## 2025-04-02 NOTE — CARE PLAN
Problem: Pain - Adult  Goal: Verbalizes/displays adequate comfort level or baseline comfort level  Outcome: Progressing     Problem: Safety - Adult  Goal: Free from fall injury  Outcome: Progressing     Problem: Chronic Conditions and Co-morbidities  Goal: Patient's chronic conditions and co-morbidity symptoms are monitored and maintained or improved  Outcome: Progressing     Problem: Pain  Goal: Walks with improved pain control throughout the shift  Outcome: Progressing     Problem: Pain  Goal: Performs ADL's with improved pain control throughout shift  Outcome: Progressing   The patient's goals for the shift include no pain    The clinical goals for the shift include pt will have adequate pain control and remain HDS through shift.

## 2025-04-02 NOTE — CARE PLAN
The patient's goals for the shift include no pain    The clinical goals for the shift include Patient's pain will remain controlled for the duration of the shift.

## 2025-04-02 NOTE — SIGNIFICANT EVENT
Discussed with Dr Gant  Concerned for elevated Cr in the setting of being NPO  WOULD HOLD LOSARTAN.  Increase hydralazine to 100 tid  Add clonidine 0.1 mg prn for BP>160/100  If needed, can also go up on Metoprolol.    Would get another CXR tomorrow am to check for pulmonary edema and determine room for more IV fluid.

## 2025-04-02 NOTE — PROGRESS NOTES
SW met with pt and his wife, Charity at bedside to offer support and discuss discharge plan. Pt is recommended for low intensity currently, and will need PT/OT post-op, OR is tentatively scheduled for this Friday. SW offered information of discharge process and recommended level of care. Pt and wife understand and denied any further issues or questions on social work at this time. SW will continue to follow and assist.     ERNESTO McmanusA, LSW

## 2025-04-02 NOTE — PROGRESS NOTES
VASCULAR SURGERY PROGRESS NOTE  Assessment/Plan   Dilip Haynes is 74 y.o. male with PMHx significant for CAD (s/p PCI), HTN, afib s/p DCCV 6/2020 (on Eliquis last took 3/26 AM), carotid endarterectomy for asymptomatic stenosis, ESRD s/p DDKT (2020), hypothyroidism, and PAD (s/p L iliac ([id number] stent) with R fem and profunda endarterectomy with patch angioplasty (2012) who presented as transfer from OSH with concern of worsening limb ischemia. Patient exam consistent with previously noted right foot tissue loss and rest pain without signs of worsening ischemia on exam. Patient with monophasic PT on R and mono DP/PT on left with intact motor and sensory function.      Underwent diagnostic RLE angiogram on 3/28 showing R CFA disease, SFA occlusion, and PT runoff. Post-op course complicated by chest pain in the setting of hypertension, transferred to ICU for nitroglycerin gtt where he remains with no further chest pain and a stable vascular exam. Stable renal function.     Ultimately will require right femoral-PT bypass with vein for CLTI 5, anticipating Thursday. Cardiology recommendations indicate no further cardiac workup necessary for OR; patient will be at an elevated but not prohibitive risk for surgery.      Plan:    Neuro  Continue PRN acetaminophen, oxycodone    CV  Cardiology recs for post procedure tropenemia  3/31/25 Stress test without evidence of inducible myocardial ischemia.  No further cardiac testing required  Continue amlodipine, hydralazine, losartan, metoprolol  PRN labetalol, hydralazine  Continue pravastain  RLE CLTI5  Continue heparin drip  Neurovascular checks  Tentative OR 4/3 pending final medical optimization    Pulm  Encourage incentive spirometer  Wean nasal cannula as tolerated  Albuterol PRN, fluticasone PRN    FENGI - h/o DDKT  History of DDKT  Appreciate nephrology recs  Uptrending Cr - follow up todays labs  Continue NS @75/hr  Holding torsemide   Continue mycophenolate,  prednisone, tacrolimus  Continue tamsulosin  Urinary retention - winters placed 4/2  Regular diet, NPO at midnight for possible OR 4/3  Continue pepcid  Zofran PRN    Heme  Trend hemoglobin  Continue heparin drip    Endo  Continue home levothyroxine   FSH, SSI    D/w attending, Dr. Stalin Roth MD  Vascular Surgery PGY6  Team Pager: [phone]      Subjective   No acute events overnight. Remains stable without significant pain.    Objective   Vitals:  Heart Rate:  [54-81]   Temp:  [36.6 °C (97.9 °F)-36.7 °C (98.1 °F)]   Resp:  [16-19]   BP: (122-139)/(57-65)   Weight:  [86.6 kg (191 lb)]   SpO2:  [97 %-100 %]     Exam:  Constitutional: No acute distress, resting comfortably  Neuro:  AOx3, grossly intact  ENMT: moist mucous membranes  CV: no tachycardia  Pulm: non-labored on NC  GI: soft, non-tender, non-distended  Skin: warm and dry  Musculoskeletal: moving all extremities  Extremities: left groin puncture without hematoma. Right hallux distal superficial dry ulceration  Pulse Exam: monophasic/mixed venous right PT, left pedal signals; motor/sensory intact    Labs:  Results from last 7 days   Lab Units 04/01/25  0721 03/31/25  0905 03/30/25  0422   WBC AUTO x10*3/uL 7.4 7.1 7.7   HEMOGLOBIN g/dL 11.4* 11.2* 11.5*   PLATELETS AUTO x10*3/uL 185 191 196      Results from last 7 days   Lab Units 04/01/25  0721 03/31/25  0905 03/30/25  0422   SODIUM mmol/L 135* 136 134*   POTASSIUM mmol/L 4.5 4.2 4.4   CHLORIDE mmol/L 100 101 102   CO2 mmol/L 24 24 22   BUN mg/dL 25* 23 23   CREATININE mg/dL 2.28* 1.94* 1.86*   GLUCOSE mg/dL 98 110* 104*   MAGNESIUM mg/dL 2.25 2.24 2.20   PHOSPHORUS mg/dL 4.9 3.7 3.9      Results from last 7 days   Lab Units 03/27/25  0207 03/26/25  1757   INR  1.2* 1.1   PROTIME seconds 13.0* 12.6*   APTT seconds 43*  57*  --       Results from last 7 days   Lab Units 04/02/25  0816 04/01/25  0721 03/31/25  0905   ANTI XA UNFRACTIONATED IU/mL 0.7 0.7 0.6

## 2025-04-02 NOTE — ANESTHESIA PREPROCEDURE EVALUATION
Patient: Dilip Haynes    Procedure Information       Date/Time: 04/03/25 0645    Procedure: Femoral to distal artery bypass, angiogram, iliac stenting (Right)    Location: [facility] OR 12 / Virtual [facility] OR    Surgeons: Alejandra Gant MD        Heparin gtt on floor  Transplanted kidney doing good per patient  Patient states that heart is good since stents placed.  General anesthetic explained and questions answered.    Vitals:    04/02/25 1143   BP: 119/67   Pulse:    Resp: 16   Temp: 36.6 °C (97.9 °F)   SpO2:        Past Surgical History:   Procedure Laterality Date    CAROTID ENDARTERECTOMY  12/11/2021    Carotid Thromboendarterectomy    KNEE SURGERY  09/12/2019    Knee Surgery    OTHER SURGICAL HISTORY  01/19/2022    Kidney transplantation    OTHER SURGICAL HISTORY  09/22/2020    Transcath Intravascular Stent Placement Percutaneous Femoral    OTHER SURGICAL HISTORY  01/19/2022    Peritoneal Dialysis Catheter    TOTAL THYROIDECTOMY  12/11/2021    Thyroid Surgery Total Thyroidectomy     Past Medical History:   Diagnosis Date    Encounter for other preprocedural examination 05/10/2020    Pre-transplant evaluation for kidney transplant    Old myocardial infarction 01/19/2022    H/O non-ST elevation myocardial infarction (NSTEMI)    Personal history of other diseases of the circulatory system 12/22/2015    History of stenosis of renal artery    Personal history of other diseases of the circulatory system 04/13/2021    History of carotid artery stenosis    Personal history of other diseases of the circulatory system 01/19/2022    History of essential hypertension    Personal history of other diseases of the circulatory system 07/23/2016    History of claudication    Personal history of other endocrine, nutritional and metabolic disease 12/22/2015    History of thyroid disease    Raynaud's syndrome without gangrene 12/22/2015    Raynauds disease       Current Facility-Administered Medications:      acetaminophen (Tylenol) tablet 650 mg, 650 mg, oral, q4h PRN, Geetha Rowland MD    albuterol 2.5 mg /3 mL (0.083 %) nebulizer solution 2.5 mg, 2.5 mg, nebulization, Once PRN, Geetha Rowland MD    amLODIPine (Norvasc) tablet 10 mg, 10 mg, oral, Daily, Geetha Rowland MD, 10 mg at 04/02/25 0959    dextrose 50 % injection 12.5 g, 12.5 g, intravenous, q15 min PRN, Geetha Rowland MD    dextrose 50 % injection 25 g, 25 g, intravenous, q15 min PRN, Geetha Rowland MD    famotidine (Pepcid) tablet 20 mg, 20 mg, oral, Daily, 20 mg at 04/02/25 0959 **OR** [DISCONTINUED] famotidine PF (Pepcid) injection 20 mg, 20 mg, intravenous, Daily, Geetha Rowland MD    fluticasone (Flonase) nasal spray 2 spray, 2 spray, Each Nostril, Daily PRN, Geetha Rowland MD    glucagon (Glucagen) injection 1 mg, 1 mg, intramuscular, q15 min PRN, Geetha Rowland MD    glucagon (Glucagen) injection 1 mg, 1 mg, intramuscular, q15 min PRN, Geetha Rowland MD    heparin 25,000 Units in dextrose 5% 250 mL (100 Units/mL) infusion (premix), 0-4,500 Units/hr, intravenous, Continuous, Geetha Rowland MD, Last Rate: 15 mL/hr at 04/02/25 0426, 1,500 Units/hr at 04/02/25 0426    hydrALAZINE (Apresoline) injection 10 mg, 10 mg, intravenous, q4h PRN, Geetha Rowland MD    hydrALAZINE (Apresoline) tablet 50 mg, 50 mg, oral, q8h, Geetha Rowland MD, 50 mg at 04/02/25 0602    insulin lispro injection 0-5 Units, 0-5 Units, subcutaneous, TID AC, Anthony Johnson DO    labetaloL (Normodyne,Trandate) injection 10 mg, 10 mg, intravenous, q4h PRN, Geetha Rowland MD    levothyroxine (Synthroid, Levoxyl) tablet 137 mcg, 137 mcg, oral, Daily, Geetha Rowland MD, 137 mcg at 04/02/25 0602    losartan (Cozaar) tablet 100 mg, 100 mg, oral, Daily, Ace King MD, 100 mg at 04/02/25 0959    metoprolol tartrate (Lopressor) tablet 50 mg, 50 mg, oral, BID, Geetha Rowland MD, 50 mg at 04/02/25 0959     mycophenolate (Cellcept) capsule 500 mg, 500 mg, oral, 2 times per day, Geetha Rowland MD, 500 mg at 04/02/25 0602    naloxone (Narcan) injection 0.2 mg, 0.2 mg, intravenous, q5 min PRN, Geetha Rowland MD    nitroglycerin (Nitrostat) SL tablet 0.4 mg, 0.4 mg, sublingual, q5 min PRN, Geetha Rowland MD    ondansetron (Zofran) tablet 4 mg, 4 mg, oral, q8h PRN **OR** ondansetron (Zofran) injection 4 mg, 4 mg, intravenous, q8h PRN, Geetha Rowland MD    oxyCODONE (Roxicodone) immediate release tablet 5 mg, 5 mg, oral, q6h PRN, Geetha Rowland MD, 5 mg at 04/01/25 2042    oxygen (O2) therapy, , inhalation, Continuous PRN - O2/gases, Geetha Rowland MD, Last Rate: 180,000 mL/hr at 03/30/25 0800, 3 L/min at 03/30/25 0800    perflutren protein A microsphere (Optison) injection 0.5 mL, 0.5 mL, intravenous, Once in imaging, Geetha Rowland MD    pravastatin (Pravachol) tablet 40 mg, 40 mg, oral, Nightly, Geetha Rowland MD, 40 mg at 04/01/25 2034    predniSONE (Deltasone) tablet 5 mg, 5 mg, oral, Daily, Geetha Rowland MD, 5 mg at 04/02/25 0959    sodium chloride 0.9% infusion, 75 mL/hr, intravenous, Continuous, Regi Roth MD, Last Rate: 75 mL/hr at 04/02/25 1225, 75 mL/hr at 04/02/25 1225    sulfur hexafluoride microsphr (Lumason) injection 24.28 mg, 2 mL, intravenous, Once in imaging, Geetha Rowland MD    [START ON 4/3/2025] tacrolimus ER (Envarsus XR) tablet ER 2 mg, 2 mg, oral, Once per day on Sunday Monday Tuesday Wednesday Thursday Friday, Brandie Randhawa, APRN-MIKHAIL    tamsulosin (Flomax) 24 hr capsule 0.4 mg, 0.4 mg, oral, Nightly, Geetha Rowland MD, 0.4 mg at 04/01/25 2034    [Held by provider] torsemide (Demadex) tablet 20 mg, 20 mg, oral, Daily, Geetha Rowland MD, 20 mg at 04/02/25 0959  Prior to Admission medications    Medication Sig Start Date End Date Taking? Authorizing Provider   amLODIPine (Norvasc) 10 mg tablet Take 1 tablet (10 mg) by  mouth once daily.   Yes Historical Provider, MD   apixaban (Eliquis) 2.5 mg tablet TAKE 1 TABLET BY MOUTH TWICE A DAY 2/17/25  Yes Krystle Linn MD PhD   empagliflozin (Jardiance) 10 mg Take 1 tablet (10 mg) by mouth once daily. 11/8/24 11/8/25 Yes Krystle Linn MD PhD   famotidine (Pepcid) 20 mg tablet TAKE 1 TABLET BY MOUTH EVERY DAY AS DIRECTED 2/27/25  Yes Ash Demarco MD   fluticasone (Flonase) 50 mcg/actuation nasal spray 2 sprays by Does not apply route once daily as needed for allergies. 9/19/22  Yes Historical Provider, MD   levothyroxine (Synthroid, Levoxyl) 137 mcg tablet Take 1 tablet (137 mcg) by mouth once daily. 9/3/23  Yes Historical Provider, MD   losartan (Cozaar) 25 mg tablet TAKE 1 TABLET BY MOUTH EVERY DAY 2/14/25  Yes Krystle Linn MD PhD   losartan (Cozaar) 50 mg tablet Take 1.5 tablets (75 mg) by mouth once daily. 11/8/24 11/8/25 Yes Krystle Linn MD PhD   metoprolol tartrate (Lopressor) 50 mg tablet TAKE 1 TABLET BY MOUTH TWICE A DAY 2/18/25  Yes Krystle Linn MD PhD   multivitamin tablet Take 1 tablet by mouth once daily. 8/31/09  Yes Historical Provider, MD   mycophenolate (Cellcept) 250 mg capsule TAKE 3 CAPSULES BY MOUTH TWICE A DAY 4/10/24  Yes Krystian Thompson MD   pravastatin (Pravachol) 40 mg tablet TAKE 1 TABLET BY MOUTH EVERYDAY AT BEDTIME 4/10/24  Yes Krystle Linn MD PhD   predniSONE (Deltasone) 5 mg tablet TAKE 1 TABLET BY MOUTH EVERY DAY 4/11/24  Yes Krystian Thompson MD   tacrolimus ER (Envarsus XR) 0.75 mg tablet ER Take 2 tablets (1.5 mg) by mouth once daily. 4/30/24 5/6/25 Yes Rajani Ramirez MD   tamsulosin (Flomax) 0.4 mg 24 hr capsule Take 1 capsule (0.4 mg) by mouth once daily at bedtime. 2/7/24  Yes Michael Montesinos MD   torsemide (Demadex) 20 mg tablet Take 1 tablet (20 mg) by mouth once daily. 7/5/24  Yes Krystle Linn MD PhD   methocarbamol (Robaxin) 500 mg tablet Take 1 tablet (500 mg) by  mouth every 8 hours if needed for muscle spasms.    Historical Provider, MD   nitroglycerin (Nitrostat) 0.4 mg SL tablet Place 1 tablet (0.4 mg) under the tongue every 5 minutes if needed for chest pain. 5/14/19   Historical Provider, MD     Allergies   Allergen Reactions    Lovastatin Myalgia     Leg cramps    Muscle cramps    Simvastatin Myalgia     Muscle cramps    Adhesive Rash    Naproxen Rash     Social History     Tobacco Use    Smoking status: Former     Types: Cigarettes    Smokeless tobacco: Never   Substance Use Topics    Alcohol use: Yes     Comment: 1 per year         Chemistry    Lab Results   Component Value Date/Time     (L) 04/02/2025 0816    K 4.1 04/02/2025 0816    CL 99 04/02/2025 0816    CO2 22 04/02/2025 0816    BUN 27 (H) 04/02/2025 0816    CREATININE 2.80 (H) 04/02/2025 0816    Lab Results   Component Value Date/Time    CALCIUM 8.9 04/02/2025 0816    ALKPHOS 74 03/27/2025 0207    AST 23 03/27/2025 0207    ALT 17 03/27/2025 0207    BILITOT 0.5 03/27/2025 0207          Lab Results   Component Value Date/Time    WBC 7.2 04/02/2025 0816    HGB 11.0 (L) 04/02/2025 0816    HCT 36.2 (L) 04/02/2025 0816     04/02/2025 0816     Lab Results   Component Value Date/Time    PROTIME 13.0 (H) 03/27/2025 0207    INR 1.2 (H) 03/27/2025 0207     Encounter Date: 03/27/25   Electrocardiogram, 12-lead PRN ACS symptoms   Result Value    Ventricular Rate 85    Atrial Rate 85    VT Interval 180    QRS Duration 84    QT Interval 348    QTC Calculation(Bazett) 414    P Axis 62    R Axis -13    T Axis 213    QRS Count 14    Q Onset 214    P Onset 124    P Offset 148    T Offset 388    QTC Fredericia 390    Narrative    Undetermined rhythm  Left ventricular hypertrophy with repolarization abnormality ( [place] product )  Abnormal ECG  When compared with ECG of 28-MAR-2025 15:15,  Current undetermined rhythm precludes rhythm comparison, needs review  QT has shortened     No results found for this or any  previous visit from the past 1095 days.        Relevant Problems   Cardiac   (+) AAA (abdominal aortic aneurysm) without rupture   (+) Aortic aneurysm   (+) Arteriosclerosis of coronary artery   (+) Atrial ectopy   (+) Atrial fibrillation (Multi)   (+) Atrial flutter (Multi)   (+) Yung-tachy syndrome (Multi)   (+) Cardiac arrest with successful resuscitation   (+) Chronic diastolic congestive heart failure   (+) Hyperlipidemia   (+) Hypertension   (+) Lower limb ischemia   (+) Myocardial infarction (Multi)   (+) PAD (peripheral artery disease) (CMS-HCC)   (+) Presence of stent in coronary artery   (+) Renal artery stenosis (CMS-HCC)      Pulmonary   (+) Pulmonary embolism      GI   (+) GERD (gastroesophageal reflux disease)      /Renal   (+) BPH with obstruction/lower urinary tract symptoms   (+) End-stage renal disease (Multi)      Liver   (+) Hepatitis C virus infection      Endocrine   (+) Hypothyroidism   (+) Secondary hyperparathyroidism (Multi)   (+) Type 2 diabetes mellitus      Hematology   (+) Anemia   (+) Iron deficiency anemia      Musculoskeletal   (+) RA (rheumatoid arthritis)      ID   (+) Hepatitis C virus infection       Clinical information reviewed:   Tobacco  Allergies  Meds   Med Hx  Surg Hx   Fam Hx  Soc Hx        NPO Detail:  No data recorded     Physical Exam    Airway  Mallampati: II  TM distance: >3 FB     Cardiovascular    Dental - normal exam     Pulmonary    Abdominal            Anesthesia Plan

## 2025-04-03 ENCOUNTER — ANESTHESIA (OUTPATIENT)
Dept: OPERATING ROOM | Facility: HOSPITAL | Age: 75
End: 2025-04-03
Payer: MEDICARE

## 2025-04-03 ENCOUNTER — APPOINTMENT (OUTPATIENT)
Dept: RADIOLOGY | Facility: HOSPITAL | Age: 75
DRG: 271 | End: 2025-04-03
Payer: MEDICARE

## 2025-04-03 LAB
ALBUMIN SERPL BCP-MCNC: 4 G/DL (ref 3.4–5)
ANION GAP SERPL CALC-SCNC: 17 MMOL/L (ref 10–20)
BUN SERPL-MCNC: 28 MG/DL (ref 6–23)
CALCIUM SERPL-MCNC: 9.3 MG/DL (ref 8.6–10.6)
CHLORIDE SERPL-SCNC: 102 MMOL/L (ref 98–107)
CO2 SERPL-SCNC: 21 MMOL/L (ref 21–32)
CREAT SERPL-MCNC: 2.29 MG/DL (ref 0.5–1.3)
EGFRCR SERPLBLD CKD-EPI 2021: 29 ML/MIN/1.73M*2
ERYTHROCYTE [DISTWIDTH] IN BLOOD BY AUTOMATED COUNT: 13.6 % (ref 11.5–14.5)
GLUCOSE BLD MANUAL STRIP-MCNC: 115 MG/DL (ref 74–99)
GLUCOSE BLD MANUAL STRIP-MCNC: 128 MG/DL (ref 74–99)
GLUCOSE BLD MANUAL STRIP-MCNC: 146 MG/DL (ref 74–99)
GLUCOSE BLD MANUAL STRIP-MCNC: 167 MG/DL (ref 74–99)
GLUCOSE SERPL-MCNC: 111 MG/DL (ref 74–99)
HCT VFR BLD AUTO: 36.7 % (ref 41–52)
HGB BLD-MCNC: 10.9 G/DL (ref 13.5–17.5)
MAGNESIUM SERPL-MCNC: 2.17 MG/DL (ref 1.6–2.4)
MCH RBC QN AUTO: 25.1 PG (ref 26–34)
MCHC RBC AUTO-ENTMCNC: 29.7 G/DL (ref 32–36)
MCV RBC AUTO: 85 FL (ref 80–100)
NRBC BLD-RTO: 0 /100 WBCS (ref 0–0)
PHOSPHATE SERPL-MCNC: 4.1 MG/DL (ref 2.5–4.9)
PLATELET # BLD AUTO: 189 X10*3/UL (ref 150–450)
POTASSIUM SERPL-SCNC: 4 MMOL/L (ref 3.5–5.3)
RBC # BLD AUTO: 4.34 X10*6/UL (ref 4.5–5.9)
SODIUM SERPL-SCNC: 136 MMOL/L (ref 136–145)
UFH PPP CHRO-ACNC: 0.7 IU/ML
WBC # BLD AUTO: 6.6 X10*3/UL (ref 4.4–11.3)

## 2025-04-03 PROCEDURE — 76776 US EXAM K TRANSPL W/DOPPLER: CPT | Performed by: RADIOLOGY

## 2025-04-03 PROCEDURE — 82947 ASSAY GLUCOSE BLOOD QUANT: CPT

## 2025-04-03 PROCEDURE — 2500000004 HC RX 250 GENERAL PHARMACY W/ HCPCS (ALT 636 FOR OP/ED): Performed by: NURSE PRACTITIONER

## 2025-04-03 PROCEDURE — 85027 COMPLETE CBC AUTOMATED: CPT

## 2025-04-03 PROCEDURE — 2500000001 HC RX 250 WO HCPCS SELF ADMINISTERED DRUGS (ALT 637 FOR MEDICARE OP)

## 2025-04-03 PROCEDURE — 2500000004 HC RX 250 GENERAL PHARMACY W/ HCPCS (ALT 636 FOR OP/ED)

## 2025-04-03 PROCEDURE — 71046 X-RAY EXAM CHEST 2 VIEWS: CPT

## 2025-04-03 PROCEDURE — 1100000001 HC PRIVATE ROOM DAILY

## 2025-04-03 PROCEDURE — 51702 INSERT TEMP BLADDER CATH: CPT

## 2025-04-03 PROCEDURE — 83735 ASSAY OF MAGNESIUM: CPT

## 2025-04-03 PROCEDURE — 2500000001 HC RX 250 WO HCPCS SELF ADMINISTERED DRUGS (ALT 637 FOR MEDICARE OP): Performed by: INTERNAL MEDICINE

## 2025-04-03 PROCEDURE — 85520 HEPARIN ASSAY: CPT

## 2025-04-03 PROCEDURE — 2500000004 HC RX 250 GENERAL PHARMACY W/ HCPCS (ALT 636 FOR OP/ED): Performed by: STUDENT IN AN ORGANIZED HEALTH CARE EDUCATION/TRAINING PROGRAM

## 2025-04-03 PROCEDURE — 76776 US EXAM K TRANSPL W/DOPPLER: CPT

## 2025-04-03 PROCEDURE — 2500000002 HC RX 250 W HCPCS SELF ADMINISTERED DRUGS (ALT 637 FOR MEDICARE OP, ALT 636 FOR OP/ED)

## 2025-04-03 PROCEDURE — 71046 X-RAY EXAM CHEST 2 VIEWS: CPT | Performed by: RADIOLOGY

## 2025-04-03 PROCEDURE — 80069 RENAL FUNCTION PANEL: CPT

## 2025-04-03 PROCEDURE — 36415 COLL VENOUS BLD VENIPUNCTURE: CPT

## 2025-04-03 PROCEDURE — 99233 SBSQ HOSP IP/OBS HIGH 50: CPT | Performed by: INTERNAL MEDICINE

## 2025-04-03 RX ORDER — POLYETHYLENE GLYCOL 3350 17 G/17G
17 POWDER, FOR SOLUTION ORAL DAILY PRN
Status: DISCONTINUED | OUTPATIENT
Start: 2025-04-03 | End: 2025-04-10 | Stop reason: HOSPADM

## 2025-04-03 RX ORDER — POLYETHYLENE GLYCOL 3350 17 G/17G
17 POWDER, FOR SOLUTION ORAL DAILY
Status: DISCONTINUED | OUTPATIENT
Start: 2025-04-03 | End: 2025-04-07

## 2025-04-03 RX ORDER — FUROSEMIDE 10 MG/ML
40 INJECTION INTRAMUSCULAR; INTRAVENOUS ONCE
Status: COMPLETED | OUTPATIENT
Start: 2025-04-03 | End: 2025-04-03

## 2025-04-03 RX ADMIN — METOPROLOL TARTRATE 75 MG: 50 TABLET, FILM COATED ORAL at 20:56

## 2025-04-03 RX ADMIN — TACROLIMUS 2 MG: 0.75 TABLET, EXTENDED RELEASE ORAL at 06:34

## 2025-04-03 RX ADMIN — POLYETHYLENE GLYCOL 3350 17 G: 17 POWDER, FOR SOLUTION ORAL at 12:13

## 2025-04-03 RX ADMIN — PREDNISONE 5 MG: 5 TABLET ORAL at 09:58

## 2025-04-03 RX ADMIN — LEVOTHYROXINE SODIUM 137 MCG: 25 TABLET ORAL at 06:33

## 2025-04-03 RX ADMIN — HYDRALAZINE HYDROCHLORIDE 100 MG: 50 TABLET ORAL at 09:54

## 2025-04-03 RX ADMIN — PRAVASTATIN SODIUM 40 MG: 20 TABLET ORAL at 20:54

## 2025-04-03 RX ADMIN — FAMOTIDINE 20 MG: 20 TABLET, FILM COATED ORAL at 09:56

## 2025-04-03 RX ADMIN — MYCOPHENOLATE MOFETIL 500 MG: 250 CAPSULE ORAL at 18:10

## 2025-04-03 RX ADMIN — FUROSEMIDE 40 MG: 10 INJECTION, SOLUTION INTRAVENOUS at 13:47

## 2025-04-03 RX ADMIN — ACETAMINOPHEN 650 MG: 325 TABLET ORAL at 20:56

## 2025-04-03 RX ADMIN — METOPROLOL TARTRATE 50 MG: 50 TABLET, FILM COATED ORAL at 09:55

## 2025-04-03 RX ADMIN — SODIUM CHLORIDE 75 ML/HR: 0.9 INJECTION, SOLUTION INTRAVENOUS at 01:59

## 2025-04-03 RX ADMIN — MYCOPHENOLATE MOFETIL 500 MG: 250 CAPSULE ORAL at 06:34

## 2025-04-03 RX ADMIN — TAMSULOSIN HYDROCHLORIDE 0.4 MG: 0.4 CAPSULE ORAL at 20:56

## 2025-04-03 RX ADMIN — HEPARIN SODIUM 1500 UNITS/HR: 10000 INJECTION, SOLUTION INTRAVENOUS at 15:38

## 2025-04-03 RX ADMIN — HYDRALAZINE HYDROCHLORIDE 100 MG: 50 TABLET ORAL at 18:10

## 2025-04-03 RX ADMIN — AMLODIPINE BESYLATE 10 MG: 10 TABLET ORAL at 09:55

## 2025-04-03 ASSESSMENT — PAIN - FUNCTIONAL ASSESSMENT
PAIN_FUNCTIONAL_ASSESSMENT: 0-10
PAIN_FUNCTIONAL_ASSESSMENT: 0-10

## 2025-04-03 ASSESSMENT — COGNITIVE AND FUNCTIONAL STATUS - GENERAL
DAILY ACTIVITIY SCORE: 24
MOBILITY SCORE: 24

## 2025-04-03 ASSESSMENT — PAIN DESCRIPTION - LOCATION: LOCATION: LEG

## 2025-04-03 ASSESSMENT — PAIN SCALES - GENERAL
PAINLEVEL_OUTOF10: 2
PAINLEVEL_OUTOF10: 1
PAINLEVEL_OUTOF10: 0 - NO PAIN

## 2025-04-03 ASSESSMENT — PAIN DESCRIPTION - ORIENTATION: ORIENTATION: RIGHT;LEFT

## 2025-04-03 NOTE — PROGRESS NOTES
VASCULAR SURGERY PROGRESS NOTE  Assessment/Plan   Dilip Haynes is 74 y.o. male with PMHx significant for CAD (s/p PCI), HTN, afib s/p DCCV 6/2020 (on Eliquis last took 3/26 AM), carotid endarterectomy for asymptomatic stenosis, ESRD s/p DDKT (2020), hypothyroidism, and PAD (s/p L iliac ([id number] stent) with R fem and profunda endarterectomy with patch angioplasty (2012) who presented as transfer from OSH with concern of worsening limb ischemia. Patient exam consistent with previously noted right foot tissue loss and rest pain without signs of worsening ischemia on exam. Patient with monophasic PT on R and mono DP/PT on left with intact motor and sensory function.      Underwent diagnostic RLE angiogram on 3/28 showing R CFA disease, SFA occlusion, and PT runoff. Post-op course complicated by chest pain in the setting of hypertension, transferred to ICU for nitroglycerin gtt.     Ultimately will require right femoral-PT bypass with vein for CLTI 5, anticipating Thursday. Cardiology recommendations indicate no further cardiac workup necessary for OR; patient will be at an elevated but not prohibitive risk for surgery.    Patient's Cr stabilized from yesterday; due to BASHIR, will obtain renal transplant duplex U/S in anticipation for operative planning and to obtain baseline preoperatively. Additionally, CXR indicates patient may be volume-up; anticipate gentle diuresis today in accordance with transplant nephro recs.      Plan:    Neuro  Continue PRN acetaminophen, oxycodone    CV  Cardiology recs for post procedure tropenemia  3/31/25 Stress test without evidence of inducible myocardial ischemia.  No further cardiac testing required  Liberalize BP goal to SBP<160.   Continue amlodipine, hydralazine, losartan, metoprolol  PRN labetalol, hydralazine  Continue pravastain  RLE CLTI5  Continue heparin drip  Neurovascular checks  Tentative OR 4/3 pending final medical optimization    Pulm  Encourage incentive  spirometer  Wean nasal cannula as tolerated  Albuterol PRN, fluticasone PRN    FENGI - h/o DDKT  History of DDKT  Appreciate nephrology recs  Stable Cr - will obtain duplex ultrasound of transplanted kidney.   Continue NS @75/hr  Holding torsemide; gave 40IV lasix today for diruesis.   Continue mycophenolate, prednisone, tacrolimus  Continue tamsulosin  Urinary retention - winters placed 4/2  Regular diet  Continue pepcid  Zofran PRN    Heme  Trend hemoglobin  Continue heparin drip    Endo  Continue home levothyroxine   FSH, SSI    D/w attending, Dr. Stalin Brown MD  Vascular Surgery p. [phone]      Subjective   No acute events overnight. Remains stable without significant pain.    Objective   Vitals:  Heart Rate:  [57-91]   Temp:  [36.4 °C (97.5 °F)-36.6 °C (97.9 °F)]   Resp:  [18-20]   BP: (129-184)/(60-86)   SpO2:  [95 %-100 %]     Exam:  Constitutional: No acute distress, resting comfortably  Neuro:  AOx3, grossly intact  ENMT: moist mucous membranes  CV: no tachycardia  Pulm: non-labored on NC  GI: soft, non-tender, non-distended  Skin: warm and dry  Musculoskeletal: moving all extremities  Extremities: left groin puncture without hematoma. Right hallux distal superficial dry ulceration  Pulse Exam: monophasic/mixed venous right PT, left pedal signals; motor/sensory intact    Labs:  Results from last 7 days   Lab Units 04/03/25  0832 04/02/25  0816 04/01/25  0721   WBC AUTO x10*3/uL 6.6 7.2 7.4   HEMOGLOBIN g/dL 10.9* 11.0* 11.4*   PLATELETS AUTO x10*3/uL 189 210 185      Results from last 7 days   Lab Units 04/03/25  0832 04/02/25  0816 04/01/25  0721   SODIUM mmol/L 136 134* 135*   POTASSIUM mmol/L 4.0 4.1 4.5   CHLORIDE mmol/L 102 99 100   CO2 mmol/L 21 22 24   BUN mg/dL 28* 27* 25*   CREATININE mg/dL 2.29* 2.80* 2.28*   GLUCOSE mg/dL 111* 99 98   MAGNESIUM mg/dL 2.17 2.19 2.25   PHOSPHORUS mg/dL 4.1 4.7 4.9             Results from last 7 days   Lab Units 04/03/25  0832 04/02/25  0816  04/01/25  0721   ANTI XA UNFRACTIONATED IU/mL 0.7 0.7 0.7

## 2025-04-03 NOTE — CARE PLAN
Problem: Pain - Adult  Goal: Verbalizes/displays adequate comfort level or baseline comfort level  Outcome: Progressing     Problem: Safety - Adult  Goal: Free from fall injury  Outcome: Progressing     Problem: Chronic Conditions and Co-morbidities  Goal: Patient's chronic conditions and co-morbidity symptoms are monitored and maintained or improved  Outcome: Progressing    The clinical goals for the shift include pt will remain HDS and have adequate pain control through shift.

## 2025-04-03 NOTE — CARE PLAN
Problem: Pain - Adult  Goal: Verbalizes/displays adequate comfort level or baseline comfort level  Outcome: Progressing   The patient's goals for the shift include no pain    The clinical goals for the shift include safety    Over the shift, the patient did not make progress toward the following goals. Barriers to progression include rest. Recommendations to address these barriers include rest time in between care.

## 2025-04-04 LAB
ALBUMIN SERPL BCP-MCNC: 3.7 G/DL (ref 3.4–5)
ANION GAP SERPL CALC-SCNC: 15 MMOL/L (ref 10–20)
BUN SERPL-MCNC: 30 MG/DL (ref 6–23)
CALCIUM SERPL-MCNC: 8.8 MG/DL (ref 8.6–10.6)
CHLORIDE SERPL-SCNC: 101 MMOL/L (ref 98–107)
CO2 SERPL-SCNC: 22 MMOL/L (ref 21–32)
CREAT SERPL-MCNC: 2.26 MG/DL (ref 0.5–1.3)
EGFRCR SERPLBLD CKD-EPI 2021: 29 ML/MIN/1.73M*2
ERYTHROCYTE [DISTWIDTH] IN BLOOD BY AUTOMATED COUNT: 13.8 % (ref 11.5–14.5)
GLUCOSE BLD MANUAL STRIP-MCNC: 114 MG/DL (ref 74–99)
GLUCOSE BLD MANUAL STRIP-MCNC: 134 MG/DL (ref 74–99)
GLUCOSE BLD MANUAL STRIP-MCNC: 137 MG/DL (ref 74–99)
GLUCOSE BLD MANUAL STRIP-MCNC: 162 MG/DL (ref 74–99)
GLUCOSE SERPL-MCNC: 94 MG/DL (ref 74–99)
HCT VFR BLD AUTO: 33.7 % (ref 41–52)
HGB BLD-MCNC: 10.5 G/DL (ref 13.5–17.5)
MAGNESIUM SERPL-MCNC: 2.22 MG/DL (ref 1.6–2.4)
MCH RBC QN AUTO: 25.3 PG (ref 26–34)
MCHC RBC AUTO-ENTMCNC: 31.2 G/DL (ref 32–36)
MCV RBC AUTO: 81 FL (ref 80–100)
NRBC BLD-RTO: 0 /100 WBCS (ref 0–0)
PHOSPHATE SERPL-MCNC: 4.5 MG/DL (ref 2.5–4.9)
PLATELET # BLD AUTO: 207 X10*3/UL (ref 150–450)
POTASSIUM SERPL-SCNC: 4.5 MMOL/L (ref 3.5–5.3)
RBC # BLD AUTO: 4.15 X10*6/UL (ref 4.5–5.9)
SODIUM SERPL-SCNC: 133 MMOL/L (ref 136–145)
UFH PPP CHRO-ACNC: 0.7 IU/ML
WBC # BLD AUTO: 6 X10*3/UL (ref 4.4–11.3)

## 2025-04-04 PROCEDURE — 80069 RENAL FUNCTION PANEL: CPT

## 2025-04-04 PROCEDURE — 85520 HEPARIN ASSAY: CPT

## 2025-04-04 PROCEDURE — 83735 ASSAY OF MAGNESIUM: CPT

## 2025-04-04 PROCEDURE — 82947 ASSAY GLUCOSE BLOOD QUANT: CPT

## 2025-04-04 PROCEDURE — 85027 COMPLETE CBC AUTOMATED: CPT

## 2025-04-04 PROCEDURE — 2500000002 HC RX 250 W HCPCS SELF ADMINISTERED DRUGS (ALT 637 FOR MEDICARE OP, ALT 636 FOR OP/ED)

## 2025-04-04 PROCEDURE — 2500000001 HC RX 250 WO HCPCS SELF ADMINISTERED DRUGS (ALT 637 FOR MEDICARE OP)

## 2025-04-04 PROCEDURE — 2500000002 HC RX 250 W HCPCS SELF ADMINISTERED DRUGS (ALT 637 FOR MEDICARE OP, ALT 636 FOR OP/ED): Performed by: INTERNAL MEDICINE

## 2025-04-04 PROCEDURE — 2500000001 HC RX 250 WO HCPCS SELF ADMINISTERED DRUGS (ALT 637 FOR MEDICARE OP): Performed by: INTERNAL MEDICINE

## 2025-04-04 PROCEDURE — 2500000004 HC RX 250 GENERAL PHARMACY W/ HCPCS (ALT 636 FOR OP/ED)

## 2025-04-04 PROCEDURE — 99233 SBSQ HOSP IP/OBS HIGH 50: CPT | Performed by: INTERNAL MEDICINE

## 2025-04-04 PROCEDURE — 80197 ASSAY OF TACROLIMUS: CPT | Performed by: INTERNAL MEDICINE

## 2025-04-04 PROCEDURE — 36415 COLL VENOUS BLD VENIPUNCTURE: CPT

## 2025-04-04 PROCEDURE — 1100000001 HC PRIVATE ROOM DAILY

## 2025-04-04 PROCEDURE — 2500000004 HC RX 250 GENERAL PHARMACY W/ HCPCS (ALT 636 FOR OP/ED): Performed by: NURSE PRACTITIONER

## 2025-04-04 PROCEDURE — 2500000004 HC RX 250 GENERAL PHARMACY W/ HCPCS (ALT 636 FOR OP/ED): Performed by: STUDENT IN AN ORGANIZED HEALTH CARE EDUCATION/TRAINING PROGRAM

## 2025-04-04 RX ORDER — NIFEDIPINE 30 MG/1
30 TABLET, FILM COATED, EXTENDED RELEASE ORAL 2 TIMES DAILY
Status: DISCONTINUED | OUTPATIENT
Start: 2025-04-04 | End: 2025-04-07

## 2025-04-04 RX ORDER — METOPROLOL TARTRATE 50 MG/1
50 TABLET ORAL 2 TIMES DAILY
Status: DISCONTINUED | OUTPATIENT
Start: 2025-04-04 | End: 2025-04-07

## 2025-04-04 RX ADMIN — METOPROLOL TARTRATE 50 MG: 50 TABLET, FILM COATED ORAL at 21:52

## 2025-04-04 RX ADMIN — MYCOPHENOLATE MOFETIL 500 MG: 250 CAPSULE ORAL at 19:10

## 2025-04-04 RX ADMIN — TAMSULOSIN HYDROCHLORIDE 0.4 MG: 0.4 CAPSULE ORAL at 21:52

## 2025-04-04 RX ADMIN — HYDRALAZINE HYDROCHLORIDE 100 MG: 50 TABLET ORAL at 11:23

## 2025-04-04 RX ADMIN — PRAVASTATIN SODIUM 40 MG: 20 TABLET ORAL at 21:52

## 2025-04-04 RX ADMIN — MYCOPHENOLATE MOFETIL 500 MG: 250 CAPSULE ORAL at 06:49

## 2025-04-04 RX ADMIN — METOPROLOL TARTRATE 75 MG: 50 TABLET, FILM COATED ORAL at 09:00

## 2025-04-04 RX ADMIN — HYDRALAZINE HYDROCHLORIDE 100 MG: 50 TABLET ORAL at 02:44

## 2025-04-04 RX ADMIN — HYDRALAZINE HYDROCHLORIDE 100 MG: 50 TABLET ORAL at 19:10

## 2025-04-04 RX ADMIN — NIFEDIPINE 30 MG: 30 TABLET, FILM COATED, EXTENDED RELEASE ORAL at 11:23

## 2025-04-04 RX ADMIN — AMLODIPINE BESYLATE 10 MG: 10 TABLET ORAL at 09:00

## 2025-04-04 RX ADMIN — LEVOTHYROXINE SODIUM 137 MCG: 25 TABLET ORAL at 06:51

## 2025-04-04 RX ADMIN — HEPARIN SODIUM 1500 UNITS/HR: 10000 INJECTION, SOLUTION INTRAVENOUS at 10:19

## 2025-04-04 RX ADMIN — FAMOTIDINE 20 MG: 20 TABLET, FILM COATED ORAL at 09:00

## 2025-04-04 RX ADMIN — PREDNISONE 5 MG: 5 TABLET ORAL at 09:00

## 2025-04-04 RX ADMIN — NIFEDIPINE 30 MG: 30 TABLET, FILM COATED, EXTENDED RELEASE ORAL at 21:53

## 2025-04-04 RX ADMIN — TACROLIMUS 2 MG: 0.75 TABLET, EXTENDED RELEASE ORAL at 06:50

## 2025-04-04 ASSESSMENT — COGNITIVE AND FUNCTIONAL STATUS - GENERAL
TOILETING: A LITTLE
WALKING IN HOSPITAL ROOM: A LITTLE
MOBILITY SCORE: 22
CLIMB 3 TO 5 STEPS WITH RAILING: A LITTLE
DAILY ACTIVITIY SCORE: 23

## 2025-04-04 ASSESSMENT — PAIN SCALES - GENERAL
PAINLEVEL_OUTOF10: 0 - NO PAIN
PAINLEVEL_OUTOF10: 0 - NO PAIN

## 2025-04-04 ASSESSMENT — PAIN - FUNCTIONAL ASSESSMENT
PAIN_FUNCTIONAL_ASSESSMENT: 0-10
PAIN_FUNCTIONAL_ASSESSMENT: 0-10

## 2025-04-04 NOTE — CARE PLAN
Problem: Pain - Adult  Goal: Verbalizes/displays adequate comfort level or baseline comfort level  Outcome: Progressing     Problem: Safety - Adult  Goal: Free from fall injury  Outcome: Progressing     Problem: Discharge Planning  Goal: Discharge to home or other facility with appropriate resources  Outcome: Progressing     Problem: Chronic Conditions and Co-morbidities  Goal: Patient's chronic conditions and co-morbidity symptoms are monitored and maintained or improved  Outcome: Progressing     Problem: Nutrition  Goal: Nutrient intake appropriate for maintaining nutritional needs  Outcome: Progressing     Problem: Pain  Goal: Takes deep breaths with improved pain control throughout the shift  Outcome: Progressing  Goal: Turns in bed with improved pain control throughout the shift  Outcome: Progressing  Goal: Walks with improved pain control throughout the shift  Outcome: Progressing  Goal: Performs ADL's with improved pain control throughout shift  Outcome: Progressing  Goal: Participates in PT with improved pain control throughout the shift  Outcome: Progressing  Goal: Free from opioid side effects throughout the shift  Outcome: Progressing  Goal: Free from acute confusion related to pain meds throughout the shift  Outcome: Progressing     Problem: Diabetes  Goal: Achieve decreasing blood glucose levels by end of shift  Outcome: Progressing  Goal: Increase stability of blood glucose readings by end of shift  Outcome: Progressing  Goal: Decrease in ketones present in urine by end of shift  Outcome: Progressing  Goal: Maintain electrolyte levels within acceptable range throughout shift  Outcome: Progressing  Goal: Maintain glucose levels >70mg/dl to <250mg/dl throughout shift  Outcome: Progressing  Goal: No changes in neurological exam by end of shift  Outcome: Progressing  Goal: Learn about and adhere to nutrition recommendations by end of shift  Outcome: Progressing  Goal: Vital signs within normal range for  age by end of shift  Outcome: Progressing  Goal: Increase self care and/or family involovement by end of shift  Outcome: Progressing  Goal: Receive DSME education by end of shift  Outcome: Progressing   The patient's goals for the shift include no pain    The clinical goals for the shift include pt to have BM

## 2025-04-04 NOTE — CARE PLAN
Problem: Pain - Adult  Goal: Verbalizes/displays adequate comfort level or baseline comfort level  Outcome: Progressing     Problem: Safety - Adult  Goal: Free from fall injury  Outcome: Progressing     Problem: Chronic Conditions and Co-morbidities  Goal: Patient's chronic conditions and co-morbidity symptoms are monitored and maintained or improved  Outcome: Progressing     Problem: Nutrition  Goal: Nutrient intake appropriate for maintaining nutritional needs  Outcome: Progressing    The clinical goals for the shift include pt will remain HDS and have adequate pain control through shift.

## 2025-04-04 NOTE — PROGRESS NOTES
Social Work Note    Interdisciplinary Treatment Plan : Tentatively OR for right femoral-PT bypass with vein for CLTI 5 on Monday. Creatinine is improved, and renal ultrasound unremarkable.   - Additional information : ADOD mid next week.  - Support : Wife, Charity is listed NOK.  - Payor : Medicare  - Planned Disposition : Low intensity currently  - Barrier to discharge : None noted at this time.     MARILIN Mcmanus, LSW

## 2025-04-04 NOTE — PROGRESS NOTES
VASCULAR SURGERY PROGRESS NOTE  Assessment/Plan   Dilip Haynes is 74 y.o. male with PMHx significant for CAD (s/p PCI), HTN, afib s/p DCCV 6/2020 (on Eliquis last took 3/26 AM), carotid endarterectomy for asymptomatic stenosis, ESRD s/p DDKT (2020), hypothyroidism, and PAD (s/p L iliac ([id number] stent) with R fem and profunda endarterectomy with patch angioplasty (2012) who presented as transfer from OSH with concern of worsening limb ischemia. Patient exam consistent with previously noted right foot tissue loss and rest pain without signs of worsening ischemia on exam. Patient with monophasic PT on R and mono DP/PT on left with intact motor and sensory function.      Underwent diagnostic RLE angiogram on 3/28 showing R CFA disease, SFA occlusion, and PT runoff. Post-op course complicated by chest pain in the setting of hypertension, transferred to ICU for nitroglycerin gtt.     Ultimately will require right femoral-PT bypass with vein for CLTI 5    Plan for OR Monday. Improved Creatinine. Renal ultrasound unremarkable.     Plan:    Neuro  Continue PRN acetaminophen, oxycodone    CV  Cardiology recs for post procedure tropenemia  3/31/25 Stress test without evidence of inducible myocardial ischemia.  No further cardiac testing required  Liberalize BP goal to SBP<160.   Continue amlodipine, hydralazine, losartan, metoprolol  PRN labetalol, hydralazine  Continue pravastain  RLE CLTI5  Continue heparin drip  Neurovascular checks  Tentative OR 4/7    Pulm  Encourage incentive spirometer  Wean nasal cannula as tolerated  Albuterol PRN, fluticasone PRN    FENGI - h/o DDKT  History of DDKT  Appreciate nephrology recs  Stable Cr - will obtain duplex ultrasound of transplanted kidney.   Holding torsemide; gave 40IV lasix today for diruesis.   Continue mycophenolate, prednisone, tacrolimus  Continue tamsulosin  Urinary retention - winters placed 4/2  Regular diet  Continue pepcid  Zofran PRN    Heme  Trend  hemoglobin  Continue heparin drip    Endo  Continue home levothyroxine   FSH, SSI    D/w attending, Dr. Stalin Mosley MD  PGY-5 Vascular Surgery Resident     Subjective   No acute events overnight. Remains stable without significant pain.    Objective   Vitals:  Heart Rate:  [49-91]   Temp:  [36.5 °C (97.7 °F)-36.8 °C (98.2 °F)]   Resp:  [16-25]   BP: (129-183)/(57-97)   SpO2:  [93 %-99 %]     Exam:  Constitutional: No acute distress, resting comfortably  Neuro:  AOx3, grossly intact  ENMT: moist mucous membranes  CV: no tachycardia  Pulm: non-labored on NC  GI: soft, non-tender, non-distended  Skin: warm and dry  Musculoskeletal: moving all extremities  Extremities: left groin puncture without hematoma. Right hallux distal superficial dry ulceration  Pulse Exam: monophasic/mixed venous right PT, left pedal signals; motor/sensory intact    Labs:  Results from last 7 days   Lab Units 04/04/25  0705 04/03/25  0832 04/02/25  0816   WBC AUTO x10*3/uL 6.0 6.6 7.2   HEMOGLOBIN g/dL 10.5* 10.9* 11.0*   PLATELETS AUTO x10*3/uL 207 189 210      Results from last 7 days   Lab Units 04/04/25  0705 04/03/25  0832 04/02/25  0816   SODIUM mmol/L 133* 136 134*   POTASSIUM mmol/L 4.5 4.0 4.1   CHLORIDE mmol/L 101 102 99   CO2 mmol/L 22 21 22   BUN mg/dL 30* 28* 27*   CREATININE mg/dL 2.26* 2.29* 2.80*   GLUCOSE mg/dL 94 111* 99   MAGNESIUM mg/dL 2.22 2.17 2.19   PHOSPHORUS mg/dL 4.5 4.1 4.7             Results from last 7 days   Lab Units 04/04/25  0705 04/03/25  0832 04/02/25  0816   ANTI XA UNFRACTIONATED IU/mL 0.7 0.7 0.7

## 2025-04-05 LAB
ALBUMIN SERPL BCP-MCNC: 3.9 G/DL (ref 3.4–5)
ANION GAP SERPL CALC-SCNC: 17 MMOL/L (ref 10–20)
BUN SERPL-MCNC: 32 MG/DL (ref 6–23)
CALCIUM SERPL-MCNC: 9.3 MG/DL (ref 8.6–10.6)
CHLORIDE SERPL-SCNC: 98 MMOL/L (ref 98–107)
CO2 SERPL-SCNC: 20 MMOL/L (ref 21–32)
CREAT SERPL-MCNC: 2.7 MG/DL (ref 0.5–1.3)
EGFRCR SERPLBLD CKD-EPI 2021: 24 ML/MIN/1.73M*2
ERYTHROCYTE [DISTWIDTH] IN BLOOD BY AUTOMATED COUNT: 13.7 % (ref 11.5–14.5)
GLUCOSE BLD MANUAL STRIP-MCNC: 126 MG/DL (ref 74–99)
GLUCOSE BLD MANUAL STRIP-MCNC: 126 MG/DL (ref 74–99)
GLUCOSE BLD MANUAL STRIP-MCNC: 132 MG/DL (ref 74–99)
GLUCOSE BLD MANUAL STRIP-MCNC: 166 MG/DL (ref 74–99)
GLUCOSE SERPL-MCNC: 110 MG/DL (ref 74–99)
HCT VFR BLD AUTO: 32.9 % (ref 41–52)
HGB BLD-MCNC: 10.4 G/DL (ref 13.5–17.5)
MAGNESIUM SERPL-MCNC: 2.24 MG/DL (ref 1.6–2.4)
MCH RBC QN AUTO: 24.4 PG (ref 26–34)
MCHC RBC AUTO-ENTMCNC: 31.6 G/DL (ref 32–36)
MCV RBC AUTO: 77 FL (ref 80–100)
NRBC BLD-RTO: 0 /100 WBCS (ref 0–0)
PHOSPHATE SERPL-MCNC: 3.8 MG/DL (ref 2.5–4.9)
PLATELET # BLD AUTO: 214 X10*3/UL (ref 150–450)
POTASSIUM SERPL-SCNC: 4.2 MMOL/L (ref 3.5–5.3)
RBC # BLD AUTO: 4.26 X10*6/UL (ref 4.5–5.9)
SODIUM SERPL-SCNC: 131 MMOL/L (ref 136–145)
TACROLIMUS BLD-MCNC: 7.3 NG/ML
UFH PPP CHRO-ACNC: 0.7 IU/ML
WBC # BLD AUTO: 7.2 X10*3/UL (ref 4.4–11.3)

## 2025-04-05 PROCEDURE — 85520 HEPARIN ASSAY: CPT

## 2025-04-05 PROCEDURE — 85027 COMPLETE CBC AUTOMATED: CPT

## 2025-04-05 PROCEDURE — 99231 SBSQ HOSP IP/OBS SF/LOW 25: CPT | Performed by: STUDENT IN AN ORGANIZED HEALTH CARE EDUCATION/TRAINING PROGRAM

## 2025-04-05 PROCEDURE — 2500000002 HC RX 250 W HCPCS SELF ADMINISTERED DRUGS (ALT 637 FOR MEDICARE OP, ALT 636 FOR OP/ED)

## 2025-04-05 PROCEDURE — 2500000002 HC RX 250 W HCPCS SELF ADMINISTERED DRUGS (ALT 637 FOR MEDICARE OP, ALT 636 FOR OP/ED): Performed by: INTERNAL MEDICINE

## 2025-04-05 PROCEDURE — 83735 ASSAY OF MAGNESIUM: CPT

## 2025-04-05 PROCEDURE — 80069 RENAL FUNCTION PANEL: CPT

## 2025-04-05 PROCEDURE — 36415 COLL VENOUS BLD VENIPUNCTURE: CPT

## 2025-04-05 PROCEDURE — 1100000001 HC PRIVATE ROOM DAILY

## 2025-04-05 PROCEDURE — 2500000001 HC RX 250 WO HCPCS SELF ADMINISTERED DRUGS (ALT 637 FOR MEDICARE OP)

## 2025-04-05 PROCEDURE — 99233 SBSQ HOSP IP/OBS HIGH 50: CPT | Performed by: INTERNAL MEDICINE

## 2025-04-05 PROCEDURE — 82947 ASSAY GLUCOSE BLOOD QUANT: CPT

## 2025-04-05 PROCEDURE — 2500000004 HC RX 250 GENERAL PHARMACY W/ HCPCS (ALT 636 FOR OP/ED)

## 2025-04-05 PROCEDURE — 2500000001 HC RX 250 WO HCPCS SELF ADMINISTERED DRUGS (ALT 637 FOR MEDICARE OP): Performed by: INTERNAL MEDICINE

## 2025-04-05 RX ADMIN — MYCOPHENOLATE MOFETIL 500 MG: 250 CAPSULE ORAL at 17:40

## 2025-04-05 RX ADMIN — NIFEDIPINE 30 MG: 30 TABLET, FILM COATED, EXTENDED RELEASE ORAL at 09:08

## 2025-04-05 RX ADMIN — METOPROLOL TARTRATE 50 MG: 50 TABLET, FILM COATED ORAL at 21:30

## 2025-04-05 RX ADMIN — FAMOTIDINE 20 MG: 20 TABLET, FILM COATED ORAL at 09:08

## 2025-04-05 RX ADMIN — LEVOTHYROXINE SODIUM 137 MCG: 25 TABLET ORAL at 06:29

## 2025-04-05 RX ADMIN — PREDNISONE 5 MG: 5 TABLET ORAL at 09:08

## 2025-04-05 RX ADMIN — HYDRALAZINE HYDROCHLORIDE 100 MG: 50 TABLET ORAL at 21:30

## 2025-04-05 RX ADMIN — OXYCODONE HYDROCHLORIDE 5 MG: 5 TABLET ORAL at 21:30

## 2025-04-05 RX ADMIN — HYDRALAZINE HYDROCHLORIDE 100 MG: 50 TABLET ORAL at 12:56

## 2025-04-05 RX ADMIN — METOPROLOL TARTRATE 50 MG: 50 TABLET, FILM COATED ORAL at 09:08

## 2025-04-05 RX ADMIN — PRAVASTATIN SODIUM 40 MG: 20 TABLET ORAL at 21:30

## 2025-04-05 RX ADMIN — TAMSULOSIN HYDROCHLORIDE 0.4 MG: 0.4 CAPSULE ORAL at 21:30

## 2025-04-05 RX ADMIN — HYDRALAZINE HYDROCHLORIDE 100 MG: 50 TABLET ORAL at 02:52

## 2025-04-05 RX ADMIN — MYCOPHENOLATE MOFETIL 500 MG: 250 CAPSULE ORAL at 06:29

## 2025-04-05 RX ADMIN — HEPARIN SODIUM 1500 UNITS/HR: 10000 INJECTION, SOLUTION INTRAVENOUS at 17:41

## 2025-04-05 RX ADMIN — HEPARIN SODIUM 1500 UNITS/HR: 10000 INJECTION, SOLUTION INTRAVENOUS at 02:49

## 2025-04-05 RX ADMIN — INSULIN LISPRO 1 UNITS: 100 INJECTION, SOLUTION INTRAVENOUS; SUBCUTANEOUS at 12:58

## 2025-04-05 RX ADMIN — ACETAMINOPHEN 650 MG: 325 TABLET ORAL at 09:10

## 2025-04-05 RX ADMIN — NIFEDIPINE 30 MG: 30 TABLET, FILM COATED, EXTENDED RELEASE ORAL at 21:30

## 2025-04-05 ASSESSMENT — PAIN - FUNCTIONAL ASSESSMENT
PAIN_FUNCTIONAL_ASSESSMENT: 0-10
PAIN_FUNCTIONAL_ASSESSMENT: 0-10

## 2025-04-05 ASSESSMENT — COGNITIVE AND FUNCTIONAL STATUS - GENERAL
MOBILITY SCORE: 23
MOBILITY SCORE: 23
DAILY ACTIVITIY SCORE: 24
DAILY ACTIVITIY SCORE: 24
CLIMB 3 TO 5 STEPS WITH RAILING: A LITTLE
CLIMB 3 TO 5 STEPS WITH RAILING: A LITTLE
DAILY ACTIVITIY SCORE: 24
MOBILITY SCORE: 23
CLIMB 3 TO 5 STEPS WITH RAILING: A LITTLE

## 2025-04-05 ASSESSMENT — PAIN SCALES - GENERAL
PAINLEVEL_OUTOF10: 0 - NO PAIN
PAINLEVEL_OUTOF10: 0 - NO PAIN
PAINLEVEL_OUTOF10: 6

## 2025-04-05 ASSESSMENT — PAIN DESCRIPTION - DESCRIPTORS: DESCRIPTORS: ACHING;THROBBING

## 2025-04-05 NOTE — CARE PLAN
Problem: Pain - Adult  Goal: Verbalizes/displays adequate comfort level or baseline comfort level  Outcome: Progressing     Problem: Safety - Adult  Goal: Free from fall injury  Outcome: Progressing     Problem: Discharge Planning  Goal: Discharge to home or other facility with appropriate resources  Outcome: Progressing     Problem: Chronic Conditions and Co-morbidities  Goal: Patient's chronic conditions and co-morbidity symptoms are monitored and maintained or improved  Outcome: Progressing     Problem: Nutrition  Goal: Nutrient intake appropriate for maintaining nutritional needs  Outcome: Progressing   The patient's goals for the shift include no pain    The clinical goals for the shift include Pt. will have adequate pain control for remainder of shift.    Over the shift, the patient did make progress toward the following goals.      Detail Level: Detailed

## 2025-04-05 NOTE — PROGRESS NOTES
VASCULAR SURGERY PROGRESS NOTE  Assessment/Plan   Dilip Haynes is 74 y.o. male with PMHx significant for CAD (s/p PCI), HTN, afib s/p DCCV 6/2020 (on Eliquis last took 3/26 AM), carotid endarterectomy for asymptomatic stenosis, ESRD s/p DDKT (2020), hypothyroidism, and PAD (s/p L iliac ([id number] stent) with R fem and profunda endarterectomy with patch angioplasty (2012) who presented as transfer from OSH with concern of worsening limb ischemia. Patient exam consistent with previously noted right foot tissue loss and rest pain without signs of worsening ischemia on exam. Patient with monophasic PT on R and mono DP/PT on left with intact motor and sensory function.      Underwent diagnostic RLE angiogram on 3/28 showing R CFA disease, SFA occlusion, and PT runoff. Post-op course complicated by chest pain in the setting of hypertension, transferred to ICU for nitroglycerin gtt.     Ultimately will require right femoral-PT bypass with vein for CLTI 5    Plan for OR Monday. Creatinine worsened today; will re-engage nephrology. Renal ultrasound unremarkable.     Plan:    Neuro  Continue PRN acetaminophen, oxycodone    CV  Cardiology recs for post procedure tropenemia  3/31/25 Stress test without evidence of inducible myocardial ischemia.  No further cardiac testing required  Liberalize BP goal to SBP<160.   Continue amlodipine, hydralazine, losartan, metoprolol  PRN labetalol, hydralazine  Continue pravastain  RLE CLTI5  Continue heparin drip  Neurovascular checks  Tentative OR 4/7    Pulm  Encourage incentive spirometer  Wean nasal cannula as tolerated  Albuterol PRN, fluticasone PRN    FENGI - h/o DDKT  History of DDKT  Appreciate nephrology recs  Stable Cr - will obtain duplex ultrasound of transplanted kidney.   Holding torsemide; gave 40IV lasix today for diruesis.   Continue mycophenolate, prednisone, tacrolimus  Continue tamsulosin  Urinary retention - winters placed 4/2  Regular diet  Continue pepcid  Zofran  PRN    Heme  Trend hemoglobin  Continue heparin drip    Endo  Continue home levothyroxine   FSH, SSI    D/w fellow, Dr. Fernando Brown MD  Vascular Surgery p. [phone]      Subjective   No acute events overnight. Remains stable without significant pain.    Objective   Vitals:  Heart Rate:  [51-78]   Temp:  [36.5 °C (97.7 °F)-37.1 °C (98.8 °F)]   Resp:  [16-20]   BP: (117-168)/(55-73)   Weight:  [85 kg (187 lb 6.3 oz)]   SpO2:  [93 %-98 %]     Exam:  Constitutional: No acute distress, resting comfortably  Neuro:  AOx3, grossly intact  ENMT: moist mucous membranes  CV: no tachycardia  Pulm: non-labored on NC  GI: soft, non-tender, non-distended  Skin: warm and dry  Musculoskeletal: moving all extremities  Extremities: left groin puncture without hematoma. Right hallux distal superficial dry ulceration  Pulse Exam: monophasic/mixed venous right PT, left pedal signals; motor/sensory intact    Labs:  Results from last 7 days   Lab Units 04/05/25  0811 04/04/25  0705 04/03/25  0832   WBC AUTO x10*3/uL 7.2 6.0 6.6   HEMOGLOBIN g/dL 10.4* 10.5* 10.9*   PLATELETS AUTO x10*3/uL 214 207 189      Results from last 7 days   Lab Units 04/05/25  0811 04/04/25  0705 04/03/25  0832   SODIUM mmol/L 131* 133* 136   POTASSIUM mmol/L 4.2 4.5 4.0   CHLORIDE mmol/L 98 101 102   CO2 mmol/L 20* 22 21   BUN mg/dL 32* 30* 28*   CREATININE mg/dL 2.70* 2.26* 2.29*   GLUCOSE mg/dL 110* 94 111*   MAGNESIUM mg/dL 2.24 2.22 2.17   PHOSPHORUS mg/dL 3.8 4.5 4.1             Results from last 7 days   Lab Units 04/05/25  0811 04/04/25  0705 04/03/25  0832   ANTI XA UNFRACTIONATED IU/mL 0.7 0.7 0.7

## 2025-04-05 NOTE — CARE PLAN
The patient's goals for the shift include no pain    The clinical goals for the shift include Pt. will have adequate pain control for remainder of shift.

## 2025-04-06 ENCOUNTER — APPOINTMENT (OUTPATIENT)
Dept: RADIOLOGY | Facility: HOSPITAL | Age: 75
DRG: 271 | End: 2025-04-06
Payer: MEDICARE

## 2025-04-06 VITALS
RESPIRATION RATE: 20 BRPM | HEIGHT: 70 IN | TEMPERATURE: 98.1 F | HEART RATE: 87 BPM | WEIGHT: 189.26 LBS | BODY MASS INDEX: 27.1 KG/M2 | OXYGEN SATURATION: 97 % | DIASTOLIC BLOOD PRESSURE: 78 MMHG | SYSTOLIC BLOOD PRESSURE: 162 MMHG

## 2025-04-06 DIAGNOSIS — I25.10 ATHEROSCLEROTIC HEART DISEASE OF NATIVE CORONARY ARTERY WITHOUT ANGINA PECTORIS: ICD-10-CM

## 2025-04-06 LAB
ABO GROUP (TYPE) IN BLOOD: NORMAL
ALBUMIN SERPL BCP-MCNC: 4 G/DL (ref 3.4–5)
ANION GAP SERPL CALC-SCNC: 17 MMOL/L (ref 10–20)
ANTIBODY SCREEN: NORMAL
BUN SERPL-MCNC: 35 MG/DL (ref 6–23)
CALCIUM SERPL-MCNC: 9.4 MG/DL (ref 8.6–10.6)
CHLORIDE SERPL-SCNC: 97 MMOL/L (ref 98–107)
CO2 SERPL-SCNC: 21 MMOL/L (ref 21–32)
CREAT SERPL-MCNC: 2.57 MG/DL (ref 0.5–1.3)
EGFRCR SERPLBLD CKD-EPI 2021: 25 ML/MIN/1.73M*2
ERYTHROCYTE [DISTWIDTH] IN BLOOD BY AUTOMATED COUNT: 14 % (ref 11.5–14.5)
GLUCOSE BLD MANUAL STRIP-MCNC: 130 MG/DL (ref 74–99)
GLUCOSE BLD MANUAL STRIP-MCNC: 148 MG/DL (ref 74–99)
GLUCOSE BLD MANUAL STRIP-MCNC: 150 MG/DL (ref 74–99)
GLUCOSE BLD MANUAL STRIP-MCNC: 169 MG/DL (ref 74–99)
GLUCOSE SERPL-MCNC: 112 MG/DL (ref 74–99)
HCT VFR BLD AUTO: 34.6 % (ref 41–52)
HGB BLD-MCNC: 10.4 G/DL (ref 13.5–17.5)
MAGNESIUM SERPL-MCNC: 2.34 MG/DL (ref 1.6–2.4)
MCH RBC QN AUTO: 25 PG (ref 26–34)
MCHC RBC AUTO-ENTMCNC: 30.1 G/DL (ref 32–36)
MCV RBC AUTO: 83 FL (ref 80–100)
NRBC BLD-RTO: 0 /100 WBCS (ref 0–0)
PHOSPHATE SERPL-MCNC: 4.4 MG/DL (ref 2.5–4.9)
PLATELET # BLD AUTO: 225 X10*3/UL (ref 150–450)
POTASSIUM SERPL-SCNC: 4.6 MMOL/L (ref 3.5–5.3)
RBC # BLD AUTO: 4.16 X10*6/UL (ref 4.5–5.9)
RH FACTOR (ANTIGEN D): NORMAL
SODIUM SERPL-SCNC: 130 MMOL/L (ref 136–145)
TACROLIMUS BLD-MCNC: 4.1 NG/ML
UFH PPP CHRO-ACNC: 0.7 IU/ML
WBC # BLD AUTO: 7.2 X10*3/UL (ref 4.4–11.3)

## 2025-04-06 PROCEDURE — 2500000004 HC RX 250 GENERAL PHARMACY W/ HCPCS (ALT 636 FOR OP/ED)

## 2025-04-06 PROCEDURE — 85027 COMPLETE CBC AUTOMATED: CPT

## 2025-04-06 PROCEDURE — 2500000004 HC RX 250 GENERAL PHARMACY W/ HCPCS (ALT 636 FOR OP/ED): Performed by: INTERNAL MEDICINE

## 2025-04-06 PROCEDURE — 83735 ASSAY OF MAGNESIUM: CPT

## 2025-04-06 PROCEDURE — 2500000001 HC RX 250 WO HCPCS SELF ADMINISTERED DRUGS (ALT 637 FOR MEDICARE OP)

## 2025-04-06 PROCEDURE — 71046 X-RAY EXAM CHEST 2 VIEWS: CPT | Performed by: STUDENT IN AN ORGANIZED HEALTH CARE EDUCATION/TRAINING PROGRAM

## 2025-04-06 PROCEDURE — 86901 BLOOD TYPING SEROLOGIC RH(D): CPT | Performed by: STUDENT IN AN ORGANIZED HEALTH CARE EDUCATION/TRAINING PROGRAM

## 2025-04-06 PROCEDURE — 2500000002 HC RX 250 W HCPCS SELF ADMINISTERED DRUGS (ALT 637 FOR MEDICARE OP, ALT 636 FOR OP/ED)

## 2025-04-06 PROCEDURE — 82947 ASSAY GLUCOSE BLOOD QUANT: CPT

## 2025-04-06 PROCEDURE — 36415 COLL VENOUS BLD VENIPUNCTURE: CPT | Performed by: STUDENT IN AN ORGANIZED HEALTH CARE EDUCATION/TRAINING PROGRAM

## 2025-04-06 PROCEDURE — 2500000001 HC RX 250 WO HCPCS SELF ADMINISTERED DRUGS (ALT 637 FOR MEDICARE OP): Performed by: INTERNAL MEDICINE

## 2025-04-06 PROCEDURE — 80197 ASSAY OF TACROLIMUS: CPT | Performed by: INTERNAL MEDICINE

## 2025-04-06 PROCEDURE — 85520 HEPARIN ASSAY: CPT

## 2025-04-06 PROCEDURE — 36415 COLL VENOUS BLD VENIPUNCTURE: CPT

## 2025-04-06 PROCEDURE — 99233 SBSQ HOSP IP/OBS HIGH 50: CPT | Performed by: INTERNAL MEDICINE

## 2025-04-06 PROCEDURE — 2500000002 HC RX 250 W HCPCS SELF ADMINISTERED DRUGS (ALT 637 FOR MEDICARE OP, ALT 636 FOR OP/ED): Performed by: INTERNAL MEDICINE

## 2025-04-06 PROCEDURE — 84100 ASSAY OF PHOSPHORUS: CPT

## 2025-04-06 PROCEDURE — 1100000001 HC PRIVATE ROOM DAILY

## 2025-04-06 PROCEDURE — 71046 X-RAY EXAM CHEST 2 VIEWS: CPT

## 2025-04-06 RX ORDER — SODIUM CHLORIDE 9 MG/ML
50 INJECTION, SOLUTION INTRAVENOUS CONTINUOUS
Status: ACTIVE | OUTPATIENT
Start: 2025-04-07 | End: 2025-04-07

## 2025-04-06 RX ADMIN — METOPROLOL TARTRATE 50 MG: 50 TABLET, FILM COATED ORAL at 21:04

## 2025-04-06 RX ADMIN — SODIUM CHLORIDE 50 ML/HR: 0.9 INJECTION, SOLUTION INTRAVENOUS at 23:58

## 2025-04-06 RX ADMIN — PRAVASTATIN SODIUM 40 MG: 20 TABLET ORAL at 21:03

## 2025-04-06 RX ADMIN — LEVOTHYROXINE SODIUM 137 MCG: 25 TABLET ORAL at 06:33

## 2025-04-06 RX ADMIN — PREDNISONE 5 MG: 5 TABLET ORAL at 08:49

## 2025-04-06 RX ADMIN — MYCOPHENOLATE MOFETIL 500 MG: 250 CAPSULE ORAL at 06:33

## 2025-04-06 RX ADMIN — NIFEDIPINE 30 MG: 30 TABLET, FILM COATED, EXTENDED RELEASE ORAL at 08:49

## 2025-04-06 RX ADMIN — METOPROLOL TARTRATE 50 MG: 50 TABLET, FILM COATED ORAL at 08:49

## 2025-04-06 RX ADMIN — HYDRALAZINE HYDROCHLORIDE 100 MG: 50 TABLET ORAL at 05:39

## 2025-04-06 RX ADMIN — HYDRALAZINE HYDROCHLORIDE 100 MG: 50 TABLET ORAL at 21:04

## 2025-04-06 RX ADMIN — NIFEDIPINE 30 MG: 30 TABLET, FILM COATED, EXTENDED RELEASE ORAL at 21:03

## 2025-04-06 RX ADMIN — HYDRALAZINE HYDROCHLORIDE 100 MG: 50 TABLET ORAL at 13:22

## 2025-04-06 RX ADMIN — TAMSULOSIN HYDROCHLORIDE 0.4 MG: 0.4 CAPSULE ORAL at 21:04

## 2025-04-06 RX ADMIN — HEPARIN SODIUM 1500 UNITS/HR: 10000 INJECTION, SOLUTION INTRAVENOUS at 08:49

## 2025-04-06 RX ADMIN — FAMOTIDINE 20 MG: 20 TABLET, FILM COATED ORAL at 08:49

## 2025-04-06 RX ADMIN — TACROLIMUS 1.5 MG: 0.75 TABLET, EXTENDED RELEASE ORAL at 06:33

## 2025-04-06 RX ADMIN — MYCOPHENOLATE MOFETIL 500 MG: 250 CAPSULE ORAL at 17:35

## 2025-04-06 ASSESSMENT — COGNITIVE AND FUNCTIONAL STATUS - GENERAL
DAILY ACTIVITIY SCORE: 24
MOBILITY SCORE: 24
DAILY ACTIVITIY SCORE: 24
CLIMB 3 TO 5 STEPS WITH RAILING: A LITTLE
MOBILITY SCORE: 24
DAILY ACTIVITIY SCORE: 24
MOBILITY SCORE: 23

## 2025-04-06 ASSESSMENT — PAIN SCALES - GENERAL
PAINLEVEL_OUTOF10: 0 - NO PAIN
PAINLEVEL_OUTOF10: 0 - NO PAIN

## 2025-04-06 ASSESSMENT — PAIN - FUNCTIONAL ASSESSMENT: PAIN_FUNCTIONAL_ASSESSMENT: 0-10

## 2025-04-06 NOTE — CARE PLAN
The patient's goals for the shift include no pain    The clinical goals for the shift include safety

## 2025-04-06 NOTE — PROGRESS NOTES
VASCULAR SURGERY PROGRESS NOTE  Assessment/Plan   Dilip Haynes is 74 y.o. male with PMHx significant for CAD (s/p PCI), HTN, afib s/p DCCV 6/2020 (on Eliquis last took 3/26 AM), carotid endarterectomy for asymptomatic stenosis, ESRD s/p DDKT (2020), hypothyroidism, and PAD (s/p L iliac ([id number] stent) with R fem and profunda endarterectomy with patch angioplasty (2012) who presented as transfer from OSH with concern of worsening limb ischemia. Patient exam consistent with previously noted right foot tissue loss and rest pain without signs of worsening ischemia on exam. Patient with monophasic PT on R and mono DP/PT on left with intact motor and sensory function.      Underwent diagnostic RLE angiogram on 3/28 showing R CFA disease, SFA occlusion, and PT runoff. Post-op course complicated by chest pain in the setting of hypertension, transferred to ICU for nitroglycerin gtt.     Ultimately will require right femoral-PT bypass with vein for CLTI 5    Plan for OR Monday. Creatinine worsened today; will re-engage nephrology. Renal ultrasound unremarkable.     Plan:    Neuro  Continue PRN acetaminophen, oxycodone    CV  Cardiology recs for post procedure tropenemia  3/31/25 Stress test without evidence of inducible myocardial ischemia.  No further cardiac testing required  Liberalize BP goal to SBP<160.   Continue amlodipine, hydralazine, losartan, metoprolol  PRN labetalol, hydralazine  Continue pravastain  RLE CLTI5  Continue heparin drip  Neurovascular checks  Tentative OR 4/7  Will obtain CXR to evaluate fluid status.     Pulm  Encourage incentive spirometer  Wean nasal cannula as tolerated  Albuterol PRN, fluticasone PRN    FENGI - h/o DDKT  History of DDKT  Appreciate nephrology recs  Stable Cr - will obtain duplex ultrasound of transplanted kidney.   Holding torsemide; gave 40IV lasix today for diruesis.   Continue mycophenolate, prednisone, tacrolimus  Continue tamsulosin  Urinary retention - winters placed  4/2  Regular diet  Continue pepcid  Zofran PRN    Heme  Trend hemoglobin  Continue heparin drip  Will hold heparin gtt 6h prior to the OR.     Endo  Continue home levothyroxine   FSH, SSI    D/w fellow, Dr. Fernando Brown MD  Vascular Surgery p. [phone]      Subjective   No acute events overnight. Remains stable without significant pain.    Objective   Vitals:  Heart Rate:  [54-79]   Temp:  [36.1 °C (97 °F)-37.2 °C (99 °F)]   Resp:  [16-20]   BP: (135-144)/(64-69)   Weight:  [85.9 kg (189 lb 4.2 oz)]   SpO2:  [92 %-96 %]     Exam:  Constitutional: No acute distress, resting comfortably  Neuro:  AOx3, grossly intact  ENMT: moist mucous membranes  CV: no tachycardia  Pulm: non-labored on NC  GI: soft, non-tender, non-distended  Skin: warm and dry  Musculoskeletal: moving all extremities  Extremities: left groin puncture without hematoma. Right hallux distal superficial dry ulceration  Pulse Exam: monophasic/mixed venous right PT, left pedal signals; motor/sensory intact    Labs:  Results from last 7 days   Lab Units 04/06/25  0547 04/05/25  0811 04/04/25  0705   WBC AUTO x10*3/uL 7.2 7.2 6.0   HEMOGLOBIN g/dL 10.4* 10.4* 10.5*   PLATELETS AUTO x10*3/uL 225 214 207      Results from last 7 days   Lab Units 04/06/25  0547 04/05/25  0811 04/04/25  0705   SODIUM mmol/L 130* 131* 133*   POTASSIUM mmol/L 4.6 4.2 4.5   CHLORIDE mmol/L 97* 98 101   CO2 mmol/L 21 20* 22   BUN mg/dL 35* 32* 30*   CREATININE mg/dL 2.57* 2.70* 2.26*   GLUCOSE mg/dL 112* 110* 94   MAGNESIUM mg/dL 2.34 2.24 2.22   PHOSPHORUS mg/dL 4.4 3.8 4.5             Results from last 7 days   Lab Units 04/06/25  0547 04/05/25  0811 04/04/25  0705   ANTI XA UNFRACTIONATED IU/mL 0.7 0.7 0.7

## 2025-04-06 NOTE — SIGNIFICANT EVENT
Immunosuppression note    Patient was seen. Lab was reviewed. Full note to follow.    A/P    Kidney allograft function - BASHIR, likely ATN    Immunosuppression - Reduce Envarsus TO 1.5 MG DAILY, PRED 5 MG    Cont winters  Oral hydration  Nepro tid to increase solute for decreased sodium level  Monitor UOP and renal function  Daily weight  Strict I/O  Left leg edema, on heparin gtt. Defer to primary team.       Ash Demarco MD

## 2025-04-07 ENCOUNTER — ANESTHESIA (OUTPATIENT)
Dept: OPERATING ROOM | Facility: HOSPITAL | Age: 75
End: 2025-04-07
Payer: MEDICARE

## 2025-04-07 ENCOUNTER — APPOINTMENT (OUTPATIENT)
Dept: RADIOLOGY | Facility: HOSPITAL | Age: 75
DRG: 271 | End: 2025-04-07
Payer: MEDICARE

## 2025-04-07 ENCOUNTER — APPOINTMENT (OUTPATIENT)
Dept: UROLOGY | Facility: CLINIC | Age: 75
End: 2025-04-07
Payer: MEDICARE

## 2025-04-07 ENCOUNTER — PHARMACY VISIT (OUTPATIENT)
Dept: PHARMACY | Facility: CLINIC | Age: 75
End: 2025-04-07
Payer: COMMERCIAL

## 2025-04-07 ENCOUNTER — ANESTHESIA EVENT (OUTPATIENT)
Dept: OPERATING ROOM | Facility: HOSPITAL | Age: 75
End: 2025-04-07
Payer: MEDICARE

## 2025-04-07 LAB
ACT BLD: 149 SEC (ref 83–199)
ACT BLD: 168 SEC (ref 83–199)
ACT BLD: 239 SEC (ref 83–199)
ACT BLD: 240 SEC (ref 83–199)
ACT BLD: 260 SEC (ref 83–199)
ACT BLD: 260 SEC (ref 83–199)
ACT BLD: 263 SEC (ref 83–199)
ACT BLD: 273 SEC (ref 83–199)
ACT BLD: 274 SEC (ref 83–199)
ACT BLD: 276 SEC (ref 83–199)
ACT BLD: 277 SEC (ref 83–199)
ACT BLD: 289 SEC (ref 83–199)
ACT BLD: 290 SEC (ref 83–199)
ACT BLD: 290 SEC (ref 83–199)
ACT BLD: 292 SEC (ref 83–199)
ACT BLD: 298 SEC (ref 83–199)
ACT BLD: 311 SEC (ref 83–199)
ALBUMIN SERPL BCP-MCNC: 3.5 G/DL (ref 3.4–5)
ALBUMIN SERPL BCP-MCNC: 3.7 G/DL (ref 3.4–5)
ALBUMIN SERPL BCP-MCNC: 3.9 G/DL (ref 3.4–5)
ANION GAP BLDA CALCULATED.4IONS-SCNC: 11 MMO/L (ref 10–25)
ANION GAP BLDA CALCULATED.4IONS-SCNC: 13 MMO/L (ref 10–25)
ANION GAP BLDA CALCULATED.4IONS-SCNC: 13 MMO/L (ref 10–25)
ANION GAP BLDA CALCULATED.4IONS-SCNC: 14 MMO/L (ref 10–25)
ANION GAP BLDA CALCULATED.4IONS-SCNC: ABNORMAL MMOL/L
ANION GAP BLDA CALCULATED.4IONS-SCNC: ABNORMAL MMOL/L
ANION GAP SERPL CALC-SCNC: 15 MMOL/L (ref 10–20)
ANION GAP SERPL CALC-SCNC: 16 MMOL/L (ref 10–20)
ANION GAP SERPL CALC-SCNC: 17 MMOL/L (ref 10–20)
APTT PPP: 36 SECONDS (ref 26–36)
APTT PPP: 37 SECONDS (ref 26–36)
ATRIAL RATE: 91 BPM
BASE EXCESS BLDA CALC-SCNC: -2.8 MMOL/L (ref -2–3)
BASE EXCESS BLDA CALC-SCNC: -3.5 MMOL/L (ref -2–3)
BASE EXCESS BLDA CALC-SCNC: -5.2 MMOL/L (ref -2–3)
BASE EXCESS BLDA CALC-SCNC: -5.4 MMOL/L (ref -2–3)
BASE EXCESS BLDA CALC-SCNC: 0 MMOL/L (ref -2–3)
BASE EXCESS BLDA CALC-SCNC: 0.6 MMOL/L (ref -2–3)
BODY TEMPERATURE: 37 DEGREES CELSIUS
BUN SERPL-MCNC: 40 MG/DL (ref 6–23)
BUN SERPL-MCNC: 40 MG/DL (ref 6–23)
BUN SERPL-MCNC: 41 MG/DL (ref 6–23)
CA-I BLD-SCNC: 1.17 MMOL/L (ref 1.1–1.33)
CA-I BLD-SCNC: 1.19 MMOL/L (ref 1.1–1.33)
CA-I BLDA-SCNC: 1.09 MMOL/L (ref 1.1–1.33)
CA-I BLDA-SCNC: 1.19 MMOL/L (ref 1.1–1.33)
CA-I BLDA-SCNC: 1.21 MMOL/L (ref 1.1–1.33)
CA-I BLDA-SCNC: 1.22 MMOL/L (ref 1.1–1.33)
CA-I BLDA-SCNC: 1.23 MMOL/L (ref 1.1–1.33)
CA-I BLDA-SCNC: 1.29 MMOL/L (ref 1.1–1.33)
CALCIUM SERPL-MCNC: 9 MG/DL (ref 8.6–10.6)
CALCIUM SERPL-MCNC: 9.1 MG/DL (ref 8.6–10.6)
CALCIUM SERPL-MCNC: 9.5 MG/DL (ref 8.6–10.6)
CHLORIDE BLDA-SCNC: 100 MMOL/L (ref 98–107)
CHLORIDE BLDA-SCNC: 100 MMOL/L (ref 98–107)
CHLORIDE BLDA-SCNC: 99 MMOL/L (ref 98–107)
CHLORIDE BLDA-SCNC: 99 MMOL/L (ref 98–107)
CHLORIDE BLDA-SCNC: ABNORMAL MMOL/L
CHLORIDE BLDA-SCNC: ABNORMAL MMOL/L
CHLORIDE SERPL-SCNC: 100 MMOL/L (ref 98–107)
CHLORIDE SERPL-SCNC: 100 MMOL/L (ref 98–107)
CHLORIDE SERPL-SCNC: 99 MMOL/L (ref 98–107)
CO2 SERPL-SCNC: 21 MMOL/L (ref 21–32)
CO2 SERPL-SCNC: 22 MMOL/L (ref 21–32)
CO2 SERPL-SCNC: 23 MMOL/L (ref 21–32)
COHGB MFR BLDA: 1.7 %
CREAT SERPL-MCNC: 2.63 MG/DL (ref 0.5–1.3)
CREAT SERPL-MCNC: 2.67 MG/DL (ref 0.5–1.3)
CREAT SERPL-MCNC: 2.83 MG/DL (ref 0.5–1.3)
DO-HGB MFR BLDA: 5.4 % (ref 0–5)
EGFRCR SERPLBLD CKD-EPI 2021: 22 ML/MIN/1.73M*2
EGFRCR SERPLBLD CKD-EPI 2021: 24 ML/MIN/1.73M*2
EGFRCR SERPLBLD CKD-EPI 2021: 24 ML/MIN/1.73M*2
ERYTHROCYTE [DISTWIDTH] IN BLOOD BY AUTOMATED COUNT: 13.9 % (ref 11.5–14.5)
ERYTHROCYTE [DISTWIDTH] IN BLOOD BY AUTOMATED COUNT: 14.1 % (ref 11.5–14.5)
ERYTHROCYTE [DISTWIDTH] IN BLOOD BY AUTOMATED COUNT: 14.2 % (ref 11.5–14.5)
GLUCOSE BLD MANUAL STRIP-MCNC: 144 MG/DL (ref 74–99)
GLUCOSE BLD MANUAL STRIP-MCNC: 147 MG/DL (ref 74–99)
GLUCOSE BLDA-MCNC: 112 MG/DL (ref 74–99)
GLUCOSE BLDA-MCNC: 144 MG/DL (ref 74–99)
GLUCOSE BLDA-MCNC: 147 MG/DL (ref 74–99)
GLUCOSE BLDA-MCNC: 148 MG/DL (ref 74–99)
GLUCOSE BLDA-MCNC: 168 MG/DL (ref 74–99)
GLUCOSE BLDA-MCNC: 99 MG/DL (ref 74–99)
GLUCOSE SERPL-MCNC: 102 MG/DL (ref 74–99)
GLUCOSE SERPL-MCNC: 119 MG/DL (ref 74–99)
GLUCOSE SERPL-MCNC: 134 MG/DL (ref 74–99)
HCO3 BLDA-SCNC: 19.7 MMOL/L (ref 22–26)
HCO3 BLDA-SCNC: 20.7 MMOL/L (ref 22–26)
HCO3 BLDA-SCNC: 21.2 MMOL/L (ref 22–26)
HCO3 BLDA-SCNC: 21.8 MMOL/L (ref 22–26)
HCO3 BLDA-SCNC: 24 MMOL/L (ref 22–26)
HCO3 BLDA-SCNC: 24.8 MMOL/L (ref 22–26)
HCT VFR BLD AUTO: 27.5 % (ref 41–52)
HCT VFR BLD AUTO: 28.7 % (ref 41–52)
HCT VFR BLD AUTO: 35.4 % (ref 41–52)
HCT VFR BLD EST: 28 % (ref 41–52)
HCT VFR BLD EST: 28 % (ref 41–52)
HCT VFR BLD EST: 29 % (ref 41–52)
HCT VFR BLD EST: 32 % (ref 41–52)
HCT VFR BLD EST: 32 % (ref 41–52)
HCT VFR BLD EST: 34 % (ref 41–52)
HGB BLD-MCNC: 10.7 G/DL (ref 13.5–17.5)
HGB BLD-MCNC: 8.7 G/DL (ref 13.5–17.5)
HGB BLD-MCNC: 9 G/DL (ref 13.5–17.5)
HGB BLDA-MCNC: 10.6 G/DL (ref 13.5–17.5)
HGB BLDA-MCNC: 10.6 G/DL (ref 13.5–17.5)
HGB BLDA-MCNC: 11.2 G/DL (ref 13.5–17.5)
HGB BLDA-MCNC: 11.2 G/DL (ref 13.5–17.5)
HGB BLDA-MCNC: 9.2 G/DL (ref 13.5–17.5)
HGB BLDA-MCNC: 9.4 G/DL (ref 13.5–17.5)
HGB BLDA-MCNC: 9.5 G/DL (ref 13.5–17.5)
INHALED O2 CONCENTRATION: 21 %
INHALED O2 CONCENTRATION: 40 %
INHALED O2 CONCENTRATION: 60 %
INHALED O2 CONCENTRATION: 65 %
INR PPP: 1.2 (ref 0.9–1.1)
INR PPP: 1.3 (ref 0.9–1.1)
LACTATE BLDA-SCNC: 0.7 MMOL/L (ref 0.4–2)
LACTATE BLDA-SCNC: 0.9 MMOL/L (ref 0.4–2)
LACTATE BLDA-SCNC: 1.1 MMOL/L (ref 0.4–2)
LACTATE BLDA-SCNC: 1.2 MMOL/L (ref 0.4–2)
LACTATE BLDA-SCNC: 1.2 MMOL/L (ref 0.4–2)
LACTATE BLDA-SCNC: 1.4 MMOL/L (ref 0.4–2)
MAGNESIUM SERPL-MCNC: 2.33 MG/DL (ref 1.6–2.4)
MAGNESIUM SERPL-MCNC: 2.49 MG/DL (ref 1.6–2.4)
MAGNESIUM SERPL-MCNC: 2.97 MG/DL (ref 1.6–2.4)
MCH RBC QN AUTO: 24.9 PG (ref 26–34)
MCH RBC QN AUTO: 25 PG (ref 26–34)
MCH RBC QN AUTO: 25.5 PG (ref 26–34)
MCHC RBC AUTO-ENTMCNC: 30.2 G/DL (ref 32–36)
MCHC RBC AUTO-ENTMCNC: 30.3 G/DL (ref 32–36)
MCHC RBC AUTO-ENTMCNC: 32.7 G/DL (ref 32–36)
MCV RBC AUTO: 78 FL (ref 80–100)
MCV RBC AUTO: 83 FL (ref 80–100)
MCV RBC AUTO: 83 FL (ref 80–100)
METHGB MFR BLDA: 0.2 % (ref 0–1.5)
NRBC BLD-RTO: 0 /100 WBCS (ref 0–0)
OXYHGB MFR BLDA: 92.6 % (ref 94–98)
OXYHGB MFR BLDA: 92.6 % (ref 94–98)
OXYHGB MFR BLDA: 93.4 % (ref 94–98)
OXYHGB MFR BLDA: 93.6 % (ref 94–98)
OXYHGB MFR BLDA: 94.6 % (ref 94–98)
OXYHGB MFR BLDA: 96.4 % (ref 94–98)
OXYHGB MFR BLDA: 96.6 % (ref 94–98)
P AXIS: 69 DEGREES
P OFFSET: 158 MS
P ONSET: 118 MS
PCO2 BLDA: 29 MM HG (ref 38–42)
PCO2 BLDA: 33 MM HG (ref 38–42)
PCO2 BLDA: 36 MM HG (ref 38–42)
PCO2 BLDA: 40 MM HG (ref 38–42)
PCO2 BLDA: 42 MM HG (ref 38–42)
PCO2 BLDA: 44 MM HG (ref 38–42)
PH BLDA: 7.29 PH (ref 7.38–7.42)
PH BLDA: 7.3 PH (ref 7.38–7.42)
PH BLDA: 7.39 PH (ref 7.38–7.42)
PH BLDA: 7.4 PH (ref 7.38–7.42)
PH BLDA: 7.44 PH (ref 7.38–7.42)
PH BLDA: 7.47 PH (ref 7.38–7.42)
PHOSPHATE SERPL-MCNC: 4 MG/DL (ref 2.5–4.9)
PHOSPHATE SERPL-MCNC: 4 MG/DL (ref 2.5–4.9)
PHOSPHATE SERPL-MCNC: 4.9 MG/DL (ref 2.5–4.9)
PLATELET # BLD AUTO: 172 X10*3/UL (ref 150–450)
PLATELET # BLD AUTO: 202 X10*3/UL (ref 150–450)
PLATELET # BLD AUTO: 252 X10*3/UL (ref 150–450)
PO2 BLDA: 119 MM HG (ref 85–95)
PO2 BLDA: 67 MM HG (ref 85–95)
PO2 BLDA: 74 MM HG (ref 85–95)
PO2 BLDA: 76 MM HG (ref 85–95)
PO2 BLDA: 82 MM HG (ref 85–95)
PO2 BLDA: 98 MM HG (ref 85–95)
POTASSIUM BLDA-SCNC: 4.5 MMOL/L (ref 3.5–5.3)
POTASSIUM BLDA-SCNC: 4.6 MMOL/L (ref 3.5–5.3)
POTASSIUM BLDA-SCNC: 4.6 MMOL/L (ref 3.5–5.3)
POTASSIUM BLDA-SCNC: 4.7 MMOL/L (ref 3.5–5.3)
POTASSIUM BLDA-SCNC: 5.5 MMOL/L (ref 3.5–5.3)
POTASSIUM BLDA-SCNC: 5.6 MMOL/L (ref 3.5–5.3)
POTASSIUM SERPL-SCNC: 4.5 MMOL/L (ref 3.5–5.3)
POTASSIUM SERPL-SCNC: 4.5 MMOL/L (ref 3.5–5.3)
POTASSIUM SERPL-SCNC: 4.6 MMOL/L (ref 3.5–5.3)
PR INTERVAL: 198 MS
PROTHROMBIN TIME: 13.1 SECONDS (ref 9.8–12.4)
PROTHROMBIN TIME: 14.6 SECONDS (ref 9.8–12.4)
Q ONSET: 217 MS
QRS COUNT: 15 BEATS
QRS DURATION: 86 MS
QT INTERVAL: 394 MS
QTC CALCULATION(BAZETT): 484 MS
QTC FREDERICIA: 453 MS
R AXIS: -16 DEGREES
RBC # BLD AUTO: 3.48 X10*6/UL (ref 4.5–5.9)
RBC # BLD AUTO: 3.53 X10*6/UL (ref 4.5–5.9)
RBC # BLD AUTO: 4.29 X10*6/UL (ref 4.5–5.9)
SAO2 % BLDA: 95 % (ref 94–100)
SAO2 % BLDA: 97 % (ref 94–100)
SAO2 % BLDA: 98 % (ref 94–100)
SAO2 % BLDA: 99 % (ref 94–100)
SODIUM BLDA-SCNC: 127 MMOL/L (ref 136–145)
SODIUM BLDA-SCNC: 127 MMOL/L (ref 136–145)
SODIUM BLDA-SCNC: 128 MMOL/L (ref 136–145)
SODIUM BLDA-SCNC: 131 MMOL/L (ref 136–145)
SODIUM BLDA-SCNC: 132 MMOL/L (ref 136–145)
SODIUM BLDA-SCNC: 132 MMOL/L (ref 136–145)
SODIUM SERPL-SCNC: 131 MMOL/L (ref 136–145)
SODIUM SERPL-SCNC: 133 MMOL/L (ref 136–145)
SODIUM SERPL-SCNC: 134 MMOL/L (ref 136–145)
T AXIS: 192 DEGREES
T OFFSET: 414 MS
TACROLIMUS BLD-MCNC: 5 NG/ML
UFH PPP CHRO-ACNC: 0.1 IU/ML
UFH PPP CHRO-ACNC: 0.1 IU/ML
UFH PPP CHRO-ACNC: 0.6 IU/ML
VENTRICULAR RATE: 91 BPM
WBC # BLD AUTO: 9.3 X10*3/UL (ref 4.4–11.3)
WBC # BLD AUTO: 9.6 X10*3/UL (ref 4.4–11.3)
WBC # BLD AUTO: 9.6 X10*3/UL (ref 4.4–11.3)

## 2025-04-07 PROCEDURE — 80069 RENAL FUNCTION PANEL: CPT

## 2025-04-07 PROCEDURE — 2500000001 HC RX 250 WO HCPCS SELF ADMINISTERED DRUGS (ALT 637 FOR MEDICARE OP): Performed by: INTERNAL MEDICINE

## 2025-04-07 PROCEDURE — 3600000010 HC OR TIME - EACH INCREMENTAL 1 MINUTE - PROCEDURE LEVEL FIVE: Performed by: STUDENT IN AN ORGANIZED HEALTH CARE EDUCATION/TRAINING PROGRAM

## 2025-04-07 PROCEDURE — 82947 ASSAY GLUCOSE BLOOD QUANT: CPT

## 2025-04-07 PROCEDURE — 85027 COMPLETE CBC AUTOMATED: CPT

## 2025-04-07 PROCEDURE — C2623 CATH, TRANSLUMIN, DRUG-COAT: HCPCS | Performed by: STUDENT IN AN ORGANIZED HEALTH CARE EDUCATION/TRAINING PROGRAM

## 2025-04-07 PROCEDURE — P9045 ALBUMIN (HUMAN), 5%, 250 ML: HCPCS | Mod: JZ,TB

## 2025-04-07 PROCEDURE — 2500000001 HC RX 250 WO HCPCS SELF ADMINISTERED DRUGS (ALT 637 FOR MEDICARE OP)

## 2025-04-07 PROCEDURE — 37799 UNLISTED PX VASCULAR SURGERY: CPT

## 2025-04-07 PROCEDURE — 71045 X-RAY EXAM CHEST 1 VIEW: CPT

## 2025-04-07 PROCEDURE — 047H3DZ DILATION OF RIGHT EXTERNAL ILIAC ARTERY WITH INTRALUMINAL DEVICE, PERCUTANEOUS APPROACH: ICD-10-PCS | Performed by: STUDENT IN AN ORGANIZED HEALTH CARE EDUCATION/TRAINING PROGRAM

## 2025-04-07 PROCEDURE — 2500000005 HC RX 250 GENERAL PHARMACY W/O HCPCS: Performed by: STUDENT IN AN ORGANIZED HEALTH CARE EDUCATION/TRAINING PROGRAM

## 2025-04-07 PROCEDURE — 85347 COAGULATION TIME ACTIVATED: CPT

## 2025-04-07 PROCEDURE — C1725 CATH, TRANSLUMIN NON-LASER: HCPCS | Performed by: STUDENT IN AN ORGANIZED HEALTH CARE EDUCATION/TRAINING PROGRAM

## 2025-04-07 PROCEDURE — 2500000004 HC RX 250 GENERAL PHARMACY W/ HCPCS (ALT 636 FOR OP/ED)

## 2025-04-07 PROCEDURE — 3700000001 HC GENERAL ANESTHESIA TIME - INITIAL BASE CHARGE: Performed by: STUDENT IN AN ORGANIZED HEALTH CARE EDUCATION/TRAINING PROGRAM

## 2025-04-07 PROCEDURE — C1887 CATHETER, GUIDING: HCPCS | Performed by: STUDENT IN AN ORGANIZED HEALTH CARE EDUCATION/TRAINING PROGRAM

## 2025-04-07 PROCEDURE — C1769 GUIDE WIRE: HCPCS | Performed by: STUDENT IN AN ORGANIZED HEALTH CARE EDUCATION/TRAINING PROGRAM

## 2025-04-07 PROCEDURE — 82375 ASSAY CARBOXYHB QUANT: CPT

## 2025-04-07 PROCEDURE — 82330 ASSAY OF CALCIUM: CPT

## 2025-04-07 PROCEDURE — 83735 ASSAY OF MAGNESIUM: CPT

## 2025-04-07 PROCEDURE — 84132 ASSAY OF SERUM POTASSIUM: CPT

## 2025-04-07 PROCEDURE — 99291 CRITICAL CARE FIRST HOUR: CPT

## 2025-04-07 PROCEDURE — 85610 PROTHROMBIN TIME: CPT

## 2025-04-07 PROCEDURE — 2500000004 HC RX 250 GENERAL PHARMACY W/ HCPCS (ALT 636 FOR OP/ED): Performed by: PHYSICAL THERAPIST

## 2025-04-07 PROCEDURE — 3700000002 HC GENERAL ANESTHESIA TIME - EACH INCREMENTAL 1 MINUTE: Performed by: STUDENT IN AN ORGANIZED HEALTH CARE EDUCATION/TRAINING PROGRAM

## 2025-04-07 PROCEDURE — 80197 ASSAY OF TACROLIMUS: CPT | Performed by: INTERNAL MEDICINE

## 2025-04-07 PROCEDURE — 04CK3ZZ EXTIRPATION OF MATTER FROM RIGHT FEMORAL ARTERY, PERCUTANEOUS APPROACH: ICD-10-PCS | Performed by: STUDENT IN AN ORGANIZED HEALTH CARE EDUCATION/TRAINING PROGRAM

## 2025-04-07 PROCEDURE — 2500000002 HC RX 250 W HCPCS SELF ADMINISTERED DRUGS (ALT 637 FOR MEDICARE OP, ALT 636 FOR OP/ED)

## 2025-04-07 PROCEDURE — 85520 HEPARIN ASSAY: CPT

## 2025-04-07 PROCEDURE — 2500000005 HC RX 250 GENERAL PHARMACY W/O HCPCS

## 2025-04-07 PROCEDURE — 2020000001 HC ICU ROOM DAILY

## 2025-04-07 PROCEDURE — 36415 COLL VENOUS BLD VENIPUNCTURE: CPT

## 2025-04-07 PROCEDURE — 047C3DZ DILATION OF RIGHT COMMON ILIAC ARTERY WITH INTRALUMINAL DEVICE, PERCUTANEOUS APPROACH: ICD-10-PCS | Performed by: STUDENT IN AN ORGANIZED HEALTH CARE EDUCATION/TRAINING PROGRAM

## 2025-04-07 PROCEDURE — 94002 VENT MGMT INPAT INIT DAY: CPT

## 2025-04-07 PROCEDURE — 2780000003 HC OR 278 NO HCPCS: Performed by: STUDENT IN AN ORGANIZED HEALTH CARE EDUCATION/TRAINING PROGRAM

## 2025-04-07 PROCEDURE — 2720000007 HC OR 272 NO HCPCS: Performed by: STUDENT IN AN ORGANIZED HEALTH CARE EDUCATION/TRAINING PROGRAM

## 2025-04-07 PROCEDURE — C1874 STENT, COATED/COV W/DEL SYS: HCPCS | Performed by: STUDENT IN AN ORGANIZED HEALTH CARE EDUCATION/TRAINING PROGRAM

## 2025-04-07 PROCEDURE — 2500000002 HC RX 250 W HCPCS SELF ADMINISTERED DRUGS (ALT 637 FOR MEDICARE OP, ALT 636 FOR OP/ED): Performed by: INTERNAL MEDICINE

## 2025-04-07 PROCEDURE — C1894 INTRO/SHEATH, NON-LASER: HCPCS | Performed by: STUDENT IN AN ORGANIZED HEALTH CARE EDUCATION/TRAINING PROGRAM

## 2025-04-07 PROCEDURE — 2500000004 HC RX 250 GENERAL PHARMACY W/ HCPCS (ALT 636 FOR OP/ED): Performed by: INTERNAL MEDICINE

## 2025-04-07 PROCEDURE — 2500000004 HC RX 250 GENERAL PHARMACY W/ HCPCS (ALT 636 FOR OP/ED): Performed by: STUDENT IN AN ORGANIZED HEALTH CARE EDUCATION/TRAINING PROGRAM

## 2025-04-07 PROCEDURE — C1762 CONN TISS, HUMAN(INC FASCIA): HCPCS | Performed by: STUDENT IN AN ORGANIZED HEALTH CARE EDUCATION/TRAINING PROGRAM

## 2025-04-07 PROCEDURE — 71045 X-RAY EXAM CHEST 1 VIEW: CPT | Performed by: STUDENT IN AN ORGANIZED HEALTH CARE EDUCATION/TRAINING PROGRAM

## 2025-04-07 PROCEDURE — 3600000005 HC OR TIME - INITIAL BASE CHARGE - PROCEDURE LEVEL FIVE: Performed by: STUDENT IN AN ORGANIZED HEALTH CARE EDUCATION/TRAINING PROGRAM

## 2025-04-07 DEVICE — XENOSURE BIOLOGIC PATCH, 0.8CM X 8CM, EIFU
Type: IMPLANTABLE DEVICE | Site: ARTERIAL | Status: FUNCTIONAL
Brand: XENOSURE BIOLOGIC PATCH

## 2025-04-07 DEVICE — VIABAHN SX ENDO HEPARIN 18 RO 7MMX5CM 7FR 120CM CATH
Type: IMPLANTABLE DEVICE | Site: ARTERIAL | Status: FUNCTIONAL
Brand: GORE VIABAHN ENDOPROSTHESIS WITH HEPARIN

## 2025-04-07 DEVICE — BX2 HEP REDUCED PROFILE BX2 7MMX39MM 6FR 135CM CATH
Type: IMPLANTABLE DEVICE | Site: ARTERIAL | Status: FUNCTIONAL
Brand: GORE VIABAHN VBX BALLOON EXPANDABLE ENDO

## 2025-04-07 RX ORDER — PANTOPRAZOLE SODIUM 40 MG/10ML
40 INJECTION, POWDER, LYOPHILIZED, FOR SOLUTION INTRAVENOUS
Status: DISCONTINUED | OUTPATIENT
Start: 2025-04-08 | End: 2025-04-07

## 2025-04-07 RX ORDER — MAGNESIUM SULFATE HEPTAHYDRATE 40 MG/ML
INJECTION, SOLUTION INTRAVENOUS AS NEEDED
Status: DISCONTINUED | OUTPATIENT
Start: 2025-04-07 | End: 2025-04-07

## 2025-04-07 RX ORDER — CEFAZOLIN SODIUM 2 G/100ML
2 INJECTION, SOLUTION INTRAVENOUS EVERY 8 HOURS
Status: COMPLETED | OUTPATIENT
Start: 2025-04-07 | End: 2025-04-08

## 2025-04-07 RX ORDER — PROTAMINE SULFATE 10 MG/ML
INJECTION, SOLUTION INTRAVENOUS AS NEEDED
Status: DISCONTINUED | OUTPATIENT
Start: 2025-04-07 | End: 2025-04-07

## 2025-04-07 RX ORDER — PROPOFOL 10 MG/ML
0-50 INJECTION, EMULSION INTRAVENOUS CONTINUOUS
Status: DISCONTINUED | OUTPATIENT
Start: 2025-04-07 | End: 2025-04-08

## 2025-04-07 RX ORDER — LIDOCAINE HCL/PF 100 MG/5ML
SYRINGE (ML) INTRAVENOUS AS NEEDED
Status: DISCONTINUED | OUTPATIENT
Start: 2025-04-07 | End: 2025-04-07

## 2025-04-07 RX ORDER — FUROSEMIDE 10 MG/ML
INJECTION INTRAMUSCULAR; INTRAVENOUS AS NEEDED
Status: DISCONTINUED | OUTPATIENT
Start: 2025-04-07 | End: 2025-04-07

## 2025-04-07 RX ORDER — MAGNESIUM SULFATE HEPTAHYDRATE 40 MG/ML
2 INJECTION, SOLUTION INTRAVENOUS EVERY 6 HOURS PRN
Status: DISCONTINUED | OUTPATIENT
Start: 2025-04-07 | End: 2025-04-09

## 2025-04-07 RX ORDER — PROPOFOL 10 MG/ML
INJECTION, EMULSION INTRAVENOUS CONTINUOUS PRN
Status: DISCONTINUED | OUTPATIENT
Start: 2025-04-07 | End: 2025-04-07

## 2025-04-07 RX ORDER — CALCIUM GLUCONATE 20 MG/ML
2 INJECTION, SOLUTION INTRAVENOUS EVERY 6 HOURS PRN
Status: DISCONTINUED | OUTPATIENT
Start: 2025-04-07 | End: 2025-04-09

## 2025-04-07 RX ORDER — CALCIUM CHLORIDE INJECTION 100 MG/ML
INJECTION, SOLUTION INTRAVENOUS AS NEEDED
Status: DISCONTINUED | OUTPATIENT
Start: 2025-04-07 | End: 2025-04-07

## 2025-04-07 RX ORDER — ROCURONIUM BROMIDE 10 MG/ML
INJECTION, SOLUTION INTRAVENOUS AS NEEDED
Status: DISCONTINUED | OUTPATIENT
Start: 2025-04-07 | End: 2025-04-07

## 2025-04-07 RX ORDER — SODIUM BICARBONATE 1 MEQ/ML
SYRINGE (ML) INTRAVENOUS AS NEEDED
Status: DISCONTINUED | OUTPATIENT
Start: 2025-04-07 | End: 2025-04-07

## 2025-04-07 RX ORDER — FENTANYL CITRATE 50 UG/ML
INJECTION, SOLUTION INTRAMUSCULAR; INTRAVENOUS AS NEEDED
Status: DISCONTINUED | OUTPATIENT
Start: 2025-04-07 | End: 2025-04-07

## 2025-04-07 RX ORDER — HYDRALAZINE HYDROCHLORIDE 20 MG/ML
10 INJECTION INTRAMUSCULAR; INTRAVENOUS EVERY 4 HOURS PRN
Status: DISCONTINUED | OUTPATIENT
Start: 2025-04-07 | End: 2025-04-08

## 2025-04-07 RX ORDER — ESOMEPRAZOLE MAGNESIUM 40 MG/1
40 GRANULE, DELAYED RELEASE ORAL
Status: DISCONTINUED | OUTPATIENT
Start: 2025-04-08 | End: 2025-04-07

## 2025-04-07 RX ORDER — PANTOPRAZOLE SODIUM 40 MG/1
40 TABLET, DELAYED RELEASE ORAL
Status: DISCONTINUED | OUTPATIENT
Start: 2025-04-08 | End: 2025-04-07

## 2025-04-07 RX ORDER — PANTOPRAZOLE SODIUM 40 MG/1
40 TABLET, DELAYED RELEASE ORAL
Status: DISCONTINUED | OUTPATIENT
Start: 2025-04-07 | End: 2025-04-09

## 2025-04-07 RX ORDER — HEPARIN SODIUM 1000 [USP'U]/ML
INJECTION, SOLUTION INTRAVENOUS; SUBCUTANEOUS AS NEEDED
Status: DISCONTINUED | OUTPATIENT
Start: 2025-04-07 | End: 2025-04-07

## 2025-04-07 RX ORDER — PRAVASTATIN SODIUM 40 MG/1
40 TABLET ORAL NIGHTLY
Qty: 90 TABLET | Refills: 0 | Status: SHIPPED | OUTPATIENT
Start: 2025-04-07

## 2025-04-07 RX ORDER — AMOXICILLIN 250 MG
2 CAPSULE ORAL 2 TIMES DAILY
Status: DISCONTINUED | OUTPATIENT
Start: 2025-04-07 | End: 2025-04-10 | Stop reason: HOSPADM

## 2025-04-07 RX ORDER — ALBUTEROL SULFATE 90 UG/1
INHALANT RESPIRATORY (INHALATION) AS NEEDED
Status: DISCONTINUED | OUTPATIENT
Start: 2025-04-07 | End: 2025-04-07

## 2025-04-07 RX ORDER — CEFAZOLIN 1 G/1
INJECTION, POWDER, FOR SOLUTION INTRAVENOUS AS NEEDED
Status: DISCONTINUED | OUTPATIENT
Start: 2025-04-07 | End: 2025-04-07

## 2025-04-07 RX ORDER — NITROGLYCERIN 20 MG/100ML
INJECTION INTRAVENOUS AS NEEDED
Status: DISCONTINUED | OUTPATIENT
Start: 2025-04-07 | End: 2025-04-07

## 2025-04-07 RX ORDER — ALBUMIN HUMAN 50 G/1000ML
SOLUTION INTRAVENOUS AS NEEDED
Status: DISCONTINUED | OUTPATIENT
Start: 2025-04-07 | End: 2025-04-07

## 2025-04-07 RX ORDER — DEXTROSE 50 % IN WATER (D50W) INTRAVENOUS SYRINGE
AS NEEDED
Status: DISCONTINUED | OUTPATIENT
Start: 2025-04-07 | End: 2025-04-07

## 2025-04-07 RX ORDER — NORETHINDRONE AND ETHINYL ESTRADIOL 0.5-0.035
KIT ORAL AS NEEDED
Status: DISCONTINUED | OUTPATIENT
Start: 2025-04-07 | End: 2025-04-07

## 2025-04-07 RX ORDER — HYDROMORPHONE HYDROCHLORIDE 0.2 MG/ML
0.2 INJECTION INTRAMUSCULAR; INTRAVENOUS; SUBCUTANEOUS
Status: DISCONTINUED | OUTPATIENT
Start: 2025-04-07 | End: 2025-04-08

## 2025-04-07 RX ORDER — LABETALOL HYDROCHLORIDE 5 MG/ML
20 INJECTION, SOLUTION INTRAVENOUS EVERY 4 HOURS PRN
Status: DISCONTINUED | OUTPATIENT
Start: 2025-04-07 | End: 2025-04-08

## 2025-04-07 RX ORDER — INSULIN LISPRO 100 [IU]/ML
0-5 INJECTION, SOLUTION INTRAVENOUS; SUBCUTANEOUS EVERY 4 HOURS
Status: DISCONTINUED | OUTPATIENT
Start: 2025-04-07 | End: 2025-04-10 | Stop reason: HOSPADM

## 2025-04-07 RX ORDER — CALCIUM GLUCONATE 20 MG/ML
1 INJECTION, SOLUTION INTRAVENOUS EVERY 6 HOURS PRN
Status: DISCONTINUED | OUTPATIENT
Start: 2025-04-07 | End: 2025-04-09

## 2025-04-07 RX ORDER — ETOMIDATE 2 MG/ML
INJECTION INTRAVENOUS AS NEEDED
Status: DISCONTINUED | OUTPATIENT
Start: 2025-04-07 | End: 2025-04-07

## 2025-04-07 RX ORDER — SODIUM CHLORIDE, SODIUM LACTATE, POTASSIUM CHLORIDE, CALCIUM CHLORIDE 600; 310; 30; 20 MG/100ML; MG/100ML; MG/100ML; MG/100ML
INJECTION, SOLUTION INTRAVENOUS CONTINUOUS PRN
Status: DISCONTINUED | OUTPATIENT
Start: 2025-04-07 | End: 2025-04-07

## 2025-04-07 RX ORDER — SUCCINYLCHOLINE CHLORIDE 100 MG/5ML
SYRINGE (ML) INTRAVENOUS AS NEEDED
Status: DISCONTINUED | OUTPATIENT
Start: 2025-04-07 | End: 2025-04-07

## 2025-04-07 RX ORDER — POTASSIUM CHLORIDE 14.9 MG/ML
20 INJECTION INTRAVENOUS EVERY 6 HOURS PRN
Status: DISCONTINUED | OUTPATIENT
Start: 2025-04-07 | End: 2025-04-09

## 2025-04-07 RX ORDER — MAGNESIUM SULFATE HEPTAHYDRATE 40 MG/ML
4 INJECTION, SOLUTION INTRAVENOUS EVERY 6 HOURS PRN
Status: DISCONTINUED | OUTPATIENT
Start: 2025-04-07 | End: 2025-04-09

## 2025-04-07 RX ORDER — HYDROMORPHONE HYDROCHLORIDE 1 MG/ML
INJECTION, SOLUTION INTRAMUSCULAR; INTRAVENOUS; SUBCUTANEOUS AS NEEDED
Status: DISCONTINUED | OUTPATIENT
Start: 2025-04-07 | End: 2025-04-07

## 2025-04-07 RX ORDER — PANTOPRAZOLE SODIUM 40 MG/10ML
40 INJECTION, POWDER, LYOPHILIZED, FOR SOLUTION INTRAVENOUS
Status: DISCONTINUED | OUTPATIENT
Start: 2025-04-07 | End: 2025-04-09

## 2025-04-07 RX ADMIN — DEXTROSE MONOHYDRATE 25 G: 25 INJECTION, SOLUTION INTRAVENOUS at 16:20

## 2025-04-07 RX ADMIN — ROCURONIUM BROMIDE 10 MG: 10 INJECTION INTRAVENOUS at 15:04

## 2025-04-07 RX ADMIN — EPHEDRINE SULFATE 5 MG: 50 INJECTION, SOLUTION INTRAVENOUS at 17:17

## 2025-04-07 RX ADMIN — HEPARIN SODIUM 9000 UNITS: 1000 INJECTION INTRAVENOUS; SUBCUTANEOUS at 13:27

## 2025-04-07 RX ADMIN — LEVOTHYROXINE SODIUM 137 MCG: 25 TABLET ORAL at 06:22

## 2025-04-07 RX ADMIN — ROCURONIUM BROMIDE 30 MG: 10 INJECTION INTRAVENOUS at 12:20

## 2025-04-07 RX ADMIN — PREDNISONE 5 MG: 5 TABLET ORAL at 08:37

## 2025-04-07 RX ADMIN — PROTAMINE SULFATE 40 MG: 10 INJECTION, SOLUTION INTRAVENOUS at 18:27

## 2025-04-07 RX ADMIN — ROCURONIUM BROMIDE 10 MG: 10 INJECTION INTRAVENOUS at 16:43

## 2025-04-07 RX ADMIN — HEPARIN SODIUM 2000 UNITS: 1000 INJECTION INTRAVENOUS; SUBCUTANEOUS at 15:22

## 2025-04-07 RX ADMIN — ETOMIDATE INJECTION 16 MG: 2 SOLUTION INTRAVENOUS at 11:44

## 2025-04-07 RX ADMIN — ROCURONIUM BROMIDE 10 MG: 10 INJECTION INTRAVENOUS at 14:07

## 2025-04-07 RX ADMIN — HEPARIN SODIUM 2000 UNITS: 1000 INJECTION INTRAVENOUS; SUBCUTANEOUS at 14:46

## 2025-04-07 RX ADMIN — EPHEDRINE SULFATE 5 MG: 50 INJECTION, SOLUTION INTRAVENOUS at 14:28

## 2025-04-07 RX ADMIN — PROPOFOL INJECTABLE EMULSION 50 MCG/KG/MIN: 10 INJECTION, EMULSION INTRAVENOUS at 20:40

## 2025-04-07 RX ADMIN — HYDROMORPHONE HYDROCHLORIDE 0.2 MG: 1 INJECTION, SOLUTION INTRAMUSCULAR; INTRAVENOUS; SUBCUTANEOUS at 19:06

## 2025-04-07 RX ADMIN — FUROSEMIDE 10 MG: 10 INJECTION INTRAMUSCULAR; INTRAVENOUS at 14:43

## 2025-04-07 RX ADMIN — ROCURONIUM BROMIDE 30 MG: 10 INJECTION INTRAVENOUS at 13:17

## 2025-04-07 RX ADMIN — SODIUM BICARBONATE 50 MEQ: 84 INJECTION INTRAVENOUS at 16:34

## 2025-04-07 RX ADMIN — FENTANYL CITRATE 100 MCG: 50 INJECTION, SOLUTION INTRAMUSCULAR; INTRAVENOUS at 11:44

## 2025-04-07 RX ADMIN — FENTANYL CITRATE 25 MCG: 50 INJECTION, SOLUTION INTRAMUSCULAR; INTRAVENOUS at 18:54

## 2025-04-07 RX ADMIN — HEPARIN SODIUM 2000 UNITS: 1000 INJECTION INTRAVENOUS; SUBCUTANEOUS at 16:35

## 2025-04-07 RX ADMIN — MYCOPHENOLATE MOFETIL 500 MG: 250 CAPSULE ORAL at 06:22

## 2025-04-07 RX ADMIN — EPHEDRINE SULFATE 5 MG: 50 INJECTION, SOLUTION INTRAVENOUS at 17:44

## 2025-04-07 RX ADMIN — SODIUM BICARBONATE 50 MEQ: 84 INJECTION INTRAVENOUS at 16:40

## 2025-04-07 RX ADMIN — CALCIUM CHLORIDE 0.25 G: 100 INJECTION, SOLUTION INTRAVENOUS at 17:25

## 2025-04-07 RX ADMIN — CEFAZOLIN 2 MG: 330 INJECTION, POWDER, FOR SOLUTION INTRAMUSCULAR; INTRAVENOUS at 12:05

## 2025-04-07 RX ADMIN — FENTANYL CITRATE 25 MCG: 50 INJECTION, SOLUTION INTRAMUSCULAR; INTRAVENOUS at 16:27

## 2025-04-07 RX ADMIN — FUROSEMIDE 20 MG: 10 INJECTION INTRAMUSCULAR; INTRAVENOUS at 16:20

## 2025-04-07 RX ADMIN — NIFEDIPINE 30 MG: 30 TABLET, FILM COATED, EXTENDED RELEASE ORAL at 08:37

## 2025-04-07 RX ADMIN — FENTANYL CITRATE 50 MCG: 50 INJECTION, SOLUTION INTRAMUSCULAR; INTRAVENOUS at 15:12

## 2025-04-07 RX ADMIN — TACROLIMUS 1.5 MG: 0.75 TABLET, EXTENDED RELEASE ORAL at 06:21

## 2025-04-07 RX ADMIN — HEPARIN SODIUM 1500 UNITS/HR: 10000 INJECTION, SOLUTION INTRAVENOUS at 03:35

## 2025-04-07 RX ADMIN — ROCURONIUM BROMIDE 20 MG: 10 INJECTION INTRAVENOUS at 16:20

## 2025-04-07 RX ADMIN — ALBUMIN HUMAN 250 ML: 0.05 INJECTION, SOLUTION INTRAVENOUS at 16:42

## 2025-04-07 RX ADMIN — CEFAZOLIN SODIUM 2 G: 2 INJECTION, SOLUTION INTRAVENOUS at 20:47

## 2025-04-07 RX ADMIN — FAMOTIDINE 20 MG: 20 TABLET, FILM COATED ORAL at 08:38

## 2025-04-07 RX ADMIN — HEPARIN SODIUM 3000 UNITS: 1000 INJECTION INTRAVENOUS; SUBCUTANEOUS at 14:01

## 2025-04-07 RX ADMIN — ROCURONIUM BROMIDE 30 MG: 10 INJECTION INTRAVENOUS at 12:00

## 2025-04-07 RX ADMIN — PANTOPRAZOLE SODIUM 40 MG: 40 INJECTION, POWDER, FOR SOLUTION INTRAVENOUS at 20:48

## 2025-04-07 RX ADMIN — LIDOCAINE HYDROCHLORIDE 100 MG: 20 INJECTION, SOLUTION INTRAVENOUS at 19:25

## 2025-04-07 RX ADMIN — ALBUTEROL SULFATE 2 PUFF: 90 AEROSOL, METERED RESPIRATORY (INHALATION) at 11:02

## 2025-04-07 RX ADMIN — PROPOFOL 30 MCG/KG/MIN: 10 INJECTION, EMULSION INTRAVENOUS at 18:54

## 2025-04-07 RX ADMIN — EPHEDRINE SULFATE 5 MG: 50 INJECTION, SOLUTION INTRAVENOUS at 16:13

## 2025-04-07 RX ADMIN — MAGNESIUM SULFATE HEPTAHYDRATE 2 G: 2 INJECTION, SOLUTION INTRAVENOUS at 19:18

## 2025-04-07 RX ADMIN — PROPOFOL 10 MG: 10 INJECTION, EMULSION INTRAVENOUS at 16:23

## 2025-04-07 RX ADMIN — NITROGLYCERIN 50 MCG: 20 INJECTION INTRAVENOUS at 11:44

## 2025-04-07 RX ADMIN — LIDOCAINE HYDROCHLORIDE 80 MG: 20 INJECTION, SOLUTION INTRAVENOUS at 11:44

## 2025-04-07 RX ADMIN — HEPARIN SODIUM 3000 UNITS: 1000 INJECTION INTRAVENOUS; SUBCUTANEOUS at 18:03

## 2025-04-07 RX ADMIN — EPHEDRINE SULFATE 5 MG: 50 INJECTION, SOLUTION INTRAVENOUS at 17:49

## 2025-04-07 RX ADMIN — PROPOFOL 20 MG: 10 INJECTION, EMULSION INTRAVENOUS at 19:02

## 2025-04-07 RX ADMIN — HYDRALAZINE HYDROCHLORIDE 100 MG: 50 TABLET ORAL at 06:22

## 2025-04-07 RX ADMIN — DEXAMETHASONE SODIUM PHOSPHATE 4 MG: 4 INJECTION INTRA-ARTICULAR; INTRALESIONAL; INTRAMUSCULAR; INTRAVENOUS; SOFT TISSUE at 15:25

## 2025-04-07 RX ADMIN — Medication 100 MG: at 11:45

## 2025-04-07 RX ADMIN — METOPROLOL TARTRATE 50 MG: 50 TABLET, FILM COATED ORAL at 08:44

## 2025-04-07 RX ADMIN — SODIUM CHLORIDE, SODIUM LACTATE, POTASSIUM CHLORIDE, AND CALCIUM CHLORIDE: .6; .31; .03; .02 INJECTION, SOLUTION INTRAVENOUS at 10:26

## 2025-04-07 RX ADMIN — ALBUMIN HUMAN 250 ML: 0.05 INJECTION, SOLUTION INTRAVENOUS at 14:37

## 2025-04-07 SDOH — HEALTH STABILITY: MENTAL HEALTH: CURRENT SMOKER: 0

## 2025-04-07 ASSESSMENT — COGNITIVE AND FUNCTIONAL STATUS - GENERAL
DAILY ACTIVITIY SCORE: 24
MOBILITY SCORE: 24

## 2025-04-07 ASSESSMENT — PAIN SCALES - GENERAL
PAINLEVEL_OUTOF10: 0 - NO PAIN
PAINLEVEL_OUTOF10: 0 - NO PAIN
PAIN_LEVEL: 2

## 2025-04-07 ASSESSMENT — PAIN - FUNCTIONAL ASSESSMENT
PAIN_FUNCTIONAL_ASSESSMENT: 0-10
PAIN_FUNCTIONAL_ASSESSMENT: CPOT (CRITICAL CARE PAIN OBSERVATION TOOL)

## 2025-04-07 NOTE — CARE PLAN
Problem: Pain - Adult  Goal: Verbalizes/displays adequate comfort level or baseline comfort level  Outcome: Progressing     Problem: Pain  Goal: Takes deep breaths with improved pain control throughout the shift  Outcome: Progressing   The patient's goals for the shift include no pain    The clinical goals for the shift include pain management    Over the shift, the patient did not make progress toward the following goals. Barriers to progression include swelling. Recommendations to address these barriers include elevate foot.

## 2025-04-07 NOTE — H&P
SICU History & Physical    Subjective   HPI:  Dilip Haynes is a 74 y.o. male with PMH of significant for obstructive CAD s/p PCI mRCA in-stent restenosis in 5/25/2016, HFrimprovedEF (follows with Dr. Linn), renal artery stenosis, HTN, afib s/p DCCV 6/2020 (on Eliquis last took 3/26 AM), carotid endarterectomy for asymptomatic stenosis, ESRD s/p DDKT (5/2020), hypothyroidism, and PAD (s/p L iliac ([id number] stent) with R fem and profunda endarterectomy with patch angioplasty (2012)  presents from the OR s/p right femoral-popliteal bypass w/ Dr. Gant on 4/7/25.    Initially presented to SICU for hypertension and ST segment depressions on 3/28 following diagnostic bilateral lower extremity angiography on 3/28/25 with Dr. Alvarez for acute on chronic right limb ischemia. Following procedure, patient with 230s/90s with NIBP in PACU. Given labetalol 5mg x1 and hydralazine 10mg x2. Still unable to control blood pressure so was started on nitroglycerin gtt and was admitted to the SICU. Diagnostic RLE angiogram on 3/28 showed R CFA disease, SFA occlusion, and PT runoff     OR Course:   EBL: 300cc  UOP: 400cc  Crystalloid: 1.4L  Colloid: 500cc  Cellsaver: N/A  Products: N/A  Antibiotic time: Ancef 2g (1200)  Intubation: Easy, 7.5 ETT, 22cm teeth  Lines/Access: 16g x2, 14g EJ, radial a-line  Notable events: Run of V-tach, resolved spontaneously, given 2g Mg and 100mg Lidocaine    Past Medical History:   Diagnosis Date    Encounter for other preprocedural examination 05/10/2020    Pre-transplant evaluation for kidney transplant    Old myocardial infarction 01/19/2022    H/O non-ST elevation myocardial infarction (NSTEMI)    Personal history of other diseases of the circulatory system 12/22/2015    History of stenosis of renal artery    Personal history of other diseases of the circulatory system 04/13/2021    History of carotid artery stenosis    Personal history of other diseases of the circulatory system 01/19/2022     History of essential hypertension    Personal history of other diseases of the circulatory system 07/23/2016    History of claudication    Personal history of other endocrine, nutritional and metabolic disease 12/22/2015    History of thyroid disease    Raynaud's syndrome without gangrene 12/22/2015    Raynauds disease     Past Surgical History:   Procedure Laterality Date    CAROTID ENDARTERECTOMY  12/11/2021    Carotid Thromboendarterectomy    KNEE SURGERY  09/12/2019    Knee Surgery    OTHER SURGICAL HISTORY  01/19/2022    Kidney transplantation    OTHER SURGICAL HISTORY  09/22/2020    Transcath Intravascular Stent Placement Percutaneous Femoral    OTHER SURGICAL HISTORY  01/19/2022    Peritoneal Dialysis Catheter    TOTAL THYROIDECTOMY  12/11/2021    Thyroid Surgery Total Thyroidectomy     Medications Prior to Admission   Medication Sig Dispense Refill Last Dose/Taking    amLODIPine (Norvasc) 10 mg tablet Take 1 tablet (10 mg) by mouth once daily.   3/26/2025 Morning    apixaban (Eliquis) 2.5 mg tablet TAKE 1 TABLET BY MOUTH TWICE A DAY 60 tablet 11 3/26/2025 Morning    empagliflozin (Jardiance) 10 mg Take 1 tablet (10 mg) by mouth once daily. 30 tablet 11 3/26/2025 Morning    famotidine (Pepcid) 20 mg tablet TAKE 1 TABLET BY MOUTH EVERY DAY AS DIRECTED 90 tablet 3 3/26/2025 Bedtime    fluticasone (Flonase) 50 mcg/actuation nasal spray 2 sprays by Does not apply route once daily as needed for allergies.   Past Week    levothyroxine (Synthroid, Levoxyl) 137 mcg tablet Take 1 tablet (137 mcg) by mouth once daily.   3/26/2025 Morning    losartan (Cozaar) 25 mg tablet TAKE 1 TABLET BY MOUTH EVERY DAY 90 tablet 1 3/26/2025    losartan (Cozaar) 50 mg tablet Take 1.5 tablets (75 mg) by mouth once daily. 135 tablet 3 3/26/2025 Morning    metoprolol tartrate (Lopressor) 50 mg tablet TAKE 1 TABLET BY MOUTH TWICE A  tablet 3 3/26/2025    multivitamin tablet Take 1 tablet by mouth once daily.   3/26/2025 Morning     mycophenolate (Cellcept) 250 mg capsule TAKE 3 CAPSULES BY MOUTH TWICE A  capsule 11 3/26/2025 Bedtime    pravastatin (Pravachol) 40 mg tablet TAKE 1 TABLET BY MOUTH EVERYDAY AT BEDTIME 90 tablet 3 3/26/2025 Bedtime    predniSONE (Deltasone) 5 mg tablet TAKE 1 TABLET BY MOUTH EVERY DAY 30 tablet 23 3/26/2025 Morning    tacrolimus ER (Envarsus XR) 0.75 mg tablet ER Take 2 tablets (1.5 mg) by mouth once daily. 60 tablet 11 3/26/2025 Morning    tamsulosin (Flomax) 0.4 mg 24 hr capsule Take 1 capsule (0.4 mg) by mouth once daily at bedtime. 90 capsule 3 3/26/2025 Bedtime    torsemide (Demadex) 20 mg tablet Take 1 tablet (20 mg) by mouth once daily. 90 tablet 3 3/26/2025 Evening    methocarbamol (Robaxin) 500 mg tablet Take 1 tablet (500 mg) by mouth every 8 hours if needed for muscle spasms.   Unknown    nitroglycerin (Nitrostat) 0.4 mg SL tablet Place 1 tablet (0.4 mg) under the tongue every 5 minutes if needed for chest pain.   Unknown     Lovastatin, Simvastatin, Adhesive, and Naproxen  Social History     Tobacco Use    Smoking status: Former     Types: Cigarettes    Smokeless tobacco: Never   Substance Use Topics    Alcohol use: Yes     Comment: 1 per year    Drug use: Never     No family history on file.    Review of Systems:  Review of Systems   Reason unable to perform ROS: Intubated and sedated.       Scheduled Medications:   [Transfer Hold] famotidine, 20 mg, oral, Daily  [Transfer Hold] hydrALAZINE, 100 mg, oral, q8h  [Transfer Hold] insulin lispro, 0-5 Units, subcutaneous, TID AC  [Transfer Hold] levothyroxine, 137 mcg, oral, Daily  [Transfer Hold] metoprolol tartrate, 50 mg, oral, BID  [Transfer Hold] mycophenolate, 500 mg, oral, 2 times per day  [Transfer Hold] NIFEdipine ER, 30 mg, oral, BID  [Transfer Hold] perflutren protein A microsphere, 0.5 mL, intravenous, Once in imaging  [Transfer Hold] polyethylene glycol, 17 g, oral, Daily  [Transfer Hold] pravastatin, 40 mg, oral, Nightly  [Transfer Hold]  predniSONE, 5 mg, oral, Daily  [Transfer Hold] sulfur hexafluoride microsphr, 2 mL, intravenous, Once in imaging  [Transfer Hold] tacrolimus ER, 1.5 mg, oral, Once per day on Sunday Monday Tuesday Wednesday Thursday Friday  [Transfer Hold] tamsulosin, 0.4 mg, oral, Nightly  [Held by provider] torsemide, 20 mg, oral, Daily         Continuous Medications:   [Held by provider] heparin, 0-4,500 Units/hr, Last Rate: Stopped (04/07/25 0954)  sodium chloride 0.9%, 50 mL/hr, Last Rate: 50 mL/hr (04/07/25 0936)         PRN Medications:   PRN medications: [Transfer Hold] acetaminophen, [Transfer Hold] albuterol, [Transfer Hold] cloNIDine, [Transfer Hold] dextrose, [Transfer Hold] dextrose, [Transfer Hold] fluticasone, [Transfer Hold] glucagon, [Transfer Hold] glucagon, [Transfer Hold] hydrALAZINE, [Transfer Hold] labetaloL, [Transfer Hold] naloxone, [Transfer Hold] nitroglycerin, [Transfer Hold] ondansetron **OR** [Transfer Hold] ondansetron, [Transfer Hold] oxyCODONE, oxygen, [Transfer Hold] polyethylene glycol, thrombin (bovine) 5000 Unit vial + gelatin absorbable 100 sponge topical mixture    Objective   Vitals:  Most Recent:  Vitals:    04/07/25 1031   BP: 137/68   Pulse: 73   Resp: 18   Temp: 36.5 °C (97.7 °F)   SpO2: 94%       24hr Min/Max:  Temp  Min: 36.5 °C (97.7 °F)  Max: 36.8 °C (98.2 °F)  Pulse  Min: 66  Max: 87  BP  Min: 121/60  Max: 162/78  Resp  Min: 16  Max: 24  SpO2  Min: 94 %  Max: 97 %    I/O:  I/O last 2 completed shifts:  In: 683.5 (7.9 mL/kg) [I.V.:683.5 (7.9 mL/kg)]  Out: 300 (3.5 mL/kg) [Urine:300 (0.1 mL/kg/hr)]  Weight: 86.2 kg     Hemodynamic parameters for last 24 hours:         Vent settings:       LDA:  Arterial Line 04/07/25 Right Radial (Active)   Placement Date/Time: 04/07/25 (c) 1130   Size: 20 G  Orientation: Right  Location: Radial  Securement Method: Transparent dressing  Patient Tolerance: Tolerated well   Number of days: 0       ETT  7.5 mm (Active)   Placement Date/Time: 04/07/25 (c)  1146   Mask Ventilation: Vent by mask + OA or adjuvant +/- NMBA  Technique: Video laryngoscopy  ETT Type: ETT - single  Single Lumen Tube Size: 7.5 mm  Cuffed: Yes  Laryngoscope: Armaan  Blade Size: 4  Location: Or...   Number of days: 0       Urethral Catheter Straight-tip;Non-latex 16 Fr. (Active)   Placement Date/Time: 04/03/25 1351   Hand Hygiene Completed: Yes  Catheter Type: Straight-tip;Non-latex  Tube Size (Fr.): 16 Fr.  Catheter Balloon Size: 10 mL  Urine Returned: Yes   Number of days: 3         Physical Exam:   Physical Exam  Vitals reviewed.   Constitutional:       Appearance: Normal appearance.   HENT:      Head: Normocephalic and atraumatic.      Mouth/Throat:      Mouth: Mucous membranes are moist.   Cardiovascular:      Rate and Rhythm: Normal rate and regular rhythm.   Pulmonary:      Comments: Intubated and sedated  Abdominal:      General: Abdomen is flat.      Palpations: Abdomen is soft.   Musculoskeletal:      Cervical back: Neck supple.      Comments: Intubated and sedated   Skin:     General: Skin is warm and dry.   Neurological:      Comments: Intubated and sedated         Lab/Radiology/Diagnostic Review:  Results for orders placed or performed during the hospital encounter of 03/27/25 (from the past 24 hours)   POCT GLUCOSE   Result Value Ref Range    POCT Glucose 150 (H) 74 - 99 mg/dL   POCT GLUCOSE   Result Value Ref Range    POCT Glucose 169 (H) 74 - 99 mg/dL   Tacrolimus   Result Value Ref Range    Tacrolimus  5.0 <=15.0 ng/mL   Renal Function Panel   Result Value Ref Range    Glucose 134 (H) 74 - 99 mg/dL    Sodium 131 (L) 136 - 145 mmol/L    Potassium 4.5 3.5 - 5.3 mmol/L    Chloride 99 98 - 107 mmol/L    Bicarbonate 21 21 - 32 mmol/L    Anion Gap 16 10 - 20 mmol/L    Urea Nitrogen 40 (H) 6 - 23 mg/dL    Creatinine 2.67 (H) 0.50 - 1.30 mg/dL    eGFR 24 (L) >60 mL/min/1.73m*2    Calcium 9.5 8.6 - 10.6 mg/dL    Phosphorus 4.0 2.5 - 4.9 mg/dL    Albumin 3.9 3.4 - 5.0 g/dL   CBC    Result Value Ref Range    WBC 9.6 4.4 - 11.3 x10*3/uL    nRBC 0.0 0.0 - 0.0 /100 WBCs    RBC 4.29 (L) 4.50 - 5.90 x10*6/uL    Hemoglobin 10.7 (L) 13.5 - 17.5 g/dL    Hematocrit 35.4 (L) 41.0 - 52.0 %    MCV 83 80 - 100 fL    MCH 24.9 (L) 26.0 - 34.0 pg    MCHC 30.2 (L) 32.0 - 36.0 g/dL    RDW 14.1 11.5 - 14.5 %    Platelets 252 150 - 450 x10*3/uL   Magnesium   Result Value Ref Range    Magnesium 2.49 (H) 1.60 - 2.40 mg/dL   Heparin Assay, UFH   Result Value Ref Range    Heparin Unfractionated 0.6 See Comment Below for Therapeutic Ranges IU/mL   POCT GLUCOSE   Result Value Ref Range    POCT Glucose 144 (H) 74 - 99 mg/dL   POCT GLUCOSE   Result Value Ref Range    POCT Glucose 147 (H) 74 - 99 mg/dL   Blood Gas Arterial Full Panel Unsolicited   Result Value Ref Range    POCT pH, Arterial 7.44 (H) 7.38 - 7.42 pH    POCT pCO2, Arterial 29 (L) 38 - 42 mm Hg    POCT pO2, Arterial 67 (L) 85 - 95 mm Hg    POCT SO2, Arterial 95 94 - 100 %    POCT Oxy Hemoglobin, Arterial 92.6 (L) 94.0 - 98.0 %    POCT Hematocrit Calculated, Arterial 34.0 (L) 41.0 - 52.0 %    POCT Sodium, Arterial 128 (L) 136 - 145 mmol/L    POCT Potassium, Arterial 4.7 3.5 - 5.3 mmol/L    POCT Chloride, Arterial 99 98 - 107 mmol/L    POCT Ionized Calcium, Arterial 1.22 1.10 - 1.33 mmol/L    POCT Glucose, Arterial 144 (H) 74 - 99 mg/dL    POCT Lactate, Arterial 1.2 0.4 - 2.0 mmol/L    POCT Base Excess, Arterial -3.5 (L) -2.0 - 3.0 mmol/L    POCT HCO3 Calculated, Arterial 19.7 (L) 22.0 - 26.0 mmol/L    POCT Hemoglobin, Arterial 11.2 (L) 13.5 - 17.5 g/dL    POCT Anion Gap, Arterial 14 10 - 25 mmo/L    Patient Temperature 37.0 degrees Celsius    FiO2 21 %   Coox Panel, Arterial Unsolicited   Result Value Ref Range    POCT Hemoglobin, Arterial 11.2 (L) 13.5 - 17.5 g/dL    POCT Oxy Hemoglobin, Arterial 92.6 (L) 94.0 - 98.0 %    POCT Carboxyhemoglobin, Arterial 1.7 %    POCT Methemoglobin, Arterial 0.2 0.0 - 1.5 %    POCT Deoxy Hemoglobin, Arterial 5.4 (H) 0.0 -  "5.0 %   ACTIVATED CLOTTING TIME LOW   Result Value Ref Range    POCT Activated Clotting Time Low Range 149 83 - 199 sec     *Note: Due to a large number of results and/or encounters for the requested time period, some results have not been displayed. A complete set of results can be found in Results Review.       Additional Labs:  SCVO2: No results found for: \"[id number]\"      This patient has a urinary catheter   Reason for the urinary catheter remaining today? critically ill patient who need accurate urinary output measurements    This patient is intubated   Reason for patient to remain intubated today? Will wean towards extubation    Assessment/Plan     Dilip Haynes is a 74 y.o. male with PMH of significant for obstructive CAD s/p PCI mRCA in-stent restenosis in 5/25/2016, HFrimprovedEF (follows with Dr. iLnn), renal artery stenosis, HTN, afib s/p DCCV 6/2020 (on Eliquis last took 3/26 AM), carotid endarterectomy for asymptomatic stenosis, ESRD s/p DDKT (5/2020), hypothyroidism, and PAD (s/p L iliac ([id number] stent) with R fem and profunda endarterectomy with patch angioplasty (2012)  presents from the OR s/p right femoral-popliteal bypass w/ Dr. Gant on 4/7/25.    Plan:  NEURO: Patient intubated and sedated. Acute post op pain.  - Pain control with hydromorphone prn  - Propofol gtt until weaning towards extubation  - lidoderm patches surrounding surgical incision PRN  - PT/OT consult  - Home meds: none    CV: History of NSTEMI/CAD s/p stents (2000, 2006, 2007) and most recently WILLIS x1 to RCA for in-stent restenosis 5/2016. [facility] 2/17/23 performed for typical angina with showed 100% mid LCX and 100% PDA with nonobstructive LM and LAD, Previous cardiac MRI which showed partial viability in the region of the LCX, HTN, pAfib s/p DCCV 6/2020 (on Eliquis last took 3/26 AM). Diastolic heart failure. Loop recorder in place, last check 3/26 without any events. Baseline -170s/60-80s. Echo 3/28 EF 60%, reduced RV " systolic function. To ICU off pressors. Run of v-tach intra-op, resolved spontaneously, given 2g Mg and 100mg lidocaine, in NSR on ICU arrival.  - Goal map range 65-90  - continuous EKG/abp monitoring  - PRN Labetalol and Hydral for SBP >160  - Home meds: amlodipine 10mg daily, losartan 75mg daily, metoprolol tartrate 50mg bid, torsemide 20mg daily, pravastatin 40mg at bedtime, empagliflozin 10mg daily, apixaban 2.5mg bid, prn SL NTG    Vascular: History of carotid endarterectomy for asymptomatic stenosis, Raynaud's, renal artery stenosis, PAD (s/p L iliac ([id number] stent) with R fem and profunda endarterectomy with patch angioplasty (2012) who presented to [facility] 3/27 from [place] ED with c/f acute on chronic right limb ischemia. Patient underwent diagnostic bilateral lower extremity angiography 3/28/25 with Dr. Alvarez where noted to have Right common femoral stenosis. Right SFA occlusion with reconstitution of diseased right above knee popliteal artery with one vessel runoff to the foot via the posterior tibial artery. Now s/p femoral-popliteal bypass, dopplerable PT bilaterally.   - chronic nonhealing right hallux ulceration -> referred to podiatry for hyperbaric oxygen therapy, not covered since he doesn't have osteomyelitis  - consider podiatry consult, will discuss with vascular surgery  - Flat until 0100 on 4/8/2025  - Resume heparin gtt at 0100 on 4/8/2025  - ASA/Plavix restart tomorrow    PULM: Former smoker, 75 pack years, quit 10yrs ago. Intubated and sedated.  - Wean to extubation  - Wean FiO2 as tolerated, maintain SPO2 >90%  - Q1h incentive spirometer while awake  - additional pulm toilet prn.    - F/U post op CXR     GI: Diverticulosis of colon. Cirrhotic liver by CTA abdomen 3/26/25 with 2 hypervascular foci favored to represent shunts.   - pt recommended to have MRI liver by last CT scan, nonurgent  - NPO while intubated and sedated  - Bedside swallow s/p extubation  - PPI for GI prophylaxis     :  ESRD s/p DDKT 2020. CKD IIIb. Renal artery stenosis s/p bilateral stent placement. Baseline creatinine 1.9. Transplant nephrology consulted for worsening BASHIR, felt likely to be ATN. Making 100cc/hr intra-op s/p 30mg lasix.  - Maintain U/O >0.5ml/kg/hr  - Check renal function panel post op, daily and prn  - Transplant nephrology following  - IS/ppx per transplant nephrology: mmf, tac, pred  - Daily tacro level  - Replete electrolytes to goal K>4, Mg>2, Phos>2.5, ionized Ca>1.10.     HEME: Baseline H/H 13/45  - Check CBC and coags post op and daily  - SCDs for DVT prophylaxis  - Restart heparin gtt at 0100 on 4/8/2025 per vascular  - ongoing monitoring for s/s bleeding     ENDO: Pre-DM, A1c 6.4%. Uninodular goiter s/p thyroidectomy. Hypothyroidism  - Q4h BG   - SSI Lispro per ICU protocol  - Continue synthroid when able     ID: Afebrile. Pending post op WBC.  - 24hr Ancef 2g (stop 1200 on 4/8/2025)  - temp q4h, wbc daily  - ongoing monitoring for s/s infection    Lines:  - radial art line  - piv x3    Dispo: continue ICU care. Reassess dispo in am     Discussed w/ ICU attending, Dr. MARCIAL

## 2025-04-07 NOTE — ANESTHESIA PROCEDURE NOTES
Airway  Date/Time: 4/7/2025 11:46 AM  Urgency: elective    Airway not difficult    Staffing  Performed: SRNA   Authorized by: Yogesh Rodriguez MD    Performed by: MARTA Araya-VISHNU  Patient location during procedure: OR    Indications and Patient Condition  Indications for airway management: anesthesia  Spontaneous ventilation: present  Sedation level: deep  Preoxygenated: yes  Patient position: sniffing  MILS maintained throughout  Mask difficulty assessment: 2 - vent by mask + OA or adjuvant +/- NMBA  Planned trial extubation    Final Airway Details  Final airway type: endotracheal airway      Successful airway: ETT  Cuffed: yes   Successful intubation technique: video laryngoscopy  Endotracheal tube insertion site: oral  Blade: Armaan  Blade size: #4  ETT size (mm): 7.5  Cormack-Lehane Classification: grade I - full view of glottis  Placement verified by: chest auscultation and capnometry   Measured from: lips  ETT to lips (cm): 21  Number of attempts at approach: 1

## 2025-04-07 NOTE — ANESTHESIA PREPROCEDURE EVALUATION
Patient: Dilip Haynes    Procedure Information       Anesthesia Start Date/Time: 04/07/25 1112    Procedure: CREATION, BYPASS, ARTERIAL, FEMORAL (Right) - Right femoral to distal bypass, angiogram, possible iliac stenting    Location: [facility] OR 17 / [facility] OR    Surgeons: Alejandra Gant MD            Relevant Problems   Cardiac  Yung-Tachy syndrome.  Subclavian steal  Diastolic Heart Failure   (+) AAA (abdominal aortic aneurysm) without rupture   (+) Aortic aneurysm   (+) Arteriosclerosis of coronary artery   (+) Atrial ectopy   (+) Atrial fibrillation (Multi)   (+) Atrial flutter (Multi)   (+) Yung-tachy syndrome (Multi)   (+) Cardiac arrest with successful resuscitation   (+) Chronic diastolic congestive heart failure   (+) Hyperlipidemia   (+) Hypertension   (+) Lower limb ischemia   (+) Myocardial infarction (Multi)   (+) PAD (peripheral artery disease) (CMS-HCC)   (+) Presence of stent in coronary artery   (+) Renal artery stenosis (CMS-HCC)      Pulmonary  Ex-smoker   (+) Pulmonary embolism      GI   (+) GERD (gastroesophageal reflux disease)      /Renal  S/P Kidney transplant   (+) BPH with obstruction/lower urinary tract symptoms   (+) End-stage renal disease (Multi)      Liver   (+) Hepatitis C virus infection      Endocrine   (+) Hypothyroidism   (+) Secondary hyperparathyroidism (Multi)   (+) Type 2 diabetes mellitus      Hematology   (+) Anemia   (+) Iron deficiency anemia      Musculoskeletal   (+) RA (rheumatoid arthritis)      ID   (+) Hepatitis C virus infection       Clinical information reviewed:   Tobacco  Allergies  Meds   Med Hx  Surg Hx   Fam Hx  Soc Hx        NPO Detail:  NPO/Void Status  Carbohydrate Drink Given Prior to Surgery? : N  Date of Last Liquid: 04/06/25  Time of Last Liquid: 0900  Date of Last Solid: 04/06/25  Time of Last Solid: 0900  Time of Last Void: 1042 (winters present)         Physical Exam    Airway  Mallampati: III  TM distance: >3 FB  Neck  ROM: limited     Cardiovascular   Rhythm: irregular  (+) JVD     Dental    Pulmonary   (+) decreased breath sounds, rales     Abdominal      Other findings: Diminished air entry on the right lung      Anesthesia Plan    History of general anesthesia?: yes  History of complications of general anesthesia?: no    ASA 4     general     The patient is not a current smoker.    intravenous induction   Postoperative administration of opioids is intended.  Anesthetic plan and risks discussed with patient.  Use of blood products discussed with patient who consented to blood products.    Plan discussed with CRNA.

## 2025-04-07 NOTE — ANESTHESIA PROCEDURE NOTES
Arterial Line:    Date/Time: 4/7/2025 11:30 AM    Staffing  Performed: SRNA   Authorized by: Yogesh Rodriguez MD    Performed by: DELBERT Araya    An arterial line was placed. Procedure performed using surface landmarks.in the OR for the following indication(s): continuous blood pressure monitoring and blood sampling needed.    A 20 gauge (size) (length) (type) catheter was placed into the Right radial artery, secured by Tegaderm,   Seldinger technique used.  Events:  patient tolerated procedure well with no complications.

## 2025-04-07 NOTE — SIGNIFICANT EVENT
Vascular Surgery Post-Operative Plan    S/p  right femoral endarterectomy, profundaplasty, right common iliac artery angioplasty and covered stent, right external iliac artery angioplasty and covered stent    Pre-op Vascular Exam: right PT signal, left PT signal  Post-op Vascular Exam: same    Plan:  To ICU for further monitoring, intubated  May be able to wean off of antihypertensive agents now that there is improved blood flow to transplant kidney  Extubation per ICU  Single episode of Vtach at conclusion of case - close monitoring in ICU  Stat labs in ICU then Q6H  Flat until 1am - left groin puncture site 7 [place] sheath closed with Minx  Resume heparin drip at previous rate at 1am  24 hours Ancef for periop antibiotics  PT tomorrow morning  Wound: left groin puncture site, 4x4 and tegaderm; right groin prevena in place to remain 7 days (4/14)    Billy Stapleton MD  Vascular Surgery Fellow  Team Pager [phone]  04/07/25  7:29 PM

## 2025-04-07 NOTE — CARE PLAN
The patient's goals for the shift include no pain    The clinical goals for the shift include pain management

## 2025-04-07 NOTE — PROGRESS NOTES
VASCULAR SURGERY PROGRESS NOTE  Assessment/Plan   Dilip Haynes is 74 y.o. male with PMHx significant for CAD (s/p PCI), HTN, afib s/p DCCV 6/2020 (on Eliquis last took 3/26 AM), carotid endarterectomy for asymptomatic stenosis, ESRD s/p DDKT (2020), hypothyroidism, and PAD (s/p L iliac ([id number] stent) with R fem and profunda endarterectomy with patch angioplasty (2012) who presented as transfer from OSH with concern of worsening right limb ischemia and right foot with minor tissue loss.     Diagnostic RLE angiogram on 3/28 showed R CFA disease, SFA occlusion, and PT runoff. Post-op course complicated by chest pain in the setting of hypertension, transferred to ICU for nitroglycerin gtt.     Ultimately will require right femoral-PT bypass with vein for CLTI 5.    Plan for OR Today.       Plan:    Neuro  Continue PRN acetaminophen, oxycodone    CV  Cardiology recs for post procedure tropenemia  3/31/25 Stress test without evidence of inducible myocardial ischemia.  No further cardiac testing required  Liberalize BP goal to SBP<160.   Continue amlodipine, hydralazine, losartan, metoprolol  PRN labetalol, hydralazine  Continue pravastatin  RLE CLTI5  Continue heparin drip  Neurovascular checks  Tentative OR 4/7      Pulm  Encourage incentive spirometer  Wean nasal cannula as tolerated  Albuterol PRN, fluticasone PRN    FENGI - h/o DDKT  History of DDKT  Appreciate nephrology recs  Stable Cr - will obtain duplex ultrasound of transplanted kidney.   Holding torsemide; gave 40IV lasix today for diruesis.   Continue mycophenolate, prednisone, tacrolimus  Continue tamsulosin  Urinary retention - winters placed 4/2  Regular diet  Continue pepcid  Zofran PRN    Heme  Trend hemoglobin  Continue heparin drip  Will hold heparin gtt 6h prior to the OR.     Endo  Continue home levothyroxine   FSH, SSI    D/w attending, Dr. Stalin Stapleton MD  Vascular Surgery p. [phone]      Subjective   No acute events overnight.      Objective   Vitals:  Heart Rate:  [59-87]   Temp:  [36.4 °C (97.5 °F)-36.8 °C (98.2 °F)]   Resp:  [16-20]   BP: (121-162)/(58-78)   Weight:  [85.9 kg (189 lb 4.2 oz)-86.2 kg (190 lb 0.6 oz)]   SpO2:  [94 %-97 %]     Exam:  Constitutional: No acute distress, resting comfortably  Neuro:  AOx3, grossly intact  CV: no tachycardia  Pulm: non-labored on NC  GI: soft, non-tender, non-distended  Skin: warm and dry  Musculoskeletal: moving all extremities  Extremities: left groin puncture without hematoma. Right hallux distal superficial dry ulceration  Pulse Exam: monophasic/mixed venous right PT, left DP/PT signals; motor/sensory intact    Labs:  Results from last 7 days   Lab Units 04/06/25  0547 04/05/25  0811 04/04/25  0705   WBC AUTO x10*3/uL 7.2 7.2 6.0   HEMOGLOBIN g/dL 10.4* 10.4* 10.5*   PLATELETS AUTO x10*3/uL 225 214 207      Results from last 7 days   Lab Units 04/06/25  0547 04/05/25  0811 04/04/25  0705   SODIUM mmol/L 130* 131* 133*   POTASSIUM mmol/L 4.6 4.2 4.5   CHLORIDE mmol/L 97* 98 101   CO2 mmol/L 21 20* 22   BUN mg/dL 35* 32* 30*   CREATININE mg/dL 2.57* 2.70* 2.26*   GLUCOSE mg/dL 112* 110* 94   MAGNESIUM mg/dL 2.34 2.24 2.22   PHOSPHORUS mg/dL 4.4 3.8 4.5             Results from last 7 days   Lab Units 04/06/25  0547 04/05/25  0811 04/04/25  0705   ANTI XA UNFRACTIONATED IU/mL 0.7 0.7 0.7

## 2025-04-07 NOTE — PROGRESS NOTES
"Dilip Haynes is a 74 y.o. male on day 11 of admission presenting with Lower limb ischemia.      Subjective   Seen this am. No shortness of breath reported. Voicing frustration with being 11 days in the hospital but voiced understanding about safety and close monitoring.       Objective          Vitals 24HR  Heart Rate:  [66-87]   Temp:  [36.5 °C (97.7 °F)-36.8 °C (98.2 °F)]   Resp:  [16-24]   BP: (121-162)/(58-80)   Height:  [177.8 cm (5' 10\")]   Weight:  [86.2 kg (190 lb 0.6 oz)]   SpO2:  [94 %-97 %]     Intake/Output last 3 Shifts:    Intake/Output Summary (Last 24 hours) at 4/7/2025 1236  Last data filed at 4/7/2025 0954  Gross per 24 hour   Intake 837.17 ml   Output 950 ml   Net -112.83 ml       Physical Exam  NAD  CTABL  RRR  No increased work of breathing  Abd soft, nt, nd  Trace edema BL    Relevant Results  Lab Results   Component Value Date    WBC 9.6 04/07/2025    HGB 10.7 (L) 04/07/2025    HCT 35.4 (L) 04/07/2025    MCV 83 04/07/2025     04/07/2025     Lab Results   Component Value Date    GLUCOSE 134 (H) 04/07/2025    CALCIUM 9.5 04/07/2025     (L) 04/07/2025    K 4.5 04/07/2025    CO2 21 04/07/2025    CL 99 04/07/2025    BUN 40 (H) 04/07/2025    CREATININE 2.67 (H) 04/07/2025         Assessment/Plan      74 y.o. 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 S/p HCV treatment with SVR. post txp course was complicated by urinary retention and enlarged prostate. Pt presented to [facility] on 3/27 for concern for worsening rt limb ischemia. On heparin. S/p diagnostic b/l LE angiogram 3/28/25 showing R CFA disease, SFA occlusion, and PT runoff.   Post-procedure course complicated by HTN urgency, chest pain. Transferred to SICU for BP control, required NTG drip now weaned off and transferred back to medical floor on PO meds.     Femoral bypass planned for 4/7.    #Allograft function: s/p DDKT 5/2020  - baseline Cr ~2, increased to " about 2.5.  -Appears euvolemic. No hypotensive episodes  -NPO today for procedure.     #Immunosuppression:  - Envarsus 1.5mg (Increased 4/2) . Goal Fk 5-8.   -Level within goal   - pls check Fk trough daily AM.   - cont MMF 500mg bid, cont pred 5mg/d     #HTN: BP Stable  -continue amlodipine, hydralazine, losartan, metoprolol, Torsemide      #Anemia: Hb stable  #CKD-MBD: Ca, Phos stable. , vit D 36 2/2025     Discussed with Dr. Demarco.          Gregory Feliciano MD  Nephrology Fellow

## 2025-04-07 NOTE — ANESTHESIA POSTPROCEDURE EVALUATION
Patient: Dilip Haynes    Procedure Summary       Date: 04/07/25 Room / Location: [facility] OR 17 / [facility] OR    Anesthesia Start: 1112 Anesthesia Stop: 1953    Procedure: FEMORAL ENTARDERECTOMY, RIGHT EXTERNAL ILIAC ARTERY STENTING, (Right) Diagnosis:       Lower limb ischemia      (Lower limb ischemia [I99.8])    Surgeons: Alejandra Gant MD Responsible Provider: Kole Yang MD    Anesthesia Type: general ASA Status: 4            Anesthesia Type: general    Vitals Value Taken Time   /70 04/07/25 1952   Temp 36 04/07/25 1953   Pulse 58 04/07/25 1953   Resp 16 04/07/25 1953   SpO2 98 % 04/07/25 1953   Vitals shown include unfiled device data.    Anesthesia Post Evaluation    Patient location during evaluation: ICU  Patient participation: complete - patient cannot participate  Level of consciousness: sedated  Pain score: 2  Pain management: adequate  Airway patency: patent  Cardiovascular status: blood pressure returned to baseline and acceptable  Respiratory status: ETT  Hydration status: acceptable  Postoperative Nausea and Vomiting: none        No notable events documented.

## 2025-04-07 NOTE — BRIEF OP NOTE
Date: 3/27/2025 - 2025  OR Location: [facility] OR    Name: Dilip Haynes, : 1950, Age: 74 y.o., MRN: [id number], Sex: male    Diagnosis  Pre-op Diagnosis      * Lower limb ischemia [I99.8] Post-op Diagnosis     * Lower limb ischemia [I99.8]     Procedures  FEMORAL ENTARDERECTOMY, RIGHT EXTERNAL ILIAC ARTERY STENTING,  08492 - FL BYP FEM-ANT TIBL PST TIBL PRONEAL ART/OTH DSTL      Surgeons      * Mrinalini Gant - Primary    Resident/Fellow/Other Assistant:  Surgeons and Role:     * Billy Stapleton MD - Assisting    Staff:   Circulator: Corina  Scrub Person: Viola  Relief Scrub: Corina  Relief Circulator: Veronique  Relief Scrub: Angelina    Anesthesia Staff: Anesthesiologist: Sid Hackett MD; Sergio Johnston MD; Yogesh Rodriguez MD; Kole Yang MD  CRNA: MARTA Araya-CRNA  C-AA: KWADWO Rodriguez  Anesthesia Resident: Payton Jorgensen MD  SRNA: Latoya Hays    Procedure Summary  Anesthesia: General  ASA: IV  Estimated Blood Loss: 300 mL  Intra-op Medications:   Administrations occurring from 0935 to 1705 on 25:   Medication Name Total Dose   thrombin (bovine) 5000 Unit vial + gelatin absorbable 100 sponge topical mixture 5 mL   acetaminophen (Tylenol) tablet 650 mg Cannot be calculated   albumin human bottle 5% 500 mL   albuterol 2.5 mg /3 mL (0.083 %) nebulizer solution 2.5 mg Cannot be calculated   albuterol inhaler 2 puff   ceFAZolin (Ancef) 1 gram/ 3 mL injection - reconstituted 330 mg/mL 2 mg   cloNIDine (Catapres) tablet 0.1 mg Cannot be calculated   dexAMETHasone (Decadron) 4 mg/mL 4 mg   dextrose 50 % injection 12.5 g Cannot be calculated   dextrose 50 % injection 25 g Cannot be calculated   dextrose 50% in water IV syringe 25 g   ePHEDrine injection 10 mg   etomidate 2 mg/mL 16 mg   fentaNYL (Sublimaze) injection 50 mcg/mL 175 mcg   fluticasone (Flonase) nasal spray 2 spray Cannot be calculated   furosemide (Lasix) 10 mg/mL 30 mg   glucagon (Glucagen)  injection 1 mg Cannot be calculated   glucagon (Glucagen) injection 1 mg Cannot be calculated   heparin injection 1,000 units/mL 18,000 Units   heparin 25,000 Units in dextrose 5% 250 mL (100 Units/mL) infusion (premix) Cannot be calculated   hydrALAZINE (Apresoline) injection 10 mg Cannot be calculated   hydrALAZINE (Apresoline) tablet 100 mg Cannot be calculated   insulin lispro injection 0-5 Units Cannot be calculated   labetaloL (Normodyne,Trandate) injection 10 mg Cannot be calculated   lactated Ringer's infusion Cannot be calculated   levothyroxine (Synthroid, Levoxyl) tablet 137 mcg Cannot be calculated   lidocaine PF (cardiac) syringe 2% 80 mg   metoprolol tartrate (Lopressor) tablet 50 mg Cannot be calculated   mycophenolate (Cellcept) capsule 500 mg Cannot be calculated   naloxone (Narcan) injection 0.2 mg Cannot be calculated   NIFEdipine ER (Adalat CC) 24 hr tablet 30 mg Cannot be calculated   nitroglycerin (Nitrostat) SL tablet 0.4 mg Cannot be calculated   nitroglycerin (Tridil) 50 mg in dextrose 5% 250 mL (0.2 mg/mL) infusion (premix) 50 mcg   oxyCODONE (Roxicodone) immediate release tablet 5 mg Cannot be calculated   perflutren protein A microsphere (Optison) injection 0.5 mL Cannot be calculated   polyethylene glycol (Glycolax, Miralax) packet 17 g Cannot be calculated   polyethylene glycol (Glycolax, Miralax) packet 17 g Cannot be calculated   pravastatin (Pravachol) tablet 40 mg Cannot be calculated   predniSONE (Deltasone) tablet 5 mg Cannot be calculated   propofol (Diprivan) injection 10 mg/mL 10 mg   rocuronium (ZeMuron) 50 mg/5 mL injection 140 mg   Sodium Bicaronate 8.4% Intravenous Syringe 100 mEq   succinylcholine 100 mg/5 mL syringe 100 mg   sulfur hexafluoride microsphr (Lumason) injection 24.28 mg Cannot be calculated   tacrolimus ER (Envarsus XR) tablet ER 1.5 mg Cannot be calculated   tamsulosin (Flomax) 24 hr capsule 0.4 mg Cannot be calculated              Anesthesia Record                Intraprocedure I/O Totals          Intake    Magnesium Sulfate 0.00 mL    The total shown is the total volume documented since Anesthesia Start was filed.    Nitroglycerin Drip 0.00 mL    The total shown is the total volume documented since Anesthesia Start was filed.    albumin human bottle 5% 500.00 mL    Total Intake 500 mL       Output    Urine 380 mL    Est. Blood Loss 300 mL    Total Output 680 mL       Net    Net Volume -180 mL          Specimen:   ID Type Source Tests Collected by Time   1 : Right common femoral plaque Tissue [facility] EXAM Alejandra Gant MD 4/7/2025 1431                  Findings: right femoral endarterectomy, profundaplasty, right common iliac artery angioplasty and covered stent, right external iliac artery angioplasty and covered stent    Complications:  None; patient tolerated the procedure well.     Disposition: ICU - intubated and hemodynamically stable.  Condition: stable  Specimens Collected:   ID Type Source Tests Collected by Time   1 : Right common femoral plaque Tissue [facility] EXAM Alejandra Gant MD 4/7/2025 8571

## 2025-04-08 ENCOUNTER — APPOINTMENT (OUTPATIENT)
Dept: CARDIOLOGY | Facility: HOSPITAL | Age: 75
DRG: 271 | End: 2025-04-08
Payer: MEDICARE

## 2025-04-08 ENCOUNTER — APPOINTMENT (OUTPATIENT)
Dept: RADIOLOGY | Facility: HOSPITAL | Age: 75
DRG: 271 | End: 2025-04-08
Payer: MEDICARE

## 2025-04-08 LAB
ALBUMIN SERPL BCP-MCNC: 3.6 G/DL (ref 3.4–5)
ANION GAP BLDA CALCULATED.4IONS-SCNC: 11 MMO/L (ref 10–25)
ANION GAP BLDA CALCULATED.4IONS-SCNC: 12 MMO/L (ref 10–25)
ANION GAP SERPL CALC-SCNC: 16 MMOL/L (ref 10–20)
APTT PPP: 61 SECONDS (ref 26–36)
ATRIAL RATE: 69 BPM
BASE EXCESS BLDA CALC-SCNC: -0.7 MMOL/L (ref -2–3)
BASE EXCESS BLDA CALC-SCNC: -1.5 MMOL/L (ref -2–3)
BODY TEMPERATURE: 37 DEGREES CELSIUS
BODY TEMPERATURE: 37 DEGREES CELSIUS
BUN SERPL-MCNC: 41 MG/DL (ref 6–23)
CA-I BLD-SCNC: 1.16 MMOL/L (ref 1.1–1.33)
CA-I BLDA-SCNC: 1.18 MMOL/L (ref 1.1–1.33)
CA-I BLDA-SCNC: 1.19 MMOL/L (ref 1.1–1.33)
CALCIUM SERPL-MCNC: 8.8 MG/DL (ref 8.6–10.6)
CHLORIDE BLDA-SCNC: 100 MMOL/L (ref 98–107)
CHLORIDE BLDA-SCNC: 102 MMOL/L (ref 98–107)
CHLORIDE SERPL-SCNC: 100 MMOL/L (ref 98–107)
CO2 SERPL-SCNC: 23 MMOL/L (ref 21–32)
CREAT SERPL-MCNC: 2.66 MG/DL (ref 0.5–1.3)
EGFRCR SERPLBLD CKD-EPI 2021: 24 ML/MIN/1.73M*2
ERYTHROCYTE [DISTWIDTH] IN BLOOD BY AUTOMATED COUNT: 14.1 % (ref 11.5–14.5)
GLUCOSE BLD MANUAL STRIP-MCNC: 116 MG/DL (ref 74–99)
GLUCOSE BLD MANUAL STRIP-MCNC: 120 MG/DL (ref 74–99)
GLUCOSE BLD MANUAL STRIP-MCNC: 127 MG/DL (ref 74–99)
GLUCOSE BLD MANUAL STRIP-MCNC: 138 MG/DL (ref 74–99)
GLUCOSE BLD MANUAL STRIP-MCNC: 166 MG/DL (ref 74–99)
GLUCOSE BLDA-MCNC: 108 MG/DL (ref 74–99)
GLUCOSE BLDA-MCNC: 109 MG/DL (ref 74–99)
GLUCOSE SERPL-MCNC: 112 MG/DL (ref 74–99)
HCO3 BLDA-SCNC: 22.7 MMOL/L (ref 22–26)
HCO3 BLDA-SCNC: 22.9 MMOL/L (ref 22–26)
HCT VFR BLD AUTO: 28 % (ref 41–52)
HCT VFR BLD EST: 26 % (ref 41–52)
HCT VFR BLD EST: 28 % (ref 41–52)
HGB BLD-MCNC: 8.6 G/DL (ref 13.5–17.5)
HGB BLDA-MCNC: 8.6 G/DL (ref 13.5–17.5)
HGB BLDA-MCNC: 9.3 G/DL (ref 13.5–17.5)
INHALED O2 CONCENTRATION: 40 %
INHALED O2 CONCENTRATION: 40 %
INR PPP: 1.2 (ref 0.9–1.1)
LACTATE BLDA-SCNC: 0.9 MMOL/L (ref 0.4–2)
LACTATE BLDA-SCNC: 1.1 MMOL/L (ref 0.4–2)
MAGNESIUM SERPL-MCNC: 2.75 MG/DL (ref 1.6–2.4)
MCH RBC QN AUTO: 25.1 PG (ref 26–34)
MCHC RBC AUTO-ENTMCNC: 30.7 G/DL (ref 32–36)
MCV RBC AUTO: 82 FL (ref 80–100)
NRBC BLD-RTO: 0 /100 WBCS (ref 0–0)
OXYHGB MFR BLDA: 93.9 % (ref 94–98)
OXYHGB MFR BLDA: 95.3 % (ref 94–98)
P AXIS: 81 DEGREES
P OFFSET: 152 MS
P ONSET: 104 MS
PCO2 BLDA: 33 MM HG (ref 38–42)
PCO2 BLDA: 35 MM HG (ref 38–42)
PEEP CMH2O: 8 CM H2O
PH BLDA: 7.42 PH (ref 7.38–7.42)
PH BLDA: 7.45 PH (ref 7.38–7.42)
PHOSPHATE SERPL-MCNC: 5.4 MG/DL (ref 2.5–4.9)
PLATELET # BLD AUTO: 212 X10*3/UL (ref 150–450)
PO2 BLDA: 73 MM HG (ref 85–95)
PO2 BLDA: 80 MM HG (ref 85–95)
POTASSIUM BLDA-SCNC: 4.7 MMOL/L (ref 3.5–5.3)
POTASSIUM BLDA-SCNC: 4.7 MMOL/L (ref 3.5–5.3)
POTASSIUM SERPL-SCNC: 4.8 MMOL/L (ref 3.5–5.3)
PR INTERVAL: 224 MS
PROTHROMBIN TIME: 13.3 SECONDS (ref 9.8–12.4)
Q ONSET: 216 MS
QRS COUNT: 11 BEATS
QRS DURATION: 90 MS
QT INTERVAL: 424 MS
QTC CALCULATION(BAZETT): 454 MS
QTC FREDERICIA: 444 MS
R AXIS: -26 DEGREES
RBC # BLD AUTO: 3.42 X10*6/UL (ref 4.5–5.9)
SAO2 % BLDA: 96 % (ref 94–100)
SAO2 % BLDA: 96 % (ref 94–100)
SODIUM BLDA-SCNC: 130 MMOL/L (ref 136–145)
SODIUM BLDA-SCNC: 131 MMOL/L (ref 136–145)
SODIUM SERPL-SCNC: 134 MMOL/L (ref 136–145)
T AXIS: 243 DEGREES
T OFFSET: 428 MS
TACROLIMUS BLD-MCNC: 2.7 NG/ML
TSH SERPL-ACNC: 0.69 MIU/L (ref 0.44–3.98)
UFH PPP CHRO-ACNC: 0.3 IU/ML
UFH PPP CHRO-ACNC: 0.6 IU/ML
VENTRICULAR RATE: 69 BPM
WBC # BLD AUTO: 9.2 X10*3/UL (ref 4.4–11.3)

## 2025-04-08 PROCEDURE — 93010 ELECTROCARDIOGRAM REPORT: CPT | Performed by: INTERNAL MEDICINE

## 2025-04-08 PROCEDURE — 84443 ASSAY THYROID STIM HORMONE: CPT

## 2025-04-08 PROCEDURE — 82947 ASSAY GLUCOSE BLOOD QUANT: CPT

## 2025-04-08 PROCEDURE — 85027 COMPLETE CBC AUTOMATED: CPT

## 2025-04-08 PROCEDURE — 71045 X-RAY EXAM CHEST 1 VIEW: CPT | Performed by: STUDENT IN AN ORGANIZED HEALTH CARE EDUCATION/TRAINING PROGRAM

## 2025-04-08 PROCEDURE — 82330 ASSAY OF CALCIUM: CPT

## 2025-04-08 PROCEDURE — 37799 UNLISTED PX VASCULAR SURGERY: CPT

## 2025-04-08 PROCEDURE — 2020000001 HC ICU ROOM DAILY

## 2025-04-08 PROCEDURE — 83735 ASSAY OF MAGNESIUM: CPT

## 2025-04-08 PROCEDURE — 2500000001 HC RX 250 WO HCPCS SELF ADMINISTERED DRUGS (ALT 637 FOR MEDICARE OP)

## 2025-04-08 PROCEDURE — 97116 GAIT TRAINING THERAPY: CPT | Mod: GP | Performed by: PHYSICAL THERAPIST

## 2025-04-08 PROCEDURE — 84132 ASSAY OF SERUM POTASSIUM: CPT

## 2025-04-08 PROCEDURE — 71045 X-RAY EXAM CHEST 1 VIEW: CPT

## 2025-04-08 PROCEDURE — 97164 PT RE-EVAL EST PLAN CARE: CPT | Mod: GP | Performed by: PHYSICAL THERAPIST

## 2025-04-08 PROCEDURE — 99233 SBSQ HOSP IP/OBS HIGH 50: CPT | Performed by: INTERNAL MEDICINE

## 2025-04-08 PROCEDURE — 80197 ASSAY OF TACROLIMUS: CPT

## 2025-04-08 PROCEDURE — 2500000002 HC RX 250 W HCPCS SELF ADMINISTERED DRUGS (ALT 637 FOR MEDICARE OP, ALT 636 FOR OP/ED)

## 2025-04-08 PROCEDURE — 2500000004 HC RX 250 GENERAL PHARMACY W/ HCPCS (ALT 636 FOR OP/ED)

## 2025-04-08 PROCEDURE — 93291 INTERROG DEV EVAL SCRMS IP: CPT | Performed by: INTERNAL MEDICINE

## 2025-04-08 PROCEDURE — 93005 ELECTROCARDIOGRAM TRACING: CPT

## 2025-04-08 PROCEDURE — 93291 INTERROG DEV EVAL SCRMS IP: CPT

## 2025-04-08 PROCEDURE — 85610 PROTHROMBIN TIME: CPT

## 2025-04-08 PROCEDURE — 99291 CRITICAL CARE FIRST HOUR: CPT

## 2025-04-08 PROCEDURE — 85520 HEPARIN ASSAY: CPT

## 2025-04-08 PROCEDURE — 94003 VENT MGMT INPAT SUBQ DAY: CPT

## 2025-04-08 RX ORDER — FUROSEMIDE 10 MG/ML
40 INJECTION INTRAMUSCULAR; INTRAVENOUS ONCE
Status: COMPLETED | OUTPATIENT
Start: 2025-04-08 | End: 2025-04-08

## 2025-04-08 RX ORDER — HYDRALAZINE HYDROCHLORIDE 25 MG/1
50 TABLET, FILM COATED ORAL 3 TIMES DAILY
Status: DISCONTINUED | OUTPATIENT
Start: 2025-04-08 | End: 2025-04-09

## 2025-04-08 RX ORDER — PRAVASTATIN SODIUM 20 MG/1
40 TABLET ORAL NIGHTLY
Status: DISCONTINUED | OUTPATIENT
Start: 2025-04-08 | End: 2025-04-10 | Stop reason: HOSPADM

## 2025-04-08 RX ORDER — HYDRALAZINE HYDROCHLORIDE 20 MG/ML
10 INJECTION INTRAMUSCULAR; INTRAVENOUS EVERY 4 HOURS PRN
Status: DISCONTINUED | OUTPATIENT
Start: 2025-04-08 | End: 2025-04-08

## 2025-04-08 RX ORDER — HYDROMORPHONE HYDROCHLORIDE 0.2 MG/ML
0.2 INJECTION INTRAMUSCULAR; INTRAVENOUS; SUBCUTANEOUS
Status: DISCONTINUED | OUTPATIENT
Start: 2025-04-08 | End: 2025-04-08

## 2025-04-08 RX ORDER — HYDRALAZINE HYDROCHLORIDE 20 MG/ML
10 INJECTION INTRAMUSCULAR; INTRAVENOUS EVERY 4 HOURS PRN
Status: DISCONTINUED | OUTPATIENT
Start: 2025-04-08 | End: 2025-04-10 | Stop reason: HOSPADM

## 2025-04-08 RX ORDER — HYDRALAZINE HYDROCHLORIDE 25 MG/1
25 TABLET, FILM COATED ORAL 3 TIMES DAILY
Status: DISCONTINUED | OUTPATIENT
Start: 2025-04-08 | End: 2025-04-08

## 2025-04-08 RX ORDER — AMLODIPINE BESYLATE 10 MG/1
10 TABLET ORAL DAILY
Status: DISCONTINUED | OUTPATIENT
Start: 2025-04-08 | End: 2025-04-10 | Stop reason: HOSPADM

## 2025-04-08 RX ORDER — OXYCODONE HYDROCHLORIDE 5 MG/1
10 TABLET ORAL EVERY 6 HOURS PRN
Status: DISCONTINUED | OUTPATIENT
Start: 2025-04-08 | End: 2025-04-09

## 2025-04-08 RX ORDER — LABETALOL HYDROCHLORIDE 5 MG/ML
20 INJECTION, SOLUTION INTRAVENOUS EVERY 4 HOURS PRN
Status: DISCONTINUED | OUTPATIENT
Start: 2025-04-08 | End: 2025-04-08

## 2025-04-08 RX ORDER — OXYCODONE HYDROCHLORIDE 5 MG/1
5 TABLET ORAL EVERY 6 HOURS PRN
Status: DISCONTINUED | OUTPATIENT
Start: 2025-04-08 | End: 2025-04-09

## 2025-04-08 RX ORDER — LABETALOL HYDROCHLORIDE 5 MG/ML
20 INJECTION, SOLUTION INTRAVENOUS EVERY 4 HOURS PRN
Status: DISCONTINUED | OUTPATIENT
Start: 2025-04-08 | End: 2025-04-10 | Stop reason: HOSPADM

## 2025-04-08 RX ORDER — METOPROLOL TARTRATE 25 MG/1
50 TABLET, FILM COATED ORAL 2 TIMES DAILY
Status: DISCONTINUED | OUTPATIENT
Start: 2025-04-08 | End: 2025-04-10 | Stop reason: HOSPADM

## 2025-04-08 RX ORDER — CLOPIDOGREL BISULFATE 75 MG/1
75 TABLET ORAL DAILY
Status: DISCONTINUED | OUTPATIENT
Start: 2025-04-08 | End: 2025-04-08

## 2025-04-08 RX ORDER — NAPROXEN SODIUM 220 MG/1
81 TABLET, FILM COATED ORAL DAILY
Status: DISCONTINUED | OUTPATIENT
Start: 2025-04-08 | End: 2025-04-09

## 2025-04-08 RX ADMIN — PROPOFOL INJECTABLE EMULSION 50 MCG/KG/MIN: 10 INJECTION, EMULSION INTRAVENOUS at 04:09

## 2025-04-08 RX ADMIN — ASPIRIN 81 MG: 81 TABLET, CHEWABLE ORAL at 08:50

## 2025-04-08 RX ADMIN — OXYCODONE HYDROCHLORIDE 10 MG: 5 TABLET ORAL at 20:00

## 2025-04-08 RX ADMIN — LABETALOL HYDROCHLORIDE 20 MG: 5 INJECTION, SOLUTION INTRAVENOUS at 13:03

## 2025-04-08 RX ADMIN — INSULIN LISPRO 1 UNITS: 100 INJECTION, SOLUTION INTRAVENOUS; SUBCUTANEOUS at 13:36

## 2025-04-08 RX ADMIN — OXYCODONE HYDROCHLORIDE 5 MG: 5 TABLET ORAL at 11:38

## 2025-04-08 RX ADMIN — LABETALOL HYDROCHLORIDE 20 MG: 5 INJECTION, SOLUTION INTRAVENOUS at 17:50

## 2025-04-08 RX ADMIN — PREDNISONE 5 MG: 5 TABLET ORAL at 08:51

## 2025-04-08 RX ADMIN — TACROLIMUS 1.5 MG: 5 CAPSULE, GELATIN COATED ORAL at 06:31

## 2025-04-08 RX ADMIN — MYCOPHENOLATE MOFETIL 500 MG: 250 CAPSULE ORAL at 17:50

## 2025-04-08 RX ADMIN — CLOPIDOGREL BISULFATE 75 MG: 75 TABLET ORAL at 08:51

## 2025-04-08 RX ADMIN — MYCOPHENOLATE MOFETIL 500 MG: 250 CAPSULE ORAL at 06:32

## 2025-04-08 RX ADMIN — TAMSULOSIN HYDROCHLORIDE 0.4 MG: 0.4 CAPSULE ORAL at 20:02

## 2025-04-08 RX ADMIN — METOPROLOL TARTRATE 50 MG: 50 TABLET, FILM COATED ORAL at 15:30

## 2025-04-08 RX ADMIN — METOPROLOL TARTRATE 50 MG: 50 TABLET, FILM COATED ORAL at 20:02

## 2025-04-08 RX ADMIN — PRAVASTATIN SODIUM 40 MG: 20 TABLET ORAL at 20:02

## 2025-04-08 RX ADMIN — HYDRALAZINE HYDROCHLORIDE 10 MG: 20 INJECTION INTRAMUSCULAR; INTRAVENOUS at 13:57

## 2025-04-08 RX ADMIN — AMLODIPINE BESYLATE 10 MG: 10 TABLET ORAL at 13:20

## 2025-04-08 RX ADMIN — HYDROMORPHONE HYDROCHLORIDE 0.2 MG: 0.2 INJECTION, SOLUTION INTRAMUSCULAR; INTRAVENOUS; SUBCUTANEOUS at 03:03

## 2025-04-08 RX ADMIN — HEPARIN SODIUM 1500 UNITS/HR: 10000 INJECTION, SOLUTION INTRAVENOUS at 18:12

## 2025-04-08 RX ADMIN — FUROSEMIDE 40 MG: 10 INJECTION INTRAMUSCULAR; INTRAVENOUS at 08:58

## 2025-04-08 RX ADMIN — PROPOFOL INJECTABLE EMULSION 50 MCG/KG/MIN: 10 INJECTION, EMULSION INTRAVENOUS at 00:07

## 2025-04-08 RX ADMIN — HYDRALAZINE HYDROCHLORIDE 50 MG: 25 TABLET ORAL at 20:02

## 2025-04-08 RX ADMIN — SENNOSIDES AND DOCUSATE SODIUM 2 TABLET: 50; 8.6 TABLET ORAL at 08:51

## 2025-04-08 RX ADMIN — SENNOSIDES AND DOCUSATE SODIUM 2 TABLET: 50; 8.6 TABLET ORAL at 20:01

## 2025-04-08 RX ADMIN — LEVOTHYROXINE SODIUM 137 MCG: 25 TABLET ORAL at 06:35

## 2025-04-08 RX ADMIN — CEFAZOLIN SODIUM 2 G: 2 INJECTION, SOLUTION INTRAVENOUS at 11:56

## 2025-04-08 RX ADMIN — PANTOPRAZOLE SODIUM 40 MG: 40 INJECTION, POWDER, FOR SOLUTION INTRAVENOUS at 06:32

## 2025-04-08 RX ADMIN — CEFAZOLIN SODIUM 2 G: 2 INJECTION, SOLUTION INTRAVENOUS at 03:58

## 2025-04-08 ASSESSMENT — COGNITIVE AND FUNCTIONAL STATUS - GENERAL
CLIMB 3 TO 5 STEPS WITH RAILING: A LITTLE
MOBILITY SCORE: 18
STANDING UP FROM CHAIR USING ARMS: A LITTLE
WALKING IN HOSPITAL ROOM: A LITTLE
TURNING FROM BACK TO SIDE WHILE IN FLAT BAD: A LITTLE
MOVING FROM LYING ON BACK TO SITTING ON SIDE OF FLAT BED WITH BEDRAILS: A LITTLE
MOVING TO AND FROM BED TO CHAIR: A LITTLE

## 2025-04-08 ASSESSMENT — PAIN SCALES - GENERAL
PAINLEVEL_OUTOF10: 2
PAINLEVEL_OUTOF10: 3
PAINLEVEL_OUTOF10: 7
PAINLEVEL_OUTOF10: 3
PAINLEVEL_OUTOF10: 5 - MODERATE PAIN
PAINLEVEL_OUTOF10: 5 - MODERATE PAIN

## 2025-04-08 ASSESSMENT — PAIN - FUNCTIONAL ASSESSMENT
PAIN_FUNCTIONAL_ASSESSMENT: CPOT (CRITICAL CARE PAIN OBSERVATION TOOL)
PAIN_FUNCTIONAL_ASSESSMENT: CPOT (CRITICAL CARE PAIN OBSERVATION TOOL)
PAIN_FUNCTIONAL_ASSESSMENT: 0-10
PAIN_FUNCTIONAL_ASSESSMENT: CPOT (CRITICAL CARE PAIN OBSERVATION TOOL)
PAIN_FUNCTIONAL_ASSESSMENT: CPOT (CRITICAL CARE PAIN OBSERVATION TOOL)

## 2025-04-08 ASSESSMENT — ACTIVITIES OF DAILY LIVING (ADL): ADL_ASSISTANCE: INDEPENDENT

## 2025-04-08 ASSESSMENT — PAIN DESCRIPTION - DESCRIPTORS: DESCRIPTORS: ACHING;SHARP

## 2025-04-08 NOTE — CONSULTS
PODIATRIC MEDICINE & SURGERY CONSULT NOTE    Subjective   Dilip Haynes is a 74 y.o. male who is on day 12 of admission for Lower limb ischemia.     Podiatry consulted for R. hallux ulcer    Initial HPI:   This is a 75 yo male new new our service with pmhx of carotid endarterectomy for asymptomatic stenosis, Raynaud's, renal artery stenosis, PAD (s/p L iliac ([id number] stent) with R fem and profunda endarterectomy with patch angioplasty (2012) who presented to [facility] 3/27 from [place] ED with c/f acute on chronic right limb ischemia. Patient underwent diagnostic bilateral lower extremity angiography 3/28/25 with Dr. Alvarez where noted to have Right common femoral stenosis. Right SFA occlusion with reconstitution of diseased right above knee popliteal artery with one vessel runoff to the foot via the posterior tibial artery. We are consulted for a chronic nonhealing right hallux ulceration. He is pod0 s/p right femoral endarterectomy, profundaplasty, right common iliac artery angioplasty and covered stent, right external iliac artery angioplasty and covered stent. Intubated (plan to be extubated later per nursing).      Review of Systems  ROS is negative unless otherwise indicated in HPI    Past Medical History:   Diagnosis Date    Encounter for other preprocedural examination 05/10/2020    Pre-transplant evaluation for kidney transplant    Old myocardial infarction 01/19/2022    H/O non-ST elevation myocardial infarction (NSTEMI)    Personal history of other diseases of the circulatory system 12/22/2015    History of stenosis of renal artery    Personal history of other diseases of the circulatory system 04/13/2021    History of carotid artery stenosis    Personal history of other diseases of the circulatory system 01/19/2022    History of essential hypertension    Personal history of other diseases of the circulatory system 07/23/2016    History of claudication    Personal history of other endocrine, nutritional and  "metabolic disease 12/22/2015    History of thyroid disease    Raynaud's syndrome without gangrene 12/22/2015    Raynauds disease       Social History     Tobacco Use    Smoking status: Former     Types: Cigarettes    Smokeless tobacco: Never   Substance Use Topics    Alcohol use: Yes     Comment: 1 per year    Drug use: Never       Recent Surgeries in Podiatry            No cases to display            Allergies   Allergen Reactions    Lovastatin Myalgia     Leg cramps    Muscle cramps    Simvastatin Myalgia     Muscle cramps    Adhesive Rash    Naproxen Rash       Medications  Scheduled medications  ceFAZolin, 2 g, intravenous, q8h  insulin lispro, 0-5 Units, subcutaneous, q4h  levothyroxine, 137 mcg, oral, Daily  mycophenolate, 500 mg, oral, 2 times per day  pantoprazole, 40 mg, oral, Daily before breakfast   Or  pantoprazole, 40 mg, intravenous, Daily before breakfast  predniSONE, 5 mg, oral, Daily  sennosides-docusate sodium, 2 tablet, oral, BID  tacrolimus, 1.5 mg, oral, Daily  [Held by provider] tacrolimus ER, 1.5 mg, oral, Once per day on Sunday Monday Tuesday Wednesday Thursday Friday  tamsulosin, 0.4 mg, oral, Nightly  [Held by provider] torsemide, 20 mg, oral, Daily      Continuous medications   heparin, 0-4,500 Units/hr, Last Rate: 1,500 Units/hr (04/08/25 0700)  propofol, 0-50 mcg/kg/min, Last Rate: 35 mcg/kg/min (04/08/25 0700)       PRN medications: acetaminophen, albuterol, calcium gluconate, calcium gluconate, dextrose, dextrose, fluticasone, glucagon, glucagon, hydrALAZINE, HYDROmorphone, labetaloL, magnesium sulfate, magnesium sulfate, naloxone, oxyCODONE, oxygen, polyethylene glycol, potassium chloride, potassium chloride    Objective   Visit Vitals  /55   Pulse 64   Temp 36.6 °C (97.9 °F) (Temporal)   Resp 16   Ht 1.778 m (5' 10\")   Wt 92.3 kg (203 lb 7.8 oz)   SpO2 96%   BMI 29.20 kg/m²   Smoking Status Former   BSA 2.14 m²       Physical Exam    Vascular: DP and PT pulses are non-palpable " bilateral, biphasic DP/PT to the left and biphasic PT monophasic DP right.  CFT is less than 3 seconds bilateral.  Skin temperature is warm to cool proximal to distal bilateral.      Neurologic: no obvious focal deficits.     Musculoskeletal: Moves all extremities spontaneously.    Dermatologic: Skin is supple with normal texture and turgor noted.  Webspaces are clean, dry and intact bilateral. There are no wounds present    There is a small dry gangrene eschar noted to the distal tip of the right hallux that is non-painful, non-malodorous and has no obvious signs of infection at bedside visit today. There is no purulence, or   erythema noted to the distal tuft of the hallux.       Lab Results   Component Value Date    WBC 9.2 04/08/2025    HGB 8.6 (L) 04/08/2025    HCT 28.0 (L) 04/08/2025    MCV 82 04/08/2025     04/08/2025     Lab Results   Component Value Date    GLUCOSE 112 (H) 04/08/2025    CALCIUM 8.8 04/08/2025     (L) 04/08/2025    K 4.8 04/08/2025    CO2 23 04/08/2025     04/08/2025    BUN 41 (H) 04/08/2025    CREATININE 2.66 (H) 04/08/2025     Lab Results   Component Value Date    HGBA1C 6.4 (H) 02/20/2025         Assessment/Plan   Dilip Haynes is a 74 y.o. male who is on day 12 of admission for Lower limb ischemia.   1. PAD (peripheral artery disease) (CMS-HCC)    2. Lower limb ischemia    3. 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 Encounter for other preprocedural examination    5. Heart failure with preserved ejection fraction, unspecified HF chronicity    6. Hypertension secondary to other renal disorders    7. Hx of percutaneous transluminal coronary angioplasty    8. History of non-ST elevation myocardial infarction (NSTEMI)    9. History of cardiovascular disorder    10. Cardiac arrest with successful resuscitation    11. Atrial flutter, unspecified type (Multi)    12. Presence of stent in coronary artery    13.  Abnormal electrocardiogram (ECG) (EKG)    14. Preop cardiovascular exam    15. End-stage renal disease (Multi)      #R Hallux Dry Gangrene    Reviewed labs, imaging and notes.   - Patient was seen and evaluated; all findings were discussed and all questions were answered to patient's satisfaction.  - Charts, labs, vitals and imaging all reviewed.     - Imaging:   XR 2/25  IMPRESSION:   No acute osseous abnormality.  No radiographic evidence of osteomyelitis     - Labs:   WBC 9.2   Uric acid 8.3 High    - PVRs:   Left Lower PVR: Evidence of moderate arterial occlusive disease in the left lower extremity at rest. Decreased digital perfusion noted. Monophasic flow is noted in the left posterior tibial artery and left dorsalis pedis artery. Multiphasic flow is noted in the left common femoral artery.   Left Posterior Tibial (Ankle) 106 mmHg  0.60  Left Dorsalis Pedis (Ankle)   92 mmHg   0.52  Left Digit (Great Toe)        57 mmHg   0.32    - Wound culture:   No wound culture collected    - Blood culture:   None collected    Plan/Recommendations:  - Recommend daily betadine to distal tuft of the right hallux to prevent conversion of dry gangrene to wet gangrene  - WBS: per vascular surgery  - DVT prophylaxis per primary  - Antibiotics per primary team    - Pain management per primary team  - Bowel regimen per primary team  - No plan for podiatric surgical intervention at this time.   - Thank you for the consult. Podiatry team will continue to follow in periphery      - FU with Dr. Flores either [address] or [facility] in [place]    Patient examined and evaluated with attending physician, Dr. Flores.    This note is pending attending physician attestation, please see attestation below.    Bryant Chamorro DPM PGY-1  Podiatric Medicine & Surgery  Please epic secure chat if any questions or concerns thank you.  Pager #[phone]

## 2025-04-08 NOTE — CARE PLAN
The patient's goals for the shift include no pain    The clinical goals for the shift include pt will remain HDS      Problem: Safety - Medical Restraint  Goal: Remains free of injury from restraints (Restraint for Interference with Medical Device)  Outcome: Progressing  Goal: Free from restraint(s) (Restraint for Interference with Medical Device)  Outcome: Progressing     Problem: Skin  Goal: Decreased wound size/increased tissue granulation at next dressing change  Outcome: Progressing  Flowsheets (Taken 4/8/2025 1432)  Decreased wound size/increased tissue granulation at next dressing change: Promote sleep for wound healing  Goal: Participates in plan/prevention/treatment measures  Outcome: Progressing  Flowsheets (Taken 4/8/2025 1432)  Participates in plan/prevention/treatment measures: Elevate heels  Goal: Prevent/manage excess moisture  Outcome: Progressing  Flowsheets (Taken 4/8/2025 1432)  Prevent/manage excess moisture: Cleanse incontinence/protect with barrier cream  Goal: Prevent/minimize sheer/friction injuries  Outcome: Progressing  Flowsheets (Taken 4/8/2025 1432)  Prevent/minimize sheer/friction injuries:   HOB 30 degrees or less   Turn/reposition every 2 hours/use positioning/transfer devices  Goal: Promote/optimize nutrition  Outcome: Progressing  Flowsheets (Taken 4/8/2025 1432)  Promote/optimize nutrition: Monitor/record intake including meals  Goal: Promote skin healing  Outcome: Progressing  Flowsheets (Taken 4/8/2025 1432)  Promote skin healing: Turn/reposition every 2 hours/use positioning/transfer devices

## 2025-04-08 NOTE — PROGRESS NOTES
Physical Therapy                 Therapy Communication Note    Patient Name: Dilip Haynes  MRN: [id number]  Department: [facility]  Room: 02/02-A  Today's Date: 4/8/2025     Discipline: Physical Therapy    PT Missed Visit: Yes     Missed Visit Reason:  (0839: Pt in ICU s/p right femoral-popliteal bypass. Per med team, plan to wean from vent today. Will re-attempt PT as able/appropriate.)    Missed Time: Attempt

## 2025-04-08 NOTE — PROGRESS NOTES
"Dilip Haynes is a 74 y.o. male on day 12 of admission presenting with Lower limb ischemia.    Subjective   NAEON - remained intubated and sedated on propofol gtt  High intensity heparin gtt started per [facility] surgery recs  Weaning sedation for extubation this AM  Postop pain well controlled on narcotics       Objective     Physical Exam  Physical Exam  Vitals reviewed.   Constitutional:       Appearance: Normal appearance.   HENT:      Head: Normocephalic and atraumatic.      Mouth/Throat:      Mouth: Mucous membranes are moist.   Cardiovascular:      Rate and Rhythm: Normal rate and regular rhythm.   Pulmonary:      Comments: Intubated and sedated  Abdominal:      General: Abdomen is flat.      Palpations: Abdomen is soft.   Musculoskeletal:      Cervical back: Neck supple.      Comments: R groin prevena intact. L groin access site no hematoma visualized, c/d/i  Skin:     General: Skin is warm and dry.   Neurological:      Comments: Intubated and sedated   Last Recorded Vitals  Blood pressure 169/57, pulse 74, temperature 36.4 °C (97.5 °F), temperature source Temporal, resp. rate 19, height 1.778 m (5' 10\"), weight 92.3 kg (203 lb 7.8 oz), SpO2 (!) 89%.  Intake/Output last 3 Shifts:  I/O last 3 completed shifts:  In: 4078.1 (44.2 mL/kg) [I.V.:3378.1 (36.6 mL/kg); IV Piggyback:700]  Out: 2270 (24.6 mL/kg) [Urine:1820 (0.5 mL/kg/hr); Emesis/NG output:150; Blood:300]  Weight: 92.3 kg     Relevant Results  Reviewed relevant labs and imaging    Assessment/Plan   Assessment & Plan  Lower limb ischemia    PAD (peripheral artery disease) (CMS-[facility])    Dilip Haynes is a 74 y.o. male with PMH of significant for obstructive CAD s/p PCI mRCA in-stent restenosis in 5/25/2016, HFrimprovedEF (follows with Dr. Linn), renal artery stenosis, HTN, afib s/p DCCV 6/2020 (on Eliquis last took 3/26 AM), carotid endarterectomy for asymptomatic stenosis, ESRD s/p DDKT (5/2020), hypothyroidism, and PAD (s/p L iliac ([id number] stent) with " R fem and profunda endarterectomy with patch angioplasty (2012)  who is POD 1 from right femoral-popliteal bypass w/ Dr. Gant on 4/7/25.     Plan:  NEURO: Patient intubated and sedated. Acute post op pain well controlled  - Pain control with oxycodone PRN  - Wean propofol gtt to off, SAT, SBT  - PT/OT consult  - Home meds: none     CV: History of NSTEMI/CAD s/p stents (2000, 2006, 2007) and most recently WILLIS x1 to RCA for in-stent restenosis 5/2016. LHC 2/17/23 performed for typical angina with showed 100% mid LCX and 100% PDA with nonobstructive LM and LAD, Previous cardiac MRI which showed partial viability in the region of the LCX, HTN, pAfib s/p DCCV 6/2020 (on Eliquis last took 3/26 AM). Diastolic heart failure. Loop recorder in place, last check 3/26 without any events. Baseline -170s/60-80s. Echo 3/28 EF 60%, reduced RV systolic function. To ICU off pressors. Run of v-tach intra-op, resolved spontaneously, given 2g Mg and 100mg lidocaine, in NSR to SB since ICU arrival. NIBP on L arm with lower SBP reads compared to R radial A line - NIBP and A line correlating when checked on RUE  - Goal MAPs > 70 and <110  - continuous EKG/abp monitoring  - PRN Labetalol and Hydral for SBP >160  - Start ASA 81mg, Plavix 75mg, and Pravastatin 40mg today  - Loop recorder implanted/ explanted/ and reimplanted in 2023; device check today  - Home meds: amlodipine 10mg daily, losartan 75mg daily, metoprolol tartrate 50mg bid, torsemide 20mg daily, pravastatin 40mg at bedtime, empagliflozin 10mg daily, apixaban 2.5mg bid, prn SL NTG     Vascular: History of carotid endarterectomy for asymptomatic stenosis, Raynaud's, renal artery stenosis, PAD (s/p L iliac ([id number] stent) with R fem and profunda endarterectomy with patch angioplasty (2012) who presented to [facility] 3/27 from [place] ED with c/f acute on chronic right limb ischemia. Patient underwent diagnostic bilateral lower extremity angiography 3/28/25 with Dr. Alvarez  where noted to have Right common femoral stenosis. Right SFA occlusion with reconstitution of diseased right above knee popliteal artery with one vessel runoff to the foot via the posterior tibial artery. Now s/p femoral-popliteal bypass, dopplerable PT bilaterally.   - chronic nonhealing right hallux ulceration -> referred to podiatry for hyperbaric oxygen therapy, not covered since he doesn't have osteomyelitis  - podiatry consult today, appreciate recs  - Continue high intensity heparin gtt per vascular surgery  - ASA/Plavix started today     PULM: Former smoker, 75 pack years, quit 10yrs ago. Intubated and sedated. CXR this AM with worsening pulmonary edema and perihilar congestion and persistent bilateral pleural effusions  - Wean to extubate  - Lasix 40 mg this AM  - Wean FiO2 as tolerated, maintain SPO2 >90%  - Q1h incentive spirometer while awake  - additional pulm toilet prn.    - CXR PRN     GI: Diverticulosis of colon. Cirrhotic liver by CTA abdomen 3/26/25 with 2 hypervascular foci favored to represent shunts (pt recommended to have MRI liver by last CT scan, nonurgent)  - NPO while intubated and sedated  - Bedside swallow after extubation  - PPI for GI prophylaxis     : ESRD s/p DDKT 2020. CKD IIIb. Renal artery stenosis s/p bilateral stent placement.   - Maintain U/O >0.5ml/kg/hr  - Check renal function panel daily and prn  - Transplant nephrology following, appreciate recs  - IS/ppx per transplant nephrology: mmf, tac, pred  - Daily tacro level  - Replete electrolytes to goal K>4, Mg>2, Phos>2.5, ionized Ca>1.10.     HEME: Postop acute blood loss anemia - Hgb 8.6 this AM  - Check CBC and coags post op and daily  - SCDs for DVT prophylaxis  - Continue heparin gtt per vascular surgery  - Start ASA/ Plavix today  - ongoing monitoring for s/s bleeding     ENDO: A1c 6.4%. Uninodular goiter s/p thyroidectomy. Hypothyroidism  - Q4h BG   - SSI Lispro per ICU protocol  - Continue home synthroid      ID:  Afebrile. Pending post op WBC.  - 24hr Ancef 2g (stop 1200 on 4/8/2025)  - temp q4h, wbc daily  - ongoing monitoring for s/s infection     Lines:  - R radial art line  - piv x3     Dispo: continue ICU care. Seen and discussed with ICU attending Dr Florecita DURAN phone #[phone]    Emiliano Pierce MD

## 2025-04-08 NOTE — PROGRESS NOTES
Physical Therapy    Physical Therapy Re-Evaluation & Treatment    Patient Name: Dilip Haynes  MRN: [id number]  Department: [facility] [place], [place] [id number] NONV1  Room: 02/02-A  Today's Date: 4/8/2025   Time Calculation  Start Time: 1208  Stop Time: 1240  Time Calculation (min): 32 min    Assessment/Plan   PT Assessment  PT Assessment Results: Decreased endurance  Rehab Prognosis: Good  Barriers to Discharge Home: No anticipated barriers  Evaluation/Treatment Tolerance: Patient tolerated treatment well  Medical Staff Made Aware: Yes  End of Session Communication: Bedside nurse  Assessment Comment: Pt admitted to SICU s/p right femoral-popliteal bypass. Re-eval performed, POC remains approrpiate at this time.  End of Session Patient Position: Up in chair, Alarm on   IP OR SWING BED PT PLAN  Inpatient or Swing Bed: Inpatient  PT Plan  Treatment/Interventions: Bed mobility, Transfer training, Gait training, Stair training, Balance training, Neuromuscular re-education, Strengthening, Endurance training, Range of motion, Home exercise program, Therapeutic activity, Therapeutic exercise  PT Plan: Ongoing PT  PT Frequency: 2 times per week  PT Discharge Recommendations: Low intensity level of continued care  Equipment Recommended upon Discharge: Wheeled walker  PT Recommended Transfer Status: Assist x1  PT - OK to Discharge: Yes      Subjective     General Visit Information:  General  Reason for Referral: Patient presented to [facility] on 3/27 for concern for worsening rt limb ischemia. On heparin. S/p diagnostic b/l LE angiogram 3/28/25 showing R CFA disease, SFA occlusion, and PT runoff. Post-procedure course complicated by HTN urgency, chest pain. Transferred to SICU for BP control, required NTG drip weaned off and transferred back to medical floor. 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 Per med team, plan to wean from vent today. Will re-attempt PT as able/appropriate.)  Prior to Session Communication: Bedside nurse  Patient Position Received: Bed, 3 rail up, Alarm off, not on at start of session  General Comment: Pt alert and agreeable, extubated this AM. On 4L NC, tele, IV, a-line, winters, wound vac. RN/team aware and treating HTN. OK to proceed.  Home Living:  Home Living  Type of Home: House  Lives With: Spouse  Home Adaptive Equipment: Cane, Walker rolling or standard  Home Layout: Two level, Able to live on main level with bedroom/bathroom, Stairs to alternate level with rails  Alternate Level Stairs-Number of Steps: 10  Home Access: Stairs to enter without rails  Entrance Stairs-Number of Steps: 1  Bathroom Shower/Tub: Tub/shower unit  Bathroom Equipment: Grab bars in shower (shower bench)  Home Living Comments: Can live on main floor if needed. Bathrooms on both floors.  Prior Level of Function:  Prior Function Per Pt/Caregiver Report  Level of [place]: Independent with ADLs and functional transfers  ADL Assistance: Independent  Homemaking Assistance: Independent  Ambulatory Assistance: Independent  Vocational: Retired  Prior Function Comments: Patient reports being independent in his home without as walker nor a cane. Patient reports difficulty walking > 1 block or grocery shopping (>300ft)  Precautions:  Precautions  Medical Precautions: Fall precautions  Precautions Comment: Post-op shoes would be best. Pt states he had post op shoe but does not have his belongings with him at this time. MAP goal , SBP < 160.       04/08/25 1208   Vital Signs   Vitals Session Pre PT   Heart Rate 85   Resp 22   SpO2 96 %   BP (!) 184/61       Vital Signs Comment: BP with mobility 174/92    Objective   Pain:  Pain Assessment  Pain Assessment: 0-10  0-10 (Numeric) Pain Score:  (Pt denied RLE pain during  session)  Cognition:  Cognition  Overall Cognitive Status: Within Functional Limits  Orientation Level: Oriented X4    General Assessments:  Activity Tolerance  Endurance: Endurance does not limit participation in activity  Early Mobility/Exercise Safety Screen: Proceed with mobilization - No exclusion criteria met    Sensation  Light Touch: No apparent deficits    Postural Control  Postural Control: Within Functional Limits    Static Sitting Balance  Static Sitting-Balance Support: Feet supported  Static Sitting-Level of Assistance: Close supervision  Dynamic Sitting Balance  Dynamic Sitting-Balance Support: Feet supported  Dynamic Sitting-Level of Assistance: Close supervision    Static Standing Balance  Static Standing-Balance Support: Bilateral upper extremity supported  Static Standing-Level of Assistance: Close supervision  Dynamic Standing Balance  Dynamic Standing-Balance Support: Bilateral upper extremity supported  Dynamic Standing-Level of Assistance: Contact guard  Functional Assessments:       Bed Mobility 1  Bed Mobility 1: Supine to sitting  Level of Assistance 1: Minimum assistance  Bed Mobility Comments 1: HOB elevated + draw sheet    Transfer 1  Technique 1: Sit to stand, Stand to sit  Transfer Device 1:  (bilat arm in arm assist)  Transfer Level of Assistance 1: Contact guard  Transfers 2  Technique 2: Sit to stand, Stand to sit  Transfer Device 2: Walker  Transfer Level of Assistance 2: Contact guard  Trials/Comments 2: Cues for hand placement    Ambulation/Gait Training  Ambulation/Gait Training Performed: Yes  Ambulation/Gait Training 1  Surface 1: Level tile  Device 1:  (HHA)  Assistance 1: Contact guard  Comments/Distance (ft) 1: x3 steps bed to chair  Ambulation/Gait Training 2  Surface 2: Level tile  Device 2: Rolling walker  Assistance 2: Contact guard  Comments/Distance (ft) 2: Pt hypertensive prior to mobility, BP began to come down with upright standing and pt reporting he felt more  comfortable so proceeded with ambulation. x100 ft with no LOB/instability    Extremity/Trunk Assessments:    RUE   RUE : Within Functional Limits  LUE   LUE: Within Functional Limits  RLE   RLE : Within Functional Limits  LLE   LLE : Within Functional Limits  Treatments:  Therapeutic Activity  Therapeutic Activity 1: Additional trials transfers/gait performed beyond re-eval for therapeutic gains. See mobility for further detail.  Outcome Measures:  [facility] Basic Mobility  Turning from your back to your side while in a flat bed without using bedrails: A little  Moving from lying on your back to sitting on the side of a flat bed without using bedrails: A little  Moving to and from bed to chair (including a wheelchair): A little  Standing up from a chair using your arms (e.g. wheelchair or bedside chair): A little  To walk in hospital room: A little  Climbing 3-5 steps with railing: A little  Basic Mobility - Total Score: 18    FSS-ICU  Ambulation: Walks >/ or equal to 50 feet with any assistance x1  Rolling: Minimal assistance (performs 75% or more of task)  Sitting: Supervision or set-up only  Transfer Sit-to-Stand: Minimal assistance (performs 75% or more of task)  Transfer Supine-to-Sit: Minimal assistance (performs 75% or more of task)  Total Score: 19      Early Mobility/Exercise Safety Screen: Proceed with mobilization - No exclusion criteria met  ICU Mobility Scale: Walking with assistance of 1 person [8]    Encounter Problems       Encounter Problems (Active)       Mobility       Pt will perform 5x STS in </= 15 sec        Start:  04/08/25    Expected End:  04/29/25               PT Problem       Patient is able to ambulate 150 feet without loss of balance requiring contact guard or less assist  (Met)       Start:  03/31/25    Expected End:  04/14/25    Resolved:  04/01/25         Patient is able to ascend 4 stairs without loss of balance requiring contact guard or less assist  (Progressing)       Start:   03/31/25    Expected End:  04/29/25            Patient is able to score>9/12 on modified Dynamic Gait Index for dynamic ambulatory balance to reduce fall risk  (Progressing)       Start:  03/31/25    Expected End:  04/29/25               PT Problem       Patient is able to ambulate 350 feet without loss of balance requiring contact guard or less assist  (Progressing)       Start:  04/01/25    Expected End:  04/29/25               Pain - Adult              Education Documentation  Precautions, taught by Keyla Galeano PT at 4/8/2025  2:35 PM.  Learner: Patient  Readiness: Acceptance  Method: Explanation  Response: Verbalizes Understanding    Body Mechanics, taught by Keyla Galeano PT at 4/8/2025  2:35 PM.  Learner: Patient  Readiness: Acceptance  Method: Explanation  Response: Verbalizes Understanding    Mobility Training, taught by Keyla Galeano PT at 4/8/2025  2:35 PM.  Learner: Patient  Readiness: Acceptance  Method: Explanation  Response: Verbalizes Understanding    Education Comments  No comments found.    Keyla Galeano PT, DPT

## 2025-04-08 NOTE — PROGRESS NOTES
VASCULAR SURGERY PROGRESS NOTE  Assessment/Plan   Dilip Haynes is 74 y.o. male with PMHx significant for CAD (s/p PCI), HTN, afib s/p DCCV 6/2020 (on Eliquis last took 3/26 AM), carotid endarterectomy for asymptomatic stenosis, ESRD s/p DDKT (2020), hypothyroidism, and PAD (s/p L iliac ([id number] stent) with R fem and profunda endarterectomy with patch angioplasty (2012) who presented as transfer from OSH with concern of worsening limb ischemia. Patient exam consistent with previously noted right foot tissue loss and rest pain without signs of worsening ischemia on exam. Patient with monophasic PT on R and mono DP/PT on left with intact motor and sensory function.      Underwent diagnostic RLE angiogram on 3/28 showing R CFA disease, SFA occlusion, and PT runoff. Post-op course complicated by chest pain in the setting of hypertension, transferred to ICU for nitroglycerin gtt.     Now s/p right femoral endarterectomy, profundaplasty, right WENDY angioplasty with covered stent, right EIA angioplasty with covered stent.     Recovering well in the SICU. Extubated today with no issues.      Plan:    Neuro  Pain control per ICU PRN acetaminophen, oxycodone    CV  Continue amlodipine, hydralazine, losartan, metoprolol  PRN labetalol, hydralazine  Continue pravastain  RLE CLTI5 - now PO1 from right femoral endarterectomy, profundaplasty, right WENDY angioplasty with covered stent, right EIA angioplasty with covered stent.   Continue heparin drip, asa 81  Neurovascular checks per ICU    Pulm  Successfully extubated by SICU     FENGI - h/o DDKT  History of DDKT  Continue mycophenolate, prednisone, tacrolimus  Continue tamsulosin  Diet per ICU  Continue pepcid  Zofran PRN    Heme  Trend hemoglobin q6 per ICU   Continue heparin drip, asa 81     Endo  Continue home levothyroxine     Discussed with attending, Dr Stalin Contreras MD  Vascular Surgery p. [phone]      Subjective   NAEON. VSS overnight. Pain stable. Doing well  post-extubation. No new issues or concerns.     Objective   Vitals:  Heart Rate:  [58-88]   Temp:  [36.4 °C (97.5 °F)-36.8 °C (98.2 °F)]   Resp:  [16-25]   BP: (103-184)/(46-74)   Weight:  [92.3 kg (203 lb 7.8 oz)]   SpO2:  [89 %-99 %]     Exam:  Constitutional: No acute distress, resting comfortably  Neuro:  AOx3, grossly intact  ENMT: moist mucous membranes  CV: no tachycardia  Pulm: non-labored on NC  GI: soft, non-tender, non-distended  Skin: warm and dry  Musculoskeletal: moving all extremities  Extremities: left groin puncture without hematoma. Right groin pravena in place and holding suction.   Pulse Exam: Bilateral PT signals present    Labs:  Results from last 7 days   Lab Units 04/08/25 0312 04/07/25 2032 04/07/25  1838   WBC AUTO x10*3/uL 9.2 9.3 9.6   HEMOGLOBIN g/dL 8.6* 8.7* 9.0*   PLATELETS AUTO x10*3/uL 212 202 172      Results from last 7 days   Lab Units 04/08/25 0312 04/07/25 2032 04/07/25  1838   SODIUM mmol/L 134* 134* 133*   POTASSIUM mmol/L 4.8 4.6 4.5   CHLORIDE mmol/L 100 100 100   CO2 mmol/L 23 22 23   BUN mg/dL 41* 40* 41*   CREATININE mg/dL 2.66* 2.63* 2.83*   GLUCOSE mg/dL 112* 119* 102*   MAGNESIUM mg/dL 2.75* 2.97* 2.33   PHOSPHORUS mg/dL 5.4* 4.9 4.0      Results from last 7 days   Lab Units 04/08/25 0312 04/07/25 2032 04/07/25  1838   INR  1.2* 1.2* 1.3*   PROTIME seconds 13.3* 13.1* 14.6*   APTT seconds 61* 37* 36        Results from last 7 days   Lab Units 04/08/25  1012 04/08/25  0455 04/07/25 2033   ANTI XA UNFRACTIONATED IU/mL 0.6 0.3 0.1

## 2025-04-08 NOTE — PROGRESS NOTES
Dilip Haynes is a 74 y.o. male on day 12 of admission presenting with Lower limb ischemia.      Subjective   Seen this am. No shortness of breath reported.        Objective          Vitals 24HR  Heart Rate:  [58-88]   Temp:  [36.4 °C (97.5 °F)-36.8 °C (98.2 °F)]   Resp:  [16-26]   BP: (103-184)/(46-74)   Weight:  [92.3 kg (203 lb 7.8 oz)]   SpO2:  [89 %-100 %]     Intake/Output last 3 Shifts:    Intake/Output Summary (Last 24 hours) at 4/8/2025 1652  Last data filed at 4/8/2025 1600  Gross per 24 hour   Intake 3028.4 ml   Output 3685 ml   Net -656.6 ml       Physical Exam  NAD  CTABL  RRR  No increased work of breathing  Abd soft, nt, nd  Trace edema BL    Relevant Results  Lab Results   Component Value Date    WBC 9.2 04/08/2025    HGB 8.6 (L) 04/08/2025    HCT 28.0 (L) 04/08/2025    MCV 82 04/08/2025     04/08/2025     Lab Results   Component Value Date    GLUCOSE 112 (H) 04/08/2025    CALCIUM 8.8 04/08/2025     (L) 04/08/2025    K 4.8 04/08/2025    CO2 23 04/08/2025     04/08/2025    BUN 41 (H) 04/08/2025    CREATININE 2.66 (H) 04/08/2025     Assessment/Plan      74 y.o. 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 S/p HCV treatment with SVR. post txp course was complicated by urinary retention and enlarged prostate. Pt presented to [facility] on 3/27 for concern for worsening rt limb ischemia. On heparin. S/p diagnostic b/l LE angiogram 3/28/25 showing R CFA disease, SFA occlusion, and PT runoff.   Post-procedure course complicated by HTN urgency, chest pain. Transferred to SICU for BP control, required NTG drip now weaned off and transferred back to medical floor on PO meds.  Now s/p femoral bypass/stenting on 4/7 in TSICU for monitoring.    #Allograft function: s/p DDKT 5/2020  - baseline Cr ~2, increased to 2.66  -Euv/hypervolemic.  -Some hypotension in OR and contrast exposure 4/7. Expecting creatinine rise and plateau over  next few days.     #Immunosuppression:  - Envarsus 1.5mg (Increased 4/2) . Goal Fk 5-8.   -continue 1.5mg qday  - cont MMF 500mg bid, cont pred 5mg/d     #HTN: BP Stable  -continue amlodipine, hydralazine, losartan, metoprolol, Torsemide      #Anemia: Hb stable  #CKD-MBD: Ca, Phos stable. , vit D 36 2/2025     Discussed with Dr. Demarco.          Gregory Feliciano MD  Nephrology Fellow

## 2025-04-08 NOTE — NURSING NOTE
ICU Swallow Screen Nursing Note    Was patient able to successfully answer or perform the following:    - What is your name? y (Y/N)    - Where are you right now? y (Y/N)    - What year is it? y (Y/N)    - Open your mouth y (Y/N)    - Stick out your tongue y (Y/N)    - Smile y (Y/N)    - Labial Closure y (Y/N)    - Lingual range of motion y (Y/N)    - Facial symmetry (smile/pucker) y (Y/N)    If the answer to any of the above questions is no, stop swallow assessment and discuss with treating team. If the answer to all of the above questions is yes, proceed with 3 Oz water swallow challenge.    - Pass 3-Oz water swallow challenge y (Y/N)

## 2025-04-08 NOTE — SIGNIFICANT EVENT
Postoperative Check Note    Subjective  Dilip Haynes is a 74 y.o. male who is now POD0 s/p right femoral endarterectomy, profundaplasty, right common iliac artery angioplasty and covered stent, right external iliac artery angioplasty and covered stent. Intubated and sedated postop.     Objective  Vitals:  Afebrile, a-line 153/39, HR 65, resp 16 per vent      Physical Exam:  General: sedated  CV: RR  Pulm: intubated, vent  Vasc: R PT biphasic, L PT biphasic, palp bilateral fem  MSK: No notable swelling or pulsations in groins, R groin prevena in place    Most recent labs:            8.6     9.2>-----<212              28.0     134  100  41                  ----------------<112     4.8  23  2.66            Mg 2.75         Assessment  Dilip Haynes is a 74 y.o. male who is now POD0 s/p right femoral endarterectomy, profundaplasty, right common iliac artery angioplasty and covered stent, right external iliac artery angioplasty and covered stent .  Patient is in stable condition, appropriate for postoperative course. The plan is as follows:      Wean pressors as able  Extubation per ICU  Flat until 1am - left groin puncture site 7 [place] sheath closed with Minx  Resume heparin drip at previous rate at 1am  24 hours Ancef for periop antibiotics  PT tomorrow morning  Wound: left groin puncture site, 4x4 and tegaderm; right groin prevena in place to remain 7 days (4/14)    Crystal Ernst MD  PGY-1   Vascular Surgery

## 2025-04-09 ENCOUNTER — APPOINTMENT (OUTPATIENT)
Dept: RADIOLOGY | Facility: HOSPITAL | Age: 75
DRG: 271 | End: 2025-04-09
Payer: MEDICARE

## 2025-04-09 ENCOUNTER — APPOINTMENT (OUTPATIENT)
Dept: VASCULAR MEDICINE | Facility: HOSPITAL | Age: 75
DRG: 271 | End: 2025-04-09
Payer: MEDICARE

## 2025-04-09 LAB
ABO GROUP (TYPE) IN BLOOD: NORMAL
ALBUMIN SERPL BCP-MCNC: 3.7 G/DL (ref 3.4–5)
ANION GAP BLDA CALCULATED.4IONS-SCNC: ABNORMAL MMOL/L
ANION GAP SERPL CALC-SCNC: 14 MMOL/L (ref 10–20)
ANTIBODY SCREEN: NORMAL
APTT PPP: 95 SECONDS (ref 26–36)
BASE EXCESS BLDA CALC-SCNC: -0.7 MMOL/L (ref -2–3)
BODY TEMPERATURE: 37 DEGREES CELSIUS
BUN SERPL-MCNC: 40 MG/DL (ref 6–23)
CA-I BLD-SCNC: 1.14 MMOL/L (ref 1.1–1.33)
CA-I BLDA-SCNC: 1.19 MMOL/L (ref 1.1–1.33)
CALCIUM SERPL-MCNC: 8.9 MG/DL (ref 8.6–10.6)
CHLORIDE BLDA-SCNC: ABNORMAL MMOL/L
CHLORIDE SERPL-SCNC: 97 MMOL/L (ref 98–107)
CO2 SERPL-SCNC: 26 MMOL/L (ref 21–32)
CREAT SERPL-MCNC: 2.32 MG/DL (ref 0.5–1.3)
EGFRCR SERPLBLD CKD-EPI 2021: 28 ML/MIN/1.73M*2
ERYTHROCYTE [DISTWIDTH] IN BLOOD BY AUTOMATED COUNT: 14.2 % (ref 11.5–14.5)
GLUCOSE BLD MANUAL STRIP-MCNC: 117 MG/DL (ref 74–99)
GLUCOSE BLD MANUAL STRIP-MCNC: 120 MG/DL (ref 74–99)
GLUCOSE BLD MANUAL STRIP-MCNC: 169 MG/DL (ref 74–99)
GLUCOSE BLD MANUAL STRIP-MCNC: 174 MG/DL (ref 74–99)
GLUCOSE BLDA-MCNC: 107 MG/DL (ref 74–99)
GLUCOSE SERPL-MCNC: 111 MG/DL (ref 74–99)
HCO3 BLDA-SCNC: 24.2 MMOL/L (ref 22–26)
HCT VFR BLD AUTO: 28.4 % (ref 41–52)
HCT VFR BLD EST: 28 % (ref 41–52)
HGB BLD-MCNC: 8.8 G/DL (ref 13.5–17.5)
HGB BLDA-MCNC: 9.3 G/DL (ref 13.5–17.5)
INHALED O2 CONCENTRATION: 36 %
INR PPP: 1.2 (ref 0.9–1.1)
LACTATE BLDA-SCNC: 0.8 MMOL/L (ref 0.4–2)
MAGNESIUM SERPL-MCNC: 2.73 MG/DL (ref 1.6–2.4)
MCH RBC QN AUTO: 25.1 PG (ref 26–34)
MCHC RBC AUTO-ENTMCNC: 31 G/DL (ref 32–36)
MCV RBC AUTO: 81 FL (ref 80–100)
NRBC BLD-RTO: 0 /100 WBCS (ref 0–0)
OXYHGB MFR BLDA: 97 % (ref 94–98)
PCO2 BLDA: 40 MM HG (ref 38–42)
PH BLDA: 7.39 PH (ref 7.38–7.42)
PHOSPHATE SERPL-MCNC: 5.6 MG/DL (ref 2.5–4.9)
PLATELET # BLD AUTO: 217 X10*3/UL (ref 150–450)
PO2 BLDA: 123 MM HG (ref 85–95)
POTASSIUM BLDA-SCNC: 4.3 MMOL/L (ref 3.5–5.3)
POTASSIUM SERPL-SCNC: 4.3 MMOL/L (ref 3.5–5.3)
PROTHROMBIN TIME: 12.9 SECONDS (ref 9.8–12.4)
RBC # BLD AUTO: 3.5 X10*6/UL (ref 4.5–5.9)
RH FACTOR (ANTIGEN D): NORMAL
SAO2 % BLDA: 99 % (ref 94–100)
SODIUM BLDA-SCNC: 130 MMOL/L (ref 136–145)
SODIUM SERPL-SCNC: 133 MMOL/L (ref 136–145)
TACROLIMUS BLD-MCNC: 3.6 NG/ML
UFH PPP CHRO-ACNC: 0.5 IU/ML
WBC # BLD AUTO: 11.6 X10*3/UL (ref 4.4–11.3)

## 2025-04-09 PROCEDURE — 85027 COMPLETE CBC AUTOMATED: CPT

## 2025-04-09 PROCEDURE — 2500000004 HC RX 250 GENERAL PHARMACY W/ HCPCS (ALT 636 FOR OP/ED)

## 2025-04-09 PROCEDURE — 2500000001 HC RX 250 WO HCPCS SELF ADMINISTERED DRUGS (ALT 637 FOR MEDICARE OP)

## 2025-04-09 PROCEDURE — 83735 ASSAY OF MAGNESIUM: CPT

## 2025-04-09 PROCEDURE — 37799 UNLISTED PX VASCULAR SURGERY: CPT

## 2025-04-09 PROCEDURE — 2500000002 HC RX 250 W HCPCS SELF ADMINISTERED DRUGS (ALT 637 FOR MEDICARE OP, ALT 636 FOR OP/ED)

## 2025-04-09 PROCEDURE — 80197 ASSAY OF TACROLIMUS: CPT

## 2025-04-09 PROCEDURE — 82330 ASSAY OF CALCIUM: CPT

## 2025-04-09 PROCEDURE — 93922 UPR/L XTREMITY ART 2 LEVELS: CPT

## 2025-04-09 PROCEDURE — 99232 SBSQ HOSP IP/OBS MODERATE 35: CPT

## 2025-04-09 PROCEDURE — 1100000001 HC PRIVATE ROOM DAILY

## 2025-04-09 PROCEDURE — 85610 PROTHROMBIN TIME: CPT

## 2025-04-09 PROCEDURE — 84132 ASSAY OF SERUM POTASSIUM: CPT

## 2025-04-09 PROCEDURE — 93922 UPR/L XTREMITY ART 2 LEVELS: CPT | Performed by: INTERNAL MEDICINE

## 2025-04-09 PROCEDURE — 82565 ASSAY OF CREATININE: CPT

## 2025-04-09 PROCEDURE — 71045 X-RAY EXAM CHEST 1 VIEW: CPT | Performed by: RADIOLOGY

## 2025-04-09 PROCEDURE — 99233 SBSQ HOSP IP/OBS HIGH 50: CPT | Performed by: INTERNAL MEDICINE

## 2025-04-09 PROCEDURE — 85520 HEPARIN ASSAY: CPT

## 2025-04-09 PROCEDURE — 86901 BLOOD TYPING SEROLOGIC RH(D): CPT

## 2025-04-09 PROCEDURE — 97530 THERAPEUTIC ACTIVITIES: CPT | Mod: GO

## 2025-04-09 PROCEDURE — 71045 X-RAY EXAM CHEST 1 VIEW: CPT

## 2025-04-09 PROCEDURE — 97168 OT RE-EVAL EST PLAN CARE: CPT | Mod: GO

## 2025-04-09 PROCEDURE — 82947 ASSAY GLUCOSE BLOOD QUANT: CPT

## 2025-04-09 RX ORDER — HYDRALAZINE HYDROCHLORIDE 50 MG/1
100 TABLET, FILM COATED ORAL 3 TIMES DAILY
Status: DISCONTINUED | OUTPATIENT
Start: 2025-04-09 | End: 2025-04-10 | Stop reason: HOSPADM

## 2025-04-09 RX ORDER — OXYCODONE HYDROCHLORIDE 5 MG/1
10 TABLET ORAL EVERY 4 HOURS PRN
Status: DISCONTINUED | OUTPATIENT
Start: 2025-04-09 | End: 2025-04-09

## 2025-04-09 RX ORDER — FAMOTIDINE 20 MG/1
20 TABLET, FILM COATED ORAL DAILY
Status: DISCONTINUED | OUTPATIENT
Start: 2025-04-09 | End: 2025-04-10 | Stop reason: HOSPADM

## 2025-04-09 RX ORDER — OXYCODONE HYDROCHLORIDE 5 MG/1
5 TABLET ORAL EVERY 4 HOURS PRN
Status: DISCONTINUED | OUTPATIENT
Start: 2025-04-09 | End: 2025-04-09

## 2025-04-09 RX ORDER — CLOPIDOGREL BISULFATE 75 MG/1
75 TABLET ORAL DAILY
Status: DISCONTINUED | OUTPATIENT
Start: 2025-04-09 | End: 2025-04-10 | Stop reason: HOSPADM

## 2025-04-09 RX ADMIN — INSULIN LISPRO 1 UNITS: 100 INJECTION, SOLUTION INTRAVENOUS; SUBCUTANEOUS at 12:21

## 2025-04-09 RX ADMIN — AMLODIPINE BESYLATE 10 MG: 10 TABLET ORAL at 08:25

## 2025-04-09 RX ADMIN — TAMSULOSIN HYDROCHLORIDE 0.4 MG: 0.4 CAPSULE ORAL at 21:20

## 2025-04-09 RX ADMIN — HEPARIN SODIUM 1500 UNITS/HR: 10000 INJECTION, SOLUTION INTRAVENOUS at 12:33

## 2025-04-09 RX ADMIN — LABETALOL HYDROCHLORIDE 20 MG: 5 INJECTION, SOLUTION INTRAVENOUS at 06:30

## 2025-04-09 RX ADMIN — MYCOPHENOLATE MOFETIL 500 MG: 250 CAPSULE ORAL at 17:32

## 2025-04-09 RX ADMIN — HYDRALAZINE HYDROCHLORIDE 100 MG: 50 TABLET ORAL at 17:33

## 2025-04-09 RX ADMIN — INSULIN LISPRO 1 UNITS: 100 INJECTION, SOLUTION INTRAVENOUS; SUBCUTANEOUS at 17:33

## 2025-04-09 RX ADMIN — PANTOPRAZOLE SODIUM 40 MG: 40 TABLET, DELAYED RELEASE ORAL at 06:30

## 2025-04-09 RX ADMIN — PRAVASTATIN SODIUM 40 MG: 20 TABLET ORAL at 21:19

## 2025-04-09 RX ADMIN — LEVOTHYROXINE SODIUM 137 MCG: 25 TABLET ORAL at 06:30

## 2025-04-09 RX ADMIN — CLOPIDOGREL BISULFATE 75 MG: 75 TABLET, FILM COATED ORAL at 12:21

## 2025-04-09 RX ADMIN — METOPROLOL TARTRATE 50 MG: 50 TABLET, FILM COATED ORAL at 21:20

## 2025-04-09 RX ADMIN — ASPIRIN 81 MG: 81 TABLET, CHEWABLE ORAL at 08:25

## 2025-04-09 RX ADMIN — SENNOSIDES AND DOCUSATE SODIUM 2 TABLET: 50; 8.6 TABLET ORAL at 08:25

## 2025-04-09 RX ADMIN — METOPROLOL TARTRATE 50 MG: 50 TABLET, FILM COATED ORAL at 08:25

## 2025-04-09 RX ADMIN — TACROLIMUS 1.5 MG: 0.75 TABLET, EXTENDED RELEASE ORAL at 06:30

## 2025-04-09 RX ADMIN — HYDRALAZINE HYDROCHLORIDE 100 MG: 50 TABLET ORAL at 08:25

## 2025-04-09 RX ADMIN — OXYCODONE HYDROCHLORIDE 10 MG: 5 TABLET ORAL at 02:00

## 2025-04-09 RX ADMIN — HYDRALAZINE HYDROCHLORIDE 100 MG: 50 TABLET ORAL at 23:16

## 2025-04-09 RX ADMIN — PREDNISONE 5 MG: 5 TABLET ORAL at 08:25

## 2025-04-09 RX ADMIN — FAMOTIDINE 20 MG: 20 TABLET, FILM COATED ORAL at 08:25

## 2025-04-09 RX ADMIN — MYCOPHENOLATE MOFETIL 500 MG: 250 CAPSULE ORAL at 06:30

## 2025-04-09 ASSESSMENT — PAIN DESCRIPTION - DESCRIPTORS
DESCRIPTORS: ACHING

## 2025-04-09 ASSESSMENT — PAIN SCALES - GENERAL
PAINLEVEL_OUTOF10: 0 - NO PAIN
PAINLEVEL_OUTOF10: 0 - NO PAIN
PAINLEVEL_OUTOF10: 1
PAINLEVEL_OUTOF10: 0 - NO PAIN
PAINLEVEL_OUTOF10: 7
PAINLEVEL_OUTOF10: 1
PAINLEVEL_OUTOF10: 3

## 2025-04-09 ASSESSMENT — COGNITIVE AND FUNCTIONAL STATUS - GENERAL
HELP NEEDED FOR BATHING: A LITTLE
DAILY ACTIVITIY SCORE: 20
DRESSING REGULAR UPPER BODY CLOTHING: A LITTLE
TOILETING: A LITTLE
DRESSING REGULAR LOWER BODY CLOTHING: A LITTLE

## 2025-04-09 ASSESSMENT — ACTIVITIES OF DAILY LIVING (ADL)
BATHING_ASSISTANCE: MINIMAL
ADL_ASSISTANCE: INDEPENDENT

## 2025-04-09 ASSESSMENT — PAIN - FUNCTIONAL ASSESSMENT
PAIN_FUNCTIONAL_ASSESSMENT: 0-10

## 2025-04-09 NOTE — PROGRESS NOTES
"Dilip Haynes is a 74 y.o. male on day 13 of admission presenting with Lower limb ischemia.    Pt is doing well without complaints    Oral hydration. No IV fluid given recent pulmonary edema post IV fluid.  UOP is still good  Cr has been fluctuated  Discussed with Dr. MARKS and resident       Scheduled medications  amLODIPine, 10 mg, oral, Daily  clopidogrel, 75 mg, oral, Daily  famotidine, 20 mg, oral, Daily  hydrALAZINE, 100 mg, oral, TID  insulin lispro, 0-5 Units, subcutaneous, q4h  levothyroxine, 137 mcg, oral, Daily  metoprolol tartrate, 50 mg, oral, BID  mycophenolate, 500 mg, oral, 2 times per day  pravastatin, 40 mg, oral, Nightly  predniSONE, 5 mg, oral, Daily  sennosides-docusate sodium, 2 tablet, oral, BID  tacrolimus ER, 1.5 mg, oral, Once per day on Sunday Monday Tuesday Wednesday Thursday Friday  tamsulosin, 0.4 mg, oral, Nightly  [Held by provider] torsemide, 20 mg, oral, Daily      Continuous medications  heparin, 0-4,500 Units/hr, Last Rate: 1,500 Units/hr (04/09/25 1233)      PRN medications  PRN medications: acetaminophen, albuterol, calcium gluconate, calcium gluconate, dextrose, dextrose, fluticasone, glucagon, glucagon, hydrALAZINE, labetaloL, magnesium sulfate, magnesium sulfate, naloxone, oxygen, polyethylene glycol, potassium chloride, potassium chloride        Last Recorded Vitals  Blood pressure 157/67, pulse 55, temperature 36.7 °C (98.1 °F), resp. rate 18, height 1.778 m (5' 10\"), weight 91.9 kg (202 lb 9.6 oz), SpO2 95%.  Intake/Output last 3 Shifts:  I/O last 3 completed shifts:  In: 3483 (37.9 mL/kg) [P.O.:240; I.V.:2943 (32 mL/kg); IV Piggyback:300]  Out: 5130 (55.8 mL/kg) [Urine:4680 (1.4 mL/kg/hr); Emesis/NG output:150; Blood:300]  Weight: 91.9 kg     A&ox3, no distress, pleasant  MMM, no lesions  CTAB  RRR  Davis  Abd soft, nt, nd  No allograft tenderness  No edema b/l    Results for orders placed or performed during the hospital encounter of 03/27/25 (from the past 24 hours) "   POCT GLUCOSE   Result Value Ref Range    POCT Glucose 166 (H) 74 - 99 mg/dL   POCT GLUCOSE   Result Value Ref Range    POCT Glucose 127 (H) 74 - 99 mg/dL   POCT GLUCOSE   Result Value Ref Range    POCT Glucose 120 (H) 74 - 99 mg/dL   Blood Gas Arterial Full Panel   Result Value Ref Range    POCT pH, Arterial 7.39 7.38 - 7.42 pH    POCT pCO2, Arterial 40 38 - 42 mm Hg    POCT pO2, Arterial 123 (H) 85 - 95 mm Hg    POCT SO2, Arterial 99 94 - 100 %    POCT Oxy Hemoglobin, Arterial 97.0 94.0 - 98.0 %    POCT Hematocrit Calculated, Arterial 28.0 (L) 41.0 - 52.0 %    POCT Sodium, Arterial 130 (L) 136 - 145 mmol/L    POCT Potassium, Arterial 4.3 3.5 - 5.3 mmol/L    POCT Chloride, Arterial      POCT Ionized Calcium, Arterial 1.19 1.10 - 1.33 mmol/L    POCT Glucose, Arterial 107 (H) 74 - 99 mg/dL    POCT Lactate, Arterial 0.8 0.4 - 2.0 mmol/L    POCT Base Excess, Arterial -0.7 -2.0 - 3.0 mmol/L    POCT HCO3 Calculated, Arterial 24.2 22.0 - 26.0 mmol/L    POCT Hemoglobin, Arterial 9.3 (L) 13.5 - 17.5 g/dL    POCT Anion Gap, Arterial      Patient Temperature 37.0 degrees Celsius    FiO2 36 %   Calcium, Ionized   Result Value Ref Range    POCT Calcium, Ionized 1.14 1.1 - 1.33 mmol/L   CBC   Result Value Ref Range    WBC 11.6 (H) 4.4 - 11.3 x10*3/uL    nRBC 0.0 0.0 - 0.0 /100 WBCs    RBC 3.50 (L) 4.50 - 5.90 x10*6/uL    Hemoglobin 8.8 (L) 13.5 - 17.5 g/dL    Hematocrit 28.4 (L) 41.0 - 52.0 %    MCV 81 80 - 100 fL    MCH 25.1 (L) 26.0 - 34.0 pg    MCHC 31.0 (L) 32.0 - 36.0 g/dL    RDW 14.2 11.5 - 14.5 %    Platelets 217 150 - 450 x10*3/uL   Magnesium   Result Value Ref Range    Magnesium 2.73 (H) 1.60 - 2.40 mg/dL   Renal Function Panel   Result Value Ref Range    Glucose 111 (H) 74 - 99 mg/dL    Sodium 133 (L) 136 - 145 mmol/L    Potassium 4.3 3.5 - 5.3 mmol/L    Chloride 97 (L) 98 - 107 mmol/L    Bicarbonate 26 21 - 32 mmol/L    Anion Gap 14 10 - 20 mmol/L    Urea Nitrogen 40 (H) 6 - 23 mg/dL    Creatinine 2.32 (H) 0.50 -  1.30 mg/dL    eGFR 28 (L) >60 mL/min/1.73m*2    Calcium 8.9 8.6 - 10.6 mg/dL    Phosphorus 5.6 (H) 2.5 - 4.9 mg/dL    Albumin 3.7 3.4 - 5.0 g/dL   Coagulation Screen   Result Value Ref Range    Protime 12.9 (H) 9.8 - 12.4 seconds    INR 1.2 (H) 0.9 - 1.1    aPTT 95 (H) 26 - 36 seconds   Heparin Assay, UFH   Result Value Ref Range    Heparin Unfractionated 0.5 See Comment Below for Therapeutic Ranges IU/mL   Tacrolimus   Result Value Ref Range    Tacrolimus  3.6 <=15.0 ng/mL   Type And Screen   Result Value Ref Range    ABO TYPE O     Rh TYPE POS     ANTIBODY SCREEN NEG    POCT GLUCOSE   Result Value Ref Range    POCT Glucose 120 (H) 74 - 99 mg/dL   POCT GLUCOSE   Result Value Ref Range    POCT Glucose 174 (H) 74 - 99 mg/dL     *Note: Due to a large number of results and/or encounters for the requested time period, some results have not been displayed. A complete set of results can be found in Results Review.       TTE: 3/28/25   1. Poorly visualized anatomical structures due to suboptimal image quality.   2. Left ventricular ejection fraction is normal, calculated by Smith's biplane at 60%.   3. Left ventricular diastolic filling is indeterminate.   4. There is reduced right ventricular systolic function.   5. Mildly enlarged right ventricle.   6. The left atrium is mildly dilated.   7. Compared with study dated 12/20/2021, no significant change Technically difficult exam, no gross changes seen.   8. The patient is in atrial fibrillation which may influence the estimate of left ventricular function and transvalvular flows.    B/l LE angiogram: 3/282/25  Right common femoral atherosclerotic occlusive disease. Right SFA occlusion with reconstitution of diseased above knee popliteal artery with runoff to the foot via the posterior tibial artery.     Lab Results   Component Value Date    WBC 11.6 (H) 04/09/2025    HGB 8.8 (L) 04/09/2025    HCT 28.4 (L) 04/09/2025    MCV 81 04/09/2025     04/09/2025     Lab  Results   Component Value Date    GLUCOSE 111 (H) 04/09/2025    CALCIUM 8.9 04/09/2025     (L) 04/09/2025    K 4.3 04/09/2025    CO2 26 04/09/2025    CL 97 (L) 04/09/2025    BUN 40 (H) 04/09/2025    CREATININE 2.32 (H) 04/09/2025     Assessment/Plan     74 y.o. 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 S/p HCV treatment with SVR. post txp course was complicated by urinary retention and enlarged prostate. Pt presented to [facility] on 3/27 for concern for worsening rt limb ischemia. On heparin. S/p diagnostic b/l LE angiogram 3/28/25 showing R CFA disease, SFA occlusion, and PT runoff.   Post-procedure course complicated by HTN urgency, chest pain. Transferred to SICU for BP control, required NTG drip now weaned off and transferred back to medical floor.     #Allograft function: s/p DDKT 5/2020    Lab Results   Component Value Date    CREATININE 2.32 (H) 04/09/2025     Lab Results   Component Value Date    GLUCOSE 111 (H) 04/09/2025    CALCIUM 8.9 04/09/2025     (L) 04/09/2025    K 4.3 04/09/2025    CO2 26 04/09/2025    CL 97 (L) 04/09/2025    BUN 40 (H) 04/09/2025    CREATININE 2.32 (H) 04/09/2025       - baseline Cr ~2, stable this admission  - CXR- Wet , post IVF  STOP IVF  Lasix IV 40 mg X ONE 4/3/25  Davis in place      #Immunosuppression:  - Envarsus . Goal Fk 5-8. Level 3.8  - pls check Fk trough daily AM.   - cont MMF 500mg bid, cont pred 5mg/d    #HTN: HTN urgency post-angiogram 3/28.   Reduced Metoprolol back to 50 mg bid for bradycardia  On hydralazine 100 mg tid  ACE/ARB - Held for elevated Cr  Amlodipine 10 mg daily    #Anemia:   Lab Results   Component Value Date    WBC 11.6 (H) 04/09/2025    HGB 8.8 (L) 04/09/2025    HCT 28.4 (L) 04/09/2025    MCV 81 04/09/2025     04/09/2025       #CKD-MBD: Ca, Phos stable. , vit D 36 2/2025     Ash Demarco MD

## 2025-04-09 NOTE — PROGRESS NOTES
Occupational Therapy    Re-Evaluation and Treatment    Patient Name: Dilip Haynes  MRN: [id number]  Today's Date: 4/9/2025  Room: [address] [place] [address]  Time Calculation  Start Time: 1107  Stop Time: 1138  Time Calculation (min): 31 min    Assessment  IP OT Assessment  OT Assessment: Pt is a 74 year old male who demonstrates decreased balance and strength, which impedes occupational performance.  Prognosis: Good  Barriers to Discharge Home: No anticipated barriers  Evaluation/Treatment Tolerance: Patient tolerated treatment well  Medical Staff Made Aware: Yes  End of Session Communication: Bedside nurse  End of Session Patient Position:  (Wheelchair pending floor transport with RN at bedside)    Plan:  Inpatient Plan  Treatment Interventions: ADL retraining, Functional transfer training, UE strengthening/ROM, Endurance training, Cognitive reorientation, Patient/family training, Equipment evaluation/education, Compensatory technique education  OT Frequency: 2 times per week  OT Discharge Recommendations: Low intensity level of continued care  Equipment Recommended upon Discharge: Wheeled walker  OT Recommended Transfer Status: Assist of 1  OT - OK to Discharge: Yes  OT Assessment  OT Assessment Results: Decreased ADL status, Decreased endurance, Decreased functional mobility, Decreased IADLs  Prognosis: Good  Evaluation/Treatment Tolerance: Patient tolerated treatment well  Medical Staff Made Aware: Yes  Strengths: Attitude of self  Barriers to Participation: Comorbidities    Subjective   Current Problem:  1. PAD (peripheral artery disease) (CMS-[facility])  Vascular US ankle brachial index (PEDRO PABLO) without exercise    Vascular US ankle brachial index (PEDRO PABLO) without exercise    Lower extremity vein mapping bilateral    Lower extremity vein mapping bilateral    Nuclear Stress Test    Nuclear Stress Test    Cardiology Interpretation Of Nuclear Stress - See Other Report For Nuclear Portion    Cardiology Interpretation Of Nuclear Stress -  See Other Report For Nuclear Portion    Cardiac device check - Inpatient    Cardiac device check - Inpatient    Vascular US Ankle Brachial Index (PEDRO PABLO) Without Exercise    Vascular US Ankle Brachial Index (PEDRO PABLO) Without Exercise    Vascular US ankle brachial index (PEDRO PABLO) without exercise    Vascular US ankle brachial index (PEDRO PABLO) without exercise    CANCELED: Vascular US ankle brachial index (PEDRO PABLO) without exercise    CANCELED: Vascular US ankle brachial index (PEDRO PABLO) without exercise    CANCELED: Case Request Operating Room: Femoral to distal artery bypass, angiogram, iliac stenting    CANCELED: Case Request Operating Room: Femoral to distal artery bypass, angiogram, iliac stenting    CANCELED: Cardiac device check - Inpatient    CANCELED: Cardiac device check - Inpatient      2. Lower limb ischemia  Case Request Operating Room: Bilateral lower extremity angiogram with possible interventions    Case Request Operating Room: Bilateral lower extremity angiogram with possible interventions    Case Request Operating Room: CREATION, BYPASS, ARTERIAL, FEMORAL    Case Request Operating Room: CREATION, BYPASS, ARTERIAL, FEMORAL    Surgical Pathology Exam    Surgical Pathology Exam      3. 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 Encounter for other preprocedural examination  Lower extremity vein mapping bilateral    Cardiology Interpretation Of Nuclear Stress - See Other Report For Nuclear Portion      5. Heart failure with preserved ejection fraction, unspecified HF chronicity        6. Hypertension secondary to other renal disorders  Transthoracic Echo (TTE) Complete    Transthoracic Echo (TTE) Complete      7. Hx of percutaneous transluminal coronary angioplasty  Transthoracic Echo (TTE) Complete    Transthoracic Echo (TTE) Complete      8. History of non-ST elevation myocardial  infarction (NSTEMI)  Transthoracic Echo (TTE) Complete    Transthoracic Echo (TTE) Complete      9. History of cardiovascular disorder  Transthoracic Echo (TTE) Complete    Transthoracic Echo (TTE) Complete      10. Cardiac arrest with successful resuscitation        11. Atrial flutter, unspecified type (Multi)  Transthoracic Echo (TTE) Complete    Transthoracic Echo (TTE) Complete      12. Presence of stent in coronary artery  Transthoracic Echo (TTE) Complete    Transthoracic Echo (TTE) Complete      13. Abnormal electrocardiogram (ECG) (EKG)  Nuclear Stress Test    Nuclear Stress Test    Cardiology Interpretation Of Nuclear Stress - See Other Report For Nuclear Portion    Cardiology Interpretation Of Nuclear Stress - See Other Report For Nuclear Portion      14. Preop cardiovascular exam  Cardiology Interpretation Of Nuclear Stress - See Other Report For Nuclear Portion    Cardiology Interpretation Of Nuclear Stress - See Other Report For Nuclear Portion      15. End-stage renal disease (Multi)  US kidney transplant     kidney transplant    CANCELED: Renal artery duplex transplant    CANCELED: Renal artery duplex transplant    CANCELED: Renal artery duplex transplant    CANCELED: Renal artery duplex transplant    CANCELED: Renal artery duplex transplant    CANCELED: Renal artery duplex transplant        General:  Reason for Referral: Patient presented to [facility] on 3/27 for concern for worsening rt limb ischemia. On heparin. S/p diagnostic b/l LE angiogram 3/28/25 showing R CFA disease, SFA occlusion, and PT runoff. Post-procedure course complicated by HTN urgency, chest pain. Transferred to SICU for BP control, required NTG drip weaned off and transferred back to medical floor. 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 On 2 L NC, with prevena wound vac to R groin     Precautions:  Medical Precautions: Fall precautions  Precautions Comment: MAP , SBP<160    Vital Signs:     04/09/25 1107 04/09/25 1138   Vital Signs   Vitals Session Pre OT Post OT   Heart Rate 71  --    Resp 18  --    SpO2 97 %  --    /51 163/89   MAP (mmHg) 85  --    Vital Signs Comment  --   with supine to sit transfer, endorsed mild dizziness.     Pain:  Pain Assessment  Pain Assessment: 0-10  0-10 (Numeric) Pain Score: 0 - No pain    Lines/Tubes/Drains:   Tele, PIV, 2L NC, prevena wound vac       Objective   Cognition:  Overall Cognitive Status: Within Functional Limits  Orientation Level: Oriented X4     Confusion Assessment Method (CAM)  Acute Onset and Fluctuating Course (1A): No     Home Living:  Type of Home: House  Lives With: Spouse  Home Adaptive Equipment: Cane, Walker rolling or standard  Home Layout: Two level, Able to live on main level with bedroom/bathroom, Stairs to alternate level with rails  Alternate Level Stairs-Number of Steps: 10  Home Access: Stairs to enter without rails  Entrance Stairs-Number of Steps: 1  Bathroom Shower/Tub: Tub/shower unit  Bathroom Equipment: Grab bars in shower (shower bench)     Prior Function:  Level of [place]: Independent with ADLs and functional transfers  ADL Assistance: Independent  Homemaking Assistance: Independent  Ambulatory Assistance: Independent  Vocational: Retired  Prior Function Comments: Difficulty walking long distances at baseline.     ADL:  Eating Assistance: Independent  Grooming Assistance: Modified independent (Device)  Grooming Deficit: Wash/dry face (seated)  Bathing Assistance: Minimal  UE Dressing Assistance: Stand by  LE Dressing Assistance: Minimal  LE Dressing Deficit: Thread RLE into pants  (min A to thread RLE into pants, able to thread LLE and pull over hips)  Toileting Assistance with Device: Stand by    Activity Tolerance:  Endurance: Tolerates 10 - 20 min exercise with multiple rests  Early Mobility/Exercise Safety Screen: Proceed with mobilization - No exclusion criteria met     Bed Mobility/Transfers: Bed Mobility  Bed Mobility: Yes  Bed Mobility 1  Bed Mobility 1: Supine to sitting  Level of Assistance 1: Minimum assistance  Bed Mobility Comments 1: HOB elevated, increased time   and Transfers  Transfer: Yes  Transfer 1  Transfer From 1: Sit to  Transfer to 1: Stand  Transfer Device 1: Walker  Transfer Level of Assistance 1: Contact guard  Trials/Comments 1: x2 trials    Sensation:  Light Touch: No apparent deficits  Sensation Comment: reports no sensation deficits to RLE    Strength:  Strength Comments: BUE WFL     Coordination:  Movements are Fluid and Coordinated: Yes     Hand Function:  Hand Function  Gross Grasp: Functional  Coordination: Functional    Extremities:   RUE   RUE : Within Functional Limits,   LUE   LUE: Within Functional Limits,        Treatment Completed on Evaluation    Activities of Daily Living:       LE Dressing  LE Dressing: Yes  LE Dressing Comments: Pt educated on compensatory LBD technique given recent procedure. With compensatory strategy, required min A to thread RLE. Able to thread LLE and pull pants over hips in standing unassisted.    Therapy/Activity:     Therapeutic Activity  Therapeutic Activity Performed: Yes  Therapeutic Activity 1: Pt performed functional mobility > a maximal household distance with FWW and CGA for safety. Tolerated well.    Outcome Measures: [facility] Daily Activity  Putting on and taking off regular lower body clothing: A little  Bathing (including washing, rinsing, drying): A little  Putting on and taking off regular upper body clothing: A little  Toileting, which includes using toilet, bedpan or urinal: A little  Taking care of personal  grooming such as brushing teeth: None  Eating Meals: None  Daily Activity - Total Score: 20        ICU Mobility Screen  Early Mobility/Exercise Safety Screen: Proceed with mobilization - No exclusion criteria met  ICU Mobility Scale: Walking with assistance of 1 person,     OT Adult Other Outcome Measures  4AT: negative    Education Documentation  Body Mechanics, taught by Jennifer Napoles OT at 4/9/2025  2:13 PM.  Learner: Patient  Readiness: Acceptance  Method: Explanation, Demonstration  Response: Verbalizes Understanding  Comment: OT goals/POC    Precautions, taught by Jennifer Napoles OT at 4/9/2025  2:13 PM.  Learner: Patient  Readiness: Acceptance  Method: Explanation, Demonstration  Response: Verbalizes Understanding  Comment: OT goals/POC    ADL Training, taught by Jennifer Napoles OT at 4/9/2025  2:13 PM.  Learner: Patient  Readiness: Acceptance  Method: Explanation, Demonstration  Response: Verbalizes Understanding  Comment: OT goals/POC    Education Comments  No comments found.      Goals:   Encounter Problems       Encounter Problems (Active)       ADLs       Patient with complete lower body dressing with modified independent level of assistance donning and doffing all LE clothes  with PRN adaptive equipment while supported sitting and standing (Progressing)       Start:  04/01/25    Expected End:  04/23/25            Patient will complete toileting including hygiene clothing management/hygiene with modified independent level of assistance and grab bars. (Progressing)       Start:  04/01/25    Expected End:  04/23/25               ADLs       Pt will complete simulated homemaking tasks, including laundry, cleaning, medication management, and simple meal prep, including item retrieval/transport tasks mod I without cueing to allow for safe return home to prior living environment        Start:  04/09/25    Expected End:  04/23/25               BALANCE       Pt will maintain  dynamic standing balance during ADL task with modified independent level of assistance in order to demonstrate decreased risk of falling and improved postural control. (Progressing)       Start:  04/01/25    Expected End:  04/23/25               MOBILITY       Patient will perform Functional mobility max Household distances/Community Distances with modified independent level of assistance and least restrictive device in order to improve safety and functional mobility. (Progressing)       Start:  04/01/25    Expected End:  04/23/25               TRANSFERS       Patient will complete sit to stand transfer with modified independent level of assistance and least restrictive device in order to improve safety and prepare for out of bed mobility. (Progressing)       Start:  04/01/25    Expected End:  04/23/25 04/09/25 at 2:15 PM   Jennifer Napoles, OT   Rehab Office: [phone]

## 2025-04-09 NOTE — OP NOTE
FEMORAL ENTARDERECTOMY, RIGHT EXTERNAL ILIAC ARTERY STENTING, (R) Operative Note     Date: 2025  OR Location: [facility] OR    Name: Dilip Haynes, : 1950, Age: 74 y.o., MRN: [id number], Sex: male    Diagnosis  Pre-op Diagnosis      * Lower limb ischemia [I99.8] Post-op Diagnosis     * Lower limb ischemia [I99.8]     Procedures  FEMORAL ENTARDERECTOMY, RIGHT EXTERNAL ILIAC ARTERY STENTING,  11[address] - MS BYP FEM-ANT TIBL PST TIBL PRONEAL ART/OTH DSTL      Surgeons      * Mrinalini Gant - Primary    Resident/Fellow/Other Assistant:  Surgeons and Role:     * Billy Stapleton MD - Assisting    Staff:   Circulator: Corina Starkeyub Person: Viola  Relief Scrub: Corina  Relief Circulator: Veronique  Relief Scrub: Angelina    Anesthesia Staff: Anesthesiologist: Sid Hackett MD; Sergio Johnston MD; Yogesh Rodriguez MD; Kole Yang MD  CRNA: MARTA Araya-CRNA  C-AA: KWADWO Rodriguez  Anesthesia Resident: Payton Jorgensen MD  SRNA: Latoya Hays    Procedure Summary  Anesthesia: General  ASA: IV  Estimated Blood Loss: ***mL  Intra-op Medications:   Administrations occurring from 0935 to 1705 on 25:   Medication Name Total Dose   thrombin (bovine) 5000 Unit vial + gelatin absorbable 100 sponge topical mixture 5 mL   acetaminophen (Tylenol) tablet 650 mg Cannot be calculated   albuterol 2.5 mg /3 mL (0.083 %) nebulizer solution 2.5 mg Cannot be calculated   dextrose 50 % injection 12.5 g Cannot be calculated   dextrose 50 % injection 25 g Cannot be calculated   fluticasone (Flonase) nasal spray 2 spray Cannot be calculated   glucagon (Glucagen) injection 1 mg Cannot be calculated   glucagon (Glucagen) injection 1 mg Cannot be calculated   heparin 25,000 Units in dextrose 5% 250 mL (100 Units/mL) infusion (premix) Cannot be calculated   levothyroxine (Synthroid, Levoxyl) tablet 137 mcg Cannot be calculated   mycophenolate (Cellcept) capsule 500 mg Cannot be calculated   naloxone  (Narcan) injection 0.2 mg Cannot be calculated   polyethylene glycol (Glycolax, Miralax) packet 17 g Cannot be calculated   predniSONE (Deltasone) tablet 5 mg Cannot be calculated   tacrolimus ER (Envarsus XR) tablet ER 1.5 mg Cannot be calculated   tamsulosin (Flomax) 24 hr capsule 0.4 mg Cannot be calculated   albumin human bottle 5% 500 mL   albuterol inhaler 2 puff   ceFAZolin (Ancef) 1 gram/ 3 mL injection - reconstituted 330 mg/mL 2 mg   cloNIDine (Catapres) tablet 0.1 mg Cannot be calculated   dexAMETHasone (Decadron) 4 mg/mL 4 mg   dextrose 50% in water IV syringe 25 g   ePHEDrine injection 10 mg   etomidate 2 mg/mL 16 mg   fentaNYL (Sublimaze) injection 50 mcg/mL 175 mcg   furosemide (Lasix) 10 mg/mL 30 mg   heparin injection 1,000 units/mL 18,000 Units   hydrALAZINE (Apresoline) injection 10 mg Cannot be calculated   hydrALAZINE (Apresoline) tablet 100 mg Cannot be calculated   insulin lispro injection 0-5 Units Cannot be calculated   labetaloL (Normodyne,Trandate) injection 10 mg Cannot be calculated   lactated Ringer's infusion Cannot be calculated   lidocaine PF (cardiac) syringe 2% 80 mg   metoprolol tartrate (Lopressor) tablet 50 mg Cannot be calculated   NIFEdipine ER (Adalat CC) 24 hr tablet 30 mg Cannot be calculated   nitroglycerin (Nitrostat) SL tablet 0.4 mg Cannot be calculated   nitroglycerin (Tridil) 50 mg in dextrose 5% 250 mL (0.2 mg/mL) infusion (premix) 50 mcg   oxyCODONE (Roxicodone) immediate release tablet 5 mg Cannot be calculated   perflutren protein A microsphere (Optison) injection 0.5 mL Cannot be calculated   polyethylene glycol (Glycolax, Miralax) packet 17 g Cannot be calculated   pravastatin (Pravachol) tablet 40 mg Cannot be calculated   propofol (Diprivan) injection 10 mg/mL 10 mg   rocuronium (ZeMuron) 50 mg/5 mL injection 140 mg   Sodium Bicaronate 8.4% Intravenous Syringe 100 mEq   succinylcholine 100 mg/5 mL syringe 100 mg   sulfur hexafluoride microsphr (Lumason)  injection 24.28 mg Cannot be calculated              Anesthesia Record               Intraprocedure I/O Totals          Intake    Magnesium Sulfate 0.00 mL    The total shown is the total volume documented since Anesthesia Start was filed.    Nitroglycerin Drip 0.00 mL    The total shown is the total volume documented since Anesthesia Start was filed.    lactated Ringer's 1400.00 mL    albumin human bottle 5% 500.00 mL    Total Intake 1900 mL       Output    Urine 380 mL    Est. Blood Loss 300 mL    Total Output 680 mL       Net    Net Volume 1220 mL          Specimen:   ID Type Source Tests Collected by Time   1 : Right common femoral plaque Tissue [facility] SURGICAL PATHOLOGY EXAM Alejandra Gant MD 4/7/2025 1431                 Drains and/or Catheters:   Urethral Catheter Straight-tip;Non-latex 16 Fr. (Active)   Site Assessment Clean;Skin intact 04/09/25 0800   Collection Container Urometer 04/09/25 0800   Securement Method Securing device (Describe) 04/09/25 0800   Reason for Continuing Urinary Catheterization accurate hourly measurement of urine volume in a critically ill patient that cannot be assessed by other volumes and urine collection strategies 04/09/25 0800   Output (mL) 75 mL 04/09/25 0800   $ Urethral Catheter Charge Indwelling cath 04/03/25 1319       [REMOVED] NG/OG/Feeding Tube OG - Henderson sump 18 Fr Center mouth (Removed)   Tube Status Unclamped;Low intermittent suction 04/08/25 0800   Placement Verification Measurements 04/08/25 0800   Distal Tube Measurement 55 cm 04/08/25 0800   Site Assessment Clean;Dry;Intact 04/08/25 0800   Drainage Appearance Bile 04/08/25 0400   NG/OG Interventions Air injected into blue air vent port 04/08/25 0000   Response To Intervention No resistance met 04/08/25 0400   Output (mL) 0 mL 04/08/25 0800       [REMOVED] Urethral Catheter Coude (Removed)   Site Assessment Unable to assess 03/31/25 1119   Collection Container Standard drainage bag 03/30/25 0800   Securement  "Method Securing device (Describe) 03/30/25 0800   Reason for Continuing Urinary Catheterization acute urinary retention 03/30/25 0800   Output (mL) 450 mL 03/31/25 1001   $ Urethral Catheter Charge Indwelling cath 03/30/25 0800       Tourniquet Times:         Implants:  Implants       Type Name Action Serial No.      Implant GRAFT, XENOSURE, BIOLOGIC PATCH, 0.8CM X 8CM - [id number] - [id number] Implanted [id number]     Implant GRAFT, XENOSURE, BIOLOGIC PATCH, 0.8CM X 8CM - [id number] Implanted      Other Cardiac Implant STENT, VIABAHN VBX 2.0 BALLOON, 7 X 39 135CM REDUCE PROFILE - [id number] - [id number] Implanted [id number]     Stent STENT GRAFT, ENDOPROSTHESIS, VIABAHN, W/MARKERS,HEPARIN, 7 X 5 X 120 - [id number] Implanted [id number]              Findings: ***    Indications: Dilip Haynes is an 74 y.o. male who is having surgery for Lower limb ischemia [I99.8]. ***    The patient was seen in the preoperative area. The risks, benefits, complications, treatment options, non-operative alternatives, expected recovery and outcomes were discussed with the patient. The possibilities of reaction to medication, pulmonary aspiration, injury to surrounding structures, bleeding, recurrent infection, the need for additional procedures, failure to diagnose a condition, and creating a complication requiring transfusion or operation were discussed with the patient. The patient concurred with the proposed plan, giving informed consent.  The site of surgery was properly noted/marked if necessary per policy. The patient has been actively warmed in preoperative area. Preoperative antibiotics {OR OPERATIVE ANTIBIOTICS:4843953052} Venous thrombosis prophylaxis {OR OPERATIVE PROPHYLAXIS:0223719299}    Procedure Details: ***  Complications:  {findings; complications:56834::\"None; patient tolerated the procedure well.\"}    Disposition: {Op note disposition:60610}  Condition: {stable/unstable:48871::\"stable\"} "                 Additional Details: ***    Attending Attestation: {Op note attestation (Optional):38921}    Alejandra Gant  Phone Number: [phone]       "were then removed.  A 14 cm bovine pericardial patch was then used to perform patch angioplasty using a 6-0 Prolene suture.  Prior to completing the patch angioplasty all the vessels were backbled and the repair was flushed with heparinized saline. ***      Complications:  {findings; complications:90384::\"None; patient tolerated the procedure well.\"}    Disposition: {Op note disposition:72527}  Condition: {stable/unstable:50647::\"stable\"}                 Additional Details: ***    Attending Attestation: {Op note attestation (Optional):72642}    Alejandra Gant  Phone Number: [phone]      "

## 2025-04-09 NOTE — PROGRESS NOTES
"Dilip Haynes is a 74 y.o. male on day 13 of admission presenting with Lower limb ischemia.    Pt is doing well without complaints  Still on board for OR tomorrow  Oral hydration. No IV fluid given recent pulmonary edema post IV fluid.  UOP is still good  Cr has been fluctuated  Discussed with Dr. MARKS and resident       Scheduled medications  amLODIPine, 10 mg, oral, Daily  clopidogrel, 75 mg, oral, Daily  famotidine, 20 mg, oral, Daily  hydrALAZINE, 100 mg, oral, TID  insulin lispro, 0-5 Units, subcutaneous, q4h  levothyroxine, 137 mcg, oral, Daily  metoprolol tartrate, 50 mg, oral, BID  mycophenolate, 500 mg, oral, 2 times per day  pravastatin, 40 mg, oral, Nightly  predniSONE, 5 mg, oral, Daily  sennosides-docusate sodium, 2 tablet, oral, BID  tacrolimus ER, 1.5 mg, oral, Once per day on Sunday Monday Tuesday Wednesday Thursday Friday  tamsulosin, 0.4 mg, oral, Nightly  [Held by provider] torsemide, 20 mg, oral, Daily      Continuous medications  heparin, 0-4,500 Units/hr, Last Rate: 1,500 Units/hr (04/09/25 1233)      PRN medications  PRN medications: acetaminophen, albuterol, calcium gluconate, calcium gluconate, dextrose, dextrose, fluticasone, glucagon, glucagon, hydrALAZINE, labetaloL, magnesium sulfate, magnesium sulfate, naloxone, oxygen, polyethylene glycol, potassium chloride, potassium chloride        Last Recorded Vitals  Blood pressure 157/67, pulse 55, temperature 36.7 °C (98.1 °F), resp. rate 18, height 1.778 m (5' 10\"), weight 91.9 kg (202 lb 9.6 oz), SpO2 95%.  Intake/Output last 3 Shifts:  I/O last 3 completed shifts:  In: 3483 (37.9 mL/kg) [P.O.:240; I.V.:2943 (32 mL/kg); IV Piggyback:300]  Out: 5130 (55.8 mL/kg) [Urine:4680 (1.4 mL/kg/hr); Emesis/NG output:150; Blood:300]  Weight: 91.9 kg     A&ox3, no distress, pleasant  MMM, no lesions  CTAB  RRR  Davis  Abd soft, nt, nd  No allograft tenderness  No edema b/l    Results for orders placed or performed during the hospital encounter of " 03/27/25 (from the past 24 hours)   POCT GLUCOSE   Result Value Ref Range    POCT Glucose 166 (H) 74 - 99 mg/dL   POCT GLUCOSE   Result Value Ref Range    POCT Glucose 127 (H) 74 - 99 mg/dL   POCT GLUCOSE   Result Value Ref Range    POCT Glucose 120 (H) 74 - 99 mg/dL   Blood Gas Arterial Full Panel   Result Value Ref Range    POCT pH, Arterial 7.39 7.38 - 7.42 pH    POCT pCO2, Arterial 40 38 - 42 mm Hg    POCT pO2, Arterial 123 (H) 85 - 95 mm Hg    POCT SO2, Arterial 99 94 - 100 %    POCT Oxy Hemoglobin, Arterial 97.0 94.0 - 98.0 %    POCT Hematocrit Calculated, Arterial 28.0 (L) 41.0 - 52.0 %    POCT Sodium, Arterial 130 (L) 136 - 145 mmol/L    POCT Potassium, Arterial 4.3 3.5 - 5.3 mmol/L    POCT Chloride, Arterial      POCT Ionized Calcium, Arterial 1.19 1.10 - 1.33 mmol/L    POCT Glucose, Arterial 107 (H) 74 - 99 mg/dL    POCT Lactate, Arterial 0.8 0.4 - 2.0 mmol/L    POCT Base Excess, Arterial -0.7 -2.0 - 3.0 mmol/L    POCT HCO3 Calculated, Arterial 24.2 22.0 - 26.0 mmol/L    POCT Hemoglobin, Arterial 9.3 (L) 13.5 - 17.5 g/dL    POCT Anion Gap, Arterial      Patient Temperature 37.0 degrees Celsius    FiO2 36 %   Calcium, Ionized   Result Value Ref Range    POCT Calcium, Ionized 1.14 1.1 - 1.33 mmol/L   CBC   Result Value Ref Range    WBC 11.6 (H) 4.4 - 11.3 x10*3/uL    nRBC 0.0 0.0 - 0.0 /100 WBCs    RBC 3.50 (L) 4.50 - 5.90 x10*6/uL    Hemoglobin 8.8 (L) 13.5 - 17.5 g/dL    Hematocrit 28.4 (L) 41.0 - 52.0 %    MCV 81 80 - 100 fL    MCH 25.1 (L) 26.0 - 34.0 pg    MCHC 31.0 (L) 32.0 - 36.0 g/dL    RDW 14.2 11.5 - 14.5 %    Platelets 217 150 - 450 x10*3/uL   Magnesium   Result Value Ref Range    Magnesium 2.73 (H) 1.60 - 2.40 mg/dL   Renal Function Panel   Result Value Ref Range    Glucose 111 (H) 74 - 99 mg/dL    Sodium 133 (L) 136 - 145 mmol/L    Potassium 4.3 3.5 - 5.3 mmol/L    Chloride 97 (L) 98 - 107 mmol/L    Bicarbonate 26 21 - 32 mmol/L    Anion Gap 14 10 - 20 mmol/L    Urea Nitrogen 40 (H) 6 - 23 mg/dL     Creatinine 2.32 (H) 0.50 - 1.30 mg/dL    eGFR 28 (L) >60 mL/min/1.73m*2    Calcium 8.9 8.6 - 10.6 mg/dL    Phosphorus 5.6 (H) 2.5 - 4.9 mg/dL    Albumin 3.7 3.4 - 5.0 g/dL   Coagulation Screen   Result Value Ref Range    Protime 12.9 (H) 9.8 - 12.4 seconds    INR 1.2 (H) 0.9 - 1.1    aPTT 95 (H) 26 - 36 seconds   Heparin Assay, UFH   Result Value Ref Range    Heparin Unfractionated 0.5 See Comment Below for Therapeutic Ranges IU/mL   Tacrolimus   Result Value Ref Range    Tacrolimus  3.6 <=15.0 ng/mL   Type And Screen   Result Value Ref Range    ABO TYPE O     Rh TYPE POS     ANTIBODY SCREEN NEG    POCT GLUCOSE   Result Value Ref Range    POCT Glucose 120 (H) 74 - 99 mg/dL   POCT GLUCOSE   Result Value Ref Range    POCT Glucose 174 (H) 74 - 99 mg/dL     *Note: Due to a large number of results and/or encounters for the requested time period, some results have not been displayed. A complete set of results can be found in Results Review.       TTE: 3/28/25   1. Poorly visualized anatomical structures due to suboptimal image quality.   2. Left ventricular ejection fraction is normal, calculated by Smith's biplane at 60%.   3. Left ventricular diastolic filling is indeterminate.   4. There is reduced right ventricular systolic function.   5. Mildly enlarged right ventricle.   6. The left atrium is mildly dilated.   7. Compared with study dated 12/20/2021, no significant change Technically difficult exam, no gross changes seen.   8. The patient is in atrial fibrillation which may influence the estimate of left ventricular function and transvalvular flows.    B/l LE angiogram: 3/282/25  Right common femoral atherosclerotic occlusive disease. Right SFA occlusion with reconstitution of diseased above knee popliteal artery with runoff to the foot via the posterior tibial artery.     Lab Results   Component Value Date    WBC 11.6 (H) 04/09/2025    HGB 8.8 (L) 04/09/2025    HCT 28.4 (L) 04/09/2025    MCV 81 04/09/2025      04/09/2025     Lab Results   Component Value Date    GLUCOSE 111 (H) 04/09/2025    CALCIUM 8.9 04/09/2025     (L) 04/09/2025    K 4.3 04/09/2025    CO2 26 04/09/2025    CL 97 (L) 04/09/2025    BUN 40 (H) 04/09/2025    CREATININE 2.32 (H) 04/09/2025     Assessment/Plan     74 y.o. 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 S/p HCV treatment with SVR. post txp course was complicated by urinary retention and enlarged prostate. Pt [facility] 3/[facility] for concern for worsening rt limb ischemia. On heparin. S/p diagnostic b/l LE angiogram 3/28/25 showing R CFA disease, SFA occlusion, and PT runoff.   Post-procedure course complicated by HTN urgency, chest pain. Transferred to SICU for BP control, required NTG drip now weaned off and transferred back to medical floor.     #Allograft function: s/p DDKT 5/2020    Lab Results   Component Value Date    CREATININE 2.32 (H) 04/09/2025     Lab Results   Component Value Date    GLUCOSE 111 (H) 04/09/2025    CALCIUM 8.9 04/09/2025     (L) 04/09/2025    K 4.3 04/09/2025    CO2 26 04/09/2025    CL 97 (L) 04/09/2025    BUN 40 (H) 04/09/2025    CREATININE 2.32 (H) 04/09/2025       - baseline Cr ~2, stable this admission  - CXR- Wet , post IVF  STOP IVF  Lasix IV 40 mg X ONE 4/3/25  Winters in place    Kidney allograft function - BASHIR, likely ATN     Cont winters  Oral hydration  Nepro tid to increase solute for decreased sodium level  Monitor UOP and renal function  Daily weight  Strict I/O  Left leg edema, on heparin gtt. Defer to primary team.     #Immunosuppression:  - Envarsus . Goal Fk 5-8. Level 3.8  - pls check Fk trough daily AM.   - cont MMF 500mg bid  -Reduce Envarsus TO 1.5 MG DAILY, PRED 5 MG    #HTN: HTN urgency post-angiogram 3/28.   Reduced Metoprolol back to 50 mg bid for bradycardia  On hydralazine 100 mg tid  ACE/ARB - Held for elevated Cr  Amlodipine 10 mg daily    #Anemia:    Lab Results   Component Value Date    WBC 11.6 (H) 04/09/2025    HGB 8.8 (L) 04/09/2025    HCT 28.4 (L) 04/09/2025    MCV 81 04/09/2025     04/09/2025     PRBC prn per surgery    #CKD-MBD: Ca, Phos stable. , vit D 36 2/2025     Ash Demarco MD

## 2025-04-09 NOTE — PROGRESS NOTES
VASCULAR SURGERY PROGRESS NOTE  Assessment/Plan   Dilip Haynes is 74 y.o. male with PMHx significant for CAD (s/p PCI), HTN, afib s/p DCCV 6/2020 (on Eliquis last took 3/26 AM), carotid endarterectomy for asymptomatic stenosis, ESRD s/p DDKT (2020), hypothyroidism, and PAD (s/p L iliac (05o10az stent) with R fem and profunda endarterectomy with patch angioplasty (2012) who presented as transfer from OSH with concern of worsening limb ischemia. Patient exam consistent with previously noted right foot tissue loss and rest pain without signs of worsening ischemia on exam. Patient with monophasic PT on R and mono DP/PT on left with intact motor and sensory function.      Underwent diagnostic RLE angiogram on 3/28 showing R CFA disease, SFA occlusion, and PT runoff. Post-op course complicated by chest pain in the setting of hypertension, transferred to ICU for nitroglycerin gtt.     4/7 s/p right femoral endarterectomy, profundaplasty, right WENDY angioplasty with covered stent, right EIA angioplasty with covered stent.     Recovering well in the SICU      Okay to transfer to regular nursing floor today       Plan:    Neuro  Multimodal pain control acetaminophen, oxycodone    CV  Continue amlodipine, hydralazine, losartan, metoprolol  PRN labetalol, hydralazine  Continue pravastain  RLE CLTI5 - now s/p right CFA endarterectomy, profundaplasty, right WENDY angioplasty with covered stent, right EIA angioplasty with covered stent on 4/7  Continue heparin drip, asa 81  Neurovascular checks per ICU    Pulm  Continue mobilizing and IS     FENGI - h/o DDKT  History of DDKT  Continue mycophenolate, prednisone, tacrolimus  Continue tamsulosin  Diet per ICU  Continue pepcid  Zofran PRN    Heme  Trend hemoglobin q6 per ICU   Continue heparin drip, asa 81     Endo  Continue home levothyroxine     Discussed with attending, Dr Stalin Stapleton MD  Vascular Surgery p. [phone]      Subjective   NAEON. He feels well. No rest pain.  Walked with physical therapy. No other concerns at this time.    Objective   Vitals:  Heart Rate:  [63-88]   Temp:  [36.4 °C (97.5 °F)-36.8 °C (98.2 °F)]   Resp:  [14-26]   BP: (118-184)/(45-96)   Weight:  [91.9 kg (202 lb 9.6 oz)]   SpO2:  [89 %-100 %]     Exam:  Constitutional: No acute distress, resting comfortably  Neuro:  AOx3, grossly intact  ENMT: moist mucous membranes  CV: no tachycardia  Pulm: non-labored on NC  GI: soft, non-tender, non-distended  Skin: warm and dry  Musculoskeletal: moving all extremities  Extremities: left groin puncture without hematoma. Right groin pravena in place and holding suction.   Pulse Exam: Bilateral PT signals present    Labs:  Results from last 7 days   Lab Units 04/09/25 0408 04/08/25 0312 04/07/25 2032   WBC AUTO x10*3/uL 11.6* 9.2 9.3   HEMOGLOBIN g/dL 8.8* 8.6* 8.7*   PLATELETS AUTO x10*3/uL 217 212 202      Results from last 7 days   Lab Units 04/09/25  0408 04/08/25 0312 04/07/25 2032   SODIUM mmol/L 133* 134* 134*   POTASSIUM mmol/L 4.3 4.8 4.6   CHLORIDE mmol/L 97* 100 100   CO2 mmol/L 26 23 22   BUN mg/dL 40* 41* 40*   CREATININE mg/dL 2.32* 2.66* 2.63*   GLUCOSE mg/dL 111* 112* 119*   MAGNESIUM mg/dL 2.73* 2.75* 2.97*   PHOSPHORUS mg/dL 5.6* 5.4* 4.9      Results from last 7 days   Lab Units 04/09/25  0408 04/08/25 0312 04/07/25 2032   INR  1.2* 1.2* 1.2*   PROTIME seconds 12.9* 13.3* 13.1*   APTT seconds 95* 61* 37*        Results from last 7 days   Lab Units 04/09/25  0408 04/08/25  1012 04/08/25  0455   ANTI XA UNFRACTIONATED IU/mL 0.5 0.6 0.3

## 2025-04-09 NOTE — PROGRESS NOTES
Dilip Haynes is a 74 y.o. male on day 13 of admission presenting with Lower limb ischemia.      Subjective   Feeling well this am. Slept well. No shortness of breath. UOP robust.       Objective          Vitals 24HR  Heart Rate:  [55-88]   Temp:  [36.6 °C (97.9 °F)-37.4 °C (99.3 °F)]   Resp:  [14-26]   BP: (128-173)/(45-96)   Weight:  [91.9 kg (202 lb 9.6 oz)]   SpO2:  [89 %-100 %]     Intake/Output last 3 Shifts:    Intake/Output Summary (Last 24 hours) at 4/9/2025 1228  Last data filed at 4/9/2025 1000  Gross per 24 hour   Intake 619.57 ml   Output 3390 ml   Net -2770.43 ml       Physical Exam  NAD  CTABL  RRR  No increased work of breathing  Abd soft, nt, nd  Trace edema BL    Relevant Results  Lab Results   Component Value Date    WBC 11.6 (H) 04/09/2025    HGB 8.8 (L) 04/09/2025    HCT 28.4 (L) 04/09/2025    MCV 81 04/09/2025     04/09/2025     Lab Results   Component Value Date    GLUCOSE 111 (H) 04/09/2025    CALCIUM 8.9 04/09/2025     (L) 04/09/2025    K 4.3 04/09/2025    CO2 26 04/09/2025    CL 97 (L) 04/09/2025    BUN 40 (H) 04/09/2025    CREATININE 2.32 (H) 04/09/2025     Assessment/Plan      74 y.o. 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 S/p HCV treatment with SVR. post txp course was complicated by urinary retention and enlarged prostate. Pt presented to [facility] on 3/27 for concern for worsening rt limb ischemia. On heparin. S/p diagnostic b/l LE angiogram 3/28/25 showing R CFA disease, SFA occlusion, and PT runoff.   Post-procedure course complicated by HTN urgency, chest pain. Transferred to SICU for BP control, required NTG drip now weaned off and transferred back to medical floor on PO meds.  Now s/p femoral bypass/stenting on 4/7 in TSICU for monitoring.    #Allograft function: s/p DDKT 5/2020  - baseline Cr ~2.  -Down trending to 2.3 today towards baseline  -Euv/mil hypervolemic.  --Torsemide on Hold. Urine output  robust     #Immunosuppression:  - Envarsus 1.5mg . Goal Fk 5-8.  Level 3.6 today.  -Increase to  2 mg qday  - cont MMF 500mg bid, cont pred 5mg/d     #HTN: BP Stable  -continue amlodipine, hydralazine, metoprolol,   If remains Hypertensive >150 systolic, can change Amlodipine to Nifedipine 60mg BID   -Losartan on hold.     #Anemia: Hb stable  #CKD-MBD: Ca, Phos stable. , vit D 36 2/2025     Discussed with Dr. Demarco.          Gregory Feliciano MD  Nephrology Fellow

## 2025-04-09 NOTE — PROGRESS NOTES
"Dilip Haynes is a 74 y.o. male on day 13 of admission presenting with Lower limb ischemia.    Pt is doing well without complaints  Encouraged to drink Nepro   UOP is still good  Cr has been fluctuated  Discussed with Dr. MARKS and resident       Scheduled medications  amLODIPine, 10 mg, oral, Daily  clopidogrel, 75 mg, oral, Daily  famotidine, 20 mg, oral, Daily  hydrALAZINE, 100 mg, oral, TID  insulin lispro, 0-5 Units, subcutaneous, q4h  levothyroxine, 137 mcg, oral, Daily  metoprolol tartrate, 50 mg, oral, BID  mycophenolate, 500 mg, oral, 2 times per day  pravastatin, 40 mg, oral, Nightly  predniSONE, 5 mg, oral, Daily  sennosides-docusate sodium, 2 tablet, oral, BID  tacrolimus ER, 1.5 mg, oral, Once per day on Sunday Monday Tuesday Wednesday Thursday Friday  tamsulosin, 0.4 mg, oral, Nightly  [Held by provider] torsemide, 20 mg, oral, Daily      Continuous medications  heparin, 0-4,500 Units/hr, Last Rate: 1,500 Units/hr (04/09/25 1233)      PRN medications  PRN medications: acetaminophen, albuterol, calcium gluconate, calcium gluconate, dextrose, dextrose, fluticasone, glucagon, glucagon, hydrALAZINE, labetaloL, magnesium sulfate, magnesium sulfate, naloxone, oxygen, polyethylene glycol, potassium chloride, potassium chloride        Last Recorded Vitals  Blood pressure 157/67, pulse 55, temperature 36.7 °C (98.1 °F), resp. rate 18, height 1.778 m (5' 10\"), weight 91.9 kg (202 lb 9.6 oz), SpO2 95%.  Intake/Output last 3 Shifts:  I/O last 3 completed shifts:  In: 3483 (37.9 mL/kg) [P.O.:240; I.V.:2943 (32 mL/kg); IV Piggyback:300]  Out: 5130 (55.8 mL/kg) [Urine:4680 (1.4 mL/kg/hr); Emesis/NG output:150; Blood:300]  Weight: 91.9 kg     A&ox3, no distress, pleasant  MMM, no lesions  CTAB  RRR  Davis  Abd soft, nt, nd  No allograft tenderness  No edema b/l    Results for orders placed or performed during the hospital encounter of 03/27/25 (from the past 24 hours)   POCT GLUCOSE   Result Value Ref Range    POCT " Glucose 166 (H) 74 - 99 mg/dL   POCT GLUCOSE   Result Value Ref Range    POCT Glucose 127 (H) 74 - 99 mg/dL   POCT GLUCOSE   Result Value Ref Range    POCT Glucose 120 (H) 74 - 99 mg/dL   Blood Gas Arterial Full Panel   Result Value Ref Range    POCT pH, Arterial 7.39 7.38 - 7.42 pH    POCT pCO2, Arterial 40 38 - 42 mm Hg    POCT pO2, Arterial 123 (H) 85 - 95 mm Hg    POCT SO2, Arterial 99 94 - 100 %    POCT Oxy Hemoglobin, Arterial 97.0 94.0 - 98.0 %    POCT Hematocrit Calculated, Arterial 28.0 (L) 41.0 - 52.0 %    POCT Sodium, Arterial 130 (L) 136 - 145 mmol/L    POCT Potassium, Arterial 4.3 3.5 - 5.3 mmol/L    POCT Chloride, Arterial      POCT Ionized Calcium, Arterial 1.19 1.10 - 1.33 mmol/L    POCT Glucose, Arterial 107 (H) 74 - 99 mg/dL    POCT Lactate, Arterial 0.8 0.4 - 2.0 mmol/L    POCT Base Excess, Arterial -0.7 -2.0 - 3.0 mmol/L    POCT HCO3 Calculated, Arterial 24.2 22.0 - 26.0 mmol/L    POCT Hemoglobin, Arterial 9.3 (L) 13.5 - 17.5 g/dL    POCT Anion Gap, Arterial      Patient Temperature 37.0 degrees Celsius    FiO2 36 %   Calcium, Ionized   Result Value Ref Range    POCT Calcium, Ionized 1.14 1.1 - 1.33 mmol/L   CBC   Result Value Ref Range    WBC 11.6 (H) 4.4 - 11.3 x10*3/uL    nRBC 0.0 0.0 - 0.0 /100 WBCs    RBC 3.50 (L) 4.50 - 5.90 x10*6/uL    Hemoglobin 8.8 (L) 13.5 - 17.5 g/dL    Hematocrit 28.4 (L) 41.0 - 52.0 %    MCV 81 80 - 100 fL    MCH 25.1 (L) 26.0 - 34.0 pg    MCHC 31.0 (L) 32.0 - 36.0 g/dL    RDW 14.2 11.5 - 14.5 %    Platelets 217 150 - 450 x10*3/uL   Magnesium   Result Value Ref Range    Magnesium 2.73 (H) 1.60 - 2.40 mg/dL   Renal Function Panel   Result Value Ref Range    Glucose 111 (H) 74 - 99 mg/dL    Sodium 133 (L) 136 - 145 mmol/L    Potassium 4.3 3.5 - 5.3 mmol/L    Chloride 97 (L) 98 - 107 mmol/L    Bicarbonate 26 21 - 32 mmol/L    Anion Gap 14 10 - 20 mmol/L    Urea Nitrogen 40 (H) 6 - 23 mg/dL    Creatinine 2.32 (H) 0.50 - 1.30 mg/dL    eGFR 28 (L) >60 mL/min/1.73m*2     Calcium 8.9 8.6 - 10.6 mg/dL    Phosphorus 5.6 (H) 2.5 - 4.9 mg/dL    Albumin 3.7 3.4 - 5.0 g/dL   Coagulation Screen   Result Value Ref Range    Protime 12.9 (H) 9.8 - 12.4 seconds    INR 1.2 (H) 0.9 - 1.1    aPTT 95 (H) 26 - 36 seconds   Heparin Assay, UFH   Result Value Ref Range    Heparin Unfractionated 0.5 See Comment Below for Therapeutic Ranges IU/mL   Tacrolimus   Result Value Ref Range    Tacrolimus  3.6 <=15.0 ng/mL   Type And Screen   Result Value Ref Range    ABO TYPE O     Rh TYPE POS     ANTIBODY SCREEN NEG    POCT GLUCOSE   Result Value Ref Range    POCT Glucose 120 (H) 74 - 99 mg/dL   POCT GLUCOSE   Result Value Ref Range    POCT Glucose 174 (H) 74 - 99 mg/dL     *Note: Due to a large number of results and/or encounters for the requested time period, some results have not been displayed. A complete set of results can be found in Results Review.       TTE: 3/28/25   1. Poorly visualized anatomical structures due to suboptimal image quality.   2. Left ventricular ejection fraction is normal, calculated by Smith's biplane at 60%.   3. Left ventricular diastolic filling is indeterminate.   4. There is reduced right ventricular systolic function.   5. Mildly enlarged right ventricle.   6. The left atrium is mildly dilated.   7. Compared with study dated 12/20/2021, no significant change Technically difficult exam, no gross changes seen.   8. The patient is in atrial fibrillation which may influence the estimate of left ventricular function and transvalvular flows.    B/l LE angiogram: 3/282/25  Right common femoral atherosclerotic occlusive disease. Right SFA occlusion with reconstitution of diseased above knee popliteal artery with runoff to the foot via the posterior tibial artery.     Lab Results   Component Value Date    WBC 11.6 (H) 04/09/2025    HGB 8.8 (L) 04/09/2025    HCT 28.4 (L) 04/09/2025    MCV 81 04/09/2025     04/09/2025     Lab Results   Component Value Date    GLUCOSE 111 (H)  04/09/2025    CALCIUM 8.9 04/09/2025     (L) 04/09/2025    K 4.3 04/09/2025    CO2 26 04/09/2025    CL 97 (L) 04/09/2025    BUN 40 (H) 04/09/2025    CREATININE 2.32 (H) 04/09/2025     Assessment/Plan     74 y.o. 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 S/p HCV treatment with SVR. post txp course was complicated by urinary retention and enlarged prostate. Pt presented to [facility] on 3/27 for concern for worsening rt limb ischemia. On heparin. S/p diagnostic b/l LE angiogram 3/28/25 showing R CFA disease, SFA occlusion, and PT runoff.   Post-procedure course complicated by HTN urgency, chest pain. Transferred to SICU for BP control, required NTG drip now weaned off and transferred back to medical floor.     #Allograft function: s/p DDKT 5/2020    Lab Results   Component Value Date    CREATININE 2.32 (H) 04/09/2025     Lab Results   Component Value Date    GLUCOSE 111 (H) 04/09/2025    CALCIUM 8.9 04/09/2025     (L) 04/09/2025    K 4.3 04/09/2025    CO2 26 04/09/2025    CL 97 (L) 04/09/2025    BUN 40 (H) 04/09/2025    CREATININE 2.32 (H) 04/09/2025       - baseline Cr ~2, stable this admission  - CXR- Wet , post IVF  STOP IVF  Lasix IV 40 mg X ONE 4/3/25  Davis in place      #Immunosuppression:  - Envarsus . Goal Fk 5-8. Level 3.8  - pls check Fk trough daily AM.   - cont MMF 500mg bid, cont pred 5mg/d    #HTN: HTN urgency post-angiogram 3/28.   Reduced Metoprolol back to 50 mg bid for bradycardia  On hydralazine 100 mg tid  ACE/ARB - Held for elevated Cr  Amlodipine 10 mg daily    #Anemia:   Lab Results   Component Value Date    WBC 11.6 (H) 04/09/2025    HGB 8.8 (L) 04/09/2025    HCT 28.4 (L) 04/09/2025    MCV 81 04/09/2025     04/09/2025       #CKD-MBD: Ca, Phos stable. , vit D 36 2/2025     Ash Demarco MD

## 2025-04-09 NOTE — PROGRESS NOTES
"Dilip Haynes is a 74 y.o. male on day 13 of admission presenting with Lower limb ischemia.    Subjective   NAEON     Objective     Physical Exam  Physical Exam  Vitals reviewed.   Constitutional:       Appearance: Normal appearance.   HENT:      Head: Normocephalic and atraumatic.      Mouth/Throat:      Mouth: Mucous membranes are moist.   Cardiovascular:      Rate and Rhythm: Normal rate and regular rhythm.   Pulmonary:      Comments: EBAE, satting > 92% on NC  Abdominal:      General: Abdomen is flat.      Palpations: Abdomen is soft.   Musculoskeletal:      Cervical back: Neck supple.      Comments: R groin prevena intact. L groin access site no hematoma visualized, c/d/i  Skin:     General: Skin is warm and dry.   Neurological:      Comments: AOx3, strength and sensation intact in BLUE and BLLE  Last Recorded Vitals  Blood pressure 172/62, pulse 78, temperature 36.6 °C (97.9 °F), temperature source Temporal, resp. rate 16, height 1.778 m (5' 10\"), weight 91.9 kg (202 lb 9.6 oz), SpO2 96%.  Intake/Output last 3 Shifts:  I/O last 3 completed shifts:  In: 3770.6 (40.9 mL/kg) [I.V.:2970.6 (32.2 mL/kg); IV Piggyback:800]  Out: 4455 (48.3 mL/kg) [Urine:4005 (1.2 mL/kg/hr); Emesis/NG output:150; Blood:300]  Weight: 92.3 kg     Relevant Results  Reviewed relevant labs and imaging    Assessment/Plan   Assessment & Plan  Lower limb ischemia    PAD (peripheral artery disease) (CMS-[facility])    Dilip Haynes is a 74 y.o. male with PMH of significant for obstructive CAD s/p PCI mRCA in-stent restenosis in 5/25/2016, HFrimprovedEF (follows with Dr. Linn), renal artery stenosis, HTN, afib s/p DCCV 6/2020 (on Eliquis last took 3/26 AM), carotid endarterectomy for asymptomatic stenosis, ESRD s/p DDKT (5/2020), hypothyroidism, and PAD (s/p L iliac ([id number] stent) with R fem and profunda endarterectomy with patch angioplasty (2012)  who is POD 2 from right femoral-popliteal bypass w/ Dr. Gant on 4/7/25.     Plan:  NEURO: Aox3. " Acute post op pain well controlled. Slept well overnight.  - PT/OT - encourage ambulation  - Home meds: none     CV: History of NSTEMI/CAD s/p stents (2000, 2006, 2007) and most recently WILLIS x1 to RCA for in-stent restenosis 5/2016. [facility] 2/17/23 performed for typical angina with showed 100% mid LCX and 100% PDA with nonobstructive LM and LAD, Previous cardiac MRI which showed partial viability in the region of the LCX, HTN, pAfib s/p DCCV 6/2020 (on Eliquis last took 3/26 AM). Diastolic heart failure. Loop recorder in place, last check 3/26 without any events. Baseline -170s/60-80s. Echo 3/28 EF 60%, reduced RV systolic function. To ICU off pressors. Run of v-tach intra-op, resolved spontaneously, given 2g Mg and 100mg lidocaine, in NSR to SB since ICU arrival. NIBP on L arm with lower SBP reads compared to R radial A line - NIBP and A line correlating when checked on RUE. MAPs . NSR this AM  - Goal MAPs > 70 and <110  - continuous EKG/abp monitoring  - PRN Labetalol and Hydral for SBP >160  - Antihypertensive regimen: Amlodipine 10mg daily, Hydralazine 100mg TID, Metoprolol Tartrate 50mg BID  - Continue ASA 81mg and Pravastatin 40mg.   - Discontinued Plavix per vascular surgery recommendations  - Loop recorder implanted/ explanted/ and reimplanted in 2023; device check 4/8; no significant arrhythmia burden  - Home meds: amlodipine 10mg daily, losartan 75mg daily, metoprolol tartrate 50mg bid, torsemide 20mg daily, pravastatin 40mg at bedtime, empagliflozin 10mg daily, apixaban 2.5mg bid, prn SL NTG     Vascular: History of carotid endarterectomy for asymptomatic stenosis, Raynaud's, renal artery stenosis, PAD (s/p L iliac ([id number] stent) with R fem and profunda endarterectomy with patch angioplasty (2012) who presented [facility] 3/27 from [place] ED with c/f acute on chronic right limb ischemia. Patient underwent diagnostic bilateral lower extremity angiography 3/28/25 with Dr. Alvarez where noted to have  Right common femoral stenosis. Right SFA occlusion with reconstitution of diseased right above knee popliteal artery with one vessel runoff to the foot via the posterior tibial artery. Now s/p femoral-popliteal bypass, dopplerable PT bilaterally.   - chronic nonhealing right hallux ulceration -> referred to podiatry for hyperbaric oxygen therapy, not covered since he doesn't have osteomyelitis  - podiatry consulted, no acute interventions, appreciate recs  - Continue high intensity heparin gtt per vascular surgery     PULM: Former smoker, 75 pack years, quit 10yrs ago. Intubated and sedated. CXR this AM with worsening pulmonary edema and perihilar congestion and persistent bilateral pleural effusions. On 2L NC satting 97%. CXR - improved aeration noted on bilateral lung bases with less perihilar and interstitial congestion noted  - Wean FiO2 as tolerated, maintain SPO2 >94%  - Q1h incentive spirometer while awake  - additional pulm toilet prn.    - CXR PRN     GI: Diverticulosis of colon. Cirrhotic liver by CTA abdomen 3/26/25 with 2 hypervascular foci favored to represent shunts (pt recommended to have MRI liver by last CT scan, nonurgent)  Passed swallow eval post extubation yesterday. Diet liberalized to CLD and then to diabetic/ renal diet. Passing gas, no BM yet since surgery on 4/7  - Pepcid 20mg daily  - DocSenna BID  - PEG PRN for constipation     : ESRD s/p DDKT 2020. CKD IIIb. Renal artery stenosis s/p bilateral stent placement. BASHIR improving. Net negative 3.1L over last 24 hrs.  - Maintain U/O >0.5ml/kg/hr  - Check renal function panel daily and prn  - Transplant nephrology following, appreciate recs  - IS/ppx per transplant nephrology: mmf, tac, pred  - Daily tacro level  - Replete electrolytes to goal K>4, Mg>2, Phos>2.5, ionized Ca>1.10.     HEME: Postop acute blood loss anemia - Hgb 8.8 this AM  - Check CBC and coags post op and daily  - SCDs for DVT prophylaxis  - Continue heparin gtt and ASA per  vascular surgery  - ongoing monitoring for s/s bleeding     ENDO: A1c 6.4%. Uninodular goiter s/p thyroidectomy. Hypothyroidism  - Q4h BG   - SSI Lispro per ICU protocol  - Continue home synthroid      ID: Afebrile. Leukocytosis most likely reactive in nature  - completed periop antibiotics  - temp q4h, wbc daily  - ongoing monitoring for s/s infection     Lines:  - R radial art line - remove today  - winters - remove today  - piv x3     Dispo: Transfer to [facility]. Seen and discussed with ICU attending Dr Florecita DURAN phone #[phone]    Emiliano Pierce MD

## 2025-04-09 NOTE — PROGRESS NOTES
"Dilip Haynes is a 74 y.o. male on day 13 of admission presenting with Lower limb ischemia.    Patient's Cr stabilized from yesterday; due to BASHIR, will obtain renal transplant duplex U/S in anticipation for operative planning and to obtain baseline preoperatively. Additionally, CXR indicates patient may be volume-up; anticipate gentle diuresis today       Scheduled medications  amLODIPine, 10 mg, oral, Daily  clopidogrel, 75 mg, oral, Daily  famotidine, 20 mg, oral, Daily  hydrALAZINE, 100 mg, oral, TID  insulin lispro, 0-5 Units, subcutaneous, q4h  levothyroxine, 137 mcg, oral, Daily  metoprolol tartrate, 50 mg, oral, BID  mycophenolate, 500 mg, oral, 2 times per day  pravastatin, 40 mg, oral, Nightly  predniSONE, 5 mg, oral, Daily  sennosides-docusate sodium, 2 tablet, oral, BID  tacrolimus ER, 1.5 mg, oral, Once per day on Sunday Monday Tuesday Wednesday Thursday Friday  tamsulosin, 0.4 mg, oral, Nightly  [Held by provider] torsemide, 20 mg, oral, Daily      Continuous medications  heparin, 0-4,500 Units/hr, Last Rate: 1,500 Units/hr (04/09/25 1233)      PRN medications  PRN medications: acetaminophen, albuterol, calcium gluconate, calcium gluconate, dextrose, dextrose, fluticasone, glucagon, glucagon, hydrALAZINE, labetaloL, magnesium sulfate, magnesium sulfate, naloxone, oxygen, polyethylene glycol, potassium chloride, potassium chloride        Last Recorded Vitals  Blood pressure 157/67, pulse 55, temperature 36.7 °C (98.1 °F), resp. rate 18, height 1.778 m (5' 10\"), weight 91.9 kg (202 lb 9.6 oz), SpO2 95%.  Intake/Output last 3 Shifts:  I/O last 3 completed shifts:  In: 3483 (37.9 mL/kg) [P.O.:240; I.V.:2943 (32 mL/kg); IV Piggyback:300]  Out: 5130 (55.8 mL/kg) [Urine:4680 (1.4 mL/kg/hr); Emesis/NG output:150; Blood:300]  Weight: 91.9 kg     A&ox3, no distress, pleasant  MMM, no lesions  CTAB  RRR  Davis  Abd soft, nt, nd  No allograft tenderness  No edema b/l    Results for orders placed or performed " during the hospital encounter of 03/27/25 (from the past 24 hours)   POCT GLUCOSE   Result Value Ref Range    POCT Glucose 166 (H) 74 - 99 mg/dL   POCT GLUCOSE   Result Value Ref Range    POCT Glucose 127 (H) 74 - 99 mg/dL   POCT GLUCOSE   Result Value Ref Range    POCT Glucose 120 (H) 74 - 99 mg/dL   Blood Gas Arterial Full Panel   Result Value Ref Range    POCT pH, Arterial 7.39 7.38 - 7.42 pH    POCT pCO2, Arterial 40 38 - 42 mm Hg    POCT pO2, Arterial 123 (H) 85 - 95 mm Hg    POCT SO2, Arterial 99 94 - 100 %    POCT Oxy Hemoglobin, Arterial 97.0 94.0 - 98.0 %    POCT Hematocrit Calculated, Arterial 28.0 (L) 41.0 - 52.0 %    POCT Sodium, Arterial 130 (L) 136 - 145 mmol/L    POCT Potassium, Arterial 4.3 3.5 - 5.3 mmol/L    POCT Chloride, Arterial      POCT Ionized Calcium, Arterial 1.19 1.10 - 1.33 mmol/L    POCT Glucose, Arterial 107 (H) 74 - 99 mg/dL    POCT Lactate, Arterial 0.8 0.4 - 2.0 mmol/L    POCT Base Excess, Arterial -0.7 -2.0 - 3.0 mmol/L    POCT HCO3 Calculated, Arterial 24.2 22.0 - 26.0 mmol/L    POCT Hemoglobin, Arterial 9.3 (L) 13.5 - 17.5 g/dL    POCT Anion Gap, Arterial      Patient Temperature 37.0 degrees Celsius    FiO2 36 %   Calcium, Ionized   Result Value Ref Range    POCT Calcium, Ionized 1.14 1.1 - 1.33 mmol/L   CBC   Result Value Ref Range    WBC 11.6 (H) 4.4 - 11.3 x10*3/uL    nRBC 0.0 0.0 - 0.0 /100 WBCs    RBC 3.50 (L) 4.50 - 5.90 x10*6/uL    Hemoglobin 8.8 (L) 13.5 - 17.5 g/dL    Hematocrit 28.4 (L) 41.0 - 52.0 %    MCV 81 80 - 100 fL    MCH 25.1 (L) 26.0 - 34.0 pg    MCHC 31.0 (L) 32.0 - 36.0 g/dL    RDW 14.2 11.5 - 14.5 %    Platelets 217 150 - 450 x10*3/uL   Magnesium   Result Value Ref Range    Magnesium 2.73 (H) 1.60 - 2.40 mg/dL   Renal Function Panel   Result Value Ref Range    Glucose 111 (H) 74 - 99 mg/dL    Sodium 133 (L) 136 - 145 mmol/L    Potassium 4.3 3.5 - 5.3 mmol/L    Chloride 97 (L) 98 - 107 mmol/L    Bicarbonate 26 21 - 32 mmol/L    Anion Gap 14 10 - 20 mmol/L     Urea Nitrogen 40 (H) 6 - 23 mg/dL    Creatinine 2.32 (H) 0.50 - 1.30 mg/dL    eGFR 28 (L) >60 mL/min/1.73m*2    Calcium 8.9 8.6 - 10.6 mg/dL    Phosphorus 5.6 (H) 2.5 - 4.9 mg/dL    Albumin 3.7 3.4 - 5.0 g/dL   Coagulation Screen   Result Value Ref Range    Protime 12.9 (H) 9.8 - 12.4 seconds    INR 1.2 (H) 0.9 - 1.1    aPTT 95 (H) 26 - 36 seconds   Heparin Assay, UFH   Result Value Ref Range    Heparin Unfractionated 0.5 See Comment Below for Therapeutic Ranges IU/mL   Tacrolimus   Result Value Ref Range    Tacrolimus  3.6 <=15.0 ng/mL   Type And Screen   Result Value Ref Range    ABO TYPE O     Rh TYPE POS     ANTIBODY SCREEN NEG    POCT GLUCOSE   Result Value Ref Range    POCT Glucose 120 (H) 74 - 99 mg/dL   POCT GLUCOSE   Result Value Ref Range    POCT Glucose 174 (H) 74 - 99 mg/dL     *Note: Due to a large number of results and/or encounters for the requested time period, some results have not been displayed. A complete set of results can be found in Results Review.       TTE: 3/28/25   1. Poorly visualized anatomical structures due to suboptimal image quality.   2. Left ventricular ejection fraction is normal, calculated by Smith's biplane at 60%.   3. Left ventricular diastolic filling is indeterminate.   4. There is reduced right ventricular systolic function.   5. Mildly enlarged right ventricle.   6. The left atrium is mildly dilated.   7. Compared with study dated 12/20/2021, no significant change Technically difficult exam, no gross changes seen.   8. The patient is in atrial fibrillation which may influence the estimate of left ventricular function and transvalvular flows.    B/l LE angiogram: 3/282/25  Right common femoral atherosclerotic occlusive disease. Right SFA occlusion with reconstitution of diseased above knee popliteal artery with runoff to the foot via the posterior tibial artery.     Lab Results   Component Value Date    WBC 11.6 (H) 04/09/2025    HGB 8.8 (L) 04/09/2025    HCT 28.4 (L)  04/09/2025    MCV 81 04/09/2025     04/09/2025     Lab Results   Component Value Date    GLUCOSE 111 (H) 04/09/2025    CALCIUM 8.9 04/09/2025     (L) 04/09/2025    K 4.3 04/09/2025    CO2 26 04/09/2025    CL 97 (L) 04/09/2025    BUN 40 (H) 04/09/2025    CREATININE 2.32 (H) 04/09/2025     Assessment/Plan     74 y.o. 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 S/p HCV treatment with SVR. post txp course was complicated by urinary retention and enlarged prostate. Pt presented to [facility] on 3/27 for concern for worsening rt limb ischemia. On heparin. S/p diagnostic b/l LE angiogram 3/28/25 showing R CFA disease, SFA occlusion, and PT runoff.   Post-procedure course complicated by HTN urgency, chest pain. Transferred to SICU for BP control, required NTG drip now weaned off and transferred back to medical floor.     #Allograft function: s/p DDKT 5/2020    Lab Results   Component Value Date    CREATININE 2.32 (H) 04/09/2025     Lab Results   Component Value Date    GLUCOSE 111 (H) 04/09/2025    CALCIUM 8.9 04/09/2025     (L) 04/09/2025    K 4.3 04/09/2025    CO2 26 04/09/2025    CL 97 (L) 04/09/2025    BUN 40 (H) 04/09/2025    CREATININE 2.32 (H) 04/09/2025       - baseline Cr ~2, stable this admission  - CXR- Wet , post IVF  STOP IVF  Lasix IV 40 mg X ONE  Bladder scan  Davis in place      #Immunosuppression:  - Envarsus . Goal Fk 5-8. Level 3.8  - pls check Fk trough daily AM.   - cont MMF 500mg bid, cont pred 5mg/d    #HTN: HTN urgency post-angiogram 3/28.       #Anemia:   Lab Results   Component Value Date    WBC 11.6 (H) 04/09/2025    HGB 8.8 (L) 04/09/2025    HCT 28.4 (L) 04/09/2025    MCV 81 04/09/2025     04/09/2025       #CKD-MBD: Ca, Phos stable. , vit D 36 2/2025     Ash Demarco MD

## 2025-04-09 NOTE — PROGRESS NOTES
PODIATRIC MEDICINE & SURGERY PROGRESS NOTE    Subjective   Dilip Haynes is a 74 y.o. male who is on day 13 of admission for Lower limb ischemia.     Podiatry consulted for right hallux dry gangrene    Initial HPI:   This is a 75 yo male new new our service with pmhx of carotid endarterectomy for asymptomatic stenosis, Raynaud's, renal artery stenosis, PAD (s/p L iliac ([id number] stent) with R fem and profunda endarterectomy with patch angioplasty (2012) who presented to [facility] 3/27 from [place] ED with c/f acute on chronic right limb ischemia. Patient underwent diagnostic bilateral lower extremity angiography 3/28/25 with Dr. Alvarez where noted to have Right common femoral stenosis. Right SFA occlusion with reconstitution of diseased right above knee popliteal artery with one vessel runoff to the foot via the posterior tibial artery. We are consulted for a chronic nonhealing right hallux ulceration. He is pod0 s/p right femoral endarterectomy, profundaplasty, right common iliac artery angioplasty and covered stent, right external iliac artery angioplasty and covered stent. Intubated (plan to be extubated later per nursing)    Interval HPI:  Patient extubated and non-sedated, not accompanied by family. Resting comfortably in bed reporting no complaints at this time. Denies consitutional symptoms at bedside.    Review of Systems  ROS is negative unless otherwise indicated in HPI    Past Medical History:   Diagnosis Date    Encounter for other preprocedural examination 05/10/2020    Pre-transplant evaluation for kidney transplant    Old myocardial infarction 01/19/2022    H/O non-ST elevation myocardial infarction (NSTEMI)    Personal history of other diseases of the circulatory system 12/22/2015    History of stenosis of renal artery    Personal history of other diseases of the circulatory system 04/13/2021    History of carotid artery stenosis    Personal history of other diseases of the circulatory system 01/19/2022  "   History of essential hypertension    Personal history of other diseases of the circulatory system 07/23/2016    History of claudication    Personal history of other endocrine, nutritional and metabolic disease 12/22/2015    History of thyroid disease    Raynaud's syndrome without gangrene 12/22/2015    Raynauds disease       Social History     Tobacco Use    Smoking status: Former     Types: Cigarettes    Smokeless tobacco: Never   Substance Use Topics    Alcohol use: Yes     Comment: 1 per year    Drug use: Never       Recent Surgeries in Podiatry            No cases to display            Allergies   Allergen Reactions    Lovastatin Myalgia     Leg cramps    Muscle cramps    Simvastatin Myalgia     Muscle cramps    Adhesive Rash    Naproxen Rash       Medications  Scheduled medications  amLODIPine, 10 mg, oral, Daily  aspirin, 81 mg, oral, Daily  famotidine, 20 mg, oral, Daily  hydrALAZINE, 100 mg, oral, TID  insulin lispro, 0-5 Units, subcutaneous, q4h  levothyroxine, 137 mcg, oral, Daily  metoprolol tartrate, 50 mg, oral, BID  mycophenolate, 500 mg, oral, 2 times per day  pravastatin, 40 mg, oral, Nightly  predniSONE, 5 mg, oral, Daily  sennosides-docusate sodium, 2 tablet, oral, BID  tacrolimus ER, 1.5 mg, oral, Once per day on Sunday Monday Tuesday Wednesday Thursday Friday  tamsulosin, 0.4 mg, oral, Nightly  [Held by provider] torsemide, 20 mg, oral, Daily      Continuous medications   heparin, 0-4,500 Units/hr, Last Rate: 1,500 Units/hr (04/09/25 0800)       PRN medications: acetaminophen, albuterol, calcium gluconate, calcium gluconate, dextrose, dextrose, fluticasone, glucagon, glucagon, hydrALAZINE, labetaloL, magnesium sulfate, magnesium sulfate, naloxone, oxygen, polyethylene glycol, potassium chloride, potassium chloride    Objective   Visit Vitals  /55   Pulse 73   Temp 37.4 °C (99.3 °F) (Temporal)   Resp 21   Ht 1.778 m (5' 10\")   Wt 91.9 kg (202 lb 9.6 oz)   SpO2 97%   BMI 29.07 kg/m² "   Smoking Status Former   BSA 2.13 m²       Physical Exam     Vascular: DP and PT pulses are non-palpable bilateral, biphasic DP/PT to the left and biphasic PT monophasic DP right.  CFT is less than 3 seconds bilateral.  Skin temperature is warm to cool proximal to distal bilateral.      Neurologic: no obvious focal deficits.     Musculoskeletal: Moves all extremities spontaneously.     Dermatologic: Skin is supple with normal texture and turgor noted.  Webspaces are clean, dry and intact bilateral. There are no wounds present     There is a small dry gangrene eschar noted to the distal tip of the right hallux that is non-painful, non-malodorous and has no obvious signs of infection at bedside visit today. There is no purulence, or   erythema noted to the distal tuft of the hallux.       Lab Results   Component Value Date    WBC 11.6 (H) 04/09/2025    HGB 8.8 (L) 04/09/2025    HCT 28.4 (L) 04/09/2025    MCV 81 04/09/2025     04/09/2025     Lab Results   Component Value Date    GLUCOSE 111 (H) 04/09/2025    CALCIUM 8.9 04/09/2025     (L) 04/09/2025    K 4.3 04/09/2025    CO2 26 04/09/2025    CL 97 (L) 04/09/2025    BUN 40 (H) 04/09/2025    CREATININE 2.32 (H) 04/09/2025     Lab Results   Component Value Date    HGBA1C 6.4 (H) 02/20/2025         IMAGING  Imaging  XR chest 1 view    Result Date: 4/9/2025  1.  Decreased size of bilateral pleural effusions and edema status post extubation.. 2. Similar pulmonary edema.   I personally reviewed the image(s)/study and resident interpretation. I agree with the findings as stated by resident Esteban Troy. Data analyzed and images interpreted at [facility], [place], [place].   MACRO: None   Signed by: Isac Taveras 4/9/2025 9:05 AM Dictation workstation:   [id number]    XR chest 1 view    Result Date: 4/8/2025  1. Overall slight worsening of bilateral lung aeration, likely due to increasing edema and/or layering bilateral  pleural effusions. 2. Medical devices as above. No pneumothorax.   I personally reviewed the image(s)/study and resident interpretation. I agree with the findings as stated by resident Esteban Troy. Data analyzed and images interpreted at [place], [place].   MACRO: None   Signed by: Krishan Plascencia 4/8/2025 11:45 AM Dictation workstation:   [id number]    XR chest 1 view    Result Date: 4/8/2025  1. Medical devices as above. Consider slight advancement of enteric tube. 2. Allowing for differences in inspiratory effort, similar mild diffuse pulmonary edema with suggestion of trace/small bilateral pleural effusions.   I personally reviewed the image(s)/study and resident interpretation. I agree with the findings as stated by resident Esteban Troy. Data analyzed and images interpreted at [place], [place].   MACRO: None   Signed by: Krishan Plascencia 4/8/2025 10:09 AM Dictation workstation:   [id number]    XR chest 2 views    Result Date: 4/6/2025  1. Mild pulmonary edema, overall slightly improved from prior study. 2. Improved but residual right basilar atelectasis/scarring 3. Questionable trace bilateral pleural effusions.   I personally reviewed the images/study and I agree with the findings as stated by Dr. Albaro Mendoza. This study was interpreted at [place], [place].   MACRO: None   Signed by: Krishan Plascencia 4/6/2025 1:41 PM Dictation workstation:   [id number]    US kidney transplant    Result Date: 4/4/2025  1. Grossly unremarkable Doppler evaluation of the transplant kidney as above with resistive indices within the normal range. 2. Questionable cortical thinning compared to remote examination from 11/13/2020 which are likely due to technical limitations limiting direct comparison to remote prior correlation with any clinical concern for medical renal disease is recommended.   I  personally reviewed the images/study and I agree with the findings as stated by resident Manfred Barclay. This study was interpreted at [facility], [place], [place].   MACRO: None   Signed by: Isak Sam 4/4/2025 8:56 AM Dictation workstation:   [id number]    XR chest 2 views    Result Date: 4/4/2025  Slight worsening of basilar edema.   Signed by: Pino Quiroga 4/4/2025 6:48 AM Dictation workstation:   [id number]     Cardiology, Vascular, and Other Imaging  Electrocardiogram, 12-lead PRN ACS symptoms    Result Date: 4/8/2025  Sinus rhythm with 1st degree AV block with frequent Premature ventricular complexes Low voltage QRS Cannot rule out Anterior infarct (cited on or before 31-MAR-2025) ST & T wave abnormality, consider inferolateral ischemia Abnormal ECG When compared with ECG of 08-APR-2025 01:18, No significant change was found Confirmed by Evaristo Thapa ([id number]) on 4/8/2025 9:11:00 AM    FL fluoro images no charge    Result Date: 4/7/2025  These images are not reportable by radiology and will not be interpreted by  Radiologists.       Assessment/Plan   Dilip Haynes is a 74 y.o. male who is on day 13 of admission for Lower limb ischemia.   1. PAD (peripheral artery disease) (CMS-HCC)    2. Lower limb ischemia    3. 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 Encounter for other preprocedural examination    5. Heart failure with preserved ejection fraction, unspecified HF chronicity    6. Hypertension secondary to other renal disorders    7. Hx of percutaneous transluminal coronary angioplasty    8. History of non-ST elevation myocardial infarction (NSTEMI)    9. History of cardiovascular disorder    10. Cardiac arrest with successful resuscitation    11. Atrial flutter, unspecified type (Multi)    12. Presence of stent in coronary artery    13. Abnormal electrocardiogram (ECG) (EKG)    14. Preop  cardiovascular exam    15. End-stage renal disease (Multi)      #R Hallux Dry Gangrene     Reviewed labs, imaging and notes.   - Patient was seen and evaluated; all findings were discussed and all questions were answered to patient's satisfaction.  - Charts, labs, vitals and imaging all reviewed.      - Imaging:   XR 2/25  IMPRESSION:   No acute osseous abnormality.  No radiographic evidence of osteomyelitis      - Labs:   WBC 9.2   Uric acid 8.3 High     - PVRs:   Left Lower PVR: Evidence of moderate arterial occlusive disease in the left lower extremity at rest. Decreased digital perfusion noted. Monophasic flow is noted in the left posterior tibial artery and left dorsalis pedis artery. Multiphasic flow is noted in the left common femoral artery.   Left Posterior Tibial (Ankle) 106 mmHg  0.60  Left Dorsalis Pedis (Ankle)   92 mmHg   0.52  Left Digit (Great Toe)        57 mmHg   0.32     - Wound culture:   No wound culture collected     - Blood culture:   None collected     Plan/Recommendations:  - Recommend daily betadine to distal tuft of the right hallux to prevent conversion of dry gangrene to wet gangrene  - WBS: per vascular surgery  - DVT prophylaxis per primary  - Antibiotics per primary team    - Pain management per primary team  - Bowel regimen per primary team  - No plan for podiatric surgical intervention at this time.  - Podiatry team will sign off at this time. Please re-consult if needed thank you.  - FU with established podiatrist Sophia Galicia DPM     Patient examined and evaluated with attending physician, Dr. Flores.    This note is pending attending physician attestation, please see attestation below.    Bryant Chamorro DPM PGY-1  Podiatric Medicine & Surgery  Please epic secure chat if any questions or concerns thank you.  Pager #[phone]

## 2025-04-09 NOTE — PROGRESS NOTES
"Dilip Haynes is a 74 y.o. male on day 13 of admission presenting with Lower limb ischemia.    Pt doing well with no complaints. No chest pain, no SOB.   Cr increased  Discussed with primary team attending and patient. Plan to postpone surgery on Monday due to BASHIR.    Concerned for elevated Cr in the setting of being NPO  WOULD HOLD LOSARTAN.  Increase hydralazine to 100 tid  Add clonidine 0.1 mg prn for BP>160/100  If needed, can also go up on Metoprolol.             Scheduled medications  amLODIPine, 10 mg, oral, Daily  clopidogrel, 75 mg, oral, Daily  famotidine, 20 mg, oral, Daily  hydrALAZINE, 100 mg, oral, TID  insulin lispro, 0-5 Units, subcutaneous, q4h  levothyroxine, 137 mcg, oral, Daily  metoprolol tartrate, 50 mg, oral, BID  mycophenolate, 500 mg, oral, 2 times per day  pravastatin, 40 mg, oral, Nightly  predniSONE, 5 mg, oral, Daily  sennosides-docusate sodium, 2 tablet, oral, BID  tacrolimus ER, 1.5 mg, oral, Once per day on Sunday Monday Tuesday Wednesday Thursday Friday  tamsulosin, 0.4 mg, oral, Nightly  [Held by provider] torsemide, 20 mg, oral, Daily      Continuous medications  heparin, 0-4,500 Units/hr, Last Rate: 1,500 Units/hr (04/09/25 1233)      PRN medications  PRN medications: acetaminophen, albuterol, calcium gluconate, calcium gluconate, dextrose, dextrose, fluticasone, glucagon, glucagon, hydrALAZINE, labetaloL, magnesium sulfate, magnesium sulfate, naloxone, oxygen, polyethylene glycol, potassium chloride, potassium chloride        Last Recorded Vitals  Blood pressure 157/67, pulse 55, temperature 36.7 °C (98.1 °F), resp. rate 18, height 1.778 m (5' 10\"), weight 91.9 kg (202 lb 9.6 oz), SpO2 95%.  Intake/Output last 3 Shifts:  I/O last 3 completed shifts:  In: 3483 (37.9 mL/kg) [P.O.:240; I.V.:2943 (32 mL/kg); IV Piggyback:300]  Out: 5130 (55.8 mL/kg) [Urine:4680 (1.4 mL/kg/hr); Emesis/NG output:150; Blood:300]  Weight: 91.9 kg     A&ox3, no distress, pleasant  MMM, no " lesions  CTAB  RRR  Davis  Abd soft, nt, nd  No allograft tenderness  No edema b/l    Results for orders placed or performed during the hospital encounter of 03/27/25 (from the past 24 hours)   POCT GLUCOSE   Result Value Ref Range    POCT Glucose 166 (H) 74 - 99 mg/dL   POCT GLUCOSE   Result Value Ref Range    POCT Glucose 127 (H) 74 - 99 mg/dL   POCT GLUCOSE   Result Value Ref Range    POCT Glucose 120 (H) 74 - 99 mg/dL   Blood Gas Arterial Full Panel   Result Value Ref Range    POCT pH, Arterial 7.39 7.38 - 7.42 pH    POCT pCO2, Arterial 40 38 - 42 mm Hg    POCT pO2, Arterial 123 (H) 85 - 95 mm Hg    POCT SO2, Arterial 99 94 - 100 %    POCT Oxy Hemoglobin, Arterial 97.0 94.0 - 98.0 %    POCT Hematocrit Calculated, Arterial 28.0 (L) 41.0 - 52.0 %    POCT Sodium, Arterial 130 (L) 136 - 145 mmol/L    POCT Potassium, Arterial 4.3 3.5 - 5.3 mmol/L    POCT Chloride, Arterial      POCT Ionized Calcium, Arterial 1.19 1.10 - 1.33 mmol/L    POCT Glucose, Arterial 107 (H) 74 - 99 mg/dL    POCT Lactate, Arterial 0.8 0.4 - 2.0 mmol/L    POCT Base Excess, Arterial -0.7 -2.0 - 3.0 mmol/L    POCT HCO3 Calculated, Arterial 24.2 22.0 - 26.0 mmol/L    POCT Hemoglobin, Arterial 9.3 (L) 13.5 - 17.5 g/dL    POCT Anion Gap, Arterial      Patient Temperature 37.0 degrees Celsius    FiO2 36 %   Calcium, Ionized   Result Value Ref Range    POCT Calcium, Ionized 1.14 1.1 - 1.33 mmol/L   CBC   Result Value Ref Range    WBC 11.6 (H) 4.4 - 11.3 x10*3/uL    nRBC 0.0 0.0 - 0.0 /100 WBCs    RBC 3.50 (L) 4.50 - 5.90 x10*6/uL    Hemoglobin 8.8 (L) 13.5 - 17.5 g/dL    Hematocrit 28.4 (L) 41.0 - 52.0 %    MCV 81 80 - 100 fL    MCH 25.1 (L) 26.0 - 34.0 pg    MCHC 31.0 (L) 32.0 - 36.0 g/dL    RDW 14.2 11.5 - 14.5 %    Platelets 217 150 - 450 x10*3/uL   Magnesium   Result Value Ref Range    Magnesium 2.73 (H) 1.60 - 2.40 mg/dL   Renal Function Panel   Result Value Ref Range    Glucose 111 (H) 74 - 99 mg/dL    Sodium 133 (L) 136 - 145 mmol/L     Potassium 4.3 3.5 - 5.3 mmol/L    Chloride 97 (L) 98 - 107 mmol/L    Bicarbonate 26 21 - 32 mmol/L    Anion Gap 14 10 - 20 mmol/L    Urea Nitrogen 40 (H) 6 - 23 mg/dL    Creatinine 2.32 (H) 0.50 - 1.30 mg/dL    eGFR 28 (L) >60 mL/min/1.73m*2    Calcium 8.9 8.6 - 10.6 mg/dL    Phosphorus 5.6 (H) 2.5 - 4.9 mg/dL    Albumin 3.7 3.4 - 5.0 g/dL   Coagulation Screen   Result Value Ref Range    Protime 12.9 (H) 9.8 - 12.4 seconds    INR 1.2 (H) 0.9 - 1.1    aPTT 95 (H) 26 - 36 seconds   Heparin Assay, UFH   Result Value Ref Range    Heparin Unfractionated 0.5 See Comment Below for Therapeutic Ranges IU/mL   Tacrolimus   Result Value Ref Range    Tacrolimus  3.6 <=15.0 ng/mL   Type And Screen   Result Value Ref Range    ABO TYPE O     Rh TYPE POS     ANTIBODY SCREEN NEG    POCT GLUCOSE   Result Value Ref Range    POCT Glucose 120 (H) 74 - 99 mg/dL   POCT GLUCOSE   Result Value Ref Range    POCT Glucose 174 (H) 74 - 99 mg/dL     *Note: Due to a large number of results and/or encounters for the requested time period, some results have not been displayed. A complete set of results can be found in Results Review.       TTE: 3/28/25   1. Poorly visualized anatomical structures due to suboptimal image quality.   2. Left ventricular ejection fraction is normal, calculated by Smith's biplane at 60%.   3. Left ventricular diastolic filling is indeterminate.   4. There is reduced right ventricular systolic function.   5. Mildly enlarged right ventricle.   6. The left atrium is mildly dilated.   7. Compared with study dated 12/20/2021, no significant change Technically difficult exam, no gross changes seen.   8. The patient is in atrial fibrillation which may influence the estimate of left ventricular function and transvalvular flows.    B/l LE angiogram: 3/282/25  Right common femoral atherosclerotic occlusive disease. Right SFA occlusion with reconstitution of diseased above knee popliteal artery with runoff to the foot via the  posterior tibial artery.     Lab Results   Component Value Date    WBC 11.6 (H) 04/09/2025    HGB 8.8 (L) 04/09/2025    HCT 28.4 (L) 04/09/2025    MCV 81 04/09/2025     04/09/2025     Lab Results   Component Value Date    GLUCOSE 111 (H) 04/09/2025    CALCIUM 8.9 04/09/2025     (L) 04/09/2025    K 4.3 04/09/2025    CO2 26 04/09/2025    CL 97 (L) 04/09/2025    BUN 40 (H) 04/09/2025    CREATININE 2.32 (H) 04/09/2025     Assessment/Plan     74 y.o. 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 S/p HCV treatment with SVR. post txp course was complicated by urinary retention and enlarged prostate. Pt presented to [facility] on 3/27 for concern for worsening rt limb ischemia. On heparin. S/p diagnostic b/l LE angiogram 3/28/25 showing R CFA disease, SFA occlusion, and PT runoff.   Post-procedure course complicated by HTN urgency, chest pain. Transferred to SICU for BP control, required NTG drip now weaned off and transferred back to medical floor.     #Allograft function: s/p DDKT 5/2020    Lab Results   Component Value Date    CREATININE 2.32 (H) 04/09/2025     Lab Results   Component Value Date    GLUCOSE 111 (H) 04/09/2025    CALCIUM 8.9 04/09/2025     (L) 04/09/2025    K 4.3 04/09/2025    CO2 26 04/09/2025    CL 97 (L) 04/09/2025    BUN 40 (H) 04/09/2025    CREATININE 2.32 (H) 04/09/2025       - baseline Cr ~2, stable this admission  - Renal function overall stable with acceptable metabolic parameters, euvolemic on exam  -pending further periop workup for potential femoral-PT bypass and LHC prior to surgery for periop clearance. Discussed risks vs benefits of contrast exposure with patient and possible risk of kidney injury. If to proceed with contrast exposure recommend ppx with IVF prior to procedure, no contraindications from nephrology standpoint.   -Uop robust. On Torsemide 20mg. Euvolemic on exam    #Immunosuppression:  - Envarsus .  Goal Fk 5-8. Level 3.8  - pls check Fk trough daily AM.   - cont MMF 500mg bid, cont pred 5mg/d    #HTN: HTN urgency post-angiogram 3/28.   -Bps now improved. Monitor and titrate meds as indicated.   -continue amlodipine, hydralazine, losartan, metoprolol, Torsemide     #Anemia:   Lab Results   Component Value Date    WBC 11.6 (H) 04/09/2025    HGB 8.8 (L) 04/09/2025    HCT 28.4 (L) 04/09/2025    MCV 81 04/09/2025     04/09/2025       #CKD-MBD: Ca, Phos stable. , vit D 36 2/2025     Ash Demarco MD

## 2025-04-10 ENCOUNTER — PHARMACY VISIT (OUTPATIENT)
Dept: PHARMACY | Facility: CLINIC | Age: 75
End: 2025-04-10
Payer: COMMERCIAL

## 2025-04-10 ENCOUNTER — DOCUMENTATION (OUTPATIENT)
Dept: HOME HEALTH SERVICES | Facility: HOME HEALTH | Age: 75
End: 2025-04-10
Payer: MEDICARE

## 2025-04-10 ENCOUNTER — HOME HEALTH ADMISSION (OUTPATIENT)
Dept: HOME HEALTH SERVICES | Facility: HOME HEALTH | Age: 75
End: 2025-04-10
Payer: MEDICARE

## 2025-04-10 ENCOUNTER — DOCUMENTATION (OUTPATIENT)
Dept: INPATIENT UNIT | Facility: HOSPITAL | Age: 75
End: 2025-04-10

## 2025-04-10 VITALS
RESPIRATION RATE: 17 BRPM | HEART RATE: 79 BPM | HEIGHT: 70 IN | OXYGEN SATURATION: 94 % | SYSTOLIC BLOOD PRESSURE: 117 MMHG | DIASTOLIC BLOOD PRESSURE: 56 MMHG | WEIGHT: 202.6 LBS | BODY MASS INDEX: 29.01 KG/M2 | TEMPERATURE: 98.6 F

## 2025-04-10 DIAGNOSIS — N18.31 STAGE 3A CHRONIC KIDNEY DISEASE (MULTI): ICD-10-CM

## 2025-04-10 PROBLEM — I99.8 LOWER LIMB ISCHEMIA: Status: RESOLVED | Noted: 2025-03-27 | Resolved: 2025-04-10

## 2025-04-10 LAB
ANION GAP SERPL CALC-SCNC: 12 MMOL/L (ref 10–20)
BUN SERPL-MCNC: 35 MG/DL (ref 6–23)
CALCIUM SERPL-MCNC: 9 MG/DL (ref 8.6–10.6)
CHLORIDE SERPL-SCNC: 98 MMOL/L (ref 98–107)
CO2 SERPL-SCNC: 26 MMOL/L (ref 21–32)
CREAT SERPL-MCNC: 1.79 MG/DL (ref 0.5–1.3)
EGFRCR SERPLBLD CKD-EPI 2021: 39 ML/MIN/1.73M*2
ERYTHROCYTE [DISTWIDTH] IN BLOOD BY AUTOMATED COUNT: 14.2 % (ref 11.5–14.5)
GLUCOSE BLD MANUAL STRIP-MCNC: 115 MG/DL (ref 74–99)
GLUCOSE BLD MANUAL STRIP-MCNC: 131 MG/DL (ref 74–99)
GLUCOSE BLD MANUAL STRIP-MCNC: 132 MG/DL (ref 74–99)
GLUCOSE SERPL-MCNC: 119 MG/DL (ref 74–99)
HCT VFR BLD AUTO: 28.3 % (ref 41–52)
HGB BLD-MCNC: 8.8 G/DL (ref 13.5–17.5)
MCH RBC QN AUTO: 25.7 PG (ref 26–34)
MCHC RBC AUTO-ENTMCNC: 31.1 G/DL (ref 32–36)
MCV RBC AUTO: 83 FL (ref 80–100)
NRBC BLD-RTO: 0 /100 WBCS (ref 0–0)
PLATELET # BLD AUTO: 211 X10*3/UL (ref 150–450)
POTASSIUM SERPL-SCNC: 4.4 MMOL/L (ref 3.5–5.3)
RBC # BLD AUTO: 3.43 X10*6/UL (ref 4.5–5.9)
SODIUM SERPL-SCNC: 132 MMOL/L (ref 136–145)
TACROLIMUS BLD-MCNC: 2.9 NG/ML
UFH PPP CHRO-ACNC: 0.3 IU/ML
WBC # BLD AUTO: 10.7 X10*3/UL (ref 4.4–11.3)

## 2025-04-10 PROCEDURE — 2500000004 HC RX 250 GENERAL PHARMACY W/ HCPCS (ALT 636 FOR OP/ED)

## 2025-04-10 PROCEDURE — 99233 SBSQ HOSP IP/OBS HIGH 50: CPT | Performed by: INTERNAL MEDICINE

## 2025-04-10 PROCEDURE — RXMED WILLOW AMBULATORY MEDICATION CHARGE

## 2025-04-10 PROCEDURE — 89240 UNLISTED MISC PATH TEST: CPT

## 2025-04-10 PROCEDURE — 85520 HEPARIN ASSAY: CPT

## 2025-04-10 PROCEDURE — 99239 HOSP IP/OBS DSCHRG MGMT >30: CPT | Performed by: NURSE PRACTITIONER

## 2025-04-10 PROCEDURE — 97116 GAIT TRAINING THERAPY: CPT | Mod: GP

## 2025-04-10 PROCEDURE — 2500000001 HC RX 250 WO HCPCS SELF ADMINISTERED DRUGS (ALT 637 FOR MEDICARE OP)

## 2025-04-10 PROCEDURE — 2500000001 HC RX 250 WO HCPCS SELF ADMINISTERED DRUGS (ALT 637 FOR MEDICARE OP): Performed by: NURSE PRACTITIONER

## 2025-04-10 PROCEDURE — 97110 THERAPEUTIC EXERCISES: CPT | Mod: GP

## 2025-04-10 PROCEDURE — 36415 COLL VENOUS BLD VENIPUNCTURE: CPT

## 2025-04-10 PROCEDURE — 2500000002 HC RX 250 W HCPCS SELF ADMINISTERED DRUGS (ALT 637 FOR MEDICARE OP, ALT 636 FOR OP/ED)

## 2025-04-10 PROCEDURE — 82947 ASSAY GLUCOSE BLOOD QUANT: CPT

## 2025-04-10 PROCEDURE — 82374 ASSAY BLOOD CARBON DIOXIDE: CPT | Performed by: NURSE PRACTITIONER

## 2025-04-10 PROCEDURE — 80197 ASSAY OF TACROLIMUS: CPT

## 2025-04-10 PROCEDURE — 85027 COMPLETE CBC AUTOMATED: CPT | Performed by: NURSE PRACTITIONER

## 2025-04-10 RX ORDER — CLOPIDOGREL BISULFATE 75 MG/1
75 TABLET ORAL DAILY
Qty: 30 TABLET | Refills: 0 | Status: SHIPPED | OUTPATIENT
Start: 2025-04-10 | End: 2025-05-10

## 2025-04-10 RX ORDER — HYDRALAZINE HYDROCHLORIDE 100 MG/1
100 TABLET, FILM COATED ORAL 3 TIMES DAILY
Qty: 90 TABLET | Refills: 0 | Status: SHIPPED | OUTPATIENT
Start: 2025-04-10 | End: 2025-05-10

## 2025-04-10 RX ORDER — AMOXICILLIN 250 MG
1 CAPSULE ORAL 2 TIMES DAILY
Qty: 14 TABLET | Refills: 0 | Status: SHIPPED | OUTPATIENT
Start: 2025-04-10 | End: 2025-04-17

## 2025-04-10 RX ORDER — ACETAMINOPHEN 325 MG/1
650 TABLET ORAL EVERY 6 HOURS PRN
Qty: 30 TABLET | Refills: 0 | Status: SHIPPED | OUTPATIENT
Start: 2025-04-10 | End: 2025-04-20

## 2025-04-10 RX ADMIN — APIXABAN 2.5 MG: 2.5 TABLET, FILM COATED ORAL at 10:19

## 2025-04-10 RX ADMIN — LEVOTHYROXINE SODIUM 137 MCG: 25 TABLET ORAL at 05:56

## 2025-04-10 RX ADMIN — METOPROLOL TARTRATE 50 MG: 50 TABLET, FILM COATED ORAL at 10:19

## 2025-04-10 RX ADMIN — CLOPIDOGREL BISULFATE 75 MG: 75 TABLET, FILM COATED ORAL at 10:19

## 2025-04-10 RX ADMIN — AMLODIPINE BESYLATE 10 MG: 10 TABLET ORAL at 10:19

## 2025-04-10 RX ADMIN — HEPARIN SODIUM 1500 UNITS/HR: 10000 INJECTION, SOLUTION INTRAVENOUS at 05:53

## 2025-04-10 RX ADMIN — PREDNISONE 5 MG: 5 TABLET ORAL at 10:19

## 2025-04-10 RX ADMIN — TACROLIMUS 2 MG: 1 TABLET, EXTENDED RELEASE ORAL at 07:08

## 2025-04-10 RX ADMIN — FAMOTIDINE 20 MG: 20 TABLET, FILM COATED ORAL at 10:19

## 2025-04-10 RX ADMIN — HYDRALAZINE HYDROCHLORIDE 100 MG: 50 TABLET ORAL at 10:19

## 2025-04-10 RX ADMIN — MYCOPHENOLATE MOFETIL 500 MG: 250 CAPSULE ORAL at 05:56

## 2025-04-10 ASSESSMENT — PAIN SCALES - GENERAL
PAINLEVEL_OUTOF10: 0 - NO PAIN
PAINLEVEL_OUTOF10: 0 - NO PAIN

## 2025-04-10 ASSESSMENT — COGNITIVE AND FUNCTIONAL STATUS - GENERAL
MOBILITY SCORE: 20
STANDING UP FROM CHAIR USING ARMS: A LITTLE
DAILY ACTIVITIY SCORE: 23
MOVING TO AND FROM BED TO CHAIR: A LITTLE
CLIMB 3 TO 5 STEPS WITH RAILING: A LITTLE
TOILETING: A LITTLE
MOBILITY SCORE: 23
TURNING FROM BACK TO SIDE WHILE IN FLAT BAD: A LITTLE
WALKING IN HOSPITAL ROOM: A LITTLE

## 2025-04-10 ASSESSMENT — PAIN - FUNCTIONAL ASSESSMENT: PAIN_FUNCTIONAL_ASSESSMENT: 0-10

## 2025-04-10 NOTE — PROGRESS NOTES
Pt is medically ready to be discharged today.   SW met with pt to offer support and discuss discharge plan. Pt is recommended for low intensity and referral was made to [facility] per pt's request. Wheeled walker is ordered through [facility].  Pt denied any further issues or questions on social work at the moment. SW will continue to follow and assist.     MARILIN Mcmanus, LSW

## 2025-04-10 NOTE — CARE PLAN
The patient's goals for the shift include no pain    The clinical goals for the shift include pt will remain free from falls throughout entire shift

## 2025-04-10 NOTE — HH CARE COORDINATION
Home Care received a Referral for Physical Therapy and Occupational Therapy. We have processed the referral for a Start of Care on 4/12-4/13/25.     If you have any questions or concerns, please feel free to contact us at [phone]. Follow the prompts, enter your five digit zip code, and you will be directed to your care team on WEST 1.

## 2025-04-10 NOTE — HOSPITAL COURSE
74 year old male admitted to [facility] from OSH with limb ischemia.  Patient underwent a diagnostic RLE angiogram on 3/28.  Post-op course complicated by chest pain in the setting of hypertension, transferred to ICU for nitroglycerin gtt. On 4/7, he was taken to the OR and underwent a right femoral endarterectomy, profundaplasty, right WENDY angioplasty with covered stent, right EIA angioplasty with covered stent. Nephrology was engaged with updated antihypertensive recommendations and tacrolimus dosing. His BASHIR resolved back to baseline. He was resumed on his home eliquis (2.5 mg BID given CKD) and started on plavix as well. The patient tolerated a regular diet, was controlled on oral pain medications and is medically ready for discharge. PT evaluated patient with recommendations for home PT. Patient will follow up in the outpatient clinic with nephrology (labs to be drawn in 3 days), vascular surgery & podiatry.

## 2025-04-10 NOTE — CARE PLAN
The patient's goals for the shift include   Problem: Safety - Adult  Goal: Free from fall injury  Outcome: Progressing       The clinical goals for the shift include Patient will remain free of fall/injury throughout shift.

## 2025-04-10 NOTE — PROGRESS NOTES
Physical Therapy    Physical Therapy Treatment    Patient Name: Dilip Haynes  MRN: [id number]  Department: [place] [id number]  Room: [facility]  Today's Date: 4/10/2025  Time Calculation  Start Time: 0929  Stop Time: 0954  Time Calculation (min): 25 min         Assessment/Plan   PT Assessment  End of Session Communication: Bedside nurse  Assessment Comment: Pt able to perform ambulation and standing and sitting exercises. Pt tolerated treatment well with no SOB and with SpO2 > 94%. Pt continues to demonstrate decreased strength and would benefit from mobilization through continuation of hospital stay. Pt would benefit from LOW intensity skilled physical therapy upon DC to continue to address the above deficit.  End of Session Patient Position: Up in chair, Alarm off, not on at start of session  PT Plan  Inpatient/Swing Bed or Outpatient: Inpatient  PT Plan  Treatment/Interventions: Bed mobility, Transfer training, Gait training, Stair training, Balance training, Neuromuscular re-education, Strengthening, Endurance training, Range of motion, Home exercise program, Therapeutic activity, Therapeutic exercise  PT Plan: Ongoing PT  PT Frequency: 2 times per week  PT Discharge Recommendations: Low intensity level of continued care  Equipment Recommended upon Discharge: Other (comment) (none, pt owns appropriate DME)  PT Recommended Transfer Status: Assistive device, Stand by assist  PT - OK to Discharge: Yes      General Visit Information:   PT  Visit  PT Received On: 04/10/25  General  Family/Caregiver Present: No  Prior to Session Communication: Bedside nurse (per conversation with RN, pt on continuous oxygen monitoring.)  Patient Position Received: Bed, 3 rail up, Alarm off, not on at start of session  General Comment: Pt seated EOB on arrival; pt agreeable to work with therapy; 2L O2 NC, Preventa wound vac    Subjective   Precautions:  Precautions  Medical Precautions: Fall precautions, Oxygen therapy device and  "L/min  Precautions Comment: Maintain SpO2 >92%    Vital Signs Comment: SpO2 maintained around 95-97% for the majority of therapy session; lowest reading 94% while perfoming LAQ seated in chair.     Objective   Pain:  Pain Assessment  Pain Assessment: 0-10  0-10 (Numeric) Pain Score:  (pt did not rate pain numerically; pt states that the pain was \"mild\")  Pain Location: Back (pt encouraged to sit in chair with back support)  Pain Orientation: Lower, Posterior  Cognition:  Cognition  Overall Cognitive Status: Within Functional Limits  Following Commands: Follows one step commands without difficulty     Postural Control:  Static Sitting Balance  Static Sitting-Balance Support: Feet supported  Static Sitting-Level of Assistance: Independent  Dynamic Sitting Balance  Dynamic Sitting-Balance Support: Feet supported  Dynamic Sitting-Level of Assistance: Distant supervision  Static Standing Balance  Static Standing-Balance Support: Bilateral upper extremity supported (2ww)  Static Standing-Level of Assistance: Distant supervision  Dynamic Standing Balance  Dynamic Standing-Balance Support: Bilateral upper extremity supported (2ww)  Dynamic Standing-Level of Assistance: Close supervision           Activity Tolerance:  Activity Tolerance  Activity Tolerance Comments: required seated rest break after completion of 30 second standing marches in between the two sets  Treatments:  Therapeutic Exercise  Therapeutic Exercise Performed: Yes  Therapeutic Exercise Activity 1: Standing marches, 2x30 seconds; pt reported moderate difficulty upon completion of each set; highest , SpO2 >95%  Therapeutic Exercise Activity 2: B LAQ x 12; increased vc for pacing of exercise.    Bed Mobility  Bed Mobility: No (pt seated EOB upon arrival)    Ambulation/Gait Training  Ambulation/Gait Training Performed: Yes  Ambulation/Gait Training 1  Surface 1: Level tile, Carpet  Device 1: Rolling walker  Assistance 1: Close " supervision  Comments/Distance (ft) 1: 180ft x1; pt reported no SOB  Transfers  Transfer: Yes  Transfer 1  Transfer From 1: Sit to, Stand to  Transfer to 1: Sit, Stand  Technique 1: Sit to stand  Transfer Device 1: Walker  Transfer Level of Assistance 1: Minimal verbal cues, Close supervision  Trials/Comments 1: x1 from bed; x3 from chair; pt required vc to feel backs of legs against chair prior to sitting.    Outcome Measures:  [facility] Basic Mobility  Turning from your back to your side while in a flat bed without using bedrails: None  Moving from lying on your back to sitting on the side of a flat bed without using bedrails: None  Moving to and from bed to chair (including a wheelchair): A little  Standing up from a chair using your arms (e.g. wheelchair or bedside chair): A little  To walk in hospital room: A little  Climbing 3-5 steps with railing: A little  Basic Mobility - Total Score: 20    Education Documentation  Precautions, taught by JACKI Cespedes at 4/10/2025 10:34 AM.  Learner: Patient  Readiness: Acceptance  Method: Explanation  Response: Verbalizes Understanding  Comment: safe mobility strategies, pacing activities, breathing techniques    Body Mechanics, taught by JACKI Cespedes at 4/10/2025 10:34 AM.  Learner: Patient  Readiness: Acceptance  Method: Explanation  Response: Verbalizes Understanding  Comment: safe mobility strategies, pacing activities, breathing techniques    Home Exercise Program, taught by JACKI Cespedes at 4/10/2025 10:34 AM.  Learner: Patient  Readiness: Acceptance  Method: Explanation  Response: Verbalizes Understanding  Comment: safe mobility strategies, pacing activities, breathing techniques    Mobility Training, taught by JACKI Cespedes at 4/10/2025 10:34 AM.  Learner: Patient  Readiness: Acceptance  Method: Explanation  Response: Verbalizes Understanding  Comment: safe mobility strategies, pacing activities, breathing techniques    Education Comments  No comments  found.           Encounter Problems       Encounter Problems (Active)       Mobility       Pt will perform 5x STS in </= 15 sec        Start:  04/08/25    Expected End:  04/29/25               PT Problem       Patient is able to ambulate 150 feet without loss of balance requiring contact guard or less assist  (Met)       Start:  03/31/25    Expected End:  04/14/25    Resolved:  04/01/25         Patient is able to ascend 4 stairs without loss of balance requiring contact guard or less assist  (Progressing)       Start:  03/31/25    Expected End:  04/29/25            Patient is able to score>9/12 on modified Dynamic Gait Index for dynamic ambulatory balance to reduce fall risk  (Progressing)       Start:  03/31/25    Expected End:  04/29/25               PT Problem       Patient is able to ambulate 350 feet without loss of balance requiring contact guard or less assist  (Progressing)       Start:  04/01/25    Expected End:  04/29/25               Pain - Adult            Mirtha Kumar, SPT

## 2025-04-10 NOTE — PROGRESS NOTES
Dilip Haynes is a 74 y.o. male on day 14 of admission presenting with Lower limb ischemia.      Subjective     Good UOP  Cr down to baseline 1.7  On NC Oxygen. Would recommend checking walking sat. May need lasix.       Objective          Vitals 24HR  Heart Rate:  []   Temp:  [36.6 °C (97.9 °F)-37 °C (98.6 °F)]   Resp:  [12-20]   BP: ()/(55-71)   SpO2:  [90 %-99 %]     Intake/Output last 3 Shifts:    Intake/Output Summary (Last 24 hours) at 4/10/2025 1353  Last data filed at 4/10/2025 1100  Gross per 24 hour   Intake 240 ml   Output 500 ml   Net -260 ml       Physical Exam  NAD  CTABL  RRR  No increased work of breathing  Abd soft, nt, nd  Trace edema BL    Relevant Results  Lab Results   Component Value Date    WBC 10.7 04/10/2025    HGB 8.8 (L) 04/10/2025    HCT 28.3 (L) 04/10/2025    MCV 83 04/10/2025     04/10/2025     Lab Results   Component Value Date    GLUCOSE 119 (H) 04/10/2025    CALCIUM 9.0 04/10/2025     (L) 04/10/2025    K 4.4 04/10/2025    CO2 26 04/10/2025    CL 98 04/10/2025    BUN 35 (H) 04/10/2025    CREATININE 1.79 (H) 04/10/2025     Assessment/Plan      74 y.o. 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 S/p HCV treatment with SVR. post txp course was complicated by urinary retention and enlarged prostate. Pt presented to [facility] on 3/27 for concern for worsening rt limb ischemia. On heparin. S/p diagnostic b/l LE angiogram 3/28/25 showing R CFA disease, SFA occlusion, and PT runoff.   Post-procedure course complicated by HTN urgency, chest pain. Transferred to SICU for BP control, required NTG drip now weaned off and transferred back to medical floor on PO meds.  Now s/p femoral bypass/stenting on 4/7 in TSICU for monitoring.    #Allograft function: s/p DDKT 5/2020  BASHIR- Resolved.  - baseline Cr ~2.  -Down trending to 2.3 today towards baseline  -Euv/mil hypervolemic.  --Torsemide on Hold. Urine output  robust     #Immunosuppression:  - Envarsus 1.5mg . Goal Fk 5-8.  Level 2.9 today.  -Increased Envarsus back to  2 mg q day  - cont MMF 500mg bid, cont pred 5mg/d     #HTN: BP Stable  -continue amlodipine, hydralazine, metoprolol,   - Given Cr has decreased to baseline, may restart half dose losartan 25 mg bid     #Anemia: Hb stable  #CKD-MBD: Ca, Phos stable. , vit D 36 2/2025       Ash Demarco MD

## 2025-04-10 NOTE — DISCHARGE SUMMARY
Discharge Diagnosis  Lower limb ischemia    Issues Requiring Follow-Up  Vascular surgery follow up  Nephrology follow up with blood work  Podiatry follow up    Test Results Pending At Discharge  Pending Labs       Order Current Status    P2y12 resistance test; [facility]; [facility] - Miscellaneous Test In process    Surgical Pathology Exam In process            Hospital Course  74 year old male admitted to [facility] from OSH with limb ischemia.  Patient underwent a diagnostic RLE angiogram on 3/28.  Post-op course complicated by chest pain in the setting of hypertension, transferred to ICU for nitroglycerin gtt. On 4/7, he was taken to the OR and underwent a right femoral endarterectomy, profundaplasty, right WENDY angioplasty with covered stent, right EIA angioplasty with covered stent. Nephrology was engaged with updated antihypertensive recommendations and tacrolimus dosing. His BASHIR resolved back to baseline. He was resumed on his home eliquis (2.5 mg BID given CKD) and started on plavix as well. The patient tolerated a regular diet, was controlled on oral pain medications and is medically ready for discharge. PT evaluated patient with recommendations for home PT. Patient will follow up in the outpatient clinic with nephrology (labs to be drawn in 3 days), vascular surgery & podiatry.       Pertinent Physical Exam At Time of Discharge  Physical Exam  Constitutional: No acute distress, resting comfortably  Neuro:  AOx3, grossly intact  ENMT: moist mucous membranes  CV: no tachycardia  Pulm: non-labored on room air  GI: soft, non-tender, non-distended  Skin: warm and dry  Musculoskeletal: moving all extremities  Extremities: warm & well perfused. Right groin with prevena intact.   Pulses: multiphasic right PT    Home Medications     Medication List      START taking these medications     acetaminophen 325 mg tablet; Commonly known as: Tylenol; Take 2 tablets   (650 mg) by mouth every 6 hours if needed for mild pain (1  - 3) for up to   10 days.   clopidogrel 75 mg tablet; Commonly known as: Plavix; Take 1 tablet (75   mg) by mouth once daily.   hydrALAZINE 100 mg tablet; Commonly known as: Apresoline; Take 1 tablet   (100 mg) by mouth 3 times a day.   sennosides-docusate sodium 8.6-50 mg tablet; Commonly known as:   Gris-Colace; Take 1 tablet by mouth 2 times a day for 7 days.     CHANGE how you take these medications     losartan 25 mg tablet; Commonly known as: Cozaar; TAKE 1 TABLET BY MOUTH   EVERY DAY; What changed: Another medication with the same name was   removed. Continue taking this medication, and follow the directions you   see here.   tacrolimus ER 1 mg tablet ER; Commonly known as: Envarsus XR; Take 2   tablets (2 mg) by mouth once daily.; What changed: medication strength,   how much to take     CONTINUE taking these medications     amLODIPine 10 mg tablet; Commonly known as: Norvasc   Eliquis 2.5 mg tablet; Generic drug: apixaban; TAKE 1 TABLET BY MOUTH   TWICE A DAY   empagliflozin 10 mg tablet; Commonly known as: Jardiance; Take 1 tablet   (10 mg) by mouth once daily.   famotidine 20 mg tablet; Commonly known as: Pepcid; TAKE 1 TABLET BY   MOUTH EVERY DAY AS DIRECTED   fluticasone 50 mcg/actuation nasal spray; Commonly known as: Flonase   levothyroxine 137 mcg tablet; Commonly known as: Synthroid, Levoxyl   methocarbamol 500 mg tablet; Commonly known as: Robaxin   metoprolol tartrate 50 mg tablet; Commonly known as: Lopressor; TAKE 1   TABLET BY MOUTH TWICE A DAY   multivitamin tablet   mycophenolate 250 mg capsule; Commonly known as: Cellcept; TAKE 3   CAPSULES BY MOUTH TWICE A DAY   nitroglycerin 0.4 mg SL tablet; Commonly known as: Nitrostat   pravastatin 40 mg tablet; Commonly known as: Pravachol; TAKE 1 TABLET BY   MOUTH EVERYDAY AT BEDTIME   predniSONE 5 mg tablet; Commonly known as: Deltasone; TAKE 1 TABLET BY   MOUTH EVERY DAY   tamsulosin 0.4 mg 24 hr capsule; Commonly known as: Flomax; Take 1   capsule  (0.4 mg) by mouth once daily at bedtime.     STOP taking these medications     torsemide 20 mg tablet; Commonly known as: Demadex       Outpatient Follow-Up  Future Appointments   Date Time Provider Department Center   4/25/2025  3:00 PM Alejandra Gant MD [id number] [facility]   4/30/2025 11:40 AM TXP KIDNEY PHYSICIAN [id number] [facility]   5/28/2025  2:45 PM Michael Montesinos MD [id number] [place]   6/10/2025 11:40 AM Cathy Marin MD [id number] [facility]   7/8/2025  9:30 AM Jaquelin Beck DPM [id number] [place]   7/17/2025  9:20 AM Krystle Linn MD PhD [id number] [facility]   8/15/2025 11:30 AM MARTA Carter-CNP [id number] [place]     >30 minutes spent on day of discharge assessment and plan including time spent in discharge education and coordination    Mikayla Pimentel APRN-CNP

## 2025-04-12 ENCOUNTER — HOME CARE VISIT (OUTPATIENT)
Dept: HOME HEALTH SERVICES | Facility: HOME HEALTH | Age: 75
End: 2025-04-12
Payer: MEDICARE

## 2025-04-12 VITALS
TEMPERATURE: 98.5 F | DIASTOLIC BLOOD PRESSURE: 71 MMHG | SYSTOLIC BLOOD PRESSURE: 131 MMHG | HEART RATE: 85 BPM | OXYGEN SATURATION: 92 %

## 2025-04-12 LAB — SCAN RESULT: NORMAL

## 2025-04-12 PROCEDURE — G0152 HHCP-SERV OF OT,EA 15 MIN: HCPCS | Mod: HHH

## 2025-04-12 PROCEDURE — 169592 NO-PAY CLAIM PROCEDURE

## 2025-04-12 ASSESSMENT — ENCOUNTER SYMPTOMS
HIGHEST PAIN SEVERITY IN PAST 24 HOURS: 10/10
PAIN LOCATION: RIGHT FOOT
LOWEST PAIN SEVERITY IN PAST 24 HOURS: 5/10
PAIN LOCATION - PAIN SEVERITY: 8/10
SUBJECTIVE PAIN PROGRESSION: UNCHANGED
PERSON REPORTING PAIN: PATIENT
DEPRESSION: 0
OCCASIONAL FEELINGS OF UNSTEADINESS: 0
PAIN: 1
LOSS OF SENSATION IN FEET: 1

## 2025-04-12 ASSESSMENT — ACTIVITIES OF DAILY LIVING (ADL)
OASIS_M1830: 05
DRESSING_LB_CURRENT_FUNCTION: MINIMUM ASSIST
FEEDING_WITHIN_DEFINED_LIMITS: 1
ENTERING_EXITING_HOME: ONE PERSON
GROOMING_WITHIN_DEFINED_LIMITS: 1
TOILETING: 1
TOILETING: STAND BY ASSIST

## 2025-04-14 ENCOUNTER — HOME CARE VISIT (OUTPATIENT)
Dept: HOME HEALTH SERVICES | Facility: HOME HEALTH | Age: 75
End: 2025-04-14
Payer: MEDICARE

## 2025-04-14 ENCOUNTER — APPOINTMENT (OUTPATIENT)
Dept: LAB | Facility: HOSPITAL | Age: 75
End: 2025-04-14
Payer: MEDICARE

## 2025-04-14 ENCOUNTER — HOSPITAL ENCOUNTER (OUTPATIENT)
Dept: CARDIOLOGY | Facility: CLINIC | Age: 75
Discharge: HOME | End: 2025-04-14
Payer: MEDICARE

## 2025-04-14 VITALS — HEART RATE: 65 BPM | OXYGEN SATURATION: 97 % | RESPIRATION RATE: 18 BRPM | TEMPERATURE: 98.4 F

## 2025-04-14 DIAGNOSIS — I48.91 ATRIAL FIBRILLATION, UNSPECIFIED TYPE (MULTI): Primary | ICD-10-CM

## 2025-04-14 DIAGNOSIS — N18.31 STAGE 3A CHRONIC KIDNEY DISEASE (MULTI): ICD-10-CM

## 2025-04-14 DIAGNOSIS — I48.91 ATRIAL FIBRILLATION, UNSPECIFIED TYPE (MULTI): ICD-10-CM

## 2025-04-14 LAB
ALBUMIN SERPL BCP-MCNC: 3.9 G/DL (ref 3.4–5)
ANION GAP SERPL CALC-SCNC: 14 MMOL/L (ref 10–20)
BUN SERPL-MCNC: 18 MG/DL (ref 6–23)
CALCIUM SERPL-MCNC: 9.2 MG/DL (ref 8.6–10.3)
CHLORIDE SERPL-SCNC: 103 MMOL/L (ref 98–107)
CO2 SERPL-SCNC: 24 MMOL/L (ref 21–32)
CREAT SERPL-MCNC: 1.49 MG/DL (ref 0.5–1.3)
EGFRCR SERPLBLD CKD-EPI 2021: 48 ML/MIN/1.73M*2
ERYTHROCYTE [DISTWIDTH] IN BLOOD BY AUTOMATED COUNT: 14.2 % (ref 11.5–14.5)
GLUCOSE SERPL-MCNC: 122 MG/DL (ref 74–99)
HCT VFR BLD AUTO: 34.2 % (ref 41–52)
HGB BLD-MCNC: 10.1 G/DL (ref 13.5–17.5)
MAGNESIUM SERPL-MCNC: 2.2 MG/DL (ref 1.6–2.4)
MCH RBC QN AUTO: 25.4 PG (ref 26–34)
MCHC RBC AUTO-ENTMCNC: 29.5 G/DL (ref 32–36)
MCV RBC AUTO: 86 FL (ref 80–100)
NRBC BLD-RTO: 0.2 /100 WBCS (ref 0–0)
PHOSPHATE SERPL-MCNC: 2.8 MG/DL (ref 2.5–4.9)
PLATELET # BLD AUTO: 362 X10*3/UL (ref 150–450)
POTASSIUM SERPL-SCNC: 4.6 MMOL/L (ref 3.5–5.3)
RBC # BLD AUTO: 3.98 X10*6/UL (ref 4.5–5.9)
SODIUM SERPL-SCNC: 136 MMOL/L (ref 136–145)
WBC # BLD AUTO: 10.6 X10*3/UL (ref 4.4–11.3)

## 2025-04-14 PROCEDURE — 80069 RENAL FUNCTION PANEL: CPT

## 2025-04-14 PROCEDURE — G0151 HHCP-SERV OF PT,EA 15 MIN: HCPCS | Mod: HHH

## 2025-04-14 PROCEDURE — G0158 HHC OT ASSISTANT EA 15: HCPCS | Mod: CO,HHH

## 2025-04-14 PROCEDURE — G0299 HHS/HOSPICE OF RN EA 15 MIN: HCPCS | Mod: HHH

## 2025-04-14 PROCEDURE — 83735 ASSAY OF MAGNESIUM: CPT

## 2025-04-14 PROCEDURE — 85027 COMPLETE CBC AUTOMATED: CPT

## 2025-04-14 ASSESSMENT — ENCOUNTER SYMPTOMS
LOWEST PAIN SEVERITY IN PAST 24 HOURS: 3/10
PAIN LOCATION - PAIN SEVERITY: 6/10
PERSON REPORTING PAIN: PATIENT
PAIN: 1
PAIN LOCATION: BACK
PAIN LOCATION: GROIN
HIGHEST PAIN SEVERITY IN PAST 24 HOURS: 7/10
PAIN: 1
PERSON REPORTING PAIN: PATIENT
LOWEST PAIN SEVERITY IN PAST 24 HOURS: 7/10
PAIN LOCATION - PAIN SEVERITY: 3/10
HIGHEST PAIN SEVERITY IN PAST 24 HOURS: 7/10
PAIN LOCATION: BACK

## 2025-04-14 ASSESSMENT — ACTIVITIES OF DAILY LIVING (ADL)
AMBULATION ASSISTANCE ON FLAT SURFACES: 1
AMBULATION_DISTANCE/DURATION_TOLERATED: 100'

## 2025-04-14 ASSESSMENT — PAIN DESCRIPTION - PAIN TYPE: TYPE: CHRONIC PAIN

## 2025-04-15 ENCOUNTER — TELEPHONE (OUTPATIENT)
Dept: VASCULAR SURGERY | Facility: HOSPITAL | Age: 75
End: 2025-04-15
Payer: MEDICARE

## 2025-04-15 LAB
ATRIAL RATE: 85 BPM
P AXIS: 62 DEGREES
P OFFSET: 148 MS
P ONSET: 124 MS
PR INTERVAL: 180 MS
Q ONSET: 214 MS
QRS COUNT: 14 BEATS
QRS DURATION: 84 MS
QT INTERVAL: 348 MS
QTC CALCULATION(BAZETT): 414 MS
QTC FREDERICIA: 390 MS
R AXIS: -13 DEGREES
T AXIS: 213 DEGREES
T OFFSET: 388 MS
VENTRICULAR RATE: 85 BPM

## 2025-04-16 ENCOUNTER — HOME CARE VISIT (OUTPATIENT)
Dept: HOME HEALTH SERVICES | Facility: HOME HEALTH | Age: 75
End: 2025-04-16
Payer: MEDICARE

## 2025-04-16 ENCOUNTER — TELEPHONE (OUTPATIENT)
Facility: HOSPITAL | Age: 75
End: 2025-04-16
Payer: MEDICARE

## 2025-04-16 DIAGNOSIS — R22.43 LOCALIZED SWELLING OF BOTH LOWER LEGS: ICD-10-CM

## 2025-04-16 PROCEDURE — G0158 HHC OT ASSISTANT EA 15: HCPCS | Mod: CO,HHH

## 2025-04-16 NOTE — TELEPHONE ENCOUNTER
Per Dr. Ramirez, another specialty reached out to and advised patient has BLE, and we should restart patient's torsemide 20 mg daily.  Called patient confirmed BLE swelling and that we would send torsemide to local pharmacy.

## 2025-04-17 ENCOUNTER — HOME CARE VISIT (OUTPATIENT)
Dept: HOME HEALTH SERVICES | Facility: HOME HEALTH | Age: 75
End: 2025-04-17
Payer: MEDICARE

## 2025-04-17 ENCOUNTER — OFFICE VISIT (OUTPATIENT)
Dept: VASCULAR SURGERY | Facility: CLINIC | Age: 75
End: 2025-04-17
Payer: MEDICARE

## 2025-04-17 ENCOUNTER — HOSPITAL ENCOUNTER (OUTPATIENT)
Dept: CARDIOLOGY | Facility: CLINIC | Age: 75
Discharge: HOME | End: 2025-04-17
Payer: MEDICARE

## 2025-04-17 VITALS — OXYGEN SATURATION: 97 % | HEART RATE: 73 BPM

## 2025-04-17 VITALS
HEART RATE: 93 BPM | WEIGHT: 174 LBS | DIASTOLIC BLOOD PRESSURE: 60 MMHG | BODY MASS INDEX: 24.36 KG/M2 | TEMPERATURE: 97.7 F | HEIGHT: 71 IN | SYSTOLIC BLOOD PRESSURE: 117 MMHG

## 2025-04-17 DIAGNOSIS — I73.9 PAD (PERIPHERAL ARTERY DISEASE) (CMS-HCC): ICD-10-CM

## 2025-04-17 DIAGNOSIS — I73.9 PAD (PERIPHERAL ARTERY DISEASE) (CMS-HCC): Primary | ICD-10-CM

## 2025-04-17 DIAGNOSIS — I48.91 ATRIAL FIBRILLATION, UNSPECIFIED TYPE (MULTI): ICD-10-CM

## 2025-04-17 DIAGNOSIS — I99.8 OTHER DISORDER OF CIRCULATORY SYSTEM: ICD-10-CM

## 2025-04-17 PROCEDURE — 1159F MED LIST DOCD IN RCRD: CPT | Performed by: SURGERY

## 2025-04-17 PROCEDURE — 99215 OFFICE O/P EST HI 40 MIN: CPT | Performed by: SURGERY

## 2025-04-17 PROCEDURE — 3074F SYST BP LT 130 MM HG: CPT | Performed by: SURGERY

## 2025-04-17 PROCEDURE — 1036F TOBACCO NON-USER: CPT | Performed by: SURGERY

## 2025-04-17 PROCEDURE — 1111F DSCHRG MED/CURRENT MED MERGE: CPT | Performed by: SURGERY

## 2025-04-17 PROCEDURE — 3078F DIAST BP <80 MM HG: CPT | Performed by: SURGERY

## 2025-04-17 RX ORDER — TORSEMIDE 20 MG/1
20 TABLET ORAL DAILY
Qty: 30 TABLET | Refills: 11 | Status: SHIPPED | OUTPATIENT
Start: 2025-04-17 | End: 2026-04-17

## 2025-04-17 RX ORDER — SODIUM CHLORIDE 9 MG/ML
100 INJECTION, SOLUTION INTRAVENOUS CONTINUOUS
OUTPATIENT
Start: 2025-04-17 | End: 2025-04-18

## 2025-04-17 ASSESSMENT — ENCOUNTER SYMPTOMS
APPETITE LEVEL: GOOD
CHANGE IN APPETITE: UNCHANGED
PAIN LOCATION: RIGHT FOOT
PERSON REPORTING PAIN: PATIENT
MUSCLE WEAKNESS: 1
SUBJECTIVE PAIN PROGRESSION: WAXING AND WANING
LAST BOWEL MOVEMENT: 67308
LOWER EXTREMITY EDEMA: 1
PAIN: 1
HIGHEST PAIN SEVERITY IN PAST 24 HOURS: 6/10

## 2025-04-17 ASSESSMENT — PATIENT HEALTH QUESTIONNAIRE - PHQ9
2. FEELING DOWN, DEPRESSED OR HOPELESS: NOT AT ALL
1. LITTLE INTEREST OR PLEASURE IN DOING THINGS: NOT AT ALL
SUM OF ALL RESPONSES TO PHQ9 QUESTIONS 1 AND 2: 0

## 2025-04-17 ASSESSMENT — ACTIVITIES OF DAILY LIVING (ADL)
CURRENT_FUNCTION: STAND BY ASSIST
AMBULATION ASSISTANCE: STAND BY ASSIST

## 2025-04-17 NOTE — PROGRESS NOTES
Vascular Surgery Clinic Note    CC: PAD    HPI:  Dilip Haynes is 74 y.o. male with history of afib on eliquis, CAD, htn, carotid disease s/p endarterectomy, PAD and CLTI RLE. HE is recently s/p R femoral endarterectomy and profundaplasty and iliac stenting for rest pain and wounds. PEDRO PABLO improved from 0 to 0.48. Rest pain is resolved but wound have not improved (toe ulcers). He has significant right leg swelling.    Known history of ESRD and subsequent renal transplant with baseline Cr around 1.7 (most recent Cr 4/14/25 was 1.49). He has CAD as well with known unrevascularized CAD and troponin leak during last hospitalization and chest pain during a period of hypertension, that resolved. He was treated with nitroglycerin and managed by cardiology who had a stress test done on 3/31/25 that was negative for inducible ischemia. He subsequently underwent the right groin surgery without incident.    Prior CTA and angiogram during recent admission did show SFA occlusion with patent popltieal artery and PT runoff.    Medical History:   has a past medical history of Encounter for other preprocedural examination (05/10/2020), Old myocardial infarction (01/19/2022), Personal history of other diseases of the circulatory system (12/22/2015), Personal history of other diseases of the circulatory system (04/13/2021), Personal history of other diseases of the circulatory system (01/19/2022), Personal history of other diseases of the circulatory system (07/23/2016), Personal history of other endocrine, nutritional and metabolic disease (12/22/2015), and Raynaud's syndrome without gangrene (12/22/2015).    Meds:   Medications Ordered Prior to Encounter[1]     Allergies:   RX Allergies[2]    SH:    Social Drivers of Health     Tobacco Use: Medium Risk (4/17/2025)    Patient History     Smoking Tobacco Use: Former     Smokeless Tobacco Use: Never     Passive Exposure: Not on file   Alcohol Use: Not At Risk (3/27/2025)    AUDIT-C      Frequency of Alcohol Consumption: Never     Average Number of Drinks: Patient does not drink     Frequency of Binge Drinking: Never   Financial Resource Strain: Low Risk  (3/27/2025)    Overall Financial Resource Strain (CARDIA)     Difficulty of Paying Living Expenses: Not hard at all   Food Insecurity: No Food Insecurity (3/27/2025)    Hunger Vital Sign     Worried About Running Out of Food in the Last Year: Never true     Ran Out of Food in the Last Year: Never true   Transportation Needs: No Transportation Needs (4/12/2025)    OASIS : Transportation     Lack of Transportation (Medical): No     Lack of Transportation (Non-Medical): No     Patient Unable or Declines to Respond: No   Physical Activity: Insufficiently Active (3/27/2025)    Exercise Vital Sign     Days of Exercise per Week: 1 day     Minutes of Exercise per Session: 30 min   Stress: No Stress Concern Present (3/27/2025)    [employer] - Occupational Stress Questionnaire     Feeling of Stress : Not at all   Social Connections: Feeling Socially Integrated (4/12/2025)    OASIS : Social Isolation     Frequency of experiencing loneliness or isolation: Never   Intimate Partner Violence: Not At Risk (3/27/2025)    Humiliation, Afraid, Rape, and Kick questionnaire     Fear of Current or Ex-Partner: No     Emotionally Abused: No     Physically Abused: No     Sexually Abused: No   Depression: Not at risk (4/17/2025)    PHQ-2     PHQ-2 Score: 0   Housing Stability: Low Risk  (3/27/2025)    Housing Stability Vital Sign     Unable to Pay for Housing in the Last Year: No     Number of Times Moved in the Last Year: 0     Homeless in the Last Year: No   Utilities: Not At Risk (3/27/2025)    [facility] Utilities     Threatened with loss of utilities: No   Digital Equity: Not on file   Health Literacy: Adequate Health Literacy (4/12/2025)    OASIS : Health Literacy     Frequency of needing help to read materials from doctor or  pharmacy: Never        FH:  Family History[3]     ROS:  All systems were reviewed and are negative except as per HPI.    Objective:  Vitals:  Vitals:    04/17/25 1051   BP: 117/60   Pulse: 93   Temp: 36.5 °C (97.7 °F)        Exam:  In NAD, well appearing  Abd Soft, ND/NT  Vascular examination:  Right groin prevena was removed. Right groin incision is intact with staples and sutures, no dehiscence, no erythema or drainage. RLE significant edema below the knee with 2nd and 3rd toe ulcerations slightly wet but no foul drainage or odor. Foot is warm, no palpable pulses    Assessment & Plan:  Dilip Haynes is 74 y.o. male with RLE CLTI. He needs in-line flow to heal the toe wounds. Significant edema in the RLE and very recent R groin surgery makes bypass difficult. Likely could have fem-bk pop and he appears to have GSV in the leg based on the CT scan, but I will schedule him for angiogram next week to see if an endovascular approach is possible given the difficulties with exposure and healing that I anticipate with bypass and CAD with recent episodes of angina requiring ntg gtt.      I spent a total of 60 minutes on the day of the visit.         Jas Ovalle M.D.             [1]   Current Outpatient Medications on File Prior to Visit   Medication Sig Dispense Refill    acetaminophen (Tylenol) 325 mg tablet Take 2 tablets (650 mg) by mouth every 6 hours if needed for mild pain (1 - 3) for up to 10 days. 30 tablet 0    amLODIPine (Norvasc) 10 mg tablet Take 1 tablet (10 mg) by mouth once daily.      apixaban (Eliquis) 2.5 mg tablet TAKE 1 TABLET BY MOUTH TWICE A DAY 60 tablet 11    clopidogrel (Plavix) 75 mg tablet Take 1 tablet (75 mg) by mouth once daily. 30 tablet 0    empagliflozin (Jardiance) 10 mg Take 1 tablet (10 mg) by mouth once daily. 30 tablet 11    famotidine (Pepcid) 20 mg tablet TAKE 1 TABLET BY MOUTH EVERY DAY AS DIRECTED 90 tablet 3    fluticasone (Flonase) 50 mcg/actuation nasal spray Administer 2  sprays into each nostril once daily as needed for allergies.      hydrALAZINE (Apresoline) 100 mg tablet Take 1 tablet (100 mg) by mouth 3 times a day. 90 tablet 0    levothyroxine (Synthroid, Levoxyl) 137 mcg tablet Take 1 tablet (137 mcg) by mouth once daily.      losartan (Cozaar) 25 mg tablet TAKE 1 TABLET BY MOUTH EVERY DAY 90 tablet 1    methocarbamol (Robaxin) 500 mg tablet Take 1 tablet (500 mg) by mouth every 8 hours if needed for muscle spasms.      metoprolol tartrate (Lopressor) 50 mg tablet TAKE 1 TABLET BY MOUTH TWICE A  tablet 3    multivitamin tablet Take 1 tablet by mouth once daily.      mycophenolate (Cellcept) 250 mg capsule TAKE 3 CAPSULES BY MOUTH TWICE A  capsule 11    nitroglycerin (Nitrostat) 0.4 mg SL tablet Place 1 tablet (0.4 mg) under the tongue every 5 minutes if needed for chest pain.      pravastatin (Pravachol) 40 mg tablet TAKE 1 TABLET BY MOUTH EVERYDAY AT BEDTIME 90 tablet 0    predniSONE (Deltasone) 5 mg tablet TAKE 1 TABLET BY MOUTH EVERY DAY 30 tablet 23    sennosides-docusate sodium (Gris-Colace) 8.6-50 mg tablet Take 1 tablet by mouth 2 times a day for 7 days. 14 tablet 0    tacrolimus ER (Envarsus XR) 1 mg tablet ER Take 2 tablets (2 mg) by mouth once daily. 60 tablet 0    tamsulosin (Flomax) 0.4 mg 24 hr capsule Take 1 capsule (0.4 mg) by mouth once daily at bedtime. 90 capsule 3     No current facility-administered medications on file prior to visit.   [2]   Allergies  Allergen Reactions    Lovastatin Myalgia     Leg cramps    Muscle cramps    Simvastatin Myalgia     Muscle cramps    Adhesive Rash    Naproxen Rash   [3] No family history on file.

## 2025-04-17 NOTE — H&P (VIEW-ONLY)
Vascular Surgery Clinic Note    CC: PAD    HPI:  Dilip Haynes is 74 y.o. male with history of afib on eliquis, CAD, htn, carotid disease s/p endarterectomy, PAD and CLTI RLE. HE is recently s/p R femoral endarterectomy and profundaplasty and iliac stenting for rest pain and wounds. PEDRO PABLO improved from 0 to 0.48. Rest pain is resolved but wound have not improved (toe ulcers). He has significant right leg swelling.    Known history of ESRD and subsequent renal transplant with baseline Cr around 1.7 (most recent Cr 4/14/25 was 1.49). He has CAD as well with known unrevascularized CAD and troponin leak during last hospitalization and chest pain during a period of hypertension, that resolved. He was treated with nitroglycerin and managed by cardiology who had a stress test done on 3/31/25 that was negative for inducible ischemia. He subsequently underwent the right groin surgery without incident.    Prior CTA and angiogram during recent admission did show SFA occlusion with patent popltieal artery and PT runoff.    Medical History:   has a past medical history of Encounter for other preprocedural examination (05/10/2020), Old myocardial infarction (01/19/2022), Personal history of other diseases of the circulatory system (12/22/2015), Personal history of other diseases of the circulatory system (04/13/2021), Personal history of other diseases of the circulatory system (01/19/2022), Personal history of other diseases of the circulatory system (07/23/2016), Personal history of other endocrine, nutritional and metabolic disease (12/22/2015), and Raynaud's syndrome without gangrene (12/22/2015).    Meds:   Medications Ordered Prior to Encounter[1]     Allergies:   RX Allergies[2]    SH:    Social Drivers of Health     Tobacco Use: Medium Risk (4/17/2025)    Patient History     Smoking Tobacco Use: Former     Smokeless Tobacco Use: Never     Passive Exposure: Not on file   Alcohol Use: Not At Risk (3/27/2025)    AUDIT-C      Frequency of Alcohol Consumption: Never     Average Number of Drinks: Patient does not drink     Frequency of Binge Drinking: Never   Financial Resource Strain: Low Risk  (3/27/2025)    Overall Financial Resource Strain (CARDIA)     Difficulty of Paying Living Expenses: Not hard at all   Food Insecurity: No Food Insecurity (3/27/2025)    Hunger Vital Sign     Worried About Running Out of Food in the Last Year: Never true     Ran Out of Food in the Last Year: Never true   Transportation Needs: No Transportation Needs (4/12/2025)    OASIS : Transportation     Lack of Transportation (Medical): No     Lack of Transportation (Non-Medical): No     Patient Unable or Declines to Respond: No   Physical Activity: Insufficiently Active (3/27/2025)    Exercise Vital Sign     Days of Exercise per Week: 1 day     Minutes of Exercise per Session: 30 min   Stress: No Stress Concern Present (3/27/2025)    [employer] - Occupational Stress Questionnaire     Feeling of Stress : Not at all   Social Connections: Feeling Socially Integrated (4/12/2025)    OASIS : Social Isolation     Frequency of experiencing loneliness or isolation: Never   Intimate Partner Violence: Not At Risk (3/27/2025)    Humiliation, Afraid, Rape, and Kick questionnaire     Fear of Current or Ex-Partner: No     Emotionally Abused: No     Physically Abused: No     Sexually Abused: No   Depression: Not at risk (4/17/2025)    PHQ-2     PHQ-2 Score: 0   Housing Stability: Low Risk  (3/27/2025)    Housing Stability Vital Sign     Unable to Pay for Housing in the Last Year: No     Number of Times Moved in the Last Year: 0     Homeless in the Last Year: No   Utilities: Not At Risk (3/27/2025)    [facility] Utilities     Threatened with loss of utilities: No   Digital Equity: Not on file   Health Literacy: Adequate Health Literacy (4/12/2025)    OASIS : Health Literacy     Frequency of needing help to read materials from doctor or  pharmacy: Never        FH:  Family History[3]     ROS:  All systems were reviewed and are negative except as per HPI.    Objective:  Vitals:  Vitals:    04/17/25 1051   BP: 117/60   Pulse: 93   Temp: 36.5 °C (97.7 °F)        Exam:  In NAD, well appearing  Abd Soft, ND/NT  Vascular examination:  Right groin prevena was removed. Right groin incision is intact with staples and sutures, no dehiscence, no erythema or drainage. RLE significant edema below the knee with 2nd and 3rd toe ulcerations slightly wet but no foul drainage or odor. Foot is warm, no palpable pulses    Assessment & Plan:  Dilip Haynes is 74 y.o. male with RLE CLTI. He needs in-line flow to heal the toe wounds. Significant edema in the RLE and very recent R groin surgery makes bypass difficult. Likely could have fem-bk pop and he appears to have GSV in the leg based on the CT scan, but I will schedule him for angiogram next week to see if an endovascular approach is possible given the difficulties with exposure and healing that I anticipate with bypass and CAD with recent episodes of angina requiring ntg gtt.      I spent a total of 60 minutes on the day of the visit.         Jas Ovalle M.D.             [1]   Current Outpatient Medications on File Prior to Visit   Medication Sig Dispense Refill    acetaminophen (Tylenol) 325 mg tablet Take 2 tablets (650 mg) by mouth every 6 hours if needed for mild pain (1 - 3) for up to 10 days. 30 tablet 0    amLODIPine (Norvasc) 10 mg tablet Take 1 tablet (10 mg) by mouth once daily.      apixaban (Eliquis) 2.5 mg tablet TAKE 1 TABLET BY MOUTH TWICE A DAY 60 tablet 11    clopidogrel (Plavix) 75 mg tablet Take 1 tablet (75 mg) by mouth once daily. 30 tablet 0    empagliflozin (Jardiance) 10 mg Take 1 tablet (10 mg) by mouth once daily. 30 tablet 11    famotidine (Pepcid) 20 mg tablet TAKE 1 TABLET BY MOUTH EVERY DAY AS DIRECTED 90 tablet 3    fluticasone (Flonase) 50 mcg/actuation nasal spray Administer 2  sprays into each nostril once daily as needed for allergies.      hydrALAZINE (Apresoline) 100 mg tablet Take 1 tablet (100 mg) by mouth 3 times a day. 90 tablet 0    levothyroxine (Synthroid, Levoxyl) 137 mcg tablet Take 1 tablet (137 mcg) by mouth once daily.      losartan (Cozaar) 25 mg tablet TAKE 1 TABLET BY MOUTH EVERY DAY 90 tablet 1    methocarbamol (Robaxin) 500 mg tablet Take 1 tablet (500 mg) by mouth every 8 hours if needed for muscle spasms.      metoprolol tartrate (Lopressor) 50 mg tablet TAKE 1 TABLET BY MOUTH TWICE A  tablet 3    multivitamin tablet Take 1 tablet by mouth once daily.      mycophenolate (Cellcept) 250 mg capsule TAKE 3 CAPSULES BY MOUTH TWICE A  capsule 11    nitroglycerin (Nitrostat) 0.4 mg SL tablet Place 1 tablet (0.4 mg) under the tongue every 5 minutes if needed for chest pain.      pravastatin (Pravachol) 40 mg tablet TAKE 1 TABLET BY MOUTH EVERYDAY AT BEDTIME 90 tablet 0    predniSONE (Deltasone) 5 mg tablet TAKE 1 TABLET BY MOUTH EVERY DAY 30 tablet 23    sennosides-docusate sodium (Gris-Colace) 8.6-50 mg tablet Take 1 tablet by mouth 2 times a day for 7 days. 14 tablet 0    tacrolimus ER (Envarsus XR) 1 mg tablet ER Take 2 tablets (2 mg) by mouth once daily. 60 tablet 0    tamsulosin (Flomax) 0.4 mg 24 hr capsule Take 1 capsule (0.4 mg) by mouth once daily at bedtime. 90 capsule 3     No current facility-administered medications on file prior to visit.   [2]   Allergies  Allergen Reactions    Lovastatin Myalgia     Leg cramps    Muscle cramps    Simvastatin Myalgia     Muscle cramps    Adhesive Rash    Naproxen Rash   [3] No family history on file.

## 2025-04-17 NOTE — TELEPHONE ENCOUNTER
Dear Mr. Haynes       Following review with Dr. Jas Ovalle, you have been scheduled for a vascular procedure on 4/ at  [facility]      Per Dr. Ovalle's  request , please hold the following medications  Eliquis   2 days prior to the procedure starting 4/20/2025  Hold Jardiance  morning of the procedure                Please remember nothing to eat nor drink after 12 midnight.   Please wear comfortable clothes and remember to bring with you your insurance cards, a form of identification and your COVID vaccination card with you.   Do not bring valuables such as credit cards, checkbook money or jewelry with you.     PLEASE BRING ALL MEDICATIONS OR AN UPDATED LIST OF CURRENT MEDICATIONS WITH YOU    You must have a  on the day of the procedure.   Directions to the medical center have been provided     If you become ill  ( I.e. cold, flu symptoms, fever or rash) before your scheduled procedure  please notify our office.     Your arrival time is not your procedure time. Please remember  that changes frequently  occur in the schedule due to emergencies and extended length of procedures.   Your patience is appreciated.       The patient has verbalized an understanding of the instructions.   If you have any questions or concerns, please feel free to contact our office at [phone].    Kelsie Bravo RN BSN  Vascular and Endovascular Surgery

## 2025-04-18 ENCOUNTER — SPECIALTY PHARMACY (OUTPATIENT)
Dept: PHARMACY | Facility: CLINIC | Age: 75
End: 2025-04-18

## 2025-04-18 LAB
LABORATORY COMMENT REPORT: NORMAL
PATH REPORT.FINAL DX SPEC: NORMAL
PATH REPORT.GROSS SPEC: NORMAL
PATH REPORT.RELEVANT HX SPEC: NORMAL
PATH REPORT.TOTAL CANCER: NORMAL

## 2025-04-21 ENCOUNTER — HOSPITAL ENCOUNTER (OUTPATIENT)
Dept: CARDIOLOGY | Facility: CLINIC | Age: 75
Discharge: HOME | End: 2025-04-21
Payer: MEDICARE

## 2025-04-21 ENCOUNTER — HOME CARE VISIT (OUTPATIENT)
Dept: HOME HEALTH SERVICES | Facility: HOME HEALTH | Age: 75
End: 2025-04-21
Payer: MEDICARE

## 2025-04-21 VITALS — OXYGEN SATURATION: 98 % | HEART RATE: 77 BPM

## 2025-04-21 DIAGNOSIS — I48.91 ATRIAL FIBRILLATION, UNSPECIFIED TYPE (MULTI): ICD-10-CM

## 2025-04-21 PROCEDURE — 93298 REM INTERROG DEV EVAL SCRMS: CPT

## 2025-04-21 PROCEDURE — G0158 HHC OT ASSISTANT EA 15: HCPCS | Mod: CO,HHH

## 2025-04-21 ASSESSMENT — ENCOUNTER SYMPTOMS
PAIN: 1
PERSON REPORTING PAIN: PATIENT
PAIN LOCATION: RIGHT FOOT
HIGHEST PAIN SEVERITY IN PAST 24 HOURS: 3/10

## 2025-04-22 ENCOUNTER — HOME CARE VISIT (OUTPATIENT)
Dept: HOME HEALTH SERVICES | Facility: HOME HEALTH | Age: 75
End: 2025-04-22
Payer: MEDICARE

## 2025-04-22 ENCOUNTER — HOSPITAL ENCOUNTER (OUTPATIENT)
Facility: HOSPITAL | Age: 75
Setting detail: OUTPATIENT SURGERY
Discharge: HOME | End: 2025-04-22
Attending: SURGERY | Admitting: SURGERY
Payer: MEDICARE

## 2025-04-22 VITALS
DIASTOLIC BLOOD PRESSURE: 72 MMHG | SYSTOLIC BLOOD PRESSURE: 162 MMHG | OXYGEN SATURATION: 98 % | RESPIRATION RATE: 18 BRPM | HEIGHT: 71 IN | TEMPERATURE: 98.6 F | WEIGHT: 165.34 LBS | HEART RATE: 73 BPM | BODY MASS INDEX: 23.15 KG/M2

## 2025-04-22 DIAGNOSIS — I48.91 UNSPECIFIED ATRIAL FIBRILLATION (MULTI): ICD-10-CM

## 2025-04-22 DIAGNOSIS — I73.9 PAD (PERIPHERAL ARTERY DISEASE) (CMS-HCC): Primary | ICD-10-CM

## 2025-04-22 DIAGNOSIS — I25.10 ATHEROSCLEROTIC HEART DISEASE OF NATIVE CORONARY ARTERY WITHOUT ANGINA PECTORIS: ICD-10-CM

## 2025-04-22 DIAGNOSIS — I99.8 OTHER DISORDER OF CIRCULATORY SYSTEM: ICD-10-CM

## 2025-04-22 LAB
ACT BLD: 236 SEC (ref 83–199)
ACT BLD: 355 SEC (ref 83–199)
ANION GAP SERPL CALC-SCNC: 14 MMOL/L (ref 10–20)
APTT PPP: 33 SECONDS (ref 26–36)
BUN SERPL-MCNC: 22 MG/DL (ref 6–23)
CALCIUM SERPL-MCNC: 9.2 MG/DL (ref 8.6–10.3)
CHLORIDE SERPL-SCNC: 99 MMOL/L (ref 98–107)
CO2 SERPL-SCNC: 24 MMOL/L (ref 21–32)
CREAT SERPL-MCNC: 1.68 MG/DL (ref 0.5–1.3)
EGFRCR SERPLBLD CKD-EPI 2021: 42 ML/MIN/1.73M*2
ERYTHROCYTE [DISTWIDTH] IN BLOOD BY AUTOMATED COUNT: 14.5 % (ref 11.5–14.5)
GLUCOSE SERPL-MCNC: 137 MG/DL (ref 74–99)
HCT VFR BLD AUTO: 35.9 % (ref 41–52)
HGB BLD-MCNC: 11.1 G/DL (ref 13.5–17.5)
INR PPP: 1.1 (ref 0.9–1.1)
MCH RBC QN AUTO: 25.5 PG (ref 26–34)
MCHC RBC AUTO-ENTMCNC: 30.9 G/DL (ref 32–36)
MCV RBC AUTO: 83 FL (ref 80–100)
NRBC BLD-RTO: 0 /100 WBCS (ref 0–0)
PLATELET # BLD AUTO: 407 X10*3/UL (ref 150–450)
POTASSIUM SERPL-SCNC: 4.4 MMOL/L (ref 3.5–5.3)
PROTHROMBIN TIME: 12.7 SECONDS (ref 9.8–12.4)
RBC # BLD AUTO: 4.35 X10*6/UL (ref 4.5–5.9)
SODIUM SERPL-SCNC: 133 MMOL/L (ref 136–145)
WBC # BLD AUTO: 9.6 X10*3/UL (ref 4.4–11.3)

## 2025-04-22 PROCEDURE — 85347 COAGULATION TIME ACTIVATED: CPT

## 2025-04-22 PROCEDURE — 76937 US GUIDE VASCULAR ACCESS: CPT | Performed by: SURGERY

## 2025-04-22 PROCEDURE — 75710 ARTERY X-RAYS ARM/LEG: CPT | Performed by: SURGERY

## 2025-04-22 PROCEDURE — 99153 MOD SED SAME PHYS/QHP EA: CPT

## 2025-04-22 PROCEDURE — 2780000003 HC OR 278 NO HCPCS: Performed by: SURGERY

## 2025-04-22 PROCEDURE — C1760 CLOSURE DEV, VASC: HCPCS | Performed by: SURGERY

## 2025-04-22 PROCEDURE — 75630 X-RAY AORTA LEG ARTERIES: CPT | Performed by: SURGERY

## 2025-04-22 PROCEDURE — C1894 INTRO/SHEATH, NON-LASER: HCPCS | Performed by: SURGERY

## 2025-04-22 PROCEDURE — 37226 PR REVSC OPN/PRQ FEM/POP W/STNT/ANGIOP SM VSL: CPT | Performed by: SURGERY

## 2025-04-22 PROCEDURE — 36415 COLL VENOUS BLD VENIPUNCTURE: CPT | Performed by: STUDENT IN AN ORGANIZED HEALTH CARE EDUCATION/TRAINING PROGRAM

## 2025-04-22 PROCEDURE — 99152 MOD SED SAME PHYS/QHP 5/>YRS: CPT | Performed by: SURGERY

## 2025-04-22 PROCEDURE — 7100000001 HC RECOVERY ROOM TIME - INITIAL BASE CHARGE: Performed by: SURGERY

## 2025-04-22 PROCEDURE — 7100000010 HC PHASE TWO TIME - EACH INCREMENTAL 1 MINUTE: Performed by: SURGERY

## 2025-04-22 PROCEDURE — 85027 COMPLETE CBC AUTOMATED: CPT | Performed by: STUDENT IN AN ORGANIZED HEALTH CARE EDUCATION/TRAINING PROGRAM

## 2025-04-22 PROCEDURE — 2550000001 HC RX 255 CONTRASTS: Performed by: SURGERY

## 2025-04-22 PROCEDURE — C1769 GUIDE WIRE: HCPCS | Performed by: SURGERY

## 2025-04-22 PROCEDURE — C1876 STENT, NON-COA/NON-COV W/DEL: HCPCS | Performed by: SURGERY

## 2025-04-22 PROCEDURE — 7100000002 HC RECOVERY ROOM TIME - EACH INCREMENTAL 1 MINUTE: Performed by: SURGERY

## 2025-04-22 PROCEDURE — C1887 CATHETER, GUIDING: HCPCS | Performed by: SURGERY

## 2025-04-22 PROCEDURE — 85347 COAGULATION TIME ACTIVATED: CPT | Performed by: SURGERY

## 2025-04-22 PROCEDURE — 2500000005 HC RX 250 GENERAL PHARMACY W/O HCPCS: Performed by: SURGERY

## 2025-04-22 PROCEDURE — 36247 INS CATH ABD/L-EXT ART 3RD: CPT | Mod: RT | Performed by: SURGERY

## 2025-04-22 PROCEDURE — C1725 CATH, TRANSLUMIN NON-LASER: HCPCS | Performed by: SURGERY

## 2025-04-22 PROCEDURE — 85610 PROTHROMBIN TIME: CPT | Performed by: STUDENT IN AN ORGANIZED HEALTH CARE EDUCATION/TRAINING PROGRAM

## 2025-04-22 PROCEDURE — 80048 BASIC METABOLIC PNL TOTAL CA: CPT | Performed by: STUDENT IN AN ORGANIZED HEALTH CARE EDUCATION/TRAINING PROGRAM

## 2025-04-22 PROCEDURE — C2623 CATH, TRANSLUMIN, DRUG-COAT: HCPCS | Performed by: SURGERY

## 2025-04-22 PROCEDURE — 2500000004 HC RX 250 GENERAL PHARMACY W/ HCPCS (ALT 636 FOR OP/ED): Performed by: STUDENT IN AN ORGANIZED HEALTH CARE EDUCATION/TRAINING PROGRAM

## 2025-04-22 PROCEDURE — 37226 HC REVASCULARIZE FEM/POP ARTERY,ANGIOPLASTY/STENT: CPT | Mod: RT | Performed by: SURGERY

## 2025-04-22 PROCEDURE — 99153 MOD SED SAME PHYS/QHP EA: CPT | Performed by: SURGERY

## 2025-04-22 PROCEDURE — 37226 HC REVASCULARIZE FEM/POP ARTERY,ANGIOPLASTY/STENT: CPT | Mod: RT

## 2025-04-22 PROCEDURE — 2500000004 HC RX 250 GENERAL PHARMACY W/ HCPCS (ALT 636 FOR OP/ED): Performed by: SURGERY

## 2025-04-22 PROCEDURE — 2720000007 HC OR 272 NO HCPCS: Performed by: SURGERY

## 2025-04-22 PROCEDURE — 99152 MOD SED SAME PHYS/QHP 5/>YRS: CPT

## 2025-04-22 PROCEDURE — 7100000009 HC PHASE TWO TIME - INITIAL BASE CHARGE: Performed by: SURGERY

## 2025-04-22 PROCEDURE — G0269 OCCLUSIVE DEVICE IN VEIN ART: HCPCS | Performed by: SURGERY

## 2025-04-22 DEVICE — STENT EVD35-06-060-120 EF ENTRUST V01
Type: IMPLANTABLE DEVICE | Site: LEG | Status: FUNCTIONAL
Brand: EVERFLEX™

## 2025-04-22 DEVICE — STENT EVD35-06-040-120 EF ENTRUST V01
Type: IMPLANTABLE DEVICE | Site: LEG | Status: FUNCTIONAL
Brand: EVERFLEX™

## 2025-04-22 DEVICE — STENT EVD35-06-100-120 EF ENTRUST V01
Type: IMPLANTABLE DEVICE | Site: LEG | Status: FUNCTIONAL
Brand: EVERFLEX™

## 2025-04-22 DEVICE — SELF-EXPANDING PERIPHERAL STENT SYSTEM
Type: IMPLANTABLE DEVICE | Site: LEG | Status: FUNCTIONAL
Brand: EVERFLEX

## 2025-04-22 RX ORDER — LIDOCAINE HYDROCHLORIDE 20 MG/ML
INJECTION, SOLUTION INFILTRATION; PERINEURAL AS NEEDED
Status: DISCONTINUED | OUTPATIENT
Start: 2025-04-22 | End: 2025-04-22 | Stop reason: HOSPADM

## 2025-04-22 RX ORDER — FENTANYL CITRATE 50 UG/ML
INJECTION, SOLUTION INTRAMUSCULAR; INTRAVENOUS AS NEEDED
Status: DISCONTINUED | OUTPATIENT
Start: 2025-04-22 | End: 2025-04-22 | Stop reason: HOSPADM

## 2025-04-22 RX ORDER — IODIXANOL 320 MG/ML
INJECTION, SOLUTION INTRAVASCULAR AS NEEDED
Status: DISCONTINUED | OUTPATIENT
Start: 2025-04-22 | End: 2025-04-22 | Stop reason: HOSPADM

## 2025-04-22 RX ORDER — PRAVASTATIN SODIUM 40 MG/1
40 TABLET ORAL NIGHTLY
Qty: 90 TABLET | Refills: 3 | Status: SHIPPED | OUTPATIENT
Start: 2025-04-22

## 2025-04-22 RX ORDER — SODIUM CHLORIDE 9 MG/ML
100 INJECTION, SOLUTION INTRAVENOUS CONTINUOUS
Status: DISCONTINUED | OUTPATIENT
Start: 2025-04-22 | End: 2025-04-22

## 2025-04-22 RX ORDER — APIXABAN 2.5 MG/1
2.5 TABLET, FILM COATED ORAL 2 TIMES DAILY
Qty: 60 TABLET | Refills: 10 | Status: SHIPPED | OUTPATIENT
Start: 2025-04-22

## 2025-04-22 RX ORDER — MIDAZOLAM HYDROCHLORIDE 1 MG/ML
INJECTION, SOLUTION INTRAMUSCULAR; INTRAVENOUS AS NEEDED
Status: DISCONTINUED | OUTPATIENT
Start: 2025-04-22 | End: 2025-04-22 | Stop reason: HOSPADM

## 2025-04-22 RX ORDER — HEPARIN SODIUM 1000 [USP'U]/ML
INJECTION, SOLUTION INTRAVENOUS; SUBCUTANEOUS AS NEEDED
Status: DISCONTINUED | OUTPATIENT
Start: 2025-04-22 | End: 2025-04-22 | Stop reason: HOSPADM

## 2025-04-22 ASSESSMENT — PAIN - FUNCTIONAL ASSESSMENT
PAIN_FUNCTIONAL_ASSESSMENT: 0-10

## 2025-04-22 ASSESSMENT — PAIN SCALES - GENERAL
PAINLEVEL_OUTOF10: 0 - NO PAIN

## 2025-04-22 NOTE — OP NOTE
Operative Note     Date: 25  Name: Dilip Haynes   : 1950  MRN: [id number]     Diagnosis  PAD with RLE CLTI with tissue loss     Procedures  Ultrasound guided access Left CFA  Ultrasound guided access R PT artery  Aortoiliac angiogram with radiologic S&I  RLE angiogram with radiologic S&I  Revascularization SFA using 5mm DCB and 6mm everflex stents  90 min supervision of moderate concsious sedation      Surgeon      * Jas Ovalle - Primary    Resident/Fellow/Other Assistant:  None    Procedure Summary  Anesthesia: sedation/local   Estimated Blood Loss: minimal    Specimen: No specimens collected       Findings:  of the SFA patent after stenting. PT dominant runoff    Indications: Dilip Haynes is a 74 y.o. male who presents with persistent wounds in the toes following right iliofemoral endarterectomy. He is known to have  of the SFA that has not been addressed.    Procedure Details:  The patient was brought to the cath lab and placed supine on the table. The administration of fentanyl and versed for moderate conscious sedation was supervised by an independent trained observer. The groins were prepped and draped. I used ultrasound to puncture the left CFA using micropuncture seldinger technique. I placed a catheter in the aorta and performed an aortoiliac angiogram. This demonstrated widely patent aorta and iliac stents. I then advanced the catheter over a wire into the contralateral CFA and performed a RLE angiogram. This demonstrated widely patent CFA and PFA. The SFA was occluded and reconstituted at the above knee popliteal artery with PT runoff to the foot. Heparin was administered and 6Fr sheath was advanced into the R CFA. I attempted to cross the SFA but got hung up in the mid-SFA due to heavy calcification. I therefore punctured the R PT and placed a micro sheath. I was able to cross the SFA from retrograde access using 0.018 system and pulled the wire out the left groin sheath. I  was then able to perform SFA angioplasty over this wire from the femoral sheath using 4mm plain balloon, then 5mm DCB. Repeat angiogram showed persistent dissection/flow disturbance along the previously occluded segment and thus I stented the entire SFA segment to the adductor hiatus using 6mm everflex stents with 6mm post dilation. There was some persistent stent compression despite post dilation using a shorter balloon, but flow was brisk through the SFA and into the PT to the foot.  Protamine was administered and the devices were removed and vascade used for closure.        Attending Attestation: I was present and scrubbed for the entire procedure.    Jas Ovalle

## 2025-04-22 NOTE — Clinical Note
Angioplasty of the right SFA lesion. Inflation 1: Pressure = 12 darryn; Duration = 180 sec. Inflation 2: Pressure = 12 darryn; Duration = 12 sec.

## 2025-04-22 NOTE — Clinical Note
Angioplasty of the right SFA lesion. Inflation 1: Pressure = 14 darryn; Duration = 60 sec. Inflation 2: Pressure = 10 darryn; Duration = 50 sec.

## 2025-04-22 NOTE — Clinical Note
Angioplasty of the right SFA lesion. Inflation 1: Pressure = 8 darryn; Duration = 5 sec. Inflation 2: Pressure = 11 darryn; Duration = 15 sec.

## 2025-04-22 NOTE — Clinical Note
Vessel(s): right popliteal artery, right PTA and right peroneal artery. Injected with hand injections. Multiple views taken.

## 2025-04-22 NOTE — Clinical Note
2cc of Vasquez's cocktail given by Dr Ovalle    Cocktail consists of  200mg nitroglycerin  5 mg Verapamil  3000cc Heparin

## 2025-04-22 NOTE — Clinical Note
Patient Clipped and Prepped: left groin. Prepped with ChloraPrep, a minimum of 3 minute dry time, longer if needed, no pooling noted, patient draped in sterile fashion. Rt foot prepped

## 2025-04-22 NOTE — Clinical Note
Sheath was exchanged in the left femoral artery with SHEATH, PINKATHY, W/.038 GUIDEWIRE, 10 CM,  6FR INTRODUCER, 6FR CHARLY, 2.5 CM DIALATOR.

## 2025-04-22 NOTE — Clinical Note
Vessel(s): prox, mid and distal right popliteal artery, right PTA, right peroneal artery, right JOYCE and right tibio-peroneal trunk. Injected with hand injections. Multiple views taken.

## 2025-04-22 NOTE — Clinical Note
Closure device placed in the left femoral artery. Site closed by VASCADE. Deployed By: Lobito Penaloza RN

## 2025-04-22 NOTE — SIGNIFICANT EVENT
Discharge instructions given to pt including medications and post procedure care, verbalized understanding

## 2025-04-23 ENCOUNTER — HOSPITAL ENCOUNTER (OUTPATIENT)
Dept: CARDIOLOGY | Facility: CLINIC | Age: 75
Discharge: HOME | End: 2025-04-23
Payer: MEDICARE

## 2025-04-23 ENCOUNTER — HOME CARE VISIT (OUTPATIENT)
Dept: HOME HEALTH SERVICES | Facility: HOME HEALTH | Age: 75
End: 2025-04-23
Payer: MEDICARE

## 2025-04-23 ENCOUNTER — TELEPHONE (OUTPATIENT)
Facility: HOSPITAL | Age: 75
End: 2025-04-23

## 2025-04-23 DIAGNOSIS — I48.91 ATRIAL FIBRILLATION, UNSPECIFIED TYPE (MULTI): ICD-10-CM

## 2025-04-23 DIAGNOSIS — Z48.298 AFTERCARE FOLLOWING ORGAN TRANSPLANT: ICD-10-CM

## 2025-04-23 PROCEDURE — G0152 HHCP-SERV OF OT,EA 15 MIN: HCPCS | Mod: HHH

## 2025-04-23 RX ORDER — MYCOPHENOLATE MOFETIL 250 MG/1
750 CAPSULE ORAL 2 TIMES DAILY
Qty: 540 CAPSULE | Refills: 3 | Status: SHIPPED | OUTPATIENT
Start: 2025-04-23 | End: 2025-04-23

## 2025-04-23 RX ORDER — MYCOPHENOLATE MOFETIL 250 MG/1
750 CAPSULE ORAL 2 TIMES DAILY
Qty: 540 CAPSULE | Refills: 3 | Status: SHIPPED | OUTPATIENT
Start: 2025-04-23 | End: 2025-04-24 | Stop reason: SDUPTHER

## 2025-04-23 ASSESSMENT — ACTIVITIES OF DAILY LIVING (ADL)
DRESSING_LB_CURRENT_FUNCTION: INDEPENDENT
AMBULATION ASSISTANCE: INDEPENDENT
PHYSICAL TRANSFERS ASSESSED: 1
TOILETING: INDEPENDENT
CURRENT_FUNCTION: INDEPENDENT
AMBULATION ASSISTANCE: 1
TOILETING: 1

## 2025-04-23 ASSESSMENT — ENCOUNTER SYMPTOMS
PERSON REPORTING PAIN: PATIENT
PAIN LOCATION - PAIN SEVERITY: 5/10
PAIN: 1
PAIN LOCATION: RIGHT LEG

## 2025-04-23 NOTE — TELEPHONE ENCOUNTER
I called pharmacy to follow up on patient mycophenolate 250 mg cap. Alex sign order but an error message came up. When I called the pharmacy they confirm they didn't receive the prescription and that it could take a few hours before they received . I will call pharmacy back at the end of the day to see if they have receive prescription.

## 2025-04-23 NOTE — TELEPHONE ENCOUNTER
Patient called requesting refill for prescription.  Patient's demographics verified, including name d.o.b address and phone number.  Patient called requesting a refill of a prescription of   mycophenolate (Cellcept) 250 mg capsule   Patient has enough medication for 0 more days.  Patient is requesting prescription be sent to   [facility] #[id number] - [place], [place] - [address]    Pharmacy  phone number is   [phone]   Patient's call back number is   [phone]

## 2025-04-23 NOTE — TELEPHONE ENCOUNTER
Called [facility] to confirm they received mmf script. Spoke with pharmD who states they do not have a script- gave VO for Mmf 750 BID- 90 day supply with 3 refills. NFQ at this time. Called pt- pt aware and will pick meds up tonight

## 2025-04-24 ENCOUNTER — TELEPHONE (OUTPATIENT)
Facility: HOSPITAL | Age: 75
End: 2025-04-24
Payer: MEDICARE

## 2025-04-24 DIAGNOSIS — Z48.298 AFTERCARE FOLLOWING ORGAN TRANSPLANT: ICD-10-CM

## 2025-04-24 RX ORDER — MYCOPHENOLATE MOFETIL 250 MG/1
750 CAPSULE ORAL 2 TIMES DAILY
Qty: 540 CAPSULE | Refills: 3 | Status: SHIPPED | OUTPATIENT
Start: 2025-04-24 | End: 2026-04-24

## 2025-04-24 NOTE — TELEPHONE ENCOUNTER
MMF script sent to Dr. Boland to approve.   Called [facility] to confirm they received script and will work on getting it filled.

## 2025-04-24 NOTE — TELEPHONE ENCOUNTER
Patient called requesting to speak with coordinator about needing refill request resent. For mmf   Pharmacy call back number is [phone]

## 2025-04-25 ENCOUNTER — HOME CARE VISIT (OUTPATIENT)
Dept: HOME HEALTH SERVICES | Facility: HOME HEALTH | Age: 75
End: 2025-04-25
Payer: MEDICARE

## 2025-04-25 ENCOUNTER — OFFICE VISIT (OUTPATIENT)
Dept: VASCULAR SURGERY | Facility: HOSPITAL | Age: 75
End: 2025-04-25
Payer: MEDICARE

## 2025-04-25 ENCOUNTER — HOSPITAL ENCOUNTER (OUTPATIENT)
Dept: CARDIOLOGY | Facility: CLINIC | Age: 75
Discharge: HOME | End: 2025-04-25
Payer: MEDICARE

## 2025-04-25 VITALS
SYSTOLIC BLOOD PRESSURE: 162 MMHG | OXYGEN SATURATION: 96 % | HEIGHT: 71 IN | HEART RATE: 71 BPM | BODY MASS INDEX: 23.04 KG/M2 | DIASTOLIC BLOOD PRESSURE: 60 MMHG | WEIGHT: 164.6 LBS

## 2025-04-25 DIAGNOSIS — I48.91 ATRIAL FIBRILLATION, UNSPECIFIED TYPE (MULTI): ICD-10-CM

## 2025-04-25 DIAGNOSIS — I73.9 PAD (PERIPHERAL ARTERY DISEASE) (CMS-HCC): Primary | ICD-10-CM

## 2025-04-25 PROCEDURE — 99211 OFF/OP EST MAY X REQ PHY/QHP: CPT | Performed by: STUDENT IN AN ORGANIZED HEALTH CARE EDUCATION/TRAINING PROGRAM

## 2025-04-25 PROCEDURE — 1111F DSCHRG MED/CURRENT MED MERGE: CPT | Performed by: STUDENT IN AN ORGANIZED HEALTH CARE EDUCATION/TRAINING PROGRAM

## 2025-04-25 PROCEDURE — 3078F DIAST BP <80 MM HG: CPT | Performed by: STUDENT IN AN ORGANIZED HEALTH CARE EDUCATION/TRAINING PROGRAM

## 2025-04-25 PROCEDURE — 3074F SYST BP LT 130 MM HG: CPT | Performed by: STUDENT IN AN ORGANIZED HEALTH CARE EDUCATION/TRAINING PROGRAM

## 2025-04-25 PROCEDURE — 1159F MED LIST DOCD IN RCRD: CPT | Performed by: STUDENT IN AN ORGANIZED HEALTH CARE EDUCATION/TRAINING PROGRAM

## 2025-04-25 PROCEDURE — 1125F AMNT PAIN NOTED PAIN PRSNT: CPT | Performed by: STUDENT IN AN ORGANIZED HEALTH CARE EDUCATION/TRAINING PROGRAM

## 2025-04-25 ASSESSMENT — PATIENT HEALTH QUESTIONNAIRE - PHQ9
SUM OF ALL RESPONSES TO PHQ9 QUESTIONS 1 AND 2: 0
2. FEELING DOWN, DEPRESSED OR HOPELESS: NOT AT ALL
1. LITTLE INTEREST OR PLEASURE IN DOING THINGS: NOT AT ALL

## 2025-04-25 ASSESSMENT — COLUMBIA-SUICIDE SEVERITY RATING SCALE - C-SSRS
1. IN THE PAST MONTH, HAVE YOU WISHED YOU WERE DEAD OR WISHED YOU COULD GO TO SLEEP AND NOT WAKE UP?: NO
2. HAVE YOU ACTUALLY HAD ANY THOUGHTS OF KILLING YOURSELF?: NO
6. HAVE YOU EVER DONE ANYTHING, STARTED TO DO ANYTHING, OR PREPARED TO DO ANYTHING TO END YOUR LIFE?: NO

## 2025-04-25 ASSESSMENT — ENCOUNTER SYMPTOMS
LOSS OF SENSATION IN FEET: 1
OCCASIONAL FEELINGS OF UNSTEADINESS: 1
DEPRESSION: 0

## 2025-04-25 ASSESSMENT — PAIN SCALES - GENERAL: PAINLEVEL_OUTOF10: 6

## 2025-04-28 ENCOUNTER — HOME CARE VISIT (OUTPATIENT)
Dept: HOME HEALTH SERVICES | Facility: HOME HEALTH | Age: 75
End: 2025-04-28
Payer: MEDICARE

## 2025-04-28 VITALS
DIASTOLIC BLOOD PRESSURE: 60 MMHG | SYSTOLIC BLOOD PRESSURE: 138 MMHG | OXYGEN SATURATION: 96 % | WEIGHT: 164 LBS | HEART RATE: 84 BPM | BODY MASS INDEX: 22.96 KG/M2 | HEIGHT: 71 IN | TEMPERATURE: 98.4 F | RESPIRATION RATE: 20 BRPM

## 2025-04-28 PROCEDURE — G0299 HHS/HOSPICE OF RN EA 15 MIN: HCPCS | Mod: HHH

## 2025-04-28 ASSESSMENT — ENCOUNTER SYMPTOMS
LOWEST PAIN SEVERITY IN PAST 24 HOURS: 5/10
PAIN SEVERITY GOAL: 0/10
HIGHEST PAIN SEVERITY IN PAST 24 HOURS: 6/10
PAIN LOCATION: RIGHT FOOT
PAIN: 1
SUBJECTIVE PAIN PROGRESSION: WAXING AND WANING

## 2025-04-29 ENCOUNTER — HOSPITAL ENCOUNTER (OUTPATIENT)
Dept: CARDIOLOGY | Facility: CLINIC | Age: 75
Discharge: HOME | End: 2025-04-29
Payer: MEDICARE

## 2025-04-29 ENCOUNTER — OFFICE VISIT (OUTPATIENT)
Dept: PODIATRY | Facility: CLINIC | Age: 75
End: 2025-04-29
Payer: MEDICARE

## 2025-04-29 VITALS
DIASTOLIC BLOOD PRESSURE: 62 MMHG | SYSTOLIC BLOOD PRESSURE: 111 MMHG | HEIGHT: 71 IN | BODY MASS INDEX: 22.96 KG/M2 | TEMPERATURE: 97.8 F | WEIGHT: 164 LBS | HEART RATE: 78 BPM | OXYGEN SATURATION: 91 % | RESPIRATION RATE: 16 BRPM

## 2025-04-29 DIAGNOSIS — M79.671 PAIN IN RIGHT FOOT: Primary | ICD-10-CM

## 2025-04-29 DIAGNOSIS — I96 GANGRENE (MULTI): ICD-10-CM

## 2025-04-29 DIAGNOSIS — I73.9 PAD (PERIPHERAL ARTERY DISEASE) (CMS-HCC): ICD-10-CM

## 2025-04-29 DIAGNOSIS — I48.91 ATRIAL FIBRILLATION, UNSPECIFIED TYPE (MULTI): ICD-10-CM

## 2025-04-29 LAB — BODY SURFACE AREA: 1.93 M2

## 2025-04-29 PROCEDURE — 99214 OFFICE O/P EST MOD 30 MIN: CPT | Performed by: PODIATRIST

## 2025-04-29 RX ORDER — TRAMADOL HYDROCHLORIDE 50 MG/1
50 TABLET ORAL EVERY 8 HOURS PRN
Qty: 10 TABLET | Refills: 0 | Status: SHIPPED | OUTPATIENT
Start: 2025-04-29 | End: 2025-05-04

## 2025-04-29 ASSESSMENT — ACTIVITIES OF DAILY LIVING (ADL)
AMBULATION ASSISTANCE: 1
AMBULATION ASSISTANCE: INDEPENDENT
HOME_HEALTH_OASIS: 00
OASIS_M1830: 01
PHYSICAL TRANSFERS ASSESSED: 1
CURRENT_FUNCTION: INDEPENDENT

## 2025-04-29 NOTE — PROGRESS NOTES
Initial Podiatric Office Visit:    Chief Complaint:   Chief Complaint   Patient presents with    Foot Ulcer           HPI:  This 74 y.o. male with PMH indicated below presents complaining of gangrenous changes and pain to the right foot.  Patient has no significant past medical vascular history.  Patient was brought to [facility] on March 26 where he was noted to have CL TI with an ischemic foot.  Patient was transferred downtown where he underwent an angiogram which showed right SFA occlusion with CFA disease.  Patient was then transferred to the ICU for postprocedural hypertension and chest pain.  Patient was told that he would need a right femoral PT bypass.  On 4/7 patient underwent femoral endarterectomy with right internal and iliac arterial stents.  Dr. Flores was the podiatric physician taking care of this patient while downtown.  Patient was discharged and wanted to follow-up closer to home and presents today for evaluation of tissue loss of the right foot after reVASc.  Patient did recently have a another angiogram on the right side with stenting by Dr. Ovalle on 4/22.  Patient states that he does have pain and swelling to the right lower extremity.  He says he has trouble sleeping at night and has throbbing.  He does have small areas of eschar but he says overall that his skin is looking better.  He denies any constitutional symptoms at this time.  No other pedal complaints.      PCP:  Steve Aguilera MD:    [facility]  Medical History[1]:    MEDICATIONS  Current Medications[2]:  Allergies[3]:  Surgical History[4]  Family History[5]:  Social History[6]    REVIEW OF SYSTEMS    GENERAL: No weight loss, malaise or fevers.  HEENT: Negative for frequent or significant headaches,   RESPIRATORY: Negative for cough, wheezing or shortness of breath.  CARDIOVASCULAR: Negative for chest pain, leg swelling or palpitations.  GI: Negative for abdominal discomfort,  MUSCULOSKELETAL: Right foot pain  SKIN: Vascular changes  and small areas of necrosis right foot  NEURO: No numbess, tingling or burning in feet      Physical Exam:       Patient is alert and oriented x 3 in NAD    Vascular:   Faintly palpable palpable Dorsalis Pedis and Posterior Tibial Pulses B/L  Capillary Fill time < 3 seconds to digits 1-5 B/L  Skin temperature warm to warm tibial tuberosity to the digits B/L  Moderate pitting edema to right lower extremity.  No varicosities    Neurological:   Intact light touch/epicritic sensation B/L.  No evidence of decreased protective sensation noted.      Dermatological:   Skin appears diffusely xerotic with post revascularization edema and shedding of the skin right lower extremity.  No significant erythema or local signs of infection noted.  Small areas of eschar noted to the right hallux and distal right second digit.  Nails 1 through 5 bilaterally are thickened, elongated with subungual debris to the left foot.  Evidence of dried blood and potential eschar formation to the right hallux nail.      Musculoskeletal/Orthopaedic:     +4/5 muscle strength Dorsiflexion, Plantarflexion, Inversion, Eversion B/L  ROM of the 1st MTPJ is decreased without pain or crepitus to the left with some pain with guarding to the right due to revascularization pain.  ROM of the MTJ/STJ is decreased without pain or crepitus b/l.  Ankle joint ROM is decreased  B/L        ASSESSMENT:   1. Pain in right foot  traMADol (Ultram) 50 mg tablet      2. PAD (peripheral artery disease) (CMS-[facility])  traMADol (Ultram) 50 mg tablet      3. Gangrene (Multi)  traMADol (Ultram) 50 mg tablet                Plan:    - Follow-up visit from hospital  - Etiology and treatment options were discussed with the patient.  -Patient examined and evaluated  - Discussed with patient that says pain is due to revascularization edema.  - Discussed that areas of necrosis look stable and that we will continue to monitor them for any changes and worsening demarcation.  - Discussed with  patient that he may require amputation of some of the digits depending on necrosis demarcation.  - Patient to continue to use Betadine wet-to-dry dressings daily.  - Prescription for pain medication sent to pharmacy to help with pain at night.  - Patient to follow-up in 2 weeks    Tamara Linn DPM    A total of 30 minutes was spent in formulation of this note, review of charts, labs,  imaging with a minimum of 50% of the time spent in face to face with with the patient.  All questions and concerns were answered to the patients satisfaction.       Off note, use of vocal Dragon dictation system was used to dictate this document.  All proper spelling and grammatical errors may not have been corrected prior to final submission.             [1]   Past Medical History:  Diagnosis Date    CHF (congestive heart failure)     Chronic kidney disease     Encounter for other preprocedural examination 05/10/2020    Pre-transplant evaluation for kidney transplant    Hyperlipidemia     Hypertension     Old myocardial infarction 01/19/2022    H/O non-ST elevation myocardial infarction (NSTEMI)    Personal history of other diseases of the circulatory system 12/22/2015    History of stenosis of renal artery    Personal history of other diseases of the circulatory system 04/13/2021    History of carotid artery stenosis    Personal history of other diseases of the circulatory system 01/19/2022    History of essential hypertension    Personal history of other diseases of the circulatory system 07/23/2016    History of claudication    Personal history of other endocrine, nutritional and metabolic disease 12/22/2015    History of thyroid disease    Raynaud's syndrome without gangrene 12/22/2015    Raynauds disease   [2]   Current Outpatient Medications   Medication Sig Dispense Refill    amLODIPine (Norvasc) 10 mg tablet Take 1 tablet (10 mg) by mouth once daily.      clopidogrel (Plavix) 75 mg tablet Take 1 tablet (75 mg) by mouth  once daily. 30 tablet 0    Eliquis 2.5 mg tablet TAKE 1 TABLET BY MOUTH TWICE A DAY 60 tablet 10    empagliflozin (Jardiance) 10 mg Take 1 tablet (10 mg) by mouth once daily. 30 tablet 11    famotidine (Pepcid) 20 mg tablet TAKE 1 TABLET BY MOUTH EVERY DAY AS DIRECTED 90 tablet 3    fluticasone (Flonase) 50 mcg/actuation nasal spray Administer 2 sprays into each nostril once daily as needed for allergies.      hydrALAZINE (Apresoline) 100 mg tablet Take 1 tablet (100 mg) by mouth 3 times a day. (Patient not taking: Reported on 4/25/2025) 90 tablet 0    levothyroxine (Synthroid, Levoxyl) 137 mcg tablet Take 1 tablet (137 mcg) by mouth once daily.      losartan (Cozaar) 25 mg tablet TAKE 1 TABLET BY MOUTH EVERY DAY 90 tablet 1    methocarbamol (Robaxin) 500 mg tablet Take 1 tablet (500 mg) by mouth every 8 hours if needed for muscle spasms.      metoprolol tartrate (Lopressor) 50 mg tablet TAKE 1 TABLET BY MOUTH TWICE A  tablet 3    multivitamin tablet Take 1 tablet by mouth once daily.      mycophenolate (Cellcept) 250 mg capsule Take 3 capsules (750 mg) by mouth 2 times a day. 540 capsule 3    nitroglycerin (Nitrostat) 0.4 mg SL tablet Place 1 tablet (0.4 mg) under the tongue every 5 minutes if needed for chest pain.      pravastatin (Pravachol) 40 mg tablet TAKE 1 TABLET BY MOUTH EVERYDAY AT BEDTIME 90 tablet 3    predniSONE (Deltasone) 5 mg tablet TAKE 1 TABLET BY MOUTH EVERY DAY 30 tablet 23    tacrolimus ER (Envarsus XR) 1 mg tablet ER Take 2 tablets (2 mg) by mouth once daily. 60 tablet 0    tamsulosin (Flomax) 0.4 mg 24 hr capsule Take 1 capsule (0.4 mg) by mouth once daily at bedtime. 90 capsule 3    torsemide (Demadex) 20 mg tablet Take 1 tablet (20 mg) by mouth once daily. 30 tablet 11    torsemide (Demadex) 20 mg tablet Take 1 tablet (20 mg) by mouth once daily.       No current facility-administered medications for this visit.   [3]   Allergies  Allergen Reactions    Lovastatin Myalgia     Leg  cramps    Muscle cramps    Simvastatin Myalgia     Muscle cramps    Adhesive Rash    Naproxen Rash   [4]   Past Surgical History:  Procedure Laterality Date    CAROTID ENDARTERECTOMY  12/11/2021    Carotid Thromboendarterectomy    CORONARY ANGIOPLASTY      INVASIVE VASCULAR PROCEDURE Right 4/22/2025    Procedure: Lower Extremity Angiogram;  Surgeon: Jas Ovalle MD;  Location: [facility];  Service: Vascular Surgery;  Laterality: Right;    INVASIVE VASCULAR PROCEDURE N/A 4/22/2025    Procedure: Angioplasty - Lower Extremity;  Surgeon: Jas Ovalle MD;  Location: [facility];  Service: Vascular Surgery;  Laterality: N/A;    KNEE SURGERY  09/12/2019    Knee Surgery    OTHER SURGICAL HISTORY  01/19/2022    Kidney transplantation    OTHER SURGICAL HISTORY  09/22/2020    Transcath Intravascular Stent Placement Percutaneous Femoral    OTHER SURGICAL HISTORY  01/19/2022    Peritoneal Dialysis Catheter    TOTAL THYROIDECTOMY  12/11/2021    Thyroid Surgery Total Thyroidectomy   [5] No family history on file.  [6]   Social History  Socioeconomic History    Marital status:    Tobacco Use    Smoking status: Former     Types: Cigarettes    Smokeless tobacco: Never   Substance and Sexual Activity    Alcohol use: Yes     Comment: 1 per year    Drug use: Never    Sexual activity: Defer     Social Drivers of Health     Financial Resource Strain: Low Risk  (3/27/2025)    Overall Financial Resource Strain (CARDIA)     Difficulty of Paying Living Expenses: Not hard at all   Food Insecurity: No Food Insecurity (3/27/2025)    Hunger Vital Sign     Worried About Running Out of Food in the Last Year: Never true     Ran Out of Food in the Last Year: Never true   Transportation Needs: No Transportation Needs (4/12/2025)    OASIS : Transportation     Lack of Transportation (Medical): No     Lack of Transportation (Non-Medical): No     Patient Unable or Declines to Respond: No   Physical Activity:  Insufficiently Active (3/27/2025)    Exercise Vital Sign     Days of Exercise per Week: 1 day     Minutes of Exercise per Session: 30 min   Stress: No Stress Concern Present (3/27/2025)    [employer] - Occupational Stress Questionnaire     Feeling of Stress : Not at all   Social Connections: Feeling Socially Integrated (4/12/2025)    OASIS : Social Isolation     Frequency of experiencing loneliness or isolation: Never   Intimate Partner Violence: Not At Risk (3/27/2025)    Humiliation, Afraid, Rape, and Kick questionnaire     Fear of Current or Ex-Partner: No     Emotionally Abused: No     Physically Abused: No     Sexually Abused: No   Housing Stability: Low Risk  (3/27/2025)    Housing Stability Vital Sign     Unable to Pay for Housing in the Last Year: No     Number of Times Moved in the Last Year: 0     Homeless in the Last Year: No

## 2025-04-30 ENCOUNTER — OFFICE VISIT (OUTPATIENT)
Facility: HOSPITAL | Age: 75
End: 2025-04-30
Payer: MEDICARE

## 2025-04-30 VITALS
TEMPERATURE: 98.8 F | WEIGHT: 165.3 LBS | BODY MASS INDEX: 23.05 KG/M2 | OXYGEN SATURATION: 93 % | HEART RATE: 50 BPM | SYSTOLIC BLOOD PRESSURE: 112 MMHG | DIASTOLIC BLOOD PRESSURE: 64 MMHG

## 2025-04-30 DIAGNOSIS — I10 ESSENTIAL HYPERTENSION: Primary | ICD-10-CM

## 2025-04-30 DIAGNOSIS — Z94.0 KIDNEY REPLACED BY TRANSPLANT (HHS-HCC): ICD-10-CM

## 2025-04-30 DIAGNOSIS — Z92.25 PERSONAL HISTORY OF IMMUNOSUPRESSION THERAPY: ICD-10-CM

## 2025-04-30 PROCEDURE — 1159F MED LIST DOCD IN RCRD: CPT | Performed by: HOSPITALIST

## 2025-04-30 PROCEDURE — 1126F AMNT PAIN NOTED NONE PRSNT: CPT | Performed by: HOSPITALIST

## 2025-04-30 PROCEDURE — 3074F SYST BP LT 130 MM HG: CPT | Performed by: HOSPITALIST

## 2025-04-30 PROCEDURE — 99215 OFFICE O/P EST HI 40 MIN: CPT | Performed by: HOSPITALIST

## 2025-04-30 PROCEDURE — 3078F DIAST BP <80 MM HG: CPT | Performed by: HOSPITALIST

## 2025-04-30 PROCEDURE — 1111F DSCHRG MED/CURRENT MED MERGE: CPT | Performed by: HOSPITALIST

## 2025-04-30 ASSESSMENT — ENCOUNTER SYMPTOMS
SHORTNESS OF BREATH: 0
BACK PAIN: 0
COUGH: 0
VOMITING: 0
DIARRHEA: 0
CHEST TIGHTNESS: 0
NERVOUS/ANXIOUS: 0
FREQUENCY: 0
PALPITATIONS: 0
HEMATURIA: 0
CONFUSION: 0
ABDOMINAL DISTENTION: 0
HEADACHES: 0
NAUSEA: 0

## 2025-04-30 ASSESSMENT — PAIN SCALES - GENERAL: PAINLEVEL_OUTOF10: 0-NO PAIN

## 2025-04-30 NOTE — PROGRESS NOTES
Dilip Haynes  74 y.o.male with a history significant for end stage renal disease secondary to hypertension s/p increased risk and HCV+  donor kidney transplant on 2020. He was seroconverted and treated. Patient's post transplant course was complicated by urinary retention and enlarged prostate.  -History of PCI in the right coronary artery and A-fib s/p cardioversion on Eliquis. Follows cardiology closely patient also underwent ablation -2022.   -Hospital admission in 2023 with CHF exacerbation. Patient had 100% stenosis in the mid circumflex and PDA RCA lesion 100% stenosis recommend medical management. Got cardiac rehab and recent echo showing 60% EF. Patient also had a cardiac MRI done in 2023 which showed cardiac remodeling in the inferior wall but with preserved EF around 60 to 65%.  - Patient recently admitted and discharged on 4/10/2025 in the setting of critical limb ischemia status post a right lower extremity angiogram and underwent right femoral endarterectomy, profundoplasty, right WENDY angioplasty and covered stent, right EIA angioplasty with covered stent.  Patient was admitted in the ICU postop for management of hypertension and acute kidney injury.    Interm history: Patient been following up with the vascular surgery closely for the right lower extremity critical limb ischemia.  Otherwise denied any complaints today.      Review of Systems   Respiratory:  Negative for cough, chest tightness and shortness of breath.    Cardiovascular:  Negative for chest pain, palpitations and leg swelling.   Gastrointestinal:  Negative for abdominal distention, diarrhea, nausea and vomiting.   Genitourinary:  Negative for frequency, hematuria and urgency.   Musculoskeletal:  Negative for back pain.   Neurological:  Negative for headaches.   Psychiatric/Behavioral:  Negative for confusion. The patient is not nervous/anxious.         Objective:  Visit Vitals  /64   Pulse 50   Temp  37.1 °C (98.8 °F) (Temporal)   Wt 75 kg (165 lb 4.8 oz)   SpO2 93%   BMI 23.05 kg/m²   Smoking Status Former   BSA 1.94 m²      Physical Exam  HENT:      Head: Normocephalic and atraumatic.      Nose: Nose normal.      Mouth/Throat:      Mouth: Mucous membranes are moist.   Eyes:      Extraocular Movements: Extraocular movements intact.      Pupils: Pupils are equal, round, and reactive to light.   Cardiovascular:      Rate and Rhythm: Normal rate.      Pulses: Normal pulses.      Heart sounds: Normal heart sounds.   Pulmonary:      Effort: Pulmonary effort is normal.   Abdominal:      Palpations: Abdomen is soft.      Tenderness: There is no abdominal tenderness. There is no guarding.   Musculoskeletal:         General: Normal range of motion.      Cervical back: Normal range of motion.   Skin:     General: Skin is warm.   Neurological:      General: No focal deficit present.      Mental Status: Mental status is at baseline.   Psychiatric:         Mood and Affect: Mood normal.          Current Medications[1]     [unfilled]     No images are attached to the encounter.     Assessment and Plan :Dilip Haynes 74 y.o.male with a history significant for end stage renal disease secondary to hypertension s/p increased risk and HCV+  donor kidney transplant on 2020. He was seroconverted and treated. Patient's post transplant course was complicated by urinary retention and enlarged prostate.  -History of PCI in the right coronary artery and [facility] Follows cardiology closely patient also underwent ablation -2022.   -Hospital admission in 2023 with CHF exacerbation. Patient had 100% stenosis in the mid circumflex and PDA RCA lesion 100% stenosis recommend medical management. Got cardiac rehab and recent echo showing 60% EF. Patient also had a cardiac MRI done in 2023 which showed cardiac remodeling in the inferior wall but with preserved EF around 60 to  65%.  - Patient recently admitted and discharged on 4/10/2025 in the setting of critical limb ischemia status post a right lower extremity angiogram and underwent right femoral endarterectomy, profundoplasty, right WENDY angioplasty and covered stent, right EIA angioplasty with covered stent.  Patient was admitted in the ICU postop for management of hypertension and acute kidney injury.    Interm history: Patient been following up with the vascular surgery closely for the right lower extremity critical limb ischemia.  Otherwise denied any complaints today.    Allograft function : Stable creatinine in the range of 1.6-1.9 currently recovered from recent BASHIR seems to be stable electrolytes.  Last UPC 0.36 will follow-up with repeat every 3 months.    Immunosuppression: Recent tacrolimus levels last checked was on 8 March is around 2.9 will follow-up with repeat levels aim levels are 5-8 continue with the current Envarsus 2 mg daily continue with the prednisone 5 mg and mycophenolate 750 twice daily    Hemodynamics: Blood pressures are optimally controlled continue with torsemide 20 daily, tamsulosin 0.4 mg daily, metoprolol 50 twice daily and hydralazine 100 3 times daily, losartan 25 every day, amlodipine 10 mg daily.    No anemia or leukopenia.    Bone mineral disease calcium and phosphorus levels are optimal recent vitamin D levels are 36 PTH is 230 will continue to monitor    General health care: Recommended age-appropriate screening, COVID shots annual dermatology visits,DEXA scan 2-3 yrs while on steroids .    Labs every 3 months and follow-up in clinic in 6 months will follow-up with the tach levels tomorrow    Ashley Gerard MD    Notes created by Cici -Please excuse the Typos .         [1]   Current Outpatient Medications:     amLODIPine (Norvasc) 10 mg tablet, Take 1 tablet (10 mg) by mouth once daily., Disp: , Rfl:     clopidogrel (Plavix) 75 mg tablet, Take 1 tablet (75 mg) by mouth once daily., Disp:  30 tablet, Rfl: 0    Eliquis 2.5 mg tablet, TAKE 1 TABLET BY MOUTH TWICE A DAY, Disp: 60 tablet, Rfl: 10    empagliflozin (Jardiance) 10 mg, Take 1 tablet (10 mg) by mouth once daily., Disp: 30 tablet, Rfl: 11    famotidine (Pepcid) 20 mg tablet, TAKE 1 TABLET BY MOUTH EVERY DAY AS DIRECTED, Disp: 90 tablet, Rfl: 3    fluticasone (Flonase) 50 mcg/actuation nasal spray, Administer 2 sprays into each nostril once daily as needed for allergies., Disp: , Rfl:     hydrALAZINE (Apresoline) 100 mg tablet, Take 1 tablet (100 mg) by mouth 3 times a day. (Patient not taking: Reported on 4/25/2025), Disp: 90 tablet, Rfl: 0    levothyroxine (Synthroid, Levoxyl) 137 mcg tablet, Take 1 tablet (137 mcg) by mouth once daily., Disp: , Rfl:     losartan (Cozaar) 25 mg tablet, TAKE 1 TABLET BY MOUTH EVERY DAY, Disp: 90 tablet, Rfl: 1    methocarbamol (Robaxin) 500 mg tablet, Take 1 tablet (500 mg) by mouth every 8 hours if needed for muscle spasms., Disp: , Rfl:     metoprolol tartrate (Lopressor) 50 mg tablet, TAKE 1 TABLET BY MOUTH TWICE A DAY, Disp: 180 tablet, Rfl: 3    multivitamin tablet, Take 1 tablet by mouth once daily., Disp: , Rfl:     mycophenolate (Cellcept) 250 mg capsule, Take 3 capsules (750 mg) by mouth 2 times a day., Disp: 540 capsule, Rfl: 3    nitroglycerin (Nitrostat) 0.4 mg SL tablet, Place 1 tablet (0.4 mg) under the tongue every 5 minutes if needed for chest pain., Disp: , Rfl:     pravastatin (Pravachol) 40 mg tablet, TAKE 1 TABLET BY MOUTH EVERYDAY AT BEDTIME, Disp: 90 tablet, Rfl: 3    predniSONE (Deltasone) 5 mg tablet, TAKE 1 TABLET BY MOUTH EVERY DAY, Disp: 30 tablet, Rfl: 23    tacrolimus ER (Envarsus XR) 1 mg tablet ER, Take 2 tablets (2 mg) by mouth once daily., Disp: 60 tablet, Rfl: 0    tamsulosin (Flomax) 0.4 mg 24 hr capsule, Take 1 capsule (0.4 mg) by mouth once daily at bedtime., Disp: 90 capsule, Rfl: 3    torsemide (Demadex) 20 mg tablet, Take 1 tablet (20 mg) by mouth once daily., Disp: 30  tablet, Rfl: 11    torsemide (Demadex) 20 mg tablet, Take 1 tablet (20 mg) by mouth once daily., Disp: , Rfl:     traMADol (Ultram) 50 mg tablet, Take 1 tablet (50 mg) by mouth every 8 hours if needed for severe pain (7 - 10) for up to 5 days., Disp: 10 tablet, Rfl: 0

## 2025-04-30 NOTE — PATIENT INSTRUCTIONS
No changes in  IS medication   Tac level tomorrow if therapeutic continue every 3 months   Labs every 3 months   RTC in 6 months

## 2025-05-01 ENCOUNTER — LAB (OUTPATIENT)
Dept: LAB | Facility: HOSPITAL | Age: 75
End: 2025-05-01
Payer: MEDICARE

## 2025-05-01 DIAGNOSIS — E55.9 VITAMIN D DEFICIENCY: ICD-10-CM

## 2025-05-01 DIAGNOSIS — Z94.0 KIDNEY REPLACED BY TRANSPLANT (HHS-HCC): ICD-10-CM

## 2025-05-01 LAB
25(OH)D3 SERPL-MCNC: 35 NG/ML (ref 30–100)
ALBUMIN SERPL BCP-MCNC: 4.1 G/DL (ref 3.4–5)
ANION GAP SERPL CALC-SCNC: 19 MMOL/L (ref 10–20)
BUN SERPL-MCNC: 19 MG/DL (ref 6–23)
CALCIUM SERPL-MCNC: 9.6 MG/DL (ref 8.6–10.6)
CHLORIDE SERPL-SCNC: 100 MMOL/L (ref 98–107)
CO2 SERPL-SCNC: 26 MMOL/L (ref 21–32)
CREAT SERPL-MCNC: 1.62 MG/DL (ref 0.5–1.3)
EGFRCR SERPLBLD CKD-EPI 2021: 44 ML/MIN/1.73M*2
ERYTHROCYTE [DISTWIDTH] IN BLOOD BY AUTOMATED COUNT: 14.2 % (ref 11.5–14.5)
GLUCOSE SERPL-MCNC: 147 MG/DL (ref 74–99)
HCT VFR BLD AUTO: 38.4 % (ref 41–52)
HGB BLD-MCNC: 11.4 G/DL (ref 13.5–17.5)
MCH RBC QN AUTO: 25.1 PG (ref 26–34)
MCHC RBC AUTO-ENTMCNC: 29.7 G/DL (ref 32–36)
MCV RBC AUTO: 85 FL (ref 80–100)
NRBC BLD-RTO: 0 /100 WBCS (ref 0–0)
PHOSPHATE SERPL-MCNC: 3.1 MG/DL (ref 2.5–4.9)
PLATELET # BLD AUTO: 330 X10*3/UL (ref 150–450)
POTASSIUM SERPL-SCNC: 4.3 MMOL/L (ref 3.5–5.3)
PTH-INTACT SERPL-MCNC: 215 PG/ML (ref 18.5–88)
RBC # BLD AUTO: 4.54 X10*6/UL (ref 4.5–5.9)
SODIUM SERPL-SCNC: 141 MMOL/L (ref 136–145)
TACROLIMUS BLD-MCNC: 5.1 NG/ML
WBC # BLD AUTO: 9.1 X10*3/UL (ref 4.4–11.3)

## 2025-05-01 PROCEDURE — 80197 ASSAY OF TACROLIMUS: CPT

## 2025-05-01 PROCEDURE — 83970 ASSAY OF PARATHORMONE: CPT

## 2025-05-01 PROCEDURE — 85027 COMPLETE CBC AUTOMATED: CPT

## 2025-05-01 PROCEDURE — 80069 RENAL FUNCTION PANEL: CPT

## 2025-05-01 PROCEDURE — 82306 VITAMIN D 25 HYDROXY: CPT

## 2025-05-05 ENCOUNTER — HOSPITAL ENCOUNTER (OUTPATIENT)
Dept: CARDIOLOGY | Facility: CLINIC | Age: 75
Discharge: HOME | End: 2025-05-05
Payer: MEDICARE

## 2025-05-05 DIAGNOSIS — I48.91 ATRIAL FIBRILLATION, UNSPECIFIED TYPE (MULTI): ICD-10-CM

## 2025-05-06 ENCOUNTER — LAB (OUTPATIENT)
Dept: LAB | Facility: HOSPITAL | Age: 75
End: 2025-05-06
Payer: MEDICARE

## 2025-05-06 ENCOUNTER — HOSPITAL ENCOUNTER (OUTPATIENT)
Dept: CARDIOLOGY | Facility: CLINIC | Age: 75
Discharge: HOME | End: 2025-05-06
Payer: MEDICARE

## 2025-05-06 DIAGNOSIS — I48.91 ATRIAL FIBRILLATION, UNSPECIFIED TYPE (MULTI): ICD-10-CM

## 2025-05-06 DIAGNOSIS — Z94.0 KIDNEY REPLACED BY TRANSPLANT (HHS-HCC): ICD-10-CM

## 2025-05-06 LAB
CREAT UR-MCNC: 100.9 MG/DL (ref 20–370)
PROT UR-ACNC: 72 MG/DL (ref 5–25)
PROT/CREAT UR: 0.71 MG/MG CREAT (ref 0–0.17)

## 2025-05-06 PROCEDURE — 82570 ASSAY OF URINE CREATININE: CPT

## 2025-05-06 PROCEDURE — 84156 ASSAY OF PROTEIN URINE: CPT

## 2025-05-06 NOTE — PROGRESS NOTES
Reason for Visit: Follow-up.  Referring Clinician: No ref. provider found     History of Present Illness  Dilip Haynes is a 74 y.o. male with history of HTN, HLD, CAD s/p PCI, ESRD s/p DDRT now with worsening allograft function who was recently admitted with rest pain and mild right first toe tissue loss. HE underwent right femoral endarterectomy with iliac stent placements. This was complicated by troponemia due to persistent hypertension and worsening fluid overload. Since his hospitalization, he developed worsening right lower extremity swelling with new bullae on the foot. As as a result, he saw Dr. Ovalle who performed a right lower extremity angiogram with SFA recanalization 4/22/2025.  He tolerated this well.  He now has inline flow to the right foot.  He does continue to have swelling but it has slightly improved.  Given the swelling, he does have pain in his right foot.  He has not      Past Medical History  He has a past medical history of CHF (congestive heart failure), Chronic kidney disease, Encounter for other preprocedural examination (05/10/2020), Hyperlipidemia, Hypertension, Old myocardial infarction (01/19/2022), Personal history of other diseases of the circulatory system (12/22/2015), Personal history of other diseases of the circulatory system (04/13/2021), Personal history of other diseases of the circulatory system (01/19/2022), Personal history of other diseases of the circulatory system (07/23/2016), Personal history of other endocrine, nutritional and metabolic disease (12/22/2015), and Raynaud's syndrome without gangrene (12/22/2015).    Past Surgical History  He has a past surgical history that includes Total thyroidectomy (12/11/2021); Carotid endarterectomy (12/11/2021); Other surgical history (01/19/2022); Other surgical history (09/22/2020); Knee surgery (09/12/2019); Other surgical history (01/19/2022); Coronary  "angioplasty; Invasive Vascular Procedure (Right, 4/22/2025); and Invasive Vascular Procedure (N/A, 4/22/2025).    Social History  He reports that he has quit smoking. His smoking use included cigarettes. He has never used smokeless tobacco. He reports current alcohol use. He reports that he does not use drugs.    Family History  Family History[1]    Allergies  Lovastatin, Simvastatin, Adhesive, and Naproxen    Outpatient Medications  Current Outpatient Medications   Medication Instructions    amLODIPine (NORVASC) 10 mg, Daily    clopidogrel (PLAVIX) 75 mg, oral, Daily    Eliquis 2.5 mg, oral, 2 times daily    empagliflozin (JARDIANCE) 10 mg, oral, Daily    Envarsus XR 2 mg, oral, Daily    famotidine (PEPCID) 20 mg, oral, Daily, as directed    fluticasone (Flonase) 50 mcg/actuation nasal spray 2 sprays, Daily PRN    hydrALAZINE (APRESOLINE) 100 mg, oral, 3 times daily    levothyroxine (SYNTHROID, LEVOXYL) 137 mcg, Daily    losartan (COZAAR) 25 mg, oral, Daily    methocarbamol (ROBAXIN) 500 mg, Every 8 hours PRN    metoprolol tartrate (LOPRESSOR) 50 mg, oral, 2 times daily    multivitamin tablet 1 tablet, Daily    mycophenolate (CELLCEPT) 750 mg, oral, 2 times daily    nitroglycerin (NITROSTAT) 0.4 mg, Every 5 min PRN    pravastatin (PRAVACHOL) 40 mg, oral, Nightly    predniSONE (DELTASONE) 5 mg, oral, Daily    tamsulosin (FLOMAX) 0.4 mg, oral, Nightly    torsemide (DEMADEX) 20 mg, oral, Daily    torsemide (DEMADEX) 20 mg, Daily       Review of Systems  ROS    Last Recorded Vitals  Vitals:    04/25/25 1513 04/25/25 1515   BP: 99/61 162/60   BP Location: Left arm Right arm   Patient Position: Sitting Sitting   BP Cuff Size: Adult Adult   Pulse: 71    SpO2: 96%    Weight: 74.7 kg (164 lb 9.6 oz)    Height: 1.803 m (5' 11\")        Physical Examination  General: Well appearing, well-nourished, in no acute distress.  HEENT: Normocephalic atraumatic, pupils equal and reactive to light, extraocular muscles intact, no " "conjunctival injection, oropharynx clear without exudates.  Neck: Normal carotid arterial pulses, no arterial bruits, no thyromegaly.  Cardiac: Regular rhythm and normal heart rate.  Pulmonary: No increased work of breathing, no wheezes or crackles.  GI: Soft, ND, NT  Lower extremities: Right toe wound. Right groin incision with slight dehiscence.  Very superficial.  No drainage.  Skin: Skin intact. No significant rashes or lesions present.  Neuro: Alert and oriented x 3, normal attention and cognition, no focal motor or sensory neurologic deficits.  Psych: Normal affect and mood.  Musculoskeletal: Normal gait normal muscle tone.    Laboratory Studies  Lab Results   Component Value Date    GLUCOSE 147 (H) 05/01/2025    CALCIUM 9.6 05/01/2025     05/01/2025    K 4.3 05/01/2025    CO2 26 05/01/2025     05/01/2025    BUN 19 05/01/2025    CREATININE 1.62 (H) 05/01/2025     Lab Results   Component Value Date    ALT 17 03/27/2025    AST 23 03/27/2025    ALKPHOS 74 03/27/2025    BILITOT 0.5 03/27/2025         Lab Results   Component Value Date    CHOL 165 06/05/2023    CHOL 139 10/19/2020     Lab Results   Component Value Date    HDL 45.6 06/05/2023    HDL 54.0 10/19/2020     No results found for: \"LDLCALC\"  Lab Results   Component Value Date    TRIG 118 06/05/2023    TRIG 93 10/19/2020     No components found for: \"CHOLHDL\"  Lab Results   Component Value Date    HGBA1C 6.4 (H) 02/20/2025     No components found for: \"UACR\"    Assessment and Plan  Problem List Items Addressed This Visit    None    - Patient is doing better after revascularization. Denies any rest pain. Still have swelling. Continues to be on Eliquis and Plavix. Tolerating it well. Has slight dehiscence of the superficial portion of the incision. Recommend Hydrofera blue therapy to the dehisced portion and betadine  point to the remaining. Will also refer patient to wound clinic at [place]. He is scheduled to see Dr. Ovalle later this month. "     Alejandra Gant MD           [1] No family history on file.

## 2025-05-07 ENCOUNTER — APPOINTMENT (OUTPATIENT)
Dept: UROLOGY | Facility: CLINIC | Age: 75
End: 2025-05-07
Payer: MEDICARE

## 2025-05-08 ENCOUNTER — HOSPITAL ENCOUNTER (OUTPATIENT)
Dept: CARDIOLOGY | Facility: CLINIC | Age: 75
Discharge: HOME | End: 2025-05-08
Payer: MEDICARE

## 2025-05-08 DIAGNOSIS — I48.91 ATRIAL FIBRILLATION, UNSPECIFIED TYPE (MULTI): ICD-10-CM

## 2025-05-09 ENCOUNTER — HOSPITAL ENCOUNTER (OUTPATIENT)
Dept: CARDIOLOGY | Facility: CLINIC | Age: 75
Discharge: HOME | End: 2025-05-09
Payer: MEDICARE

## 2025-05-09 DIAGNOSIS — I48.91 ATRIAL FIBRILLATION, UNSPECIFIED TYPE (MULTI): ICD-10-CM

## 2025-05-11 DIAGNOSIS — Z94.0 KIDNEY REPLACED BY TRANSPLANT (HHS-HCC): ICD-10-CM

## 2025-05-12 ENCOUNTER — HOSPITAL ENCOUNTER (OUTPATIENT)
Dept: CARDIOLOGY | Facility: CLINIC | Age: 75
Discharge: HOME | End: 2025-05-12
Payer: MEDICARE

## 2025-05-12 DIAGNOSIS — I48.91 UNSPECIFIED ATRIAL FIBRILLATION (MULTI): ICD-10-CM

## 2025-05-12 DIAGNOSIS — I48.92 UNSPECIFIED ATRIAL FLUTTER (MULTI): ICD-10-CM

## 2025-05-12 RX ORDER — PREDNISONE 5 MG/1
5 TABLET ORAL DAILY
Qty: 30 TABLET | Refills: 23 | Status: SHIPPED | OUTPATIENT
Start: 2025-05-12

## 2025-05-13 ENCOUNTER — OFFICE VISIT (OUTPATIENT)
Dept: PODIATRY | Facility: CLINIC | Age: 75
End: 2025-05-13
Payer: MEDICARE

## 2025-05-13 VITALS
HEIGHT: 71 IN | OXYGEN SATURATION: 96 % | TEMPERATURE: 97.7 F | HEART RATE: 116 BPM | WEIGHT: 163 LBS | SYSTOLIC BLOOD PRESSURE: 141 MMHG | DIASTOLIC BLOOD PRESSURE: 72 MMHG | BODY MASS INDEX: 22.82 KG/M2 | RESPIRATION RATE: 16 BRPM

## 2025-05-13 DIAGNOSIS — I96 GANGRENE (MULTI): Primary | ICD-10-CM

## 2025-05-13 DIAGNOSIS — M79.671 PAIN IN RIGHT FOOT: ICD-10-CM

## 2025-05-13 DIAGNOSIS — I73.9 PAD (PERIPHERAL ARTERY DISEASE): ICD-10-CM

## 2025-05-13 PROCEDURE — 99213 OFFICE O/P EST LOW 20 MIN: CPT | Performed by: PODIATRIST

## 2025-05-13 RX ORDER — TRAMADOL HYDROCHLORIDE 50 MG/1
50 TABLET ORAL EVERY 8 HOURS PRN
Qty: 10 EACH | Refills: 0 | Status: SHIPPED | OUTPATIENT
Start: 2025-05-13 | End: 2025-05-18

## 2025-05-13 NOTE — PROGRESS NOTES
Chief Complaint:   Chief Complaint   Patient presents with    Follow-up           HPI:  This 74 y.o. male with PMH indicated below presents for close follow-up of gangrenous changes right foot after revascularization.   Per historical purposes, patient was brought to [facility] on March 26 where he was noted to have CL TI with an ischemic foot.  Patient was transferred downtown where he underwent an angiogram which showed right SFA occlusion with CFA disease.  Patient was then transferred to the ICU for postprocedural hypertension and chest pain.  Patient was told that he would need a right femoral PT bypass.  On 4/7 patient underwent femoral endarterectomy with right internal and iliac arterial stents.  Dr. Flores was the podiatric physician taking care of this patient while downtown.  Patient was discharged and wanted to follow-up closer to home and presents today for evaluation of tissue loss of the right foot after reVASc.  Patient did recently have a another angiogram on the right side with stenting by Dr. Ovalle on 4/22.  Patient states that he does have pain and swelling to the right lower extremity.  He says he has trouble sleeping at night and has throbbing.  Since her last visit patient has continued to do Betadine to the right foot.  He has been taking tramadol which has greatly helped with reperfusion pain and rest pain.  He does have small areas of eschar but he says overall that his skin is looking better.  He denies any constitutional symptoms at this time.  No other pedal complaints.      PCP:  Steve Aguilera MD:    [facility]  Medical History[1]:    MEDICATIONS  Current Medications[2]:  Allergies[3]:  Surgical History[4]  Family History[5]:  Social History[6]    REVIEW OF SYSTEMS    All negative except what is listed in HPI    Physical Exam:       Patient is alert and oriented x 3 in NAD    Vascular:   Faintly palpable palpable Dorsalis Pedis and Posterior Tibial Pulses B/L  Capillary Fill time <  How Severe Is Your Rash?: moderate 3 seconds to digits 1-5 B/L  Skin temperature warm to warm tibial tuberosity to the digits B/L  Mild pitting edema to right lower extremity.  No varicosities    Neurological:   Intact light touch/epicritic sensation B/L.  No evidence of decreased protective sensation noted.      Dermatological:   Skin appears diffusely xerotic with post revascularization edema and shedding of the skin right lower extremity.  No significant erythema or local signs of infection noted.  Small areas of eschar noted to the right hallux and distal right second digit.  Nails 1 through 5 bilaterally are thickened, elongated with subungual debris to the left foot.  Evidence of dried blood and potential eschar formation to the right hallux nail.  Overall improvements of skin with decreased eschar formation and removal of peeling skin with new epithelialized tissue.      Musculoskeletal/Orthopaedic:     +4/5 muscle strength Dorsiflexion, Plantarflexion, Inversion, Eversion B/L  ROM of the 1st MTPJ is decreased without pain or crepitus to the left with some pain with guarding to the right due to revascularization pain.  ROM of the MTJ/STJ is decreased without pain or crepitus b/l.  Ankle joint ROM is decreased  B/L        ASSESSMENT:   1. Gangrene (Multi)  traMADol (Ultram) 50 mg tablet      2. Pain in right foot  traMADol (Ultram) 50 mg tablet      3. PAD (peripheral artery disease) (CMS-[facility])  traMADol (Ultram) 50 mg tablet                    Plan:    - Follow-up visit   - Etiology and treatment options were discussed with the patient.  -Patient examined and evaluated  - Peeling skin gently removed using pickups.  - Evidence of newly revascularized skin and epithelialized skin noted.  - No local signs infection.  - Remaining stable eschar is noted to the distal aspect of the right second digit and medial distal aspect of the right first digit metatarsal.  - Overall improvement with edema.  - Patient to continue Betadine dressings daily and may  Is This A New Presentation, Or A Follow-Up?: Rash cleanse the surrounding nonvascular tissues with soap and water and may use Aquaphor to the noneschar areas.  - Patient to continue to monitor for worsening signs of infection or necrosis.  - Refill for tramadol sent for reperfusion pain and gangrene pain.  Discussed with patient that vascular will need to take over on this medication refill if pain persist.  - Patient to follow-up in 2 weeks    Tamara Linn DPM    A total of 20 minutes was spent in formulation of this note, review of charts, labs,  imaging with a minimum of 50% of the time spent in face to face with with the patient.  All questions and concerns were answered to the patients satisfaction.       Off note, use of vocal Dragon dictation system was used to dictate this document.  All proper spelling and grammatical errors may not have been corrected prior to final submission.             [1]   Past Medical History:  Diagnosis Date    CHF (congestive heart failure)     Chronic kidney disease     Encounter for other preprocedural examination 05/10/2020    Pre-transplant evaluation for kidney transplant    Hyperlipidemia     Hypertension     Old myocardial infarction 01/19/2022    H/O non-ST elevation myocardial infarction (NSTEMI)    Personal history of other diseases of the circulatory system 12/22/2015    History of stenosis of renal artery    Personal history of other diseases of the circulatory system 04/13/2021    History of carotid artery stenosis    Personal history of other diseases of the circulatory system 01/19/2022    History of essential hypertension    Personal history of other diseases of the circulatory system 07/23/2016    History of claudication    Personal history of other endocrine, nutritional and metabolic disease 12/22/2015    History of thyroid disease    Raynaud's syndrome without gangrene 12/22/2015    Raynauds disease   [2]   Current Outpatient Medications   Medication Sig Dispense Refill    amLODIPine (Norvasc) 10 mg  tablet Take 1 tablet (10 mg) by mouth once daily.      Eliquis 2.5 mg tablet TAKE 1 TABLET BY MOUTH TWICE A DAY 60 tablet 10    empagliflozin (Jardiance) 10 mg Take 1 tablet (10 mg) by mouth once daily. 30 tablet 11    famotidine (Pepcid) 20 mg tablet TAKE 1 TABLET BY MOUTH EVERY DAY AS DIRECTED 90 tablet 3    fluticasone (Flonase) 50 mcg/actuation nasal spray Administer 2 sprays into each nostril once daily as needed for allergies.      hydrALAZINE (Apresoline) 100 mg tablet Take 1 tablet (100 mg) by mouth 3 times a day. (Patient not taking: Reported on 4/30/2025) 90 tablet 0    levothyroxine (Synthroid, Levoxyl) 137 mcg tablet Take 1 tablet (137 mcg) by mouth once daily.      losartan (Cozaar) 25 mg tablet TAKE 1 TABLET BY MOUTH EVERY DAY 90 tablet 1    methocarbamol (Robaxin) 500 mg tablet Take 1 tablet (500 mg) by mouth every 8 hours if needed for muscle spasms.      metoprolol tartrate (Lopressor) 50 mg tablet TAKE 1 TABLET BY MOUTH TWICE A  tablet 3    multivitamin tablet Take 1 tablet by mouth once daily.      mycophenolate (Cellcept) 250 mg capsule Take 3 capsules (750 mg) by mouth 2 times a day. 540 capsule 3    nitroglycerin (Nitrostat) 0.4 mg SL tablet Place 1 tablet (0.4 mg) under the tongue every 5 minutes if needed for chest pain.      pravastatin (Pravachol) 40 mg tablet TAKE 1 TABLET BY MOUTH EVERYDAY AT BEDTIME 90 tablet 3    predniSONE (Deltasone) 5 mg tablet TAKE 1 TABLET BY MOUTH EVERY DAY 30 tablet 23    tamsulosin (Flomax) 0.4 mg 24 hr capsule Take 1 capsule (0.4 mg) by mouth once daily at bedtime. 90 capsule 3    torsemide (Demadex) 20 mg tablet Take 1 tablet (20 mg) by mouth once daily. 30 tablet 11    torsemide (Demadex) 20 mg tablet Take 1 tablet (20 mg) by mouth once daily.       No current facility-administered medications for this visit.   [3]   Allergies  Allergen Reactions    Lovastatin Myalgia     Leg cramps    Muscle cramps    Simvastatin Myalgia     Muscle cramps    Adhesive  Rash    Naproxen Rash   [4]   Past Surgical History:  Procedure Laterality Date    CAROTID ENDARTERECTOMY  12/11/2021    Carotid Thromboendarterectomy    CORONARY ANGIOPLASTY      INVASIVE VASCULAR PROCEDURE Right 4/22/2025    Procedure: Lower Extremity Angiogram;  Surgeon: Jas Ovalle MD;  Location: [facility];  Service: Vascular Surgery;  Laterality: Right;    INVASIVE VASCULAR PROCEDURE N/A 4/22/2025    Procedure: Angioplasty - Lower Extremity;  Surgeon: Jas Ovalle MD;  Location: [facility];  Service: Vascular Surgery;  Laterality: N/A;    KNEE SURGERY  09/12/2019    Knee Surgery    OTHER SURGICAL HISTORY  01/19/2022    Kidney transplantation    OTHER SURGICAL HISTORY  09/22/2020    Transcath Intravascular Stent Placement Percutaneous Femoral    OTHER SURGICAL HISTORY  01/19/2022    Peritoneal Dialysis Catheter    TOTAL THYROIDECTOMY  12/11/2021    Thyroid Surgery Total Thyroidectomy   [5] No family history on file.  [6]   Social History  Socioeconomic History    Marital status:    Tobacco Use    Smoking status: Former     Types: Cigarettes    Smokeless tobacco: Never   Substance and Sexual Activity    Alcohol use: Yes     Comment: 1 per year    Drug use: Never    Sexual activity: Defer     Social Drivers of Health     Financial Resource Strain: Low Risk  (3/27/2025)    Overall Financial Resource Strain (CARDIA)     Difficulty of Paying Living Expenses: Not hard at all   Food Insecurity: No Food Insecurity (3/27/2025)    Hunger Vital Sign     Worried About Running Out of Food in the Last Year: Never true     Ran Out of Food in the Last Year: Never true   Transportation Needs: No Transportation Needs (4/28/2025)    OASIS : Transportation     Lack of Transportation (Medical): No     Lack of Transportation (Non-Medical): No     Patient Unable or Declines to Respond: No   Physical Activity: Insufficiently Active (3/27/2025)    Exercise Vital Sign     Days of Exercise  per Week: 1 day     Minutes of Exercise per Session: 30 min   Stress: No Stress Concern Present (3/27/2025)    [employer] - Occupational Stress Questionnaire     Feeling of Stress : Not at all   Social Connections: Feeling Socially Integrated (4/28/2025)    OASIS : Social Isolation     Frequency of experiencing loneliness or isolation: Never   Intimate Partner Violence: Not At Risk (3/27/2025)    Humiliation, Afraid, Rape, and Kick questionnaire     Fear of Current or Ex-Partner: No     Emotionally Abused: No     Physically Abused: No     Sexually Abused: No   Housing Stability: Low Risk  (3/27/2025)    Housing Stability Vital Sign     Unable to Pay for Housing in the Last Year: No     Number of Times Moved in the Last Year: 0     Homeless in the Last Year: No      5-Fu Pregnancy And Lactation Text: This medication is Pregnancy Category X and contraindicated in pregnancy and in women who may become pregnant. It is unknown if this medication is excreted in breast milk.

## 2025-05-15 ENCOUNTER — HOSPITAL ENCOUNTER (OUTPATIENT)
Dept: CARDIOLOGY | Facility: CLINIC | Age: 75
Discharge: HOME | End: 2025-05-15
Payer: MEDICARE

## 2025-05-15 DIAGNOSIS — I48.91 UNSPECIFIED ATRIAL FIBRILLATION (MULTI): ICD-10-CM

## 2025-05-15 DIAGNOSIS — I48.92 UNSPECIFIED ATRIAL FLUTTER (MULTI): ICD-10-CM

## 2025-05-16 ENCOUNTER — HOSPITAL ENCOUNTER (OUTPATIENT)
Dept: CARDIOLOGY | Facility: CLINIC | Age: 75
Discharge: HOME | End: 2025-05-16
Payer: MEDICARE

## 2025-05-16 DIAGNOSIS — I48.91 UNSPECIFIED ATRIAL FIBRILLATION (MULTI): ICD-10-CM

## 2025-05-16 DIAGNOSIS — I48.92 UNSPECIFIED ATRIAL FLUTTER (MULTI): ICD-10-CM

## 2025-05-19 ENCOUNTER — HOSPITAL ENCOUNTER (OUTPATIENT)
Dept: CARDIOLOGY | Facility: CLINIC | Age: 75
Discharge: HOME | End: 2025-05-19
Payer: MEDICARE

## 2025-05-19 DIAGNOSIS — I48.92 UNSPECIFIED ATRIAL FLUTTER (MULTI): ICD-10-CM

## 2025-05-19 DIAGNOSIS — I48.91 UNSPECIFIED ATRIAL FIBRILLATION (MULTI): ICD-10-CM

## 2025-05-20 ENCOUNTER — HOSPITAL ENCOUNTER (OUTPATIENT)
Dept: CARDIOLOGY | Facility: CLINIC | Age: 75
Discharge: HOME | End: 2025-05-20
Payer: MEDICARE

## 2025-05-20 DIAGNOSIS — I48.91 UNSPECIFIED ATRIAL FIBRILLATION (MULTI): ICD-10-CM

## 2025-05-20 DIAGNOSIS — I48.92 UNSPECIFIED ATRIAL FLUTTER (MULTI): ICD-10-CM

## 2025-05-27 ENCOUNTER — HOSPITAL ENCOUNTER (OUTPATIENT)
Dept: RADIOLOGY | Facility: HOSPITAL | Age: 75
Discharge: HOME | End: 2025-05-27
Payer: MEDICARE

## 2025-05-27 ENCOUNTER — HOSPITAL ENCOUNTER (OUTPATIENT)
Dept: CARDIOLOGY | Facility: CLINIC | Age: 75
Discharge: HOME | End: 2025-05-27
Payer: MEDICARE

## 2025-05-27 DIAGNOSIS — I73.9 PAD (PERIPHERAL ARTERY DISEASE): ICD-10-CM

## 2025-05-27 DIAGNOSIS — I48.91 UNSPECIFIED ATRIAL FIBRILLATION (MULTI): ICD-10-CM

## 2025-05-27 DIAGNOSIS — I48.92 UNSPECIFIED ATRIAL FLUTTER (MULTI): ICD-10-CM

## 2025-05-27 PROCEDURE — 93298 REM INTERROG DEV EVAL SCRMS: CPT | Performed by: INTERNAL MEDICINE

## 2025-05-27 PROCEDURE — 93922 UPR/L XTREMITY ART 2 LEVELS: CPT | Performed by: INTERNAL MEDICINE

## 2025-05-27 PROCEDURE — 93922 UPR/L XTREMITY ART 2 LEVELS: CPT

## 2025-05-27 PROCEDURE — 93298 REM INTERROG DEV EVAL SCRMS: CPT

## 2025-05-28 ENCOUNTER — APPOINTMENT (OUTPATIENT)
Dept: UROLOGY | Facility: CLINIC | Age: 75
End: 2025-05-28
Payer: MEDICARE

## 2025-05-28 VITALS — DIASTOLIC BLOOD PRESSURE: 68 MMHG | HEART RATE: 65 BPM | SYSTOLIC BLOOD PRESSURE: 159 MMHG

## 2025-05-28 DIAGNOSIS — I50.32 CHRONIC DIASTOLIC CONGESTIVE HEART FAILURE: ICD-10-CM

## 2025-05-28 DIAGNOSIS — I70.221 CRITICAL LIMB ISCHEMIA OF RIGHT LOWER EXTREMITY: ICD-10-CM

## 2025-05-28 DIAGNOSIS — N40.1 BENIGN PROSTATIC HYPERPLASIA WITH URINARY OBSTRUCTION: ICD-10-CM

## 2025-05-28 DIAGNOSIS — I48.0 PAROXYSMAL ATRIAL FIBRILLATION (MULTI): ICD-10-CM

## 2025-05-28 DIAGNOSIS — L97.509: ICD-10-CM

## 2025-05-28 DIAGNOSIS — J44.1 CHRONIC OBSTRUCTIVE PULMONARY DISEASE WITH (ACUTE) EXACERBATION (MULTI): ICD-10-CM

## 2025-05-28 DIAGNOSIS — I48.91 ATRIAL FIBRILLATION, UNSPECIFIED TYPE (MULTI): ICD-10-CM

## 2025-05-28 DIAGNOSIS — N18.32 STAGE 3B CHRONIC KIDNEY DISEASE (MULTI): ICD-10-CM

## 2025-05-28 DIAGNOSIS — M06.9 RHEUMATOID ARTHRITIS, INVOLVING UNSPECIFIED SITE, UNSPECIFIED WHETHER RHEUMATOID FACTOR PRESENT (MULTI): ICD-10-CM

## 2025-05-28 DIAGNOSIS — N39.41 URGENCY INCONTINENCE: ICD-10-CM

## 2025-05-28 DIAGNOSIS — N18.31 STAGE 3A CHRONIC KIDNEY DISEASE (MULTI): ICD-10-CM

## 2025-05-28 DIAGNOSIS — N13.8 BENIGN PROSTATIC HYPERPLASIA WITH URINARY OBSTRUCTION: ICD-10-CM

## 2025-05-28 DIAGNOSIS — Z94.0 KIDNEY REPLACED BY TRANSPLANT (HHS-HCC): Primary | ICD-10-CM

## 2025-05-28 DIAGNOSIS — R31.0 GROSS HEMATURIA: ICD-10-CM

## 2025-05-28 LAB
POC APPEARANCE, URINE: CLEAR
POC BILIRUBIN, URINE: NEGATIVE
POC BLOOD, URINE: ABNORMAL
POC COLOR, URINE: YELLOW
POC GLUCOSE, URINE: ABNORMAL MG/DL
POC KETONES, URINE: NEGATIVE MG/DL
POC LEUKOCYTES, URINE: ABNORMAL
POC NITRITE,URINE: NEGATIVE
POC PH, URINE: 7 PH
POC PROTEIN, URINE: ABNORMAL MG/DL
POC SPECIFIC GRAVITY, URINE: 1.01
POC UROBILINOGEN, URINE: 0.2 EU/DL

## 2025-05-28 PROCEDURE — 81003 URINALYSIS AUTO W/O SCOPE: CPT | Performed by: STUDENT IN AN ORGANIZED HEALTH CARE EDUCATION/TRAINING PROGRAM

## 2025-05-28 PROCEDURE — G2211 COMPLEX E/M VISIT ADD ON: HCPCS | Performed by: STUDENT IN AN ORGANIZED HEALTH CARE EDUCATION/TRAINING PROGRAM

## 2025-05-28 PROCEDURE — 3078F DIAST BP <80 MM HG: CPT | Performed by: STUDENT IN AN ORGANIZED HEALTH CARE EDUCATION/TRAINING PROGRAM

## 2025-05-28 PROCEDURE — 99214 OFFICE O/P EST MOD 30 MIN: CPT | Performed by: STUDENT IN AN ORGANIZED HEALTH CARE EDUCATION/TRAINING PROGRAM

## 2025-05-28 PROCEDURE — 1159F MED LIST DOCD IN RCRD: CPT | Performed by: STUDENT IN AN ORGANIZED HEALTH CARE EDUCATION/TRAINING PROGRAM

## 2025-05-28 PROCEDURE — 3077F SYST BP >= 140 MM HG: CPT | Performed by: STUDENT IN AN ORGANIZED HEALTH CARE EDUCATION/TRAINING PROGRAM

## 2025-05-28 RX ORDER — TAMSULOSIN HYDROCHLORIDE 0.4 MG/1
0.4 CAPSULE ORAL NIGHTLY
Qty: 90 CAPSULE | Refills: 3 | Status: SHIPPED | OUTPATIENT
Start: 2025-05-28

## 2025-05-28 NOTE — PROGRESS NOTES
Scribed for Dr. Michael Montesinos by Taras Carvajal. I, Dr. Michael Montesinos have personally reviewed and agreed with the information entered by the Virtual Scribe. 05/28/25.    ASSESSMENT:  Problem List Items Addressed This Visit       Gross hematuria    Kidney replaced by transplant ([facility]) - Primary     Other Visit Diagnoses         Urgency incontinence        Relevant Orders    POCT UA Automated manually resulted (Completed)      Benign prostatic hyperplasia with urinary obstruction        Relevant Medications    tamsulosin (Flomax) 0.4 mg 24 hr capsule          1. Urinary retention  2. BPH with urinary retention - s/p TURP  3. Gross hematuria - resolved, work-up normal    PLAN:  #BPH ~ s/p TURP (02/24/22)  Remains satisfied with results of his TURP.   Denies any recurrent obstructive urinary issues.   Denotes benefit with continuing tamsulosin daily for his BPH.  Tolerating the medication without side-effects.   Discussed risks of continued use and alternative treatment options.   Patient elects to continue tamsulosin, Rx refill sent to pharmacy.   Will reevaluate plan on an annual basis.    All questions were answered to the patient’s satisfaction.  Patient agrees with the plan and wishes to proceed.  Continue follow-up for ongoing care of his chronic medical conditions.       History of Present Illness (HPI):  Dilip presents for a annual follow up visit.  The patient’s EMR has been reviewed.  Lives in [place], [place].  Accompanied by his wife provides additional history today  Initially referred by Shayy Luciano for UDS    Hx of BPH with urinary retention s/p TURP (02/24/22).     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  Was primarily anuric during this time  Was unable to void following transplant.  Continued ISC 6-8 times daily for a time.  He eventually underwent TURP 2/24/22.  He has since been voiding well on his own.   No longer requiring ISC, continues on tamsulosin with benefit.     TODAY:  (05/28/25)  Reports he has been doing well overall.   Continues to benefit with taking tamsulosin daily.   Primarily helps him reduce his urinary frequency and nocturia.   Denies any side-effects and would like to continue.   Denies any UTI's or gross hematuria in the interim.   No acute or worsening complaints.     TO REVIEW: [02/07/24]  Reports he has been doing well overall.   Continues tamsulosin with good response.   Denies any side-effects and wishes to continue.   Albeit, c/o mild dysuria, concerned he may have UTI.  Typically has 1-2x UTI's per year.   Otherwise denies any gross hematuria, flank pain, fevers or chills.  Notes he underwent colonoscopy about 2 weeks ago.  4x polyps removed.      TO REVIEW: 12/14/2022   Today, the patient reports he is doing well overall. States that his urinary symptoms have improved since being on tamsulosin. Stream remains strong. Reports an isolated incident of enuresis x1 since last visit, otherwise denies any incontinence during the day or night. Denies any recent UTIs or gross hematuria. PVR 0 mL today.     TO REVIEW:  No longer requiring ISC, voiding on his own with a good strong stream  Was having trouble with gross hematuria  Recurred every time he starts his Eliquis   This has finally resolved, no further bleeding  CT urogram confirmed no other cause for bleeding, no clots or other bladder or kidney defects     had UTIs including enterococcus requiring linezolid,   saw ID and was recommended prophylactic antibiotic  is not currently taking macrobid recommended  just started doxycycline for current UTI by Shayy   he was not having symptoms, this was prophylactic for UDS today    UDS testing:  I personally reviewed and interpreted the patient's urodynamic testing. This demonstrated normal bladder capacity. There was multiple small DOs noted during the filling phase. Filling pressures/compliance remained normal. Patient had normal sensation of urgency.   Voiding phase was  significant for no flow or ability to void at all but a normal bell shaped detrusor pressure curve with significant bladder pressures recorded. Postvoid residual was entire volume. Patient's detrusor pressure remained normal/high during urination. EMG activity was synergic.     Is on tamsulosin  still having problems with constipation  no gross hematuria   is not able to void on his own without self cath  denies current UTI symptoms  good appetite     PSA last checked 2019, was 0.84  CT from 2019 personally reviewed: prostate volume 45g        Medical History[1]  Surgical History[2]  Family History[3]  Tobacco Use History[4]  Current Medications[5]  Allergies[6]  Past medical, surgical, family and social history in the chart was reviewed and is accurate including any additions to what is in this HPI.    REVIEW OF SYSTEMS (ROS):   Constitutional: denies any unintentional weight loss or change in strength.  Integumentary: denies any rashes or pruritus.  Eyes: denies any double vision or eye pain.  Ear/Nose/Mouth/Throat: denies any nosebleeds or gum bleeds.  Cardiovascular: denies any chest pain or syncope.  Respiratory: denies hemoptysis.  Gastrointestinal: denies nausea or vomiting.  Musculoskeletal: denies muscle cramping or weakness.  Neurologic: denies convulsions or seizures.  Hematologic/Lymphatic: denies bleeding tendencies.  Endocrine: denies heat/cold intolerance.  All other systems have been reviewed and are negative unless otherwise noted in the HPI.     OBJECTIVE:  Visit Vitals  /68   Pulse 65     PHYSICAL EXAM:  Constitutional: No obvious distress.  Eyes: Non-injected conjunctiva, sclera clear, EOMI.  Ears/Nose/Mouth/Throat: No obvious drainage per ears or nose.  Cardiovascular: Extremities are warm and well perfused. No edema, cyanosis or pallor.  Respiratory: No audible wheezing/stridor; respirations do not appear labored.  Gastrointestinal: Abdomen soft, not distended.  Musculoskeletal: Normal  ROM of extremities.  Skin: No obvious rashes or open sores.  Neurologic: Alert and oriented, CN 2-12 grossly intact.  Psychiatric: Answers questions appropriately with normal affect.  Hematologic/Lymphatic/Immunologic: No obvious bruises or sites of spontaneous bleeding.  Genitourinary: No CVA tenderness, bladder not palpable.     LABS & IMAGING:  Basic Labs:  Lab Results   Component Value Date    WBC 9.1 05/01/2025    HGB 11.4 (L) 05/01/2025    HCT 38.4 (L) 05/01/2025     05/01/2025     05/01/2025    K 4.3 05/01/2025     05/01/2025    ALT 17 03/27/2025    AST 23 03/27/2025    CREATININE 1.62 (H) 05/01/2025    BUN 19 05/01/2025    CO2 26 05/01/2025    TSH 0.69 04/08/2025    INR 1.1 04/22/2025       Scribed for Dr. Michael Montesinos by Taras Carvajal.  By signing my name below, I, Beatrice Victor attest that this documentation has been prepared under the direction and in the presence of Michael Montesinos MD. All medical record entries made by the Scribe were at my direction or personally dictated by me. I have reviewed the chart and agree that the record accurately reflects my personal performance of the history, physical exam, discussion and plan.           [1]   Past Medical History:  Diagnosis Date    CHF (congestive heart failure)     Chronic kidney disease     Encounter for other preprocedural examination 05/10/2020    Pre-transplant evaluation for kidney transplant    Hyperlipidemia     Hypertension     Old myocardial infarction 01/19/2022    H/O non-ST elevation myocardial infarction (NSTEMI)    Personal history of other diseases of the circulatory system 12/22/2015    History of stenosis of renal artery    Personal history of other diseases of the circulatory system 04/13/2021    History of carotid artery stenosis    Personal history of other diseases of the circulatory system 01/19/2022    History of essential hypertension    Personal history of other diseases of the circulatory system  07/23/2016    History of claudication    Personal history of other endocrine, nutritional and metabolic disease 12/22/2015    History of thyroid disease    Raynaud's syndrome without gangrene 12/22/2015    Raynauds disease   [2]   Past Surgical History:  Procedure Laterality Date    CAROTID ENDARTERECTOMY  12/11/2021    Carotid Thromboendarterectomy    CORONARY ANGIOPLASTY      INVASIVE VASCULAR PROCEDURE Right 4/22/2025    Procedure: Lower Extremity Angiogram;  Surgeon: Jas Ovalle MD;  Location: [facility];  Service: Vascular Surgery;  Laterality: Right;    INVASIVE VASCULAR PROCEDURE N/A 4/22/2025    Procedure: Angioplasty - Lower Extremity;  Surgeon: Jas Ovalle MD;  Location: [facility];  Service: Vascular Surgery;  Laterality: N/A;    KNEE SURGERY  09/12/2019    Knee Surgery    OTHER SURGICAL HISTORY  01/19/2022    Kidney transplantation    OTHER SURGICAL HISTORY  09/22/2020    Transcath Intravascular Stent Placement Percutaneous Femoral    OTHER SURGICAL HISTORY  01/19/2022    Peritoneal Dialysis Catheter    TOTAL THYROIDECTOMY  12/11/2021    Thyroid Surgery Total Thyroidectomy   [3] No family history on file.  [4]   Social History  Tobacco Use   Smoking Status Former    Types: Cigarettes   Smokeless Tobacco Never   [5]   Current Outpatient Medications   Medication Sig Dispense Refill    amLODIPine (Norvasc) 10 mg tablet Take 1 tablet (10 mg) by mouth once daily.      Eliquis 2.5 mg tablet TAKE 1 TABLET BY MOUTH TWICE A DAY 60 tablet 10    empagliflozin (Jardiance) 10 mg Take 1 tablet (10 mg) by mouth once daily. 30 tablet 11    famotidine (Pepcid) 20 mg tablet TAKE 1 TABLET BY MOUTH EVERY DAY AS DIRECTED 90 tablet 3    fluticasone (Flonase) 50 mcg/actuation nasal spray Administer 2 sprays into each nostril once daily as needed for allergies.      levothyroxine (Synthroid, Levoxyl) 137 mcg tablet Take 1 tablet (137 mcg) by mouth once daily.      losartan (Cozaar) 25 mg  tablet TAKE 1 TABLET BY MOUTH EVERY DAY 90 tablet 1    methocarbamol (Robaxin) 500 mg tablet Take 1 tablet (500 mg) by mouth every 8 hours if needed for muscle spasms.      metoprolol tartrate (Lopressor) 50 mg tablet TAKE 1 TABLET BY MOUTH TWICE A  tablet 3    multivitamin tablet Take 1 tablet by mouth once daily.      mycophenolate (Cellcept) 250 mg capsule Take 3 capsules (750 mg) by mouth 2 times a day. 540 capsule 3    nitroglycerin (Nitrostat) 0.4 mg SL tablet Place 1 tablet (0.4 mg) under the tongue every 5 minutes if needed for chest pain.      pravastatin (Pravachol) 40 mg tablet TAKE 1 TABLET BY MOUTH EVERYDAY AT BEDTIME 90 tablet 3    predniSONE (Deltasone) 5 mg tablet TAKE 1 TABLET BY MOUTH EVERY DAY 30 tablet 23    tamsulosin (Flomax) 0.4 mg 24 hr capsule Take 1 capsule (0.4 mg) by mouth once daily at bedtime. 90 capsule 3    torsemide (Demadex) 20 mg tablet Take 1 tablet (20 mg) by mouth once daily. 30 tablet 11    torsemide (Demadex) 20 mg tablet Take 1 tablet (20 mg) by mouth once daily.      hydrALAZINE (Apresoline) 100 mg tablet Take 1 tablet (100 mg) by mouth 3 times a day. (Patient not taking: Reported on 4/30/2025) 90 tablet 0     No current facility-administered medications for this visit.   [6]   Allergies  Allergen Reactions    Lovastatin Myalgia     Leg cramps    Muscle cramps    Simvastatin Myalgia     Muscle cramps    Adhesive Rash    Naproxen Rash

## 2025-05-29 ENCOUNTER — OFFICE VISIT (OUTPATIENT)
Dept: VASCULAR SURGERY | Facility: CLINIC | Age: 75
End: 2025-05-29
Payer: MEDICARE

## 2025-05-29 ENCOUNTER — HOSPITAL ENCOUNTER (OUTPATIENT)
Dept: CARDIOLOGY | Facility: CLINIC | Age: 75
Discharge: HOME | End: 2025-05-29
Payer: MEDICARE

## 2025-05-29 VITALS
SYSTOLIC BLOOD PRESSURE: 141 MMHG | DIASTOLIC BLOOD PRESSURE: 58 MMHG | WEIGHT: 166 LBS | TEMPERATURE: 97.1 F | BODY MASS INDEX: 23.24 KG/M2 | HEIGHT: 71 IN | HEART RATE: 65 BPM | RESPIRATION RATE: 16 BRPM

## 2025-05-29 DIAGNOSIS — I48.91 UNSPECIFIED ATRIAL FIBRILLATION (MULTI): ICD-10-CM

## 2025-05-29 DIAGNOSIS — I48.92 UNSPECIFIED ATRIAL FLUTTER (MULTI): ICD-10-CM

## 2025-05-29 DIAGNOSIS — I73.9 PAD (PERIPHERAL ARTERY DISEASE): Primary | ICD-10-CM

## 2025-05-29 PROBLEM — J44.1 CHRONIC OBSTRUCTIVE PULMONARY DISEASE WITH (ACUTE) EXACERBATION (MULTI): Status: ACTIVE | Noted: 2025-05-29

## 2025-05-29 PROBLEM — N18.32 STAGE 3B CHRONIC KIDNEY DISEASE (MULTI): Status: ACTIVE | Noted: 2025-05-29

## 2025-05-29 PROCEDURE — 3077F SYST BP >= 140 MM HG: CPT | Performed by: SURGERY

## 2025-05-29 PROCEDURE — 3078F DIAST BP <80 MM HG: CPT | Performed by: SURGERY

## 2025-05-29 PROCEDURE — 99213 OFFICE O/P EST LOW 20 MIN: CPT | Performed by: SURGERY

## 2025-05-29 PROCEDURE — 1036F TOBACCO NON-USER: CPT | Performed by: SURGERY

## 2025-05-29 PROCEDURE — 1159F MED LIST DOCD IN RCRD: CPT | Performed by: SURGERY

## 2025-05-29 NOTE — PROGRESS NOTES
Vascular Note    Doing well s/p RLE revacularization including iliofemoral reconstruction and subsequent SFA recanalization. Right great toe is healing. PEDRO PABLO 0.9 on the right. He has a strong right PT pulse today and foot is warm.  I advised him to get some lambs wool or toe separators to put in between the first and second toes.  RTC 3 mo with PEDRO PABLO.    Jas Ovalle MD

## 2025-06-02 ENCOUNTER — HOSPITAL ENCOUNTER (OUTPATIENT)
Dept: CARDIOLOGY | Facility: CLINIC | Age: 75
Discharge: HOME | End: 2025-06-02
Payer: MEDICARE

## 2025-06-02 DIAGNOSIS — I48.91 ATRIAL FIBRILLATION, UNSPECIFIED TYPE (MULTI): ICD-10-CM

## 2025-06-03 ENCOUNTER — OFFICE VISIT (OUTPATIENT)
Dept: PODIATRY | Facility: CLINIC | Age: 75
End: 2025-06-03
Payer: MEDICARE

## 2025-06-03 VITALS
HEART RATE: 76 BPM | HEIGHT: 71 IN | OXYGEN SATURATION: 94 % | WEIGHT: 165 LBS | TEMPERATURE: 97.5 F | BODY MASS INDEX: 23.1 KG/M2 | DIASTOLIC BLOOD PRESSURE: 69 MMHG | SYSTOLIC BLOOD PRESSURE: 146 MMHG

## 2025-06-03 DIAGNOSIS — M79.671 PAIN IN RIGHT FOOT: ICD-10-CM

## 2025-06-03 DIAGNOSIS — I96 GANGRENE (MULTI): Primary | ICD-10-CM

## 2025-06-03 DIAGNOSIS — I73.9 PAD (PERIPHERAL ARTERY DISEASE): ICD-10-CM

## 2025-06-03 PROCEDURE — 99213 OFFICE O/P EST LOW 20 MIN: CPT | Performed by: PODIATRIST

## 2025-06-03 ASSESSMENT — PATIENT HEALTH QUESTIONNAIRE - PHQ9
SUM OF ALL RESPONSES TO PHQ9 QUESTIONS 1 AND 2: 0
1. LITTLE INTEREST OR PLEASURE IN DOING THINGS: NOT AT ALL
2. FEELING DOWN, DEPRESSED OR HOPELESS: NOT AT ALL

## 2025-06-03 NOTE — PROGRESS NOTES
Chief Complaint:   Chief Complaint   Patient presents with    Foot Pain           HPI:  This 74 y.o. male with PMH indicated below presents for close follow-up of gangrenous changes right foot after revascularization.   Per historical purposes, patient was brought to [facility] on March 26 where he was noted to have CL TI with an ischemic foot.  Patient was transferred downtown where he underwent an angiogram which showed right SFA occlusion with CFA disease.  Patient was then transferred to the ICU for postprocedural hypertension and chest pain.  Patient was told that he would need a right femoral PT bypass.  On 4/7 patient underwent femoral endarterectomy with right internal and iliac arterial stents.  Dr. Flores was the podiatric physician taking care of this patient while downtown.  Patient was discharged and wanted to follow-up closer to home and presents today for evaluation of tissue loss of the right foot after reVASc.  Patient did recently have a another angiogram on the right side with stenting by Dr. Ovalle on 4/22.  Patient had recent vascular follow-up with adequate perfusion noted with new PEDRO PABLO.   Patient has been closely followed by vascular and podiatry and his skin continues to show demarcation with new underlying intact tissue.  Patient denies any drainage or new wounds.  He has been painting the area as daily with Betadine.  He says his pain is significantly improved and well-controlled.  He has been ambulating as tolerated in a surgical shoe.  He denies any constitutional symptoms at this time.  No other pedal complaints.      PCP:  Steve Aguilera MD:    [facility]  Medical History[1]:    MEDICATIONS  Current Medications[2]:  Allergies[3]:  Surgical History[4]  Family History[5]:  Social History[6]    REVIEW OF SYSTEMS    All negative except what is listed in HPI    Physical Exam:       Patient is alert and oriented x 3 in NAD    Vascular:   Faintly palpable palpable Dorsalis Pedis and Posterior  Tibial Pulses B/L  Capillary Fill time < 3 seconds to digits 1-5 B/L  Skin temperature warm to warm tibial tuberosity to the digits B/L  Mild pitting edema to right lower extremity.  No varicosities    Neurological:   Intact light touch/epicritic sensation B/L.  No evidence of decreased protective sensation noted.      Dermatological:   Skin appears diffusely xerotic with post revascularization edema and shedding of the skin right lower extremity.  No significant erythema or local signs of infection noted.  Small areas of eschar noted to the right hallux and distal right second digit.  Edges of the eschar were easily retracted and able to be removed gently with forceps.  Underlying tissue is well-healed and intact with no underlying wounds.  Nails 1 through 5 bilaterally are thickened, elongated with subungual debris to the left foot.  Overall significant improvements of new skin with decreased eschar formation and removal of peeling skin with new epithelialized tissue.       Musculoskeletal/Orthopaedic:     +4/5 muscle strength Dorsiflexion, Plantarflexion, Inversion, Eversion B/L  ROM of the 1st MTPJ is decreased without pain or crepitus to the left with some pain with guarding to the right due to revascularization pain.  ROM of the MTJ/STJ is decreased without pain or crepitus b/l.  Ankle joint ROM is decreased  B/L        ASSESSMENT:   1. Gangrene (Multi)        2. Pain in right foot        3. PAD (peripheral artery disease)                          Plan:    - Follow-up visit   - Etiology and treatment options were discussed with the patient.  -Patient examined and evaluated  - Peeling skin gently removed using pickups.  - Evidence of newly revascularized skin and epithelialized skin noted.  - No local signs infection.  - Overall improvement with edema.  - Patient may start using Aquaphor to the noneschar areas.  - Patient to continue to monitor for changes in the surrounding tissue.  - Patient may transition to  normal shoe with no wounds present at this time.  - Discussed with patient that a few small areas of remaining eschar will slough off over the next several days to weeks.  - Patient to follow-up in 3 months for routine care.  - Patient encouraged to follow-up closely with vascular as well    Tamara Linn DPM    A total of 20 minutes was spent in formulation of this note, review of charts, labs,  imaging with a minimum of 50% of the time spent in face to face with with the patient.  All questions and concerns were answered to the patients satisfaction.       Off note, use of vocal Dragon dictation system was used to dictate this document.  All proper spelling and grammatical errors may not have been corrected prior to final submission.             [1]   Past Medical History:  Diagnosis Date    CHF (congestive heart failure)     Chronic kidney disease     Encounter for other preprocedural examination 05/10/2020    Pre-transplant evaluation for kidney transplant    Hyperlipidemia     Hypertension     Old myocardial infarction 01/19/2022    H/O non-ST elevation myocardial infarction (NSTEMI)    Personal history of other diseases of the circulatory system 12/22/2015    History of stenosis of renal artery    Personal history of other diseases of the circulatory system 04/13/2021    History of carotid artery stenosis    Personal history of other diseases of the circulatory system 01/19/2022    History of essential hypertension    Personal history of other diseases of the circulatory system 07/23/2016    History of claudication    Personal history of other endocrine, nutritional and metabolic disease 12/22/2015    History of thyroid disease    Raynaud's syndrome without gangrene 12/22/2015    Raynauds disease   [2]   Current Outpatient Medications   Medication Sig Dispense Refill    amLODIPine (Norvasc) 10 mg tablet Take 1 tablet (10 mg) by mouth once daily.      Eliquis 2.5 mg tablet TAKE 1 TABLET BY MOUTH TWICE A  DAY 60 tablet 10    empagliflozin (Jardiance) 10 mg Take 1 tablet (10 mg) by mouth once daily. 30 tablet 11    famotidine (Pepcid) 20 mg tablet TAKE 1 TABLET BY MOUTH EVERY DAY AS DIRECTED 90 tablet 3    fluticasone (Flonase) 50 mcg/actuation nasal spray Administer 2 sprays into each nostril once daily as needed for allergies.      hydrALAZINE (Apresoline) 100 mg tablet Take 1 tablet (100 mg) by mouth 3 times a day. (Patient not taking: Reported on 4/30/2025) 90 tablet 0    levothyroxine (Synthroid, Levoxyl) 137 mcg tablet Take 1 tablet (137 mcg) by mouth once daily.      losartan (Cozaar) 25 mg tablet TAKE 1 TABLET BY MOUTH EVERY DAY 90 tablet 1    methocarbamol (Robaxin) 500 mg tablet Take 1 tablet (500 mg) by mouth every 8 hours if needed for muscle spasms.      metoprolol tartrate (Lopressor) 50 mg tablet TAKE 1 TABLET BY MOUTH TWICE A  tablet 3    multivitamin tablet Take 1 tablet by mouth once daily.      mycophenolate (Cellcept) 250 mg capsule Take 3 capsules (750 mg) by mouth 2 times a day. 540 capsule 3    nitroglycerin (Nitrostat) 0.4 mg SL tablet Place 1 tablet (0.4 mg) under the tongue every 5 minutes if needed for chest pain.      pravastatin (Pravachol) 40 mg tablet TAKE 1 TABLET BY MOUTH EVERYDAY AT BEDTIME 90 tablet 3    predniSONE (Deltasone) 5 mg tablet TAKE 1 TABLET BY MOUTH EVERY DAY 30 tablet 23    tamsulosin (Flomax) 0.4 mg 24 hr capsule Take 1 capsule (0.4 mg) by mouth once daily at bedtime. 90 capsule 3    torsemide (Demadex) 20 mg tablet Take 1 tablet (20 mg) by mouth once daily. 30 tablet 11    torsemide (Demadex) 20 mg tablet Take 1 tablet (20 mg) by mouth once daily.       No current facility-administered medications for this visit.   [3]   Allergies  Allergen Reactions    Lovastatin Myalgia     Leg cramps    Muscle cramps    Simvastatin Myalgia     Muscle cramps    Adhesive Rash    Naproxen Rash   [4]   Past Surgical History:  Procedure Laterality Date    CAROTID ENDARTERECTOMY   12/11/2021    Carotid Thromboendarterectomy    CORONARY ANGIOPLASTY      INVASIVE VASCULAR PROCEDURE Right 4/22/2025    Procedure: Lower Extremity Angiogram;  Surgeon: Jas Ovalle MD;  Location: [facility];  Service: Vascular Surgery;  Laterality: Right;    INVASIVE VASCULAR PROCEDURE N/A 4/22/2025    Procedure: Angioplasty - Lower Extremity;  Surgeon: Jas Ovalle MD;  Location: [facility];  Service: Vascular Surgery;  Laterality: N/A;    KNEE SURGERY  09/12/2019    Knee Surgery    OTHER SURGICAL HISTORY  01/19/2022    Kidney transplantation    OTHER SURGICAL HISTORY  09/22/2020    Transcath Intravascular Stent Placement Percutaneous Femoral    OTHER SURGICAL HISTORY  01/19/2022    Peritoneal Dialysis Catheter    TOTAL THYROIDECTOMY  12/11/2021    Thyroid Surgery Total Thyroidectomy   [5] No family history on file.  [6]   Social History  Socioeconomic History    Marital status:    Tobacco Use    Smoking status: Former     Types: Cigarettes    Smokeless tobacco: Never   Substance and Sexual Activity    Alcohol use: Yes     Comment: 1 per year    Drug use: Never    Sexual activity: Defer     Social Drivers of Health     Financial Resource Strain: Low Risk  (3/27/2025)    Overall Financial Resource Strain (CARDIA)     Difficulty of Paying Living Expenses: Not hard at all   Food Insecurity: No Food Insecurity (3/27/2025)    Hunger Vital Sign     Worried About Running Out of Food in the Last Year: Never true     Ran Out of Food in the Last Year: Never true   Transportation Needs: No Transportation Needs (4/28/2025)    OASIS : Transportation     Lack of Transportation (Medical): No     Lack of Transportation (Non-Medical): No     Patient Unable or Declines to Respond: No   Physical Activity: Insufficiently Active (3/27/2025)    Exercise Vital Sign     Days of Exercise per Week: 1 day     Minutes of Exercise per Session: 30 min   Stress: No Stress Concern Present (3/27/2025)     [facility] - Occupational Stress Questionnaire     Feeling of Stress : Not at all   Social Connections: Feeling Socially Integrated (4/28/2025)    OASIS : Social Isolation     Frequency of experiencing loneliness or isolation: Never   Intimate Partner Violence: Not At Risk (3/27/2025)    Humiliation, Afraid, Rape, and Kick questionnaire     Fear of Current or Ex-Partner: No     Emotionally Abused: No     Physically Abused: No     Sexually Abused: No   Housing Stability: Low Risk  (3/27/2025)    Housing Stability Vital Sign     Unable to Pay for Housing in the Last Year: No     Number of Times Moved in the Last Year: 0     Homeless in the Last Year: No

## 2025-06-06 ENCOUNTER — HOSPITAL ENCOUNTER (OUTPATIENT)
Dept: CARDIOLOGY | Facility: CLINIC | Age: 75
Discharge: HOME | End: 2025-06-06
Payer: MEDICARE

## 2025-06-06 DIAGNOSIS — I48.92 UNSPECIFIED ATRIAL FLUTTER (MULTI): ICD-10-CM

## 2025-06-06 DIAGNOSIS — I48.91 UNSPECIFIED ATRIAL FIBRILLATION (MULTI): ICD-10-CM

## 2025-06-09 ENCOUNTER — HOSPITAL ENCOUNTER (OUTPATIENT)
Dept: CARDIOLOGY | Facility: CLINIC | Age: 75
Discharge: HOME | End: 2025-06-09
Payer: MEDICARE

## 2025-06-09 DIAGNOSIS — I48.91 ATRIAL FIBRILLATION, UNSPECIFIED TYPE (MULTI): ICD-10-CM

## 2025-06-10 ENCOUNTER — APPOINTMENT (OUTPATIENT)
Dept: ENDOCRINOLOGY | Facility: CLINIC | Age: 75
End: 2025-06-10
Payer: MEDICARE

## 2025-06-10 ENCOUNTER — HOSPITAL ENCOUNTER (OUTPATIENT)
Dept: CARDIOLOGY | Facility: CLINIC | Age: 75
Discharge: HOME | End: 2025-06-10
Payer: MEDICARE

## 2025-06-10 DIAGNOSIS — I48.91 ATRIAL FIBRILLATION, UNSPECIFIED TYPE (MULTI): ICD-10-CM

## 2025-06-12 ENCOUNTER — HOSPITAL ENCOUNTER (OUTPATIENT)
Dept: CARDIOLOGY | Facility: CLINIC | Age: 75
Discharge: HOME | End: 2025-06-12
Payer: MEDICARE

## 2025-06-12 DIAGNOSIS — I48.91 ATRIAL FIBRILLATION, UNSPECIFIED TYPE (MULTI): ICD-10-CM

## 2025-06-13 ENCOUNTER — HOSPITAL ENCOUNTER (OUTPATIENT)
Dept: CARDIOLOGY | Facility: CLINIC | Age: 75
Discharge: HOME | End: 2025-06-13
Payer: MEDICARE

## 2025-06-13 DIAGNOSIS — I48.91 ATRIAL FIBRILLATION, UNSPECIFIED TYPE (MULTI): ICD-10-CM

## 2025-06-16 ENCOUNTER — HOSPITAL ENCOUNTER (OUTPATIENT)
Dept: CARDIOLOGY | Facility: CLINIC | Age: 75
Discharge: HOME | End: 2025-06-16
Payer: MEDICARE

## 2025-06-16 DIAGNOSIS — I48.91 ATRIAL FIBRILLATION, UNSPECIFIED TYPE (MULTI): ICD-10-CM

## 2025-06-23 ENCOUNTER — HOSPITAL ENCOUNTER (OUTPATIENT)
Dept: CARDIOLOGY | Facility: CLINIC | Age: 75
Discharge: HOME | End: 2025-06-23
Payer: MEDICARE

## 2025-06-23 DIAGNOSIS — I48.91 ATRIAL FIBRILLATION, UNSPECIFIED TYPE (MULTI): ICD-10-CM

## 2025-06-30 ENCOUNTER — HOSPITAL ENCOUNTER (OUTPATIENT)
Dept: CARDIOLOGY | Facility: CLINIC | Age: 75
Discharge: HOME | End: 2025-06-30
Payer: MEDICARE

## 2025-06-30 DIAGNOSIS — I48.91 ATRIAL FIBRILLATION, UNSPECIFIED TYPE (MULTI): ICD-10-CM

## 2025-06-30 PROCEDURE — 93298 REM INTERROG DEV EVAL SCRMS: CPT

## 2025-07-07 ENCOUNTER — HOSPITAL ENCOUNTER (OUTPATIENT)
Dept: CARDIOLOGY | Facility: CLINIC | Age: 75
Discharge: HOME | End: 2025-07-07
Payer: MEDICARE

## 2025-07-07 DIAGNOSIS — I48.91 ATRIAL FIBRILLATION, UNSPECIFIED TYPE (MULTI): ICD-10-CM

## 2025-07-08 ENCOUNTER — HOSPITAL ENCOUNTER (OUTPATIENT)
Dept: CARDIOLOGY | Facility: CLINIC | Age: 75
Discharge: HOME | End: 2025-07-08
Payer: MEDICARE

## 2025-07-08 ENCOUNTER — APPOINTMENT (OUTPATIENT)
Dept: PODIATRY | Facility: CLINIC | Age: 75
End: 2025-07-08
Payer: MEDICARE

## 2025-07-08 DIAGNOSIS — I48.91 ATRIAL FIBRILLATION, UNSPECIFIED TYPE (MULTI): ICD-10-CM

## 2025-07-10 ENCOUNTER — HOSPITAL ENCOUNTER (OUTPATIENT)
Dept: CARDIOLOGY | Facility: CLINIC | Age: 75
Discharge: HOME | End: 2025-07-10
Payer: MEDICARE

## 2025-07-10 DIAGNOSIS — I48.91 ATRIAL FIBRILLATION, UNSPECIFIED TYPE (MULTI): ICD-10-CM

## 2025-07-11 ENCOUNTER — HOSPITAL ENCOUNTER (OUTPATIENT)
Dept: CARDIOLOGY | Facility: CLINIC | Age: 75
Discharge: HOME | End: 2025-07-11
Payer: MEDICARE

## 2025-07-11 DIAGNOSIS — I48.91 ATRIAL FIBRILLATION, UNSPECIFIED TYPE (MULTI): ICD-10-CM

## 2025-07-11 PROCEDURE — 93298 REM INTERROG DEV EVAL SCRMS: CPT

## 2025-07-14 ENCOUNTER — HOSPITAL ENCOUNTER (OUTPATIENT)
Dept: CARDIOLOGY | Facility: CLINIC | Age: 75
Discharge: HOME | End: 2025-07-14
Payer: MEDICARE

## 2025-07-14 ENCOUNTER — TELEPHONE (OUTPATIENT)
Facility: HOSPITAL | Age: 75
End: 2025-07-14
Payer: MEDICARE

## 2025-07-14 DIAGNOSIS — Z92.25 PERSONAL HISTORY OF IMMUNOSUPRESSION THERAPY: ICD-10-CM

## 2025-07-14 DIAGNOSIS — R06.02 SHORTNESS OF BREATH: ICD-10-CM

## 2025-07-14 DIAGNOSIS — N18.9 CHRONIC KIDNEY DISEASE, UNSPECIFIED CKD STAGE: Chronic | ICD-10-CM

## 2025-07-14 DIAGNOSIS — Z94.0 KIDNEY REPLACED BY TRANSPLANT (HHS-HCC): ICD-10-CM

## 2025-07-14 DIAGNOSIS — I48.91 ATRIAL FIBRILLATION, UNSPECIFIED TYPE (MULTI): ICD-10-CM

## 2025-07-14 DIAGNOSIS — Z95.818 PRESENCE OF CARDIAC DEVICE: ICD-10-CM

## 2025-07-14 RX ORDER — HYDRALAZINE HYDROCHLORIDE 100 MG/1
100 TABLET, FILM COATED ORAL 3 TIMES DAILY
Qty: 90 TABLET | Refills: 11 | Status: SHIPPED | OUTPATIENT
Start: 2025-07-14 | End: 2025-07-17 | Stop reason: ALTCHOICE

## 2025-07-14 NOTE — TELEPHONE ENCOUNTER
Patient messaged that after he was discharged from the hospital his Envarsus 1mg (takes 2mg daily) was discontinued. New order sent to patients [facility] and dose reviewed with patient from clinic instruction 4/30 with Dr. Ramirez.

## 2025-07-17 ENCOUNTER — TELEPHONE (OUTPATIENT)
Facility: HOSPITAL | Age: 75
End: 2025-07-17
Payer: MEDICARE

## 2025-07-17 ENCOUNTER — OFFICE VISIT (OUTPATIENT)
Dept: CARDIOLOGY | Facility: HOSPITAL | Age: 75
End: 2025-07-17
Payer: MEDICARE

## 2025-07-17 ENCOUNTER — TELEPHONE (OUTPATIENT)
Facility: HOSPITAL | Age: 75
End: 2025-07-17

## 2025-07-17 VITALS
BODY MASS INDEX: 23.1 KG/M2 | DIASTOLIC BLOOD PRESSURE: 69 MMHG | WEIGHT: 165 LBS | SYSTOLIC BLOOD PRESSURE: 125 MMHG | HEART RATE: 80 BPM | HEIGHT: 71 IN | OXYGEN SATURATION: 96 %

## 2025-07-17 DIAGNOSIS — I48.91 UNSPECIFIED ATRIAL FIBRILLATION (MULTI): ICD-10-CM

## 2025-07-17 DIAGNOSIS — I50.32 CHRONIC DIASTOLIC CONGESTIVE HEART FAILURE: ICD-10-CM

## 2025-07-17 DIAGNOSIS — R22.43 LOCALIZED SWELLING OF BOTH LOWER LEGS: ICD-10-CM

## 2025-07-17 DIAGNOSIS — I50.32 CHRONIC HEART FAILURE WITH PRESERVED EJECTION FRACTION: Primary | ICD-10-CM

## 2025-07-17 DIAGNOSIS — Z94.0 KIDNEY REPLACED BY TRANSPLANT (HHS-HCC): ICD-10-CM

## 2025-07-17 DIAGNOSIS — I10 ESSENTIAL HYPERTENSION: ICD-10-CM

## 2025-07-17 DIAGNOSIS — Z95.818 PRESENCE OF CARDIAC DEVICE: ICD-10-CM

## 2025-07-17 DIAGNOSIS — I10 HYPERTENSION, UNSPECIFIED TYPE: ICD-10-CM

## 2025-07-17 DIAGNOSIS — R06.02 SHORTNESS OF BREATH: ICD-10-CM

## 2025-07-17 DIAGNOSIS — I25.10 ATHEROSCLEROTIC HEART DISEASE OF NATIVE CORONARY ARTERY WITHOUT ANGINA PECTORIS: ICD-10-CM

## 2025-07-17 PROCEDURE — 1159F MED LIST DOCD IN RCRD: CPT | Performed by: STUDENT IN AN ORGANIZED HEALTH CARE EDUCATION/TRAINING PROGRAM

## 2025-07-17 PROCEDURE — 3078F DIAST BP <80 MM HG: CPT | Performed by: STUDENT IN AN ORGANIZED HEALTH CARE EDUCATION/TRAINING PROGRAM

## 2025-07-17 PROCEDURE — 99215 OFFICE O/P EST HI 40 MIN: CPT | Performed by: STUDENT IN AN ORGANIZED HEALTH CARE EDUCATION/TRAINING PROGRAM

## 2025-07-17 PROCEDURE — 99212 OFFICE O/P EST SF 10 MIN: CPT

## 2025-07-17 PROCEDURE — 1126F AMNT PAIN NOTED NONE PRSNT: CPT | Performed by: STUDENT IN AN ORGANIZED HEALTH CARE EDUCATION/TRAINING PROGRAM

## 2025-07-17 PROCEDURE — 3074F SYST BP LT 130 MM HG: CPT | Performed by: STUDENT IN AN ORGANIZED HEALTH CARE EDUCATION/TRAINING PROGRAM

## 2025-07-17 RX ORDER — EPLERENONE 25 MG/1
25 TABLET ORAL DAILY
Qty: 90 TABLET | Refills: 3 | Status: SHIPPED | OUTPATIENT
Start: 2025-07-17

## 2025-07-17 RX ORDER — PRAVASTATIN SODIUM 40 MG/1
40 TABLET ORAL NIGHTLY
Qty: 30 TABLET | Refills: 11 | Status: SHIPPED | OUTPATIENT
Start: 2025-07-17 | End: 2026-07-17

## 2025-07-17 RX ORDER — AMLODIPINE BESYLATE 10 MG/1
10 TABLET ORAL DAILY
Qty: 30 TABLET | Refills: 11 | Status: SHIPPED | OUTPATIENT
Start: 2025-07-17 | End: 2026-07-17

## 2025-07-17 RX ORDER — LOSARTAN POTASSIUM 25 MG/1
25 TABLET ORAL DAILY
Qty: 30 TABLET | Refills: 11 | Status: SHIPPED | OUTPATIENT
Start: 2025-07-17 | End: 2026-07-17

## 2025-07-17 RX ORDER — METOPROLOL TARTRATE 50 MG/1
50 TABLET ORAL 2 TIMES DAILY
Qty: 60 TABLET | Refills: 11 | Status: SHIPPED | OUTPATIENT
Start: 2025-07-17 | End: 2026-07-17

## 2025-07-17 RX ORDER — TORSEMIDE 20 MG/1
20 TABLET ORAL DAILY
Qty: 30 TABLET | Refills: 11 | Status: SHIPPED | OUTPATIENT
Start: 2025-07-17 | End: 2026-07-17

## 2025-07-17 ASSESSMENT — ENCOUNTER SYMPTOMS
VOMITING: 0
DEPRESSION: 0
FEVER: 0
LIGHT-HEADEDNESS: 0
NAUSEA: 0
WEAKNESS: 0
COUGH: 0
CHILLS: 0
WEIGHT GAIN: 0
PALPITATIONS: 0
HEADACHES: 0
OCCASIONAL FEELINGS OF UNSTEADINESS: 1
EXCESSIVE DAYTIME SLEEPINESS: 1
DYSPNEA ON EXERTION: 0
FREQUENCY: 0
LOSS OF SENSATION IN FEET: 0
DIARRHEA: 0
CONSTIPATION: 0
DIZZINESS: 0
WEIGHT LOSS: 0
IRREGULAR HEARTBEAT: 0
SHORTNESS OF BREATH: 0
WHEEZING: 0

## 2025-07-17 ASSESSMENT — PAIN SCALES - GENERAL: PAINLEVEL_OUTOF10: 0-NO PAIN

## 2025-07-17 NOTE — TELEPHONE ENCOUNTER
Call placed to [facility] medication has arrived and patient can .   Discussed with Dr. Moreira  Patient to repeat labs in 1 week with cabrera and HLA     Called patient asked him to please get Envarsus today and start dose this afternoon and tomorrow resume his normal scheduled time of taking.   To repeat labs next Thursday the 24th with allosure.     Lab orders placed   Email sent to [facility] asking for kit delivery.

## 2025-07-17 NOTE — PATIENT INSTRUCTIONS
Thank you for coming in today. If you have any questions or concerns, you may call the Heart Failure Office at [phone] option [phone], or [phone].  You may also contact our heart failure nursing team via email on [email or web address].    For quicker results set-up your  [employer] account to receive results and other correspondence directly to your phone.    Please bring all your pills/medications to your Cardiology appointments.    **  - Please call your kidney transplant coordinator TODAY and let them know of your trouble getting Envarsus    -We will renew your heart failure medications today    - Please make the following medication changes:  1.  START eplerenone 25 mg once daily    2. STOP Hydralazine    - Please have the following tests done:  1.Blood tests in 1 week (RFP, BNP)    2.  Blood test in 4 weeks (RFP, BNP)    - Please make an appointment to be seen in 4 months

## 2025-07-17 NOTE — TELEPHONE ENCOUNTER
Notified by Dr. Linn patient being seen by her today and informed her he has been out of Envarsus for 1 week.     Call placed to [facility] pharmacy, states they had to order the Envarsus and shipment due to arrive approx. 3 pm today.     Will discuss with nephrologist patient being out of envarsus for 1 week and what labs he would like and if more testing needed.     Will call pharmacy this afternoon to ensure medication received and patient picks up.

## 2025-07-17 NOTE — PROGRESS NOTES
"Accompanied by: wife     Chief Complaint: HFpEF care     History of Present Illness     74 y.o. man with history of vascular disease with obstructive CAD s/p PCI mRCA in-stent restenosis in 5/25/2016, renal artery stenosis, femoral artery stenosis and carotid stenosis all s/p intervention, HFrimprovedEF, atrial fibrillation s/p DCCV 6/2020 and is maintained on DOAC. he is s/p DDKT on 5/5/2020.  TTE 12/10/2021 demonstrates normal left ventricular systolic function 55% with mild to moderate RV systolic impairment. 12/13/2021 stress evaluation c/f inducible ischaemia. He had a positive stress test 12/2021 and is now status post left heart catheterization 12/28/2021 which demonstrated known midLCx  with collateral flow, and otherwise mild CAD. He will be maintained on aspirin and pravastatin for CAD. He was hospitalized 2/2023 with sudden onset dyspnea. COVID and influenza were negative. On left heart catheterization 2/2023 hisLCx CTA was again visualized andrPDA 100% occlusion was also noted. Recommendation was made by the cardiology team for medical management. TTE 2/2023 LVEF 60 -65% with normal biventricular systolic function, mild mitral regurgitation.   He is s/p RLE revacularization including iliofemoral reconstruction and subsequent SFA recanalization 4/2025 ( Dr Ovalle).  He does have normal biventricular systolic function with LVEF 63%. There was no evidence of i LV or RV thrombus. Normal valvular function.TTE 3/2025 demonstrated normal LV systolic function with LVEF ~60%.    Functionally he can walk  around the stores and this improved.     He has been off Hydrlazine  as it made him feel \" little bit SOB and I didn't feel right\"     Symptomatically he denies any other cardiovascular complaints today, except for leg swelling.     Review of Systems   Constitutional: Negative for chills, fever, weight gain and weight loss.   Cardiovascular:  Positive for leg swelling. Negative for chest pain, dyspnea on " exertion, irregular heartbeat and palpitations.   Respiratory:  Negative for cough, shortness of breath and wheezing.    Gastrointestinal:  Negative for constipation, diarrhea, nausea and vomiting.   Genitourinary:  Negative for bladder incontinence, frequency and hesitancy.   Neurological:  Positive for excessive daytime sleepiness. Negative for dizziness, headaches, light-headedness and weakness.        Fatigue on occasion     The electronic medical record has been reviewed by me for salient history. All cardiovascular imaging and testing available in the electronic medical record, and [employer] has been reviewed.      Medication Documentation Review Audit       Reviewed by Franny Danielson RN (Registered Nurse) on 07/17/25 at 0927      Medication Order Taking? Sig Documenting Provider Last Dose Status   amLODIPine (Norvasc) 10 mg tablet [id number] Yes Take 1 tablet (10 mg) by mouth once daily. Elieser Morales MD 4/22/2025 Active   Eliquis 2.5 mg tablet [id number] Yes TAKE 1 TABLET BY MOUTH TWICE A DAY Krystle Linn MD PhD 4/20/2025 Active   empagliflozin (Jardiance) 10 mg [id number] Yes Take 1 tablet (10 mg) by mouth once daily. Krystle Linn MD PhD 4/21/2025 Active   famotidine (Pepcid) 20 mg tablet [id number] Yes TAKE 1 TABLET BY MOUTH EVERY DAY AS DIRECTED Ash Demarco MD 4/21/2025 Active   fluticasone (Flonase) 50 mcg/actuation nasal spray [id number] Yes Administer 2 sprays into each nostril once daily as needed for allergies. Historical Provider, MD 4/21/2025 Active   Patient not taking:  Discontinued 07/14/25 0810   hydrALAZINE (Apresoline) 100 mg tablet [id number]  Take 1 tablet (100 mg) by mouth 3 times a day.   Patient not taking: Reported on 7/17/2025    Jaguar Moreira MD  Active   levothyroxine (Synthroid, Levoxyl) 137 mcg tablet [id number] Yes Take 1 tablet (137 mcg) by mouth once daily. Historical MD Andrew 4/22/2025 Active   losartan (Cozaar) 25 mg tablet [id number] Yes TAKE  "1 TABLET BY MOUTH EVERY DAY Krystle Linn MD PhD 4/22/2025 Active   Patient not taking:  Discontinued 07/17/25 0926   metoprolol tartrate (Lopressor) 50 mg tablet [id number] Yes TAKE 1 TABLET BY MOUTH TWICE A DAY Krystle Linn MD PhD 4/21/2025 Active   multivitamin tablet [id number] Yes Take 1 tablet by mouth once daily. Historical Provider, MD 4/22/2025 Active   mycophenolate (Cellcept) 250 mg capsule [id number] Yes Take 3 capsules (750 mg) by mouth 2 times a day. Krystian Thompson MD  Active   nitroglycerin (Nitrostat) 0.4 mg SL tablet [id number] Yes Place 1 tablet (0.4 mg) under the tongue every 5 minutes if needed for chest pain. Historical Provider, MD Unknown Active   pravastatin (Pravachol) 40 mg tablet [id number] Yes TAKE 1 TABLET BY MOUTH EVERYDAY AT BEDTIME Krystle Linn MD PhD 4/21/2025 Active   predniSONE (Deltasone) 5 mg tablet [id number] Yes TAKE 1 TABLET BY MOUTH EVERY DAY Krystian Thompson MD  Active   tacrolimus ER (Envarsus XR) 1 mg tablet ER [id number] Yes Take 2 tablets (2 mg) by mouth once daily. Jaguar Moreira MD  Active   tamsulosin (Flomax) 0.4 mg 24 hr capsule [id number] Yes Take 1 capsule (0.4 mg) by mouth once daily at bedtime. Michael Montesinos MD  Active   torsemide (Demadex) 20 mg tablet [id number] Yes Take 1 tablet (20 mg) by mouth once daily. Rajani Ramirez MD 4/21/2025 Active     Discontinued 07/17/25 0927                   Allergies  Reviewed by Franny Danielson RN on 7/17/2025        Severity Reactions Comments    Lovastatin Not Specified Myalgia Leg cramps Muscle cramps    Simvastatin Not Specified Myalgia Muscle cramps    Adhesive Low Rash     Naproxen Low Rash             Visit Vitals  /69 (BP Location: Left arm, Patient Position: Sitting, BP Cuff Size: Adult)   Pulse 80   Ht 1.803 m (5' 11\")   Wt 74.8 kg (165 lb)   SpO2 96%   BMI 23.01 kg/m²   Smoking Status Former   BSA 1.94 m²      On examination:     Very pleasant elderly " -American man in no apparent CP or painful distress  Well groomed   Neck: No JVD or HJR  CVS: HS 1,2.  No added sounds  Resp: CTA bilaterally. Percussion note resonant  Abdomen: Mildly obese, SNT, BS wnl  Extremities: 2-3+ pedal edema on right , 1+ on left   Skin: warm and dry.    CNS: AO x 4, no gross deficits     Ix:          Lab Results   Component Value Date     WBC 8.8 2025     HGB 14.3 2025     HCT 47.3 2025     MCV 82 2025      2025        Chemistry           Lab Results   Component Value Date/Time      2025 1524     K 4.6 2025 1524      2025 1524     CO2 23 2025 1524     BUN 25 (H) 2025 1524     CREATININE 1.94 (H) 2025 1524          Lab Results   Component Value Date/Time     CALCIUM 9.8 2025 1524     ALKPHOS 69 2025 1524     AST 18 2025 1524     ALT 14 2025 1524     BILITOT 1.1 2025 1524               Results/Data  MRI Cardiac w/wo contrast for Morph/Funct and Valve Dz 2023 10:25AM Krystle Hayes      Test Name Result Flag Reference   MRI Cardiac w/wo contrast for Morph/Funct and Valve Dz (Report)       FINAL REPORT  Interpreted by: MINGO WARREN   23 16:20                        [facility]                              CMR Report      MRN:          [id number]                  Name:      SUKHDEEP TORREZ                  :         1950                  Scan Date:  2023 09:41:15                    Electronically signed by Mingo Warren  16:20:34     GENERAL INFORMATION   =====================================================================  =====================================     HEIGHT: 68.90 in  (175.00 cm)  WEIGHT: 189.60 lbs  (86.00 kgs)  BSA: 2.02 m\S\2  BASELINE HR: 70 BPM  SCAN LOCATION: [facility]  REFERRING PHYSICIAN: KRYSTLE HAYES  ATTENDING PHYSICIAN: KRYSTLE HAYES  TECHNOLOGIST: Kori Mack  NUMBER: [id number]  CPT CODES: 20940  ICD10 CODES: I50.30, [ , ]I25.9  PATIENT HISTORY: \E\n\E\n72 year old man with history of vascular   disease with obstructive CAD s/p PCI mRCA in-stent restenosis in   5/25/2016, renal artery stenosis, femoral artery stenosis and carotid   stenosis all s/p intervention, HFrecEF, atrial fibrillation s/p DCCV   6/2020 and is maintained on DOAC. 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 12/13/2021 stress   evaluation c/f inducible ischaemia. He had a positive stress test   12/2021 and is now status post left heart catheterization 12/28/2021   which demonstrated known midLCx  with collateral flow, and   otherwise mild CAD. He will be maintained on aspirin and pravastatin   for CAD. He was hospitalized 2/2023 with sudden onset dyspnea. COVID   and influenza were negative. On left heart catheterization 2/2023   hisLCx CTA was again visualized andrPDA 100% occlusion was also   noted. Recommendation was made by the cardiology team for medical   management. TTE 2/2023 LVEF 60 -65% with normal biventricular   systolic function, mild mitral regurgitation. Today he remains with   SOBOE.\E\n#CAD:\E\n? recurrent angina\E\n- cMRI ( viability   assessment, MV assessment)\E\n- will review 2/2023 [facility] with   interventional Cards after cMRI results available \E\n     SUMMARY   =====================================================================  =====================================     Prior Inferior and Inferolateral infarction with partial viability   (LCX territory). Wall motion  abnormalities in this distribution with preserved LVEF.  Moderate non-ischemic scarring in the septum.  LVH with concentric remodeling. Maximum thickness of the septum is   1.9 cm.  Mild RVH.     LEFT VENTRICLE: Quantitative LVEF 63 %. There is moderate LV   hypertrophy. There is LV concentric remodeling.  LV cavity size  is normal. LV systolic function is regionally impaired. There is no   LV mass/thrombus.     VIABILITY: LV scar size is 13 %.     RIGHT VENTRICLE: There is mild RVH. RV cavity size is normal. RV   systolic function is normal. There is no RV mass/thrombus.     LV/RV SEPTUM: The ventricular septum is intact.     LA/RA SEPTUM: The atrial septum is intact.     LEFT ATRIUM: LA is mildly enlarged.     RIGHT ATRIUM: There is no RA mass/thrombus. RA is mildly enlarged.     PERICARDIUM: Pericardium is normal. There is no pericardial effusion.     PLEURAL EFFUSION: There is no pleural effusion.     AORTIC VALVE: Aortic valve is mildly thickened. Aortic valveleaflets   are normal. The aortic valve annulus is normal in  size. There is mild aortic regurgitation.     MITRAL VALVE: Mitral valve leaflets are normal. The mitral valve   annulus is normal in size. There is mild mitral  regurgitation.     TRICUSPID VALVE: Tricuspid valve leaflets are normal. The tricuspid   valve annulus is normal in size.     AORTIC ROOT: The aortic root is normal.        CORE EXAM   =====================================================================  =====================================     MEASUREMENTS   ---------------------------------------------------------------------  -------------------    VOLUMETRIC ANALYSIS     ----------------------------------------------  .------------------------------------------------------.  .   .     . LV  . Reference . RV . Reference .  +------+----------+------+------------+----+-----------+  . EDV . ml    . 120 . (105-187) .  .      .  .   . ml/m\S\2  .  60 . (58-93)  .  .      .  . ESV . ml    .  44 . (25-70)  .  .      .  .   . ml/m\S\2  .  22 . (13-35)  .  .      .  . CO  . L/min  . 5.84 .      .  .      .  .   . L/min/m\S\2 . 2.90 .      .  .      .  . MASS . g    . 235 . (106-183) .  .      .  .   . g/m\S\2   . 117 . (56-89)  .  .      .  . SV  . ml    .  76 . () .  .      .  .   .  ml/m\S\2  .  38 . (39-63)  .  .      .  . EF  . %    .  63 . (59-77)  .  .      .  '------+----------+------+------------+----+-----------'         CARDIAC OUTPUT HR: 77 BPM    LV DIMENSIONS     ----------------------------------------------      WALL THICKNESS - ANTEROSEPTAL: 1.9 cm      WALL THICKNESS - INFEROLATERAL: 1.4 cm      WALL THICKNESS - MAXIMUM: 1.9 cm      LV SUSANA: 4.61 cm      LV ESD: 3.39 cm       LA DIMENSIONS (LV SYSTOLE)     ----------------------------------------------      AREA - 2 CHAMBER: 23.74 cm\S\2      LENGTH - 2 CHAMBER: 6.26 cm      AREA - 4 CHAMBER: 29.24 cm\S\2      LENGTH - 4 CHAMBER: 7.19 cm      VOLUME: 94 ml      VOLUME NORMALIZED: 46.7 ml/m\S\2       RA DIMENSIONS (RV SYSTOLE)     ----------------------------------------------      AREA - 4 CHAMBER: 22 cm\S\2      LENGTH - 4 CHAMBER: 5.2 cm       AORTIC ROOT DIMENSIONS     ----------------------------------------------      ANNULUS: 1.18 cm      SINUS OF VALSALVA: 2.7 cm      SINOTUBULAR JUNCTION: 2.4 cm       EXTRACELLULAR VOLUME MEASUREMENT     ----------------------------------------------      PRE-CONTRAST T1 MYOCARDIUM: 169.66 msec      PRE-CONTRAST T1 LV CAVITY: 178.02 msec      POST-CONTRAST T1 MYOCARDIUM: 134.39 msec      POST-CONTRAST T1 LV CAVITY: 65.5 msec       IRON QUANTIFICATION     ----------------------------------------------      MYOCARDIAL T2*: 33.26 msec      LIVER T2*: 12.89 msec        17 SEGMENT   ---------------------------------------------------------------------  -------------------  .---------------------------------------------------------------------  ----------------------.  . Segments      . Wall Motion  . Hyperenhancement . Stress   Perfusion . Interpretation .  +--------------------+---------------+------------------+-------------  -----+----------------+  . Base Anterior   . Normal/Hyper . None       .           . Normal     .  . Base Anteroseptal . Severe Hypo  . 26-50%      .           . Non-CAD  Scar  .  . Base Inferoseptal . Severe Hypo  . 1-25%      .           . Non-CAD Scar  .  . Base Inferior   . Severe Hypo  . 51-75%      .           . Sub-Endo MI  .  . Base Inferolateral . Normal/Hyper . 26-50%      .           . Sub-Endo MI  .  . Base Anterolateral . Normal/Hyper . None       .           . Normal     .  . Mid Anterior    . Normal/Hyper . None       .           . Normal     .  . Mid Anteroseptal  . Normal/Hyper . 1-25%      .           . Non-CAD Scar  .  . Mid Inferoseptal  . Normal/Hyper . 1-25%      .           . Non-CAD Scar  .  . Mid Inferior    . Mild/Mod Hypo . 1-25%      .           . Sub-Endo MI  .  . Mid Inferolateral . Normal/Hyper . 1-25%      .           . Sub-Endo MI  .  . Mid Anterolateral . Normal/Hyper . None       .           . Normal     .  . Apical Anterior  . Normal/Hyper . None       .           . Normal     .  . Apical Septal   . Normal/Hyper . None       .           . Normal     .  . Apical Inferior  . Normal/Hyper . 1-25%      .           . Sub-Endo MI  .  . Apical Lateral   . Normal/Hyper . None       .           . Normal     .  . [place]        . Normal/Hyper . None       .           . Normal     .  +--------------------+---------------+------------------+-------------  -----+----------------+  . RV Segments    . Wall Motion  . Hyperenhancement . Stress   Perfusion . Interpretation .  +--------------------+---------------+------------------+-------------  -----+----------------+  . RV Basal Anterior . Normal/Hyper . None       .           . Normal     .  . RV Basal Inferior . Normal/Hyper . None       .           . Normal     .  . RV Mid       . Normal/Hyper . None       .           . Normal     .  . RV Apical     . Normal/Hyper . None       .           . Normal     .  '--------------------+---------------+------------------+-------------  -----+----------------'       FINDINGS     ----------------------------------------------      LV SCAR SIZE (17 SEGMENT): 13 %        SCAN  INFO   =====================================================================  =====================================     GENERAL   ---------------------------------------------------------------------  -------------------    SCANNER     ----------------------------------------------      : Siem[employer]      MODEL: Rypple      PULSE SEQUENCES: SSFP cine, 2D LGE segmented, 2D LGE   single-shot, Black-blood LGE (or Grey-blood),      Pre-contrast T1 mapping, Post-contrast T1 mapping, T2   mapping, First-pass perfusion without stress,      Phase contrast imaging, HASTE morphology        CONTRAST AGENT     ----------------------------------------------      TYPE: Dotarem      LOT NUMBER: [id number]       EXPIRATION DATE: 2027-09-01 00:00:00       GD CONCENTRATION: 0.5 M      VOLUME ADMINISTERED: 35 ml      DOSAGE: 0.20 mmol/kg       SEDATION     ----------------------------------------------      SEDATION USED?: No       SETUP     ----------------------------------------------      SCAN TYPE: Clinical      PATIENT TYPE: Outpatient      INCOMPLETE SCAN: No      REASON(S) FOR SCAN: Viability: chronic CAD         Report generated by [employer], a product of [employer]     Electronically signed by: BRIANA  04/06/23 16:20      Impressions     74 y.o.  man with history of vascular disease with obstructive CAD s/p PCI mRCA in-stent restenosis in 5/25/2016, renal artery stenosis, femoral artery stenosis and carotid stenosis all s/p intervention, HFrimprovedEF, atrial fibrillation s/p DCCV 6/2020 and is maintained on DOAC. he is s/p DDKT on 5/5/2020.  TTE 12/10/2021 demonstrates normal left ventricular systolic function 55% with mild to moderate RV systolic impairment. 12/13/2021 stress evaluation c/f inducible ischaemia. He had a positive stress test 12/2021 and is now status post left heart catheterization 12/28/2021 which demonstrated known midLCx  with collateral flow, and otherwise  mild CAD. He will be maintained on aspirin and pravastatin for CAD. He was hospitalized 2/2023 with sudden onset dyspnea. COVID and influenza were negative. On left heart catheterization 2/2023 hisLCx CTA was again visualized andrPDA 100% occlusion was also noted. Recommendation was made by the cardiology team for medical management. TTE 2/2023 LVEF 60 -65% with normal biventricular systolic function, mild mitral regurgitation.   He is s/p RLE revacularization including iliofemoral reconstruction and subsequent SFA recanalization 4/2025 ( Dr Ovalle).  He does have normal biventricular systolic function with LVEF 63%. There was no evidence of i LV or RV thrombus. Normal valvular function.TTE 3/2025 demonstrated normal LV systolic function with LVEF ~60%    ACC/AHA stage B  NYHA FC: 2  Volume status: Euvolemic ( right leg swelling related to circulatory issues)  Perfusion status: Warm to touch     PLAN     #HFrecoveredEF  -Continue SGLT2 inhibitor, empagliflozin 10 mg once daily.    -Start Eplerenone 25 mg every day with close renal function monitoring  - Cont hold Hydrlazine ( did not tolerate)     #Hypertension  -Continue metoprolol   -Continue losartan 75 mg every day     #CAD:  No symptoms     -Continue C rehab, pending improvement in right toe pain  -Continue aspirin, pravast, metoprolol tartrate, CCB  -Given myalgia he was previously transitioned from rosuvastatin to pravastatin and this will be continued.      #Atrial fibrillation:  -Continue DOAC , he no longer troubled by overt bleeding     This note was transcribed using the Dragon Dictation system. There may be grammatical, punctuation, or verbiage errors that can occur with voice recognition programs.       Krystle Linn MD PhD

## 2025-07-21 ENCOUNTER — HOSPITAL ENCOUNTER (OUTPATIENT)
Dept: CARDIOLOGY | Facility: CLINIC | Age: 75
Discharge: HOME | End: 2025-07-21
Payer: MEDICARE

## 2025-07-21 DIAGNOSIS — I48.91 ATRIAL FIBRILLATION, UNSPECIFIED TYPE (MULTI): ICD-10-CM

## 2025-07-24 DIAGNOSIS — I50.32 CHRONIC HEART FAILURE WITH PRESERVED EJECTION FRACTION: ICD-10-CM

## 2025-07-24 DIAGNOSIS — Z94.0 KIDNEY REPLACED BY TRANSPLANT (HHS-HCC): ICD-10-CM

## 2025-07-24 DIAGNOSIS — I50.32 CHRONIC DIASTOLIC CONGESTIVE HEART FAILURE: ICD-10-CM

## 2025-07-25 ENCOUNTER — LAB (OUTPATIENT)
Dept: LAB | Facility: HOSPITAL | Age: 75
End: 2025-07-25
Payer: MEDICARE

## 2025-07-25 ENCOUNTER — HOSPITAL ENCOUNTER (OUTPATIENT)
Dept: CARDIOLOGY | Facility: CLINIC | Age: 75
Discharge: HOME | End: 2025-07-25
Payer: MEDICARE

## 2025-07-25 DIAGNOSIS — I50.32 CHRONIC DIASTOLIC CONGESTIVE HEART FAILURE: ICD-10-CM

## 2025-07-25 DIAGNOSIS — I50.32 CHRONIC HEART FAILURE WITH PRESERVED EJECTION FRACTION: ICD-10-CM

## 2025-07-25 DIAGNOSIS — I50.32 CHRONIC DIASTOLIC (CONGESTIVE) HEART FAILURE: Primary | ICD-10-CM

## 2025-07-25 DIAGNOSIS — Z94.0 KIDNEY REPLACED BY TRANSPLANT (HHS-HCC): ICD-10-CM

## 2025-07-25 DIAGNOSIS — I48.91 ATRIAL FIBRILLATION, UNSPECIFIED TYPE (MULTI): ICD-10-CM

## 2025-07-25 LAB
ALBUMIN SERPL BCP-MCNC: 4.2 G/DL (ref 3.4–5)
ANION GAP SERPL CALC-SCNC: 15 MMOL/L (ref 10–20)
BNP SERPL-MCNC: 481 PG/ML (ref 0–99)
BUN SERPL-MCNC: 17 MG/DL (ref 6–23)
CALCIUM SERPL-MCNC: 9.8 MG/DL (ref 8.6–10.6)
CHLORIDE SERPL-SCNC: 98 MMOL/L (ref 98–107)
CO2 SERPL-SCNC: 26 MMOL/L (ref 21–32)
CREAT SERPL-MCNC: 1.7 MG/DL (ref 0.5–1.3)
EGFRCR SERPLBLD CKD-EPI 2021: 41 ML/MIN/1.73M*2
ERYTHROCYTE [DISTWIDTH] IN BLOOD BY AUTOMATED COUNT: 16.4 % (ref 11.5–14.5)
GLUCOSE SERPL-MCNC: 110 MG/DL (ref 74–99)
HCT VFR BLD AUTO: 41.9 % (ref 41–52)
HGB BLD-MCNC: 11.9 G/DL (ref 13.5–17.5)
MCH RBC QN AUTO: 22.9 PG (ref 26–34)
MCHC RBC AUTO-ENTMCNC: 28.4 G/DL (ref 32–36)
MCV RBC AUTO: 81 FL (ref 80–100)
NRBC BLD-RTO: 0 /100 WBCS (ref 0–0)
PHOSPHATE SERPL-MCNC: 4.1 MG/DL (ref 2.5–4.9)
PLATELET # BLD AUTO: 368 X10*3/UL (ref 150–450)
POTASSIUM SERPL-SCNC: 4.1 MMOL/L (ref 3.5–5.3)
RBC # BLD AUTO: 5.19 X10*6/UL (ref 4.5–5.9)
SODIUM SERPL-SCNC: 135 MMOL/L (ref 136–145)
TACROLIMUS BLD-MCNC: 5.5 NG/ML
WBC # BLD AUTO: 9.5 X10*3/UL (ref 4.4–11.3)

## 2025-07-25 PROCEDURE — 83880 ASSAY OF NATRIURETIC PEPTIDE: CPT

## 2025-07-25 PROCEDURE — 86832 HLA CLASS I HIGH DEFIN QUAL: CPT

## 2025-07-25 PROCEDURE — 80069 RENAL FUNCTION PANEL: CPT

## 2025-07-25 PROCEDURE — 86833 HLA CLASS II HIGH DEFIN QUAL: CPT

## 2025-07-25 PROCEDURE — 85027 COMPLETE CBC AUTOMATED: CPT

## 2025-07-25 PROCEDURE — 80197 ASSAY OF TACROLIMUS: CPT

## 2025-07-25 PROCEDURE — 36415 COLL VENOUS BLD VENIPUNCTURE: CPT

## 2025-07-27 LAB
ALLOSURE SCORE - KIDNEY: 0.09 %
CAREDX_ORDER_ID: NORMAL
CENTER_ORDER_ID: NORMAL
CLIENT SPECIMEN ID - ALLOSURE: NORMAL
DONOR RELATION - ALLOSURE: NORMAL
NOTES - ALLOSURE: NORMAL
RELATIVE CHANGE VALUE - KIDNEY: NORMAL
TEST COMMENTS - ALLOSURE: NORMAL
TIME POST TX - ALLOSURE: NORMAL
TRANSPLANTED ORGAN - ALLOSURE: NORMAL
TX DATE - ALLOSURE/ALLOMAP: NORMAL
WP_ORDER_ID: NORMAL

## 2025-07-28 ENCOUNTER — HOSPITAL ENCOUNTER (OUTPATIENT)
Dept: CARDIOLOGY | Facility: CLINIC | Age: 75
Discharge: HOME | End: 2025-07-28
Payer: MEDICARE

## 2025-07-28 DIAGNOSIS — I48.91 ATRIAL FIBRILLATION, UNSPECIFIED TYPE (MULTI): ICD-10-CM

## 2025-07-28 LAB — HOLD SPECIMEN: NORMAL

## 2025-07-30 LAB
HLA RESULTS: NORMAL
HLA-A+B+C AB NFR SER: NORMAL %
HLA-DP+DQ+DR AB NFR SER: NORMAL %

## 2025-08-01 ENCOUNTER — RESULTS FOLLOW-UP (OUTPATIENT)
Facility: HOSPITAL | Age: 75
End: 2025-08-01
Payer: MEDICARE

## 2025-08-01 ENCOUNTER — HOSPITAL ENCOUNTER (OUTPATIENT)
Dept: CARDIOLOGY | Facility: CLINIC | Age: 75
Discharge: HOME | End: 2025-08-01
Payer: MEDICARE

## 2025-08-01 ENCOUNTER — TELEPHONE (OUTPATIENT)
Facility: HOSPITAL | Age: 75
End: 2025-08-01
Payer: MEDICARE

## 2025-08-01 DIAGNOSIS — R76.8 DONOR SPECIFIC ANTIBODY (DSA) POSITIVE: ICD-10-CM

## 2025-08-01 DIAGNOSIS — Z48.298 AFTERCARE FOLLOWING ORGAN TRANSPLANT: ICD-10-CM

## 2025-08-01 DIAGNOSIS — T86.19 DELAYED GRAFT FUNCTION OF KIDNEY (HHS-HCC): ICD-10-CM

## 2025-08-01 DIAGNOSIS — I48.91 ATRIAL FIBRILLATION, UNSPECIFIED TYPE (MULTI): ICD-10-CM

## 2025-08-01 RX ORDER — MYCOPHENOLATE MOFETIL 250 MG/1
1000 CAPSULE ORAL 2 TIMES DAILY
Qty: 720 CAPSULE | Refills: 3 | Status: SHIPPED | OUTPATIENT
Start: 2025-08-01 | End: 2026-08-01

## 2025-08-01 NOTE — TELEPHONE ENCOUNTER
Reviewed recent lab results with patient. 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 Educated that DSA (donor-specific antibody) development post-transplant can be a sign of potential immune activity against the graft and is being closely monitored. Per Dr. Mckinney, MMF dose increased to 1000 mg BID. Informed patient that a kidney biopsy is recommended for further evaluation and that scheduling will contact them to arrange. Patient verbalized understanding. He will get labs in preparation of biopsy on Monday.

## 2025-08-01 NOTE — RESULT ENCOUNTER NOTE
Labs reviewed with Dr. Mckinney.  Labs ordered, MMF increased to 1000mg BID, and biopsy.   Call placed to patient.

## 2025-08-04 ENCOUNTER — HOSPITAL ENCOUNTER (OUTPATIENT)
Dept: CARDIOLOGY | Facility: CLINIC | Age: 75
Discharge: HOME | End: 2025-08-04
Payer: MEDICARE

## 2025-08-04 ENCOUNTER — LAB (OUTPATIENT)
Dept: LAB | Facility: HOSPITAL | Age: 75
End: 2025-08-04
Payer: MEDICARE

## 2025-08-04 DIAGNOSIS — R76.8 OTHER SPECIFIED ABNORMAL IMMUNOLOGICAL FINDINGS IN SERUM: ICD-10-CM

## 2025-08-04 DIAGNOSIS — Z48.298 ENCOUNTER FOR AFTERCARE FOLLOWING OTHER ORGAN TRANSPLANT: ICD-10-CM

## 2025-08-04 DIAGNOSIS — I48.91 ATRIAL FIBRILLATION, UNSPECIFIED TYPE (MULTI): ICD-10-CM

## 2025-08-04 DIAGNOSIS — T86.19 OTHER COMPLICATION OF KIDNEY TRANSPLANT: Primary | ICD-10-CM

## 2025-08-04 LAB
ALBUMIN SERPL BCP-MCNC: 4.2 G/DL (ref 3.4–5)
ANION GAP SERPL CALC-SCNC: 14 MMOL/L (ref 10–20)
BUN SERPL-MCNC: 33 MG/DL (ref 6–23)
CALCIUM SERPL-MCNC: 9.5 MG/DL (ref 8.6–10.3)
CHLORIDE SERPL-SCNC: 98 MMOL/L (ref 98–107)
CO2 SERPL-SCNC: 25 MMOL/L (ref 21–32)
CREAT SERPL-MCNC: 2.16 MG/DL (ref 0.5–1.3)
EGFRCR SERPLBLD CKD-EPI 2021: 31 ML/MIN/1.73M*2
ERYTHROCYTE [DISTWIDTH] IN BLOOD BY AUTOMATED COUNT: 16.9 % (ref 11.5–14.5)
GLUCOSE SERPL-MCNC: 162 MG/DL (ref 74–99)
HCT VFR BLD AUTO: 40.8 % (ref 41–52)
HGB BLD-MCNC: 12.4 G/DL (ref 13.5–17.5)
MAGNESIUM SERPL-MCNC: 1.97 MG/DL (ref 1.6–2.4)
MCH RBC QN AUTO: 23.4 PG (ref 26–34)
MCHC RBC AUTO-ENTMCNC: 30.4 G/DL (ref 32–36)
MCV RBC AUTO: 77 FL (ref 80–100)
NRBC BLD-RTO: 0 /100 WBCS (ref 0–0)
PHOSPHATE SERPL-MCNC: 3.2 MG/DL (ref 2.5–4.9)
PLATELET # BLD AUTO: 328 X10*3/UL (ref 150–450)
POTASSIUM SERPL-SCNC: 4.2 MMOL/L (ref 3.5–5.3)
RBC # BLD AUTO: 5.29 X10*6/UL (ref 4.5–5.9)
SODIUM SERPL-SCNC: 133 MMOL/L (ref 136–145)
WBC # BLD AUTO: 9.7 X10*3/UL (ref 4.4–11.3)

## 2025-08-04 PROCEDURE — 83735 ASSAY OF MAGNESIUM: CPT

## 2025-08-04 PROCEDURE — 85027 COMPLETE CBC AUTOMATED: CPT

## 2025-08-04 PROCEDURE — 80069 RENAL FUNCTION PANEL: CPT

## 2025-08-04 PROCEDURE — 80197 ASSAY OF TACROLIMUS: CPT

## 2025-08-04 PROCEDURE — 85610 PROTHROMBIN TIME: CPT

## 2025-08-04 PROCEDURE — 36415 COLL VENOUS BLD VENIPUNCTURE: CPT

## 2025-08-05 ENCOUNTER — HOSPITAL ENCOUNTER (OUTPATIENT)
Dept: CARDIOLOGY | Facility: CLINIC | Age: 75
Discharge: HOME | End: 2025-08-05
Payer: MEDICARE

## 2025-08-05 ENCOUNTER — LAB (OUTPATIENT)
Dept: LAB | Facility: HOSPITAL | Age: 75
End: 2025-08-05
Payer: MEDICARE

## 2025-08-05 DIAGNOSIS — I48.91 ATRIAL FIBRILLATION, UNSPECIFIED TYPE (MULTI): ICD-10-CM

## 2025-08-05 LAB
INR PPP: 1.3 (ref 0.9–1.1)
PROTHROMBIN TIME: 14.9 SECONDS (ref 9.8–12.4)
TACROLIMUS BLD-MCNC: 7.6 NG/ML

## 2025-08-07 ENCOUNTER — HOSPITAL ENCOUNTER (OUTPATIENT)
Dept: CARDIOLOGY | Facility: CLINIC | Age: 75
Discharge: HOME | End: 2025-08-07
Payer: MEDICARE

## 2025-08-07 DIAGNOSIS — I48.91 ATRIAL FIBRILLATION, UNSPECIFIED TYPE (MULTI): ICD-10-CM

## 2025-08-08 ENCOUNTER — HOSPITAL ENCOUNTER (OUTPATIENT)
Dept: CARDIOLOGY | Facility: CLINIC | Age: 75
Discharge: HOME | End: 2025-08-08
Payer: MEDICARE

## 2025-08-08 DIAGNOSIS — I48.91 UNSPECIFIED ATRIAL FIBRILLATION (MULTI): ICD-10-CM

## 2025-08-08 DIAGNOSIS — I48.92 UNSPECIFIED ATRIAL FLUTTER (MULTI): ICD-10-CM

## 2025-08-11 ENCOUNTER — HOSPITAL ENCOUNTER (OUTPATIENT)
Dept: CARDIOLOGY | Facility: CLINIC | Age: 75
Discharge: HOME | End: 2025-08-11
Payer: MEDICARE

## 2025-08-11 DIAGNOSIS — I48.92 UNSPECIFIED ATRIAL FLUTTER (MULTI): ICD-10-CM

## 2025-08-11 DIAGNOSIS — I48.91 UNSPECIFIED ATRIAL FIBRILLATION (MULTI): ICD-10-CM

## 2025-08-11 PROCEDURE — 93298 REM INTERROG DEV EVAL SCRMS: CPT

## 2025-08-14 PROCEDURE — 36415 COLL VENOUS BLD VENIPUNCTURE: CPT

## 2025-08-14 PROCEDURE — 83880 ASSAY OF NATRIURETIC PEPTIDE: CPT

## 2025-08-14 RX ORDER — METHOCARBAMOL 500 MG/1
500 TABLET, FILM COATED ORAL EVERY 8 HOURS PRN
COMMUNITY
Start: 2025-02-20

## 2025-08-15 ENCOUNTER — OFFICE VISIT (OUTPATIENT)
Dept: CARDIOLOGY | Facility: CLINIC | Age: 75
End: 2025-08-15
Payer: MEDICARE

## 2025-08-15 ENCOUNTER — LAB (OUTPATIENT)
Dept: LAB | Facility: HOSPITAL | Age: 75
End: 2025-08-15
Payer: MEDICARE

## 2025-08-15 VITALS
DIASTOLIC BLOOD PRESSURE: 69 MMHG | HEIGHT: 69 IN | WEIGHT: 172 LBS | HEART RATE: 69 BPM | OXYGEN SATURATION: 95 % | BODY MASS INDEX: 25.48 KG/M2 | SYSTOLIC BLOOD PRESSURE: 166 MMHG

## 2025-08-15 DIAGNOSIS — I48.91 ATRIAL FIBRILLATION, UNSPECIFIED TYPE (MULTI): Primary | ICD-10-CM

## 2025-08-15 DIAGNOSIS — I50.32 CHRONIC DIASTOLIC (CONGESTIVE) HEART FAILURE: Primary | ICD-10-CM

## 2025-08-15 LAB
ATRIAL RATE: 277 BPM
BNP SERPL-MCNC: 406 PG/ML (ref 0–99)
P AXIS: -85 DEGREES
P OFFSET: 165 MS
P ONSET: 132 MS
Q ONSET: 216 MS
QRS COUNT: 12 BEATS
QRS DURATION: 84 MS
QT INTERVAL: 406 MS
QTC CALCULATION(BAZETT): 435 MS
QTC FREDERICIA: 425 MS
R AXIS: -38 DEGREES
T AXIS: 168 DEGREES
T OFFSET: 419 MS
VENTRICULAR RATE: 69 BPM

## 2025-08-15 PROCEDURE — 3077F SYST BP >= 140 MM HG: CPT | Performed by: NURSE PRACTITIONER

## 2025-08-15 PROCEDURE — 99212 OFFICE O/P EST SF 10 MIN: CPT

## 2025-08-15 PROCEDURE — 99214 OFFICE O/P EST MOD 30 MIN: CPT | Performed by: NURSE PRACTITIONER

## 2025-08-15 PROCEDURE — 1160F RVW MEDS BY RX/DR IN RCRD: CPT | Performed by: NURSE PRACTITIONER

## 2025-08-15 PROCEDURE — 3078F DIAST BP <80 MM HG: CPT | Performed by: NURSE PRACTITIONER

## 2025-08-15 PROCEDURE — 1159F MED LIST DOCD IN RCRD: CPT | Performed by: NURSE PRACTITIONER

## 2025-08-15 PROCEDURE — 1126F AMNT PAIN NOTED NONE PRSNT: CPT | Performed by: NURSE PRACTITIONER

## 2025-08-15 PROCEDURE — 93005 ELECTROCARDIOGRAM TRACING: CPT | Performed by: NURSE PRACTITIONER

## 2025-08-15 ASSESSMENT — ENCOUNTER SYMPTOMS
OCCASIONAL FEELINGS OF UNSTEADINESS: 0
LOSS OF SENSATION IN FEET: 1
DEPRESSION: 0

## 2025-08-15 ASSESSMENT — PAIN SCALES - GENERAL: PAINLEVEL_OUTOF10: 0-NO PAIN

## 2025-08-17 DIAGNOSIS — I50.32 CHRONIC HEART FAILURE WITH PRESERVED EJECTION FRACTION: ICD-10-CM

## 2025-08-17 DIAGNOSIS — I50.32 CHRONIC DIASTOLIC CONGESTIVE HEART FAILURE: ICD-10-CM

## 2025-08-18 ENCOUNTER — HOSPITAL ENCOUNTER (OUTPATIENT)
Dept: CARDIOLOGY | Facility: CLINIC | Age: 75
Discharge: HOME | End: 2025-08-18
Payer: MEDICARE

## 2025-08-18 ENCOUNTER — TELEPHONE (OUTPATIENT)
Facility: HOSPITAL | Age: 75
End: 2025-08-18
Payer: MEDICARE

## 2025-08-18 ENCOUNTER — PATIENT MESSAGE (OUTPATIENT)
Dept: TRANSPLANT | Facility: CLINIC | Age: 75
End: 2025-08-18
Payer: MEDICARE

## 2025-08-18 DIAGNOSIS — I48.92 UNSPECIFIED ATRIAL FLUTTER (MULTI): ICD-10-CM

## 2025-08-18 DIAGNOSIS — I48.91 UNSPECIFIED ATRIAL FIBRILLATION (MULTI): ICD-10-CM

## 2025-08-21 ENCOUNTER — HOSPITAL ENCOUNTER (OUTPATIENT)
Dept: CARDIOLOGY | Facility: CLINIC | Age: 75
Discharge: HOME | End: 2025-08-21
Payer: MEDICARE

## 2025-08-21 ENCOUNTER — HOSPITAL ENCOUNTER (OUTPATIENT)
Dept: RADIOLOGY | Facility: HOSPITAL | Age: 75
Discharge: HOME | End: 2025-08-21
Payer: MEDICARE

## 2025-08-21 VITALS
HEART RATE: 66 BPM | RESPIRATION RATE: 17 BRPM | OXYGEN SATURATION: 100 % | SYSTOLIC BLOOD PRESSURE: 132 MMHG | DIASTOLIC BLOOD PRESSURE: 66 MMHG

## 2025-08-21 DIAGNOSIS — I48.91 UNSPECIFIED ATRIAL FIBRILLATION (MULTI): ICD-10-CM

## 2025-08-21 DIAGNOSIS — Z48.298 AFTERCARE FOLLOWING ORGAN TRANSPLANT: ICD-10-CM

## 2025-08-21 DIAGNOSIS — T86.19 DELAYED GRAFT FUNCTION OF KIDNEY (HHS-HCC): ICD-10-CM

## 2025-08-21 DIAGNOSIS — I48.92 UNSPECIFIED ATRIAL FLUTTER (MULTI): ICD-10-CM

## 2025-08-21 DIAGNOSIS — R76.8 DONOR SPECIFIC ANTIBODY (DSA) POSITIVE: ICD-10-CM

## 2025-08-21 PROCEDURE — 50200 RENAL BIOPSY PERQ: CPT

## 2025-08-21 PROCEDURE — 7100000010 HC PHASE TWO TIME - EACH INCREMENTAL 1 MINUTE

## 2025-08-21 PROCEDURE — 7100000009 HC PHASE TWO TIME - INITIAL BASE CHARGE

## 2025-08-21 PROCEDURE — 2720000007 HC OR 272 NO HCPCS

## 2025-08-21 PROCEDURE — 2500000004 HC RX 250 GENERAL PHARMACY W/ HCPCS (ALT 636 FOR OP/ED): Performed by: RADIOLOGY

## 2025-08-21 RX ORDER — MIDAZOLAM HYDROCHLORIDE 2 MG/2ML
INJECTION, SOLUTION INTRAMUSCULAR; INTRAVENOUS
Status: COMPLETED | OUTPATIENT
Start: 2025-08-21 | End: 2025-08-21

## 2025-08-21 RX ORDER — FENTANYL CITRATE 50 UG/ML
INJECTION, SOLUTION INTRAMUSCULAR; INTRAVENOUS
Status: COMPLETED | OUTPATIENT
Start: 2025-08-21 | End: 2025-08-21

## 2025-08-21 RX ADMIN — FENTANYL CITRATE 50 MCG: 50 INJECTION INTRAMUSCULAR; INTRAVENOUS at 09:18

## 2025-08-21 RX ADMIN — MIDAZOLAM HYDROCHLORIDE 1 MG: 2 INJECTION, SOLUTION INTRAMUSCULAR; INTRAVENOUS at 09:24

## 2025-08-21 RX ADMIN — MIDAZOLAM HYDROCHLORIDE 1 MG: 2 INJECTION, SOLUTION INTRAMUSCULAR; INTRAVENOUS at 09:18

## 2025-08-21 RX ADMIN — FENTANYL CITRATE 50 MCG: 50 INJECTION INTRAMUSCULAR; INTRAVENOUS at 09:24

## 2025-08-21 ASSESSMENT — PAIN - FUNCTIONAL ASSESSMENT
PAIN_FUNCTIONAL_ASSESSMENT: 0-10

## 2025-08-21 ASSESSMENT — ENCOUNTER SYMPTOMS
PND: 0
DIZZINESS: 0
FALLS: 0
WEAKNESS: 0
LIGHT-HEADEDNESS: 0
SHORTNESS OF BREATH: 0
IRREGULAR HEARTBEAT: 0
HEADACHES: 0
COUGH: 0
MYALGIAS: 0
ORTHOPNEA: 0
DYSPNEA ON EXERTION: 0
SPUTUM PRODUCTION: 0
ABDOMINAL PAIN: 0
VOMITING: 0
BLURRED VISION: 0
SORE THROAT: 0
DIAPHORESIS: 0
HEMOPTYSIS: 0
NEAR-SYNCOPE: 0
SYNCOPE: 0
PALPITATIONS: 0
SNORING: 0
FEVER: 0
NAUSEA: 0
DIARRHEA: 0
DOUBLE VISION: 0

## 2025-08-21 ASSESSMENT — PAIN SCALES - GENERAL
PAINLEVEL_OUTOF10: 0 - NO PAIN

## 2025-08-22 ENCOUNTER — TELEPHONE (OUTPATIENT)
Facility: HOSPITAL | Age: 75
End: 2025-08-22
Payer: MEDICARE

## 2025-08-22 DIAGNOSIS — Z94.0 KIDNEY REPLACED BY TRANSPLANT (HHS-HCC): ICD-10-CM

## 2025-08-22 DIAGNOSIS — R76.8 DONOR SPECIFIC ANTIBODY (DSA) POSITIVE: ICD-10-CM

## 2025-08-22 RX ORDER — PREDNISONE 20 MG/1
TABLET ORAL
Qty: 44 TABLET | Refills: 0 | Status: SHIPPED | OUTPATIENT
Start: 2025-08-23 | End: 2025-09-16

## 2025-08-22 RX ORDER — PREDNISONE 5 MG/1
TABLET ORAL
Qty: 70 TABLET | Refills: 0 | Status: SHIPPED | OUTPATIENT
Start: 2025-08-22 | End: 2025-09-19

## 2025-08-25 ENCOUNTER — HOSPITAL ENCOUNTER (OUTPATIENT)
Dept: CARDIOLOGY | Facility: CLINIC | Age: 75
Discharge: HOME | End: 2025-08-25
Payer: MEDICARE

## 2025-08-25 DIAGNOSIS — I48.91 UNSPECIFIED ATRIAL FIBRILLATION (MULTI): ICD-10-CM

## 2025-08-25 DIAGNOSIS — I48.92 UNSPECIFIED ATRIAL FLUTTER (MULTI): ICD-10-CM

## 2025-08-26 ENCOUNTER — HOSPITAL ENCOUNTER (EMERGENCY)
Facility: HOSPITAL | Age: 75
End: 2025-08-26
Payer: MEDICARE

## 2025-08-26 ENCOUNTER — HOSPITAL ENCOUNTER (INPATIENT)
Facility: HOSPITAL | Age: 75
End: 2025-08-26
Attending: STUDENT IN AN ORGANIZED HEALTH CARE EDUCATION/TRAINING PROGRAM | Admitting: STUDENT IN AN ORGANIZED HEALTH CARE EDUCATION/TRAINING PROGRAM
Payer: MEDICARE

## 2025-08-26 PROBLEM — I63.512 ACUTE ISCHEMIC LEFT MCA STROKE (MULTI): Status: ACTIVE | Noted: 2025-08-26

## 2025-08-26 LAB
ATRIAL RATE: 277 BPM
P AXIS: -85 DEGREES
P OFFSET: 165 MS
P ONSET: 132 MS
Q ONSET: 216 MS
QRS COUNT: 12 BEATS
QRS DURATION: 84 MS
QT INTERVAL: 406 MS
QTC CALCULATION(BAZETT): 435 MS
QTC FREDERICIA: 425 MS
R AXIS: -38 DEGREES
T AXIS: 168 DEGREES
T OFFSET: 419 MS
VENTRICULAR RATE: 69 BPM

## 2025-08-26 ASSESSMENT — ACTIVITIES OF DAILY LIVING (ADL)
HEARING - RIGHT EAR: FUNCTIONAL
HEARING - LEFT EAR: FUNCTIONAL
ADEQUATE_TO_COMPLETE_ADL: YES
PATIENT'S MEMORY ADEQUATE TO SAFELY COMPLETE DAILY ACTIVITIES?: YES
JUDGMENT_ADEQUATE_SAFELY_COMPLETE_DAILY_ACTIVITIES: YES

## 2025-08-26 ASSESSMENT — PAIN - FUNCTIONAL ASSESSMENT: PAIN_FUNCTIONAL_ASSESSMENT: UNABLE TO SELF-REPORT

## 2025-08-26 ASSESSMENT — LIFESTYLE VARIABLES
TOTAL SCORE: 0
EVER HAD A DRINK FIRST THING IN THE MORNING TO STEADY YOUR NERVES TO GET RID OF A HANGOVER: NO
HAVE YOU EVER FELT YOU SHOULD CUT DOWN ON YOUR DRINKING: NO
EVER FELT BAD OR GUILTY ABOUT YOUR DRINKING: NO
HAVE PEOPLE ANNOYED YOU BY CRITICIZING YOUR DRINKING: NO

## 2025-08-27 ENCOUNTER — APPOINTMENT (OUTPATIENT)
Dept: CARDIOLOGY | Facility: HOSPITAL | Age: 75
End: 2025-08-27
Payer: MEDICARE

## 2025-08-27 LAB
LAB AP ASR DISCLAIMER: NORMAL
LABORATORY COMMENT REPORT: NORMAL
PATH REPORT.FINAL DX SPEC: NORMAL
PATH REPORT.GROSS SPEC: NORMAL
PATH REPORT.MICROSCOPIC SPEC OTHER STN: NORMAL
PATH REPORT.RELEVANT HX SPEC: NORMAL
PATH REPORT.TOTAL CANCER: NORMAL

## 2025-08-27 PROCEDURE — 93306 TTE W/DOPPLER COMPLETE: CPT | Performed by: INTERNAL MEDICINE

## 2025-08-27 PROCEDURE — C8929 TTE W OR WO FOL WCON,DOPPLER: HCPCS

## 2025-08-27 ASSESSMENT — COGNITIVE AND FUNCTIONAL STATUS - GENERAL
PERSONAL GROOMING: A LOT
DRESSING REGULAR LOWER BODY CLOTHING: A LOT
STANDING UP FROM CHAIR USING ARMS: TOTAL
WALKING IN HOSPITAL ROOM: TOTAL
DRESSING REGULAR UPPER BODY CLOTHING: A LOT
TURNING FROM BACK TO SIDE WHILE IN FLAT BAD: A LOT
HELP NEEDED FOR BATHING: A LOT
MOVING FROM LYING ON BACK TO SITTING ON SIDE OF FLAT BED WITH BEDRAILS: A LOT
HELP NEEDED FOR BATHING: A LOT
WALKING IN HOSPITAL ROOM: TOTAL
DAILY ACTIVITIY SCORE: 12
DRESSING REGULAR UPPER BODY CLOTHING: A LOT
TURNING FROM BACK TO SIDE WHILE IN FLAT BAD: A LOT
EATING MEALS: A LITTLE
TOILETING: A LOT
MOBILITY SCORE: 10
CLIMB 3 TO 5 STEPS WITH RAILING: TOTAL
EATING MEALS: A LOT
STANDING UP FROM CHAIR USING ARMS: A LOT
CLIMB 3 TO 5 STEPS WITH RAILING: TOTAL
MOBILITY SCORE: 8
TOILETING: TOTAL
DRESSING REGULAR LOWER BODY CLOTHING: TOTAL
MOVING TO AND FROM BED TO CHAIR: A LOT
PERSONAL GROOMING: A LOT
MOVING TO AND FROM BED TO CHAIR: TOTAL
MOVING FROM LYING ON BACK TO SITTING ON SIDE OF FLAT BED WITH BEDRAILS: A LOT
DAILY ACTIVITIY SCORE: 11

## 2025-08-27 ASSESSMENT — PAIN - FUNCTIONAL ASSESSMENT
PAIN_FUNCTIONAL_ASSESSMENT: UNABLE TO SELF-REPORT
PAIN_FUNCTIONAL_ASSESSMENT: 0-10
PAIN_FUNCTIONAL_ASSESSMENT: UNABLE TO SELF-REPORT

## 2025-08-27 ASSESSMENT — ACTIVITIES OF DAILY LIVING (ADL): BATHING_ASSISTANCE: MAXIMAL

## 2025-08-27 ASSESSMENT — PAIN SCALES - GENERAL
PAINLEVEL_OUTOF10: 0 - NO PAIN

## 2025-08-27 ASSESSMENT — ABCD2 SCORE: ABCD2 SCORE: 4

## 2025-08-28 ENCOUNTER — APPOINTMENT (OUTPATIENT)
Dept: VASCULAR SURGERY | Facility: CLINIC | Age: 75
End: 2025-08-28
Payer: MEDICARE

## 2025-08-28 ENCOUNTER — APPOINTMENT (OUTPATIENT)
Dept: RADIOLOGY | Facility: HOSPITAL | Age: 75
End: 2025-08-28
Payer: MEDICARE

## 2025-08-28 PROCEDURE — 74230 X-RAY XM SWLNG FUNCJ C+: CPT

## 2025-08-28 SDOH — HEALTH STABILITY: MENTAL HEALTH: HOW MANY DRINKS CONTAINING ALCOHOL DO YOU HAVE ON A TYPICAL DAY WHEN YOU ARE DRINKING?: PATIENT UNABLE TO ANSWER

## 2025-08-28 SDOH — SOCIAL STABILITY: SOCIAL INSECURITY
ASK PARENT OR GUARDIAN: ARE THERE TIMES WHEN YOU, YOUR CHILD(REN), OR ANY MEMBER OF YOUR HOUSEHOLD FEEL UNSAFE, HARMED, OR THREATENED AROUND PERSONS WITH WHOM YOU KNOW OR LIVE?: UNABLE TO ASSESS

## 2025-08-28 SDOH — HEALTH STABILITY: MENTAL HEALTH: HOW OFTEN DO YOU HAVE SIX OR MORE DRINKS ON ONE OCCASION?: PATIENT UNABLE TO ANSWER

## 2025-08-28 SDOH — ECONOMIC STABILITY: HOUSING INSECURITY: IN THE PAST 12 MONTHS, HOW MANY TIMES HAVE YOU MOVED WHERE YOU WERE LIVING?: 0

## 2025-08-28 SDOH — ECONOMIC STABILITY: FOOD INSECURITY
WITHIN THE PAST 12 MONTHS, YOU WORRIED THAT YOUR FOOD WOULD RUN OUT BEFORE YOU GOT THE MONEY TO BUY MORE.: PATIENT UNABLE TO ANSWER

## 2025-08-28 SDOH — HEALTH STABILITY: MENTAL HEALTH: HOW OFTEN DO YOU HAVE A DRINK CONTAINING ALCOHOL?: PATIENT UNABLE TO ANSWER

## 2025-08-28 SDOH — SOCIAL STABILITY: SOCIAL INSECURITY: HAVE YOU HAD ANY THOUGHTS OF HARMING ANYONE ELSE?: UNABLE TO ASSESS

## 2025-08-28 SDOH — SOCIAL STABILITY: SOCIAL INSECURITY
WITHIN THE LAST YEAR, HAVE YOU BEEN RAPED OR FORCED TO HAVE ANY KIND OF SEXUAL ACTIVITY BY YOUR PARTNER OR EX-PARTNER?: PATIENT UNABLE TO ANSWER

## 2025-08-28 SDOH — SOCIAL STABILITY: SOCIAL INSECURITY: DOES ANYONE TRY TO KEEP YOU FROM HAVING/CONTACTING OTHER FRIENDS OR DOING THINGS OUTSIDE YOUR HOME?: UNABLE TO ASSESS

## 2025-08-28 SDOH — SOCIAL STABILITY: SOCIAL INSECURITY: ABUSE: ADULT

## 2025-08-28 SDOH — ECONOMIC STABILITY: TRANSPORTATION INSECURITY
IN THE PAST 12 MONTHS, HAS LACK OF TRANSPORTATION KEPT YOU FROM MEDICAL APPOINTMENTS OR FROM GETTING MEDICATIONS?: PATIENT UNABLE TO ANSWER

## 2025-08-28 SDOH — ECONOMIC STABILITY: INCOME INSECURITY
IN THE PAST 12 MONTHS HAS THE ELECTRIC, GAS, OIL, OR WATER COMPANY THREATENED TO SHUT OFF SERVICES IN YOUR HOME?: PATIENT UNABLE TO ANSWER

## 2025-08-28 SDOH — SOCIAL STABILITY: SOCIAL INSECURITY
WITHIN THE LAST YEAR, HAVE YOU BEEN HUMILIATED OR EMOTIONALLY ABUSED IN OTHER WAYS BY YOUR PARTNER OR EX-PARTNER?: PATIENT UNABLE TO ANSWER

## 2025-08-28 SDOH — SOCIAL STABILITY: SOCIAL INSECURITY: HAVE YOU HAD THOUGHTS OF HARMING ANYONE ELSE?: UNABLE TO ASSESS

## 2025-08-28 SDOH — SOCIAL STABILITY: SOCIAL INSECURITY: WITHIN THE LAST YEAR, HAVE YOU BEEN AFRAID OF YOUR PARTNER OR EX-PARTNER?: PATIENT UNABLE TO ANSWER

## 2025-08-28 SDOH — ECONOMIC STABILITY: HOUSING INSECURITY
IN THE LAST 12 MONTHS, WAS THERE A TIME WHEN YOU WERE NOT ABLE TO PAY THE MORTGAGE OR RENT ON TIME?: PATIENT UNABLE TO ANSWER

## 2025-08-28 SDOH — ECONOMIC STABILITY: FOOD INSECURITY
WITHIN THE PAST 12 MONTHS, THE FOOD YOU BOUGHT JUST DIDN'T LAST AND YOU DIDN'T HAVE MONEY TO GET MORE.: PATIENT UNABLE TO ANSWER

## 2025-08-28 SDOH — SOCIAL STABILITY: SOCIAL INSECURITY: ARE YOU OR HAVE YOU BEEN THREATENED OR ABUSED PHYSICALLY, EMOTIONALLY, OR SEXUALLY BY ANYONE?: UNABLE TO ASSESS

## 2025-08-28 SDOH — SOCIAL STABILITY: SOCIAL INSECURITY: DO YOU FEEL UNSAFE GOING BACK TO THE PLACE WHERE YOU ARE LIVING?: UNABLE TO ASSESS

## 2025-08-28 SDOH — SOCIAL STABILITY: SOCIAL INSECURITY
WITHIN THE LAST YEAR, HAVE YOU BEEN KICKED, HIT, SLAPPED, OR OTHERWISE PHYSICALLY HURT BY YOUR PARTNER OR EX-PARTNER?: PATIENT UNABLE TO ANSWER

## 2025-08-28 SDOH — SOCIAL STABILITY: SOCIAL INSECURITY: ARE THERE ANY APPARENT SIGNS OF INJURIES/BEHAVIORS THAT COULD BE RELATED TO ABUSE/NEGLECT?: UNABLE TO ASSESS

## 2025-08-28 SDOH — ECONOMIC STABILITY: HOUSING INSECURITY: DO YOU FEEL UNSAFE GOING BACK TO THE PLACE WHERE YOU LIVE?: UNABLE TO ASSESS

## 2025-08-28 SDOH — ECONOMIC STABILITY: HOUSING INSECURITY: AT ANY TIME IN THE PAST 12 MONTHS, WERE YOU HOMELESS OR LIVING IN A SHELTER (INCLUDING NOW)?: PATIENT UNABLE TO ANSWER

## 2025-08-28 SDOH — ECONOMIC STABILITY: FOOD INSECURITY
HOW HARD IS IT FOR YOU TO PAY FOR THE VERY BASICS LIKE FOOD, HOUSING, MEDICAL CARE, AND HEATING?: PATIENT UNABLE TO ANSWER

## 2025-08-28 SDOH — SOCIAL STABILITY: SOCIAL INSECURITY: WERE YOU ABLE TO COMPLETE ALL THE BEHAVIORAL HEALTH SCREENINGS?: NO

## 2025-08-28 SDOH — SOCIAL STABILITY: SOCIAL INSECURITY: HAS ANYONE EVER THREATENED TO HURT YOUR FAMILY OR YOUR PETS?: UNABLE TO ASSESS

## 2025-08-28 SDOH — SOCIAL STABILITY: SOCIAL INSECURITY: DO YOU FEEL ANYONE HAS EXPLOITED OR TAKEN ADVANTAGE OF YOU FINANCIALLY OR OF YOUR PERSONAL PROPERTY?: UNABLE TO ASSESS

## 2025-08-28 ASSESSMENT — COGNITIVE AND FUNCTIONAL STATUS - GENERAL
TURNING FROM BACK TO SIDE WHILE IN FLAT BAD: A LOT
MOVING TO AND FROM BED TO CHAIR: A LOT
WALKING IN HOSPITAL ROOM: A LOT
CLIMB 3 TO 5 STEPS WITH RAILING: A LOT
EATING MEALS: A LOT
STANDING UP FROM CHAIR USING ARMS: A LOT
HELP NEEDED FOR BATHING: A LOT
MOVING FROM LYING ON BACK TO SITTING ON SIDE OF FLAT BED WITH BEDRAILS: A LOT
PERSONAL GROOMING: A LOT
CLIMB 3 TO 5 STEPS WITH RAILING: A LOT
HELP NEEDED FOR BATHING: A LOT
HELP NEEDED FOR BATHING: A LOT
EATING MEALS: A LOT
HELP NEEDED FOR BATHING: A LOT
MOBILITY SCORE: 12
PERSONAL GROOMING: A LOT
EATING MEALS: A LOT
MOBILITY SCORE: 12
MOVING TO AND FROM BED TO CHAIR: A LOT
DRESSING REGULAR LOWER BODY CLOTHING: A LOT
MOBILITY SCORE: 12
PERSONAL GROOMING: A LOT
TOILETING: A LOT
DAILY ACTIVITIY SCORE: 12
DRESSING REGULAR LOWER BODY CLOTHING: A LOT
TOILETING: TOTAL
HELP NEEDED FOR BATHING: A LOT
STANDING UP FROM CHAIR USING ARMS: A LOT
STANDING UP FROM CHAIR USING ARMS: A LOT
HELP NEEDED FOR BATHING: A LOT
MOVING TO AND FROM BED TO CHAIR: A LOT
DAILY ACTIVITIY SCORE: 11
TURNING FROM BACK TO SIDE WHILE IN FLAT BAD: A LOT
MOVING TO AND FROM BED TO CHAIR: A LOT
DRESSING REGULAR LOWER BODY CLOTHING: TOTAL
TOILETING: A LOT
TOILETING: A LOT
DRESSING REGULAR UPPER BODY CLOTHING: A LOT
STANDING UP FROM CHAIR USING ARMS: A LOT
TURNING FROM BACK TO SIDE WHILE IN FLAT BAD: A LOT
STANDING UP FROM CHAIR USING ARMS: A LOT
MOVING FROM LYING ON BACK TO SITTING ON SIDE OF FLAT BED WITH BEDRAILS: A LOT
CLIMB 3 TO 5 STEPS WITH RAILING: A LOT
PERSONAL GROOMING: A LOT
DRESSING REGULAR UPPER BODY CLOTHING: A LOT
CLIMB 3 TO 5 STEPS WITH RAILING: TOTAL
DRESSING REGULAR LOWER BODY CLOTHING: A LOT
MOVING TO AND FROM BED TO CHAIR: TOTAL
EATING MEALS: A LOT
WALKING IN HOSPITAL ROOM: A LOT
CLIMB 3 TO 5 STEPS WITH RAILING: A LOT
HELP NEEDED FOR BATHING: A LOT
PERSONAL GROOMING: A LOT
DRESSING REGULAR LOWER BODY CLOTHING: A LOT
CLIMB 3 TO 5 STEPS WITH RAILING: A LOT
DAILY ACTIVITIY SCORE: 12
DRESSING REGULAR UPPER BODY CLOTHING: A LOT
MOVING FROM LYING ON BACK TO SITTING ON SIDE OF FLAT BED WITH BEDRAILS: A LOT
TURNING FROM BACK TO SIDE WHILE IN FLAT BAD: A LOT
TOILETING: A LOT
DAILY ACTIVITIY SCORE: 12
TOILETING: A LOT
DRESSING REGULAR LOWER BODY CLOTHING: A LOT
PERSONAL GROOMING: A LOT
TURNING FROM BACK TO SIDE WHILE IN FLAT BAD: A LOT
PERSONAL GROOMING: A LOT
WALKING IN HOSPITAL ROOM: A LOT
MOBILITY SCORE: 12
DRESSING REGULAR LOWER BODY CLOTHING: A LOT
TURNING FROM BACK TO SIDE WHILE IN FLAT BAD: A LOT
STANDING UP FROM CHAIR USING ARMS: A LOT
PATIENT BASELINE BEDBOUND: NO
EATING MEALS: A LOT
MOVING FROM LYING ON BACK TO SITTING ON SIDE OF FLAT BED WITH BEDRAILS: A LOT
DAILY ACTIVITIY SCORE: 12
MOVING FROM LYING ON BACK TO SITTING ON SIDE OF FLAT BED WITH BEDRAILS: A LOT
MOBILITY SCORE: 12
STANDING UP FROM CHAIR USING ARMS: A LOT
DRESSING REGULAR UPPER BODY CLOTHING: A LOT
WALKING IN HOSPITAL ROOM: A LOT
DRESSING REGULAR UPPER BODY CLOTHING: A LOT
TOILETING: A LOT
DAILY ACTIVITIY SCORE: 12
WALKING IN HOSPITAL ROOM: TOTAL
DAILY ACTIVITIY SCORE: 12
EATING MEALS: A LOT
TURNING FROM BACK TO SIDE WHILE IN FLAT BAD: A LOT
PERSONAL GROOMING: A LOT
DRESSING REGULAR UPPER BODY CLOTHING: A LOT
STANDING UP FROM CHAIR USING ARMS: A LOT
DRESSING REGULAR LOWER BODY CLOTHING: A LOT
MOVING FROM LYING ON BACK TO SITTING ON SIDE OF FLAT BED WITH BEDRAILS: A LOT
EATING MEALS: A LITTLE
DRESSING REGULAR UPPER BODY CLOTHING: A LOT
MOBILITY SCORE: 12
EATING MEALS: A LOT
MOVING FROM LYING ON BACK TO SITTING ON SIDE OF FLAT BED WITH BEDRAILS: A LOT
HELP NEEDED FOR BATHING: A LOT
WALKING IN HOSPITAL ROOM: A LOT
WALKING IN HOSPITAL ROOM: A LOT
TOILETING: A LOT
MOBILITY SCORE: 12
WALKING IN HOSPITAL ROOM: A LOT
DAILY ACTIVITIY SCORE: 12
MOVING TO AND FROM BED TO CHAIR: A LOT
MOBILITY SCORE: 9
TURNING FROM BACK TO SIDE WHILE IN FLAT BAD: A LOT
MOVING FROM LYING ON BACK TO SITTING ON SIDE OF FLAT BED WITH BEDRAILS: A LOT
CLIMB 3 TO 5 STEPS WITH RAILING: A LOT
DRESSING REGULAR UPPER BODY CLOTHING: A LOT
CLIMB 3 TO 5 STEPS WITH RAILING: A LOT

## 2025-08-28 ASSESSMENT — PAIN SCALES - GENERAL
PAINLEVEL_OUTOF10: 0 - NO PAIN

## 2025-08-28 ASSESSMENT — ACTIVITIES OF DAILY LIVING (ADL)
GROOMING: UNABLE TO ASSESS
JUDGMENT_ADEQUATE_SAFELY_COMPLETE_DAILY_ACTIVITIES: UNABLE TO ASSESS
FEEDING YOURSELF: UNABLE TO ASSESS
PATIENT'S MEMORY ADEQUATE TO SAFELY COMPLETE DAILY ACTIVITIES?: UNABLE TO ASSESS
BATHING: UNABLE TO ASSESS
WALKS IN HOME: UNABLE TO ASSESS
TOILETING: UNABLE TO ASSESS
ADEQUATE_TO_COMPLETE_ADL: UNABLE TO ASSESS
LACK_OF_TRANSPORTATION: PATIENT UNABLE TO ANSWER
HEARING - LEFT EAR: FUNCTIONAL
DRESSING YOURSELF: UNABLE TO ASSESS
HEARING - RIGHT EAR: FUNCTIONAL

## 2025-08-28 ASSESSMENT — LIFESTYLE VARIABLES
AUDIT-C TOTAL SCORE: -1
HOW OFTEN DO YOU HAVE 6 OR MORE DRINKS ON ONE OCCASION: PATIENT UNABLE TO ANSWER
SKIP TO QUESTIONS 9-10: 0
HOW MANY STANDARD DRINKS CONTAINING ALCOHOL DO YOU HAVE ON A TYPICAL DAY: PATIENT UNABLE TO ANSWER
SKIP TO QUESTIONS 9-10: 0
HOW OFTEN DO YOU HAVE A DRINK CONTAINING ALCOHOL: PATIENT UNABLE TO ANSWER
AUDIT-C TOTAL SCORE: -1
AUDIT-C TOTAL SCORE: -1

## 2025-08-28 ASSESSMENT — PAIN - FUNCTIONAL ASSESSMENT
PAIN_FUNCTIONAL_ASSESSMENT: 0-10
PAIN_FUNCTIONAL_ASSESSMENT: 0-10
PAIN_FUNCTIONAL_ASSESSMENT: CPOT (CRITICAL CARE PAIN OBSERVATION TOOL)
PAIN_FUNCTIONAL_ASSESSMENT: 0-10
PAIN_FUNCTIONAL_ASSESSMENT: CPOT (CRITICAL CARE PAIN OBSERVATION TOOL)
PAIN_FUNCTIONAL_ASSESSMENT: WONG-BAKER FACES
PAIN_FUNCTIONAL_ASSESSMENT: 0-10
PAIN_FUNCTIONAL_ASSESSMENT: WONG-BAKER FACES

## 2025-08-28 ASSESSMENT — PATIENT HEALTH QUESTIONNAIRE - PHQ9
2. FEELING DOWN, DEPRESSED OR HOPELESS: NOT AT ALL
SUM OF ALL RESPONSES TO PHQ9 QUESTIONS 1 & 2: 0
1. LITTLE INTEREST OR PLEASURE IN DOING THINGS: NOT AT ALL

## 2025-08-28 ASSESSMENT — PAIN SCALES - WONG BAKER
WONGBAKER_NUMERICALRESPONSE: HURTS LITTLE BIT
WONGBAKER_NUMERICALRESPONSE: HURTS LITTLE BIT

## 2025-08-29 ENCOUNTER — APPOINTMENT (OUTPATIENT)
Dept: CARDIOLOGY | Facility: HOSPITAL | Age: 75
End: 2025-08-29
Payer: MEDICARE

## 2025-08-29 PROCEDURE — 93005 ELECTROCARDIOGRAM TRACING: CPT

## 2025-08-29 ASSESSMENT — COGNITIVE AND FUNCTIONAL STATUS - GENERAL
DAILY ACTIVITIY SCORE: 10
MOVING TO AND FROM BED TO CHAIR: TOTAL
DRESSING REGULAR UPPER BODY CLOTHING: TOTAL
MOVING FROM LYING ON BACK TO SITTING ON SIDE OF FLAT BED WITH BEDRAILS: TOTAL
HELP NEEDED FOR BATHING: A LOT
PERSONAL GROOMING: A LOT
DRESSING REGULAR LOWER BODY CLOTHING: TOTAL
DRESSING REGULAR LOWER BODY CLOTHING: TOTAL
DRESSING REGULAR UPPER BODY CLOTHING: TOTAL
HELP NEEDED FOR BATHING: A LOT
WALKING IN HOSPITAL ROOM: TOTAL
CLIMB 3 TO 5 STEPS WITH RAILING: TOTAL
CLIMB 3 TO 5 STEPS WITH RAILING: TOTAL
DRESSING REGULAR LOWER BODY CLOTHING: TOTAL
TOILETING: TOTAL
CLIMB 3 TO 5 STEPS WITH RAILING: TOTAL
STANDING UP FROM CHAIR USING ARMS: TOTAL
STANDING UP FROM CHAIR USING ARMS: A LOT
MOBILITY SCORE: 9
TOILETING: TOTAL
DRESSING REGULAR UPPER BODY CLOTHING: A LOT
MOBILITY SCORE: 6
PERSONAL GROOMING: TOTAL
MOVING TO AND FROM BED TO CHAIR: TOTAL
DAILY ACTIVITIY SCORE: 10
CLIMB 3 TO 5 STEPS WITH RAILING: TOTAL
TURNING FROM BACK TO SIDE WHILE IN FLAT BAD: TOTAL
DAILY ACTIVITIY SCORE: 11
TOILETING: TOTAL
PERSONAL GROOMING: TOTAL
EATING MEALS: A LITTLE
WALKING IN HOSPITAL ROOM: TOTAL
MOVING FROM LYING ON BACK TO SITTING ON SIDE OF FLAT BED WITH BEDRAILS: A LOT
MOBILITY SCORE: 6
WALKING IN HOSPITAL ROOM: TOTAL
HELP NEEDED FOR BATHING: A LOT
STANDING UP FROM CHAIR USING ARMS: TOTAL
DAILY ACTIVITIY SCORE: 10
TOILETING: TOTAL
MOVING FROM LYING ON BACK TO SITTING ON SIDE OF FLAT BED WITH BEDRAILS: TOTAL
DRESSING REGULAR UPPER BODY CLOTHING: TOTAL
MOBILITY SCORE: 7
PERSONAL GROOMING: TOTAL
DRESSING REGULAR LOWER BODY CLOTHING: TOTAL
HELP NEEDED FOR BATHING: A LOT
STANDING UP FROM CHAIR USING ARMS: TOTAL
MOVING TO AND FROM BED TO CHAIR: TOTAL
MOVING FROM LYING ON BACK TO SITTING ON SIDE OF FLAT BED WITH BEDRAILS: A LOT
TURNING FROM BACK TO SIDE WHILE IN FLAT BAD: TOTAL
WALKING IN HOSPITAL ROOM: TOTAL
MOVING TO AND FROM BED TO CHAIR: TOTAL
TURNING FROM BACK TO SIDE WHILE IN FLAT BAD: TOTAL
TURNING FROM BACK TO SIDE WHILE IN FLAT BAD: A LOT

## 2025-08-29 ASSESSMENT — PAIN - FUNCTIONAL ASSESSMENT
PAIN_FUNCTIONAL_ASSESSMENT: 0-10

## 2025-08-29 ASSESSMENT — PAIN SCALES - GENERAL
PAINLEVEL_OUTOF10: 0 - NO PAIN
PAINLEVEL_OUTOF10: 3

## 2025-08-30 ENCOUNTER — APPOINTMENT (OUTPATIENT)
Dept: RADIOLOGY | Facility: HOSPITAL | Age: 75
End: 2025-08-30
Payer: MEDICARE

## 2025-08-30 PROCEDURE — 70450 CT HEAD/BRAIN W/O DYE: CPT

## 2025-08-30 PROCEDURE — 93005 ELECTROCARDIOGRAM TRACING: CPT

## 2025-08-30 ASSESSMENT — COGNITIVE AND FUNCTIONAL STATUS - GENERAL
DRESSING REGULAR UPPER BODY CLOTHING: TOTAL
CLIMB 3 TO 5 STEPS WITH RAILING: TOTAL
TOILETING: TOTAL
MOBILITY SCORE: 7
WALKING IN HOSPITAL ROOM: TOTAL
PERSONAL GROOMING: TOTAL
TURNING FROM BACK TO SIDE WHILE IN FLAT BAD: TOTAL
HELP NEEDED FOR BATHING: TOTAL
DAILY ACTIVITIY SCORE: 9
DRESSING REGULAR LOWER BODY CLOTHING: TOTAL
MOVING FROM LYING ON BACK TO SITTING ON SIDE OF FLAT BED WITH BEDRAILS: A LOT
MOVING TO AND FROM BED TO CHAIR: TOTAL
STANDING UP FROM CHAIR USING ARMS: TOTAL

## 2025-08-30 ASSESSMENT — PAIN SCALES - GENERAL
PAINLEVEL_OUTOF10: 0 - NO PAIN
PAINLEVEL_OUTOF10: 0 - NO PAIN

## 2025-08-30 ASSESSMENT — PAIN SCALES - WONG BAKER: WONGBAKER_NUMERICALRESPONSE: NO HURT

## 2025-08-30 ASSESSMENT — PAIN - FUNCTIONAL ASSESSMENT: PAIN_FUNCTIONAL_ASSESSMENT: 0-10

## 2025-08-31 ENCOUNTER — APPOINTMENT (OUTPATIENT)
Dept: CARDIOLOGY | Facility: HOSPITAL | Age: 75
End: 2025-08-31
Payer: MEDICARE

## 2025-08-31 ASSESSMENT — COGNITIVE AND FUNCTIONAL STATUS - GENERAL
DAILY ACTIVITIY SCORE: 8
EATING MEALS: A LITTLE
HELP NEEDED FOR BATHING: TOTAL
CLIMB 3 TO 5 STEPS WITH RAILING: TOTAL
TOILETING: TOTAL
HELP NEEDED FOR BATHING: TOTAL
CLIMB 3 TO 5 STEPS WITH RAILING: TOTAL
STANDING UP FROM CHAIR USING ARMS: TOTAL
MOVING FROM LYING ON BACK TO SITTING ON SIDE OF FLAT BED WITH BEDRAILS: A LOT
TURNING FROM BACK TO SIDE WHILE IN FLAT BAD: TOTAL
DRESSING REGULAR UPPER BODY CLOTHING: TOTAL
DRESSING REGULAR UPPER BODY CLOTHING: TOTAL
MOBILITY SCORE: 7
MOVING TO AND FROM BED TO CHAIR: TOTAL
DRESSING REGULAR LOWER BODY CLOTHING: TOTAL
MOBILITY SCORE: 7
PERSONAL GROOMING: TOTAL
DAILY ACTIVITIY SCORE: 8
WALKING IN HOSPITAL ROOM: TOTAL
WALKING IN HOSPITAL ROOM: TOTAL
MOVING TO AND FROM BED TO CHAIR: TOTAL
STANDING UP FROM CHAIR USING ARMS: TOTAL
TOILETING: TOTAL
EATING MEALS: A LITTLE
PERSONAL GROOMING: TOTAL
TURNING FROM BACK TO SIDE WHILE IN FLAT BAD: TOTAL
DRESSING REGULAR LOWER BODY CLOTHING: TOTAL
MOVING FROM LYING ON BACK TO SITTING ON SIDE OF FLAT BED WITH BEDRAILS: A LOT

## 2025-08-31 ASSESSMENT — PAIN - FUNCTIONAL ASSESSMENT: PAIN_FUNCTIONAL_ASSESSMENT: WONG-BAKER FACES

## 2025-08-31 ASSESSMENT — PAIN SCALES - WONG BAKER: WONGBAKER_NUMERICALRESPONSE: NO HURT

## 2025-08-31 ASSESSMENT — PAIN SCALES - GENERAL: PAINLEVEL_OUTOF10: 0 - NO PAIN

## 2025-09-01 ENCOUNTER — APPOINTMENT (OUTPATIENT)
Dept: CARDIOLOGY | Facility: HOSPITAL | Age: 75
End: 2025-09-01
Payer: MEDICARE

## 2025-09-01 PROCEDURE — 93005 ELECTROCARDIOGRAM TRACING: CPT

## 2025-09-01 ASSESSMENT — COGNITIVE AND FUNCTIONAL STATUS - GENERAL
EATING MEALS: A LITTLE
DAILY ACTIVITIY SCORE: 8
DRESSING REGULAR LOWER BODY CLOTHING: TOTAL
STANDING UP FROM CHAIR USING ARMS: TOTAL
DRESSING REGULAR UPPER BODY CLOTHING: TOTAL
WALKING IN HOSPITAL ROOM: TOTAL
HELP NEEDED FOR BATHING: TOTAL
MOBILITY SCORE: 7
TURNING FROM BACK TO SIDE WHILE IN FLAT BAD: TOTAL
PERSONAL GROOMING: TOTAL
MOVING FROM LYING ON BACK TO SITTING ON SIDE OF FLAT BED WITH BEDRAILS: A LOT
CLIMB 3 TO 5 STEPS WITH RAILING: TOTAL
MOVING TO AND FROM BED TO CHAIR: TOTAL
TOILETING: TOTAL

## 2025-09-01 ASSESSMENT — PAIN - FUNCTIONAL ASSESSMENT: PAIN_FUNCTIONAL_ASSESSMENT: WONG-BAKER FACES

## 2025-09-01 ASSESSMENT — PAIN SCALES - GENERAL: PAINLEVEL_OUTOF10: 0 - NO PAIN

## 2025-09-01 ASSESSMENT — PAIN SCALES - WONG BAKER: WONGBAKER_NUMERICALRESPONSE: NO HURT

## 2025-09-02 ASSESSMENT — COGNITIVE AND FUNCTIONAL STATUS - GENERAL
DAILY ACTIVITIY SCORE: 8
EATING MEALS: A LITTLE
TOILETING: TOTAL
DRESSING REGULAR LOWER BODY CLOTHING: TOTAL
DRESSING REGULAR UPPER BODY CLOTHING: A LOT
MOVING TO AND FROM BED TO CHAIR: TOTAL
STANDING UP FROM CHAIR USING ARMS: A LOT
STANDING UP FROM CHAIR USING ARMS: TOTAL
CLIMB 3 TO 5 STEPS WITH RAILING: TOTAL
EATING MEALS: A LITTLE
DAILY ACTIVITIY SCORE: 11
TOILETING: TOTAL
TURNING FROM BACK TO SIDE WHILE IN FLAT BAD: TOTAL
TURNING FROM BACK TO SIDE WHILE IN FLAT BAD: A LOT
MOVING FROM LYING ON BACK TO SITTING ON SIDE OF FLAT BED WITH BEDRAILS: A LITTLE
MOBILITY SCORE: 7
DRESSING REGULAR LOWER BODY CLOTHING: TOTAL
WALKING IN HOSPITAL ROOM: TOTAL
MOVING FROM LYING ON BACK TO SITTING ON SIDE OF FLAT BED WITH BEDRAILS: A LOT
PERSONAL GROOMING: A LOT
HELP NEEDED FOR BATHING: TOTAL
CLIMB 3 TO 5 STEPS WITH RAILING: TOTAL
WALKING IN HOSPITAL ROOM: TOTAL
MOVING TO AND FROM BED TO CHAIR: A LOT
PERSONAL GROOMING: TOTAL
MOBILITY SCORE: 11
DRESSING REGULAR UPPER BODY CLOTHING: TOTAL
HELP NEEDED FOR BATHING: A LOT

## 2025-09-02 ASSESSMENT — PAIN - FUNCTIONAL ASSESSMENT
PAIN_FUNCTIONAL_ASSESSMENT: 0-10

## 2025-09-02 ASSESSMENT — ACTIVITIES OF DAILY LIVING (ADL): HOME_MANAGEMENT_TIME_ENTRY: 13

## 2025-09-02 ASSESSMENT — PAIN SCALES - GENERAL
PAINLEVEL_OUTOF10: 0 - NO PAIN

## 2025-09-03 ASSESSMENT — COGNITIVE AND FUNCTIONAL STATUS - GENERAL
WALKING IN HOSPITAL ROOM: TOTAL
TURNING FROM BACK TO SIDE WHILE IN FLAT BAD: TOTAL
MOVING FROM LYING ON BACK TO SITTING ON SIDE OF FLAT BED WITH BEDRAILS: A LITTLE
PERSONAL GROOMING: A LITTLE
PERSONAL GROOMING: TOTAL
MOVING TO AND FROM BED TO CHAIR: TOTAL
HELP NEEDED FOR BATHING: TOTAL
STANDING UP FROM CHAIR USING ARMS: A LITTLE
DAILY ACTIVITIY SCORE: 14
WALKING IN HOSPITAL ROOM: TOTAL
HELP NEEDED FOR BATHING: TOTAL
CLIMB 3 TO 5 STEPS WITH RAILING: TOTAL
EATING MEALS: A LITTLE
CLIMB 3 TO 5 STEPS WITH RAILING: TOTAL
MOVING FROM LYING ON BACK TO SITTING ON SIDE OF FLAT BED WITH BEDRAILS: A LOT
STANDING UP FROM CHAIR USING ARMS: TOTAL
MOVING TO AND FROM BED TO CHAIR: A LOT
TURNING FROM BACK TO SIDE WHILE IN FLAT BAD: TOTAL
STANDING UP FROM CHAIR USING ARMS: A LOT
MOVING TO AND FROM BED TO CHAIR: TOTAL
CLIMB 3 TO 5 STEPS WITH RAILING: TOTAL
WALKING IN HOSPITAL ROOM: A LOT
TOILETING: TOTAL
TURNING FROM BACK TO SIDE WHILE IN FLAT BAD: A LOT
HELP NEEDED FOR BATHING: A LOT
DRESSING REGULAR UPPER BODY CLOTHING: TOTAL
MOBILITY SCORE: 7
DAILY ACTIVITIY SCORE: 8
PERSONAL GROOMING: TOTAL
WALKING IN HOSPITAL ROOM: A LOT
TOILETING: TOTAL
DRESSING REGULAR LOWER BODY CLOTHING: A LOT
MOBILITY SCORE: 7
MOVING TO AND FROM BED TO CHAIR: A LOT
DAILY ACTIVITIY SCORE: 8
MOVING FROM LYING ON BACK TO SITTING ON SIDE OF FLAT BED WITH BEDRAILS: A LITTLE
DRESSING REGULAR UPPER BODY CLOTHING: A LOT
DRESSING REGULAR UPPER BODY CLOTHING: TOTAL
EATING MEALS: A LITTLE
STANDING UP FROM CHAIR USING ARMS: TOTAL
TOILETING: A LOT
DRESSING REGULAR LOWER BODY CLOTHING: TOTAL
MOVING FROM LYING ON BACK TO SITTING ON SIDE OF FLAT BED WITH BEDRAILS: A LOT
MOBILITY SCORE: 12
MOBILITY SCORE: 13
TURNING FROM BACK TO SIDE WHILE IN FLAT BAD: A LOT
DRESSING REGULAR LOWER BODY CLOTHING: TOTAL
EATING MEALS: A LITTLE
CLIMB 3 TO 5 STEPS WITH RAILING: TOTAL

## 2025-09-03 ASSESSMENT — PAIN - FUNCTIONAL ASSESSMENT: PAIN_FUNCTIONAL_ASSESSMENT: 0-10

## 2025-09-03 ASSESSMENT — PAIN SCALES - GENERAL
PAINLEVEL_OUTOF10: 0 - NO PAIN
PAINLEVEL_OUTOF10: 0 - NO PAIN

## 2025-09-04 ENCOUNTER — APPOINTMENT (OUTPATIENT)
Dept: GASTROENTEROLOGY | Facility: HOSPITAL | Age: 75
End: 2025-09-04
Payer: MEDICARE

## 2025-09-04 ENCOUNTER — ANESTHESIA (OUTPATIENT)
Dept: GASTROENTEROLOGY | Facility: HOSPITAL | Age: 75
End: 2025-09-04
Payer: MEDICARE

## 2025-09-04 ENCOUNTER — ANESTHESIA EVENT (OUTPATIENT)
Dept: GASTROENTEROLOGY | Facility: HOSPITAL | Age: 75
End: 2025-09-04
Payer: MEDICARE

## 2025-09-04 PROCEDURE — 45380 COLONOSCOPY AND BIOPSY: CPT | Performed by: STUDENT IN AN ORGANIZED HEALTH CARE EDUCATION/TRAINING PROGRAM

## 2025-09-04 PROCEDURE — 3700000002 HC GENERAL ANESTHESIA TIME - EACH INCREMENTAL 1 MINUTE

## 2025-09-04 PROCEDURE — 2500000004 HC RX 250 GENERAL PHARMACY W/ HCPCS (ALT 636 FOR OP/ED): Performed by: NURSE ANESTHETIST, CERTIFIED REGISTERED

## 2025-09-04 PROCEDURE — 3700000001 HC GENERAL ANESTHESIA TIME - INITIAL BASE CHARGE

## 2025-09-04 PROCEDURE — 2720000007 HC OR 272 NO HCPCS

## 2025-09-04 RX ORDER — PROPOFOL 10 MG/ML
INJECTION, EMULSION INTRAVENOUS AS NEEDED
Status: DISCONTINUED | OUTPATIENT
Start: 2025-09-04 | End: 2025-09-04

## 2025-09-04 RX ORDER — SODIUM CHLORIDE, SODIUM LACTATE, POTASSIUM CHLORIDE, CALCIUM CHLORIDE 600; 310; 30; 20 MG/100ML; MG/100ML; MG/100ML; MG/100ML
INJECTION, SOLUTION INTRAVENOUS CONTINUOUS PRN
Status: DISCONTINUED | OUTPATIENT
Start: 2025-09-04 | End: 2025-09-04

## 2025-09-04 RX ADMIN — PROPOFOL 75 MCG/KG/MIN: 10 INJECTION, EMULSION INTRAVENOUS at 15:24

## 2025-09-04 RX ADMIN — PROPOFOL 30 MG: 10 INJECTION, EMULSION INTRAVENOUS at 15:33

## 2025-09-04 RX ADMIN — PROPOFOL 50 MG: 10 INJECTION, EMULSION INTRAVENOUS at 15:24

## 2025-09-04 RX ADMIN — PROPOFOL 20 MG: 10 INJECTION, EMULSION INTRAVENOUS at 15:26

## 2025-09-04 RX ADMIN — SODIUM CHLORIDE, POTASSIUM CHLORIDE, SODIUM LACTATE AND CALCIUM CHLORIDE: 600; 310; 30; 20 INJECTION, SOLUTION INTRAVENOUS at 15:12

## 2025-09-04 ASSESSMENT — PAIN - FUNCTIONAL ASSESSMENT
PAIN_FUNCTIONAL_ASSESSMENT: 0-10
PAIN_FUNCTIONAL_ASSESSMENT: 0-10

## 2025-09-04 ASSESSMENT — COGNITIVE AND FUNCTIONAL STATUS - GENERAL
DRESSING REGULAR LOWER BODY CLOTHING: TOTAL
HELP NEEDED FOR BATHING: A LOT
DAILY ACTIVITIY SCORE: 11
TOILETING: TOTAL
DRESSING REGULAR UPPER BODY CLOTHING: A LOT
PERSONAL GROOMING: A LOT
EATING MEALS: A LITTLE

## 2025-09-04 ASSESSMENT — PAIN SCALES - GENERAL
PAIN_LEVEL: 0
PAINLEVEL_OUTOF10: 0 - NO PAIN
PAINLEVEL_OUTOF10: 0 - NO PAIN

## 2025-09-05 ASSESSMENT — PAIN SCALES - GENERAL
PAINLEVEL_OUTOF10: 7
PAINLEVEL_OUTOF10: 0 - NO PAIN
PAINLEVEL_OUTOF10: 0 - NO PAIN

## 2025-09-05 ASSESSMENT — PAIN - FUNCTIONAL ASSESSMENT
PAIN_FUNCTIONAL_ASSESSMENT: 0-10

## 2025-09-05 ASSESSMENT — PAIN DESCRIPTION - ORIENTATION: ORIENTATION: LEFT

## 2025-09-05 ASSESSMENT — COGNITIVE AND FUNCTIONAL STATUS - GENERAL
CLIMB 3 TO 5 STEPS WITH RAILING: TOTAL
STANDING UP FROM CHAIR USING ARMS: A LITTLE
MOVING FROM LYING ON BACK TO SITTING ON SIDE OF FLAT BED WITH BEDRAILS: A LITTLE
MOBILITY SCORE: 14
WALKING IN HOSPITAL ROOM: A LOT
TURNING FROM BACK TO SIDE WHILE IN FLAT BAD: A LITTLE
MOVING TO AND FROM BED TO CHAIR: A LOT

## 2025-09-05 ASSESSMENT — PAIN DESCRIPTION - LOCATION: LOCATION: ABDOMEN

## 2025-09-06 ASSESSMENT — PAIN SCALES - WONG BAKER
WONGBAKER_NUMERICALRESPONSE: NO HURT
WONGBAKER_NUMERICALRESPONSE: NO HURT

## 2025-09-06 ASSESSMENT — PAIN - FUNCTIONAL ASSESSMENT
PAIN_FUNCTIONAL_ASSESSMENT: 0-10

## 2025-09-06 ASSESSMENT — PAIN SCALES - GENERAL
PAINLEVEL_OUTOF10: 0 - NO PAIN

## 2025-09-07 ASSESSMENT — COGNITIVE AND FUNCTIONAL STATUS - GENERAL
STANDING UP FROM CHAIR USING ARMS: A LOT
DAILY ACTIVITIY SCORE: 9
CLIMB 3 TO 5 STEPS WITH RAILING: TOTAL
PERSONAL GROOMING: A LOT
EATING MEALS: A LOT
DAILY ACTIVITIY SCORE: 9
TURNING FROM BACK TO SIDE WHILE IN FLAT BAD: A LOT
MOVING FROM LYING ON BACK TO SITTING ON SIDE OF FLAT BED WITH BEDRAILS: A LITTLE
MOVING TO AND FROM BED TO CHAIR: A LOT
WALKING IN HOSPITAL ROOM: TOTAL
STANDING UP FROM CHAIR USING ARMS: A LOT
MOBILITY SCORE: 11
EATING MEALS: A LOT
MOVING TO AND FROM BED TO CHAIR: A LOT
CLIMB 3 TO 5 STEPS WITH RAILING: TOTAL
DRESSING REGULAR LOWER BODY CLOTHING: TOTAL
HELP NEEDED FOR BATHING: TOTAL
TOILETING: A LOT
HELP NEEDED FOR BATHING: TOTAL
TURNING FROM BACK TO SIDE WHILE IN FLAT BAD: A LOT
WALKING IN HOSPITAL ROOM: TOTAL
MOBILITY SCORE: 11
TOILETING: A LOT
DRESSING REGULAR LOWER BODY CLOTHING: TOTAL
DRESSING REGULAR UPPER BODY CLOTHING: TOTAL
MOVING FROM LYING ON BACK TO SITTING ON SIDE OF FLAT BED WITH BEDRAILS: A LITTLE
PERSONAL GROOMING: A LOT
DRESSING REGULAR UPPER BODY CLOTHING: TOTAL

## 2025-09-07 ASSESSMENT — PAIN SCALES - GENERAL
PAINLEVEL_OUTOF10: 0 - NO PAIN

## 2025-09-07 ASSESSMENT — PAIN - FUNCTIONAL ASSESSMENT
PAIN_FUNCTIONAL_ASSESSMENT: 0-10

## 2025-10-15 ENCOUNTER — APPOINTMENT (OUTPATIENT)
Dept: ENDOCRINOLOGY | Facility: CLINIC | Age: 75
End: 2025-10-15
Payer: MEDICARE

## 2026-05-27 ENCOUNTER — APPOINTMENT (OUTPATIENT)
Dept: UROLOGY | Facility: CLINIC | Age: 76
End: 2026-05-27
Payer: MEDICARE

## (undated) DEVICE — SHEATH, PINNACLE, 10 CM,  5FR INTRODUCER, 5FR DIA, 2.5 CM DIALATOR

## (undated) DEVICE — DEVICE, INFLATION, ENCORE 20

## (undated) DEVICE — INTRODUCER SET, MICROPUNCT, STIFF, 4FR 10CM,PLATINUM TIP,NITINOLWIRE

## (undated) DEVICE — CATHETER, QUICKCROSS SUPPORT .035 X 90CM

## (undated) DEVICE — TOWEL, SURGICAL, NEURO, O/R, 16 X 26, BLUE, STERILE

## (undated) DEVICE — GUIDEWIRE, EXCHANGE, HEAVY DUTY, CURVED, COATED, ROSEN, 0.035 IN X 260 CM

## (undated) DEVICE — ACCESS KIT, S-MAK MINI, 5FR 10CM 0.018IN 40CM, SS/SS, ECHO ENHANCE NEEDLE

## (undated) DEVICE — CATHETER, QUICKCROSS SUPPORT .035 X 135CM

## (undated) DEVICE — Device

## (undated) DEVICE — CATHETER, 5 FR. 65CM, ANGLE TAPER AT TIP

## (undated) DEVICE — SHEATH, PINNACLE, 10 CM,  6FR INTRODUCER, 6FR DIA, 2.5 CM DIALATOR

## (undated) DEVICE — SHEATH, INTRODUCER, 10CM, 7FR, R/O RADIOPAQUE MARKER, 7FR DIA, 2.5 CM DILATOR.

## (undated) DEVICE — GLOVE, SURGICAL, PROTEXIS PI MICRO, 7.5, PF, LF

## (undated) DEVICE — BANDAGE, QUIKCLOT, INTERVENTIONAL HEMO, W/O SLIT

## (undated) DEVICE — GUIDEWIRE, ANGLE TIP,  .035 DIA, 260 CM, 3 CM TIP"

## (undated) DEVICE — COVER PROBE, SOFT FLEX W/ GEL, 5 X 48 IN (13X122CM)

## (undated) DEVICE — TUBING, MANIFOLD, LOW PRESSURE

## (undated) DEVICE — CATHETER, DIAGNOSTIC, 5FR, IM

## (undated) DEVICE — GLOVE, SURGICAL, PROTEXIS PI MICRO, 6.0, PF, LF

## (undated) DEVICE — CATHETER, BALLOON, INPACT AV, DCB 018 5.0 MM X 100 MM X 130 CM

## (undated) DEVICE — TAPE, UMBILICAL, 1/8 X 30 IN, MULTIPACK, COTTON, WHITE

## (undated) DEVICE — SUTURE, VICRYL, 2-0, 18 IN, CT-1, UNDYED

## (undated) DEVICE — CATHETER, 4 FR. 100CM, ANGLE TAPER A TIP

## (undated) DEVICE — CATHETER, INPACT ADMIRAL, PACLITAXEL COATED , 6FR, 5MM X 250MM X 130CM

## (undated) DEVICE — SPONGE, HEMOSTAT, SURGICEL FIBRILLAR, ABS, 4 X 4, LF

## (undated) DEVICE — SYSTEM KIT, INCISION, PREVENA PEEL AND PLACE, 13CM

## (undated) DEVICE — SHEATH, 45CM, W/DILATOR, 6 FR DIA, ST CURVE STYLE W/CROSS-CUT VALVE

## (undated) DEVICE — TRAY, MINOR, SINGLE BASIN, STERILE

## (undated) DEVICE — DRAPE, INCISE, ANTIMICROBIAL, IOBAN 2, LARGE, 17 X 23 IN, DISPOSABLE, STERILE

## (undated) DEVICE — CATHETER, BALLOON, CHOCOLATE, 5.0MM X 40MM, OTW PTA

## (undated) DEVICE — INSERT, CLAMP, SURGICAL, SOFT/TRACTION, STEALTH, 1 MM

## (undated) DEVICE — CATHETER TRAY, SURESTEP, 16FR, URINE METER W/STATLOCK

## (undated) DEVICE — GUIDEWIRE, GOLD TIP, 45 DEG ANGLE, 300 CM, 3CM TIP

## (undated) DEVICE — INTRODUCER, MICROPUNCTURE, 2.9/4.0 FR, W/ GUIDEWIRE, ECHO

## (undated) DEVICE — GUIDEWIRE, STRAIGHT, AMPLATZ SUPER STIFF, 0.035 IN X 180 CM

## (undated) DEVICE — SHEATH, 45CM, W/DILATOR, 7 FR DIA, ST CURVE STYLE W/CROSS-CUT VALVE

## (undated) DEVICE — STAPLER, SKIN PROXIMATE, 35 WIDE

## (undated) DEVICE — CATHETER, BALLOON, INPACT AV, DCB 018 5.0 MM X 150 MM X 130 CM

## (undated) DEVICE — PROTECTOR, NERVE, ULNAR, PINK

## (undated) DEVICE — CATHETER, BALLOON, CHOCOLATE, 6.0MM X 40MM, OTW PTA

## (undated) DEVICE — SPONGE, HEMOSTATIC, GELATIN, SURGIFOAM, 8 X 12.5 CM X 10 MM

## (undated) DEVICE — GUIDEWIRE, STIFF SHAFT, ANGLE TIP, .035 DIA, 260 CM,  3 CM TIP"

## (undated) DEVICE — GUIDEWIRE, STRAIGHT, SUPER STIFF, AMPLATZ, 0.035 IN X 260 CM

## (undated) DEVICE — DRESSING, ISLAND, ADHESIVE, TELFA, 4 X 8 IN

## (undated) DEVICE — SUTURE, PROLENE, 6-0, 30 IN, BV-1 BV-1

## (undated) DEVICE — DRAPE, INCISE, ANTIMICROBIAL, IOBAN 2, STERI DRAPE, 23 X 33 IN, DISPOSABLE, STERILE

## (undated) DEVICE — CATHETER, TEMPO, UNIV FLUSH, 4FR, 65CM

## (undated) DEVICE — GUIDEWIRE, ANGLE TIP,  .035 DIA, 180 CM, 3 CM TIP"

## (undated) DEVICE — COVER, TABLE, UHC

## (undated) DEVICE — CATHETER, CXI SUPPORT, .018 X 150CM, ANG TIP

## (undated) DEVICE — SUTURE, PROLENE, 5-0, 36 IN, C-1, CV-11, BLUE

## (undated) DEVICE — CLOSURE SYSTEM, VASCULAR, VASCADE 6/7F VCS

## (undated) DEVICE — SUTURE, SILK, 3-0, 18 IN SH/CR, BLACK

## (undated) DEVICE — MANIFOLD, 4 PORT NEPTUNE STANDARD

## (undated) DEVICE — SUTURE, SILK, 2-0, 30 IN, SH, BLACK

## (undated) DEVICE — TORQUE DEVICE, ACCOMODATES WIRES W/DIA .010 TO .038"."

## (undated) DEVICE — SHEATH, PINNACLE, 10 CM,  4FR INTRODUCER, 4FR DIA, 2.5 CM DIALATOR

## (undated) DEVICE — GLOVE, SURGICAL, PROTEXIS PI BLUE W/NEUTHERA, 6.0, PF, LF

## (undated) DEVICE — COVER, TABLE, 44 X 75 IN, DISPOSABLE, LF, STERILE

## (undated) DEVICE — CATHETER, BALLOON, EVERCROSS, 6MM X 80MM X 135XM, OTW PTA

## (undated) DEVICE — APPLICATOR, CHLORAPREP, W/ORANGE TINT, 26ML

## (undated) DEVICE — SUTURE, VICRYL, 3-0, 27 IN, SH

## (undated) DEVICE — COVER, CART, 45 X 27 X 48 IN, CLEAR

## (undated) DEVICE — SHEATH, PINNACLE, W/.038 GUIDEWIRE, 10 CM,  6FR INTRODUCER, 6FR DIA, 2.5 CM DIALATOR

## (undated) DEVICE — GLOVE, SURGICAL, PROTEXIS PI BLUE W/NEUTHERA, 8.0, PF, LF

## (undated) DEVICE — SHEATH, PINNACLE, W/.038 GW 10CM, 5FR INTRODUCER, 2.5 CM DIALATOR

## (undated) DEVICE — CATHETER, 4 FR. 65CM, ANGLE NON-TAPER AT TIP

## (undated) DEVICE — STRAP, CIRCUMFERENTIAL, 2 X 76""

## (undated) DEVICE — SUTURE, PROLENE, 7-0, 30 IN, BV1, DA, BLUE

## (undated) DEVICE — BOWL, BASIN, 32 OZ, STERILE

## (undated) DEVICE — CATHETER, ANGIOGRAPHIC, OMNI-FLUSH, 0.038 IN, 5FR X 70 CM

## (undated) DEVICE — GUIDEWIRE, .035 X 260 BENTSON STR